# Patient Record
Sex: MALE | Race: BLACK OR AFRICAN AMERICAN | NOT HISPANIC OR LATINO | Employment: OTHER | URBAN - METROPOLITAN AREA
[De-identification: names, ages, dates, MRNs, and addresses within clinical notes are randomized per-mention and may not be internally consistent; named-entity substitution may affect disease eponyms.]

---

## 2021-07-27 RX ORDER — DIAPER,BRIEF,INFANT-TODD,DISP
EACH MISCELLANEOUS
COMMUNITY

## 2021-07-27 RX ORDER — GLUCOSAM/MSM/CHOND/HYALURON AC 750-60-150
TABLET ORAL
COMMUNITY

## 2021-07-28 ENCOUNTER — TELEPHONE (OUTPATIENT)
Dept: INTERNAL MEDICINE CLINIC | Facility: CLINIC | Age: 71
End: 2021-07-28

## 2021-07-28 ENCOUNTER — OFFICE VISIT (OUTPATIENT)
Dept: INTERNAL MEDICINE CLINIC | Facility: CLINIC | Age: 71
End: 2021-07-28
Payer: COMMERCIAL

## 2021-07-28 VITALS
OXYGEN SATURATION: 97 % | HEIGHT: 70 IN | WEIGHT: 214 LBS | BODY MASS INDEX: 30.64 KG/M2 | HEART RATE: 77 BPM | DIASTOLIC BLOOD PRESSURE: 90 MMHG | TEMPERATURE: 97.6 F | SYSTOLIC BLOOD PRESSURE: 168 MMHG

## 2021-07-28 DIAGNOSIS — I10 ESSENTIAL HYPERTENSION: ICD-10-CM

## 2021-07-28 DIAGNOSIS — Z12.5 SCREENING FOR PROSTATE CANCER: ICD-10-CM

## 2021-07-28 DIAGNOSIS — N52.9 ERECTILE DYSFUNCTION, UNSPECIFIED ERECTILE DYSFUNCTION TYPE: ICD-10-CM

## 2021-07-28 DIAGNOSIS — Z13.0 ENCOUNTER FOR SCREENING FOR DISEASES OF THE BLOOD AND BLOOD-FORMING ORGANS AND CERTAIN DISORDERS INVOLVING THE IMMUNE MECHANISM: ICD-10-CM

## 2021-07-28 DIAGNOSIS — E55.9 VITAMIN D DEFICIENCY: ICD-10-CM

## 2021-07-28 DIAGNOSIS — Z12.12 SCREENING FOR COLORECTAL CANCER: ICD-10-CM

## 2021-07-28 DIAGNOSIS — Z13.29 SCREENING FOR THYROID DISORDER: ICD-10-CM

## 2021-07-28 DIAGNOSIS — Z11.59 NEED FOR HEPATITIS C SCREENING TEST: ICD-10-CM

## 2021-07-28 DIAGNOSIS — Z12.11 SCREENING FOR COLORECTAL CANCER: ICD-10-CM

## 2021-07-28 DIAGNOSIS — Z13.220 SCREENING FOR HYPERCHOLESTEROLEMIA: ICD-10-CM

## 2021-07-28 DIAGNOSIS — H54.7 POOR VISION: ICD-10-CM

## 2021-07-28 DIAGNOSIS — Z00.00 WELCOME TO MEDICARE PREVENTIVE VISIT: Primary | ICD-10-CM

## 2021-07-28 DIAGNOSIS — Z23 ENCOUNTER FOR IMMUNIZATION: ICD-10-CM

## 2021-07-28 DIAGNOSIS — Z13.1 SCREENING FOR DIABETES MELLITUS: ICD-10-CM

## 2021-07-28 PROCEDURE — 1160F RVW MEDS BY RX/DR IN RCRD: CPT | Performed by: INTERNAL MEDICINE

## 2021-07-28 PROCEDURE — 93000 ELECTROCARDIOGRAM COMPLETE: CPT | Performed by: INTERNAL MEDICINE

## 2021-07-28 PROCEDURE — 1170F FXNL STATUS ASSESSED: CPT | Performed by: INTERNAL MEDICINE

## 2021-07-28 PROCEDURE — 3725F SCREEN DEPRESSION PERFORMED: CPT | Performed by: INTERNAL MEDICINE

## 2021-07-28 PROCEDURE — 3008F BODY MASS INDEX DOCD: CPT | Performed by: INTERNAL MEDICINE

## 2021-07-28 PROCEDURE — 1101F PT FALLS ASSESS-DOCD LE1/YR: CPT | Performed by: INTERNAL MEDICINE

## 2021-07-28 PROCEDURE — 3288F FALL RISK ASSESSMENT DOCD: CPT | Performed by: INTERNAL MEDICINE

## 2021-07-28 PROCEDURE — 1036F TOBACCO NON-USER: CPT | Performed by: INTERNAL MEDICINE

## 2021-07-28 PROCEDURE — 1125F AMNT PAIN NOTED PAIN PRSNT: CPT | Performed by: INTERNAL MEDICINE

## 2021-07-28 PROCEDURE — G0402 INITIAL PREVENTIVE EXAM: HCPCS | Performed by: INTERNAL MEDICINE

## 2021-07-28 RX ORDER — SILDENAFIL 50 MG/1
50 TABLET, FILM COATED ORAL DAILY PRN
Qty: 10 TABLET | Refills: 5 | Status: SHIPPED | OUTPATIENT
Start: 2021-07-28

## 2021-07-28 NOTE — ASSESSMENT & PLAN NOTE
Next patient's 1st visit here, patient get some outpatient readings to see how he is and will recheck in a few months, will also check

## 2021-07-28 NOTE — ASSESSMENT & PLAN NOTE
Discussed preventative health, cancer screening, immunizations, and safety issues  I recommend screening colonoscopy  I recommend screening labs including hepatitis C screening  Regarding immunizations, I recommend yearly flu shot, I recommend Prevnar 13, then Pneumovax 23 one year later  I recommend Tdap vaccination at pharmacy  I recommend getting the Shingrix shot to help prevent Shingles  You can get it a pharmacy, and they can administer it there  It is a two shot series with the second shot needed between 2-6 months after the first shot  I would not recommend getting the shot before an important or fun event in case you were to have a reaction to the shot like a sore arm or flu-like symptoms  I make the same recommendation about any shot, as people can have a reaction to any shot

## 2021-07-28 NOTE — PATIENT INSTRUCTIONS
Problem List Items Addressed This Visit        Cardiovascular and Mediastinum    Essential hypertension       Next patient's 1st visit here, patient get some outpatient readings to see how he is and will recheck in a few months, will also check            Other    Welcome to Medicare preventive visit - Primary     Discussed preventative health, cancer screening, immunizations, and safety issues  I recommend screening colonoscopy  I recommend screening labs including hepatitis C screening  Regarding immunizations, I recommend yearly flu shot, I recommend Prevnar 13, then Pneumovax 23 one year later  I recommend Tdap vaccination at pharmacy  I recommend getting the Shingrix shot to help prevent Shingles  You can get it a pharmacy, and they can administer it there  It is a two shot series with the second shot needed between 2-6 months after the first shot  I would not recommend getting the shot before an important or fun event in case you were to have a reaction to the shot like a sore arm or flu-like symptoms  I make the same recommendation about any shot, as people can have a reaction to any shot           Relevant Orders    POCT ECG (Completed)    Erectile dysfunction     Try Viagra 50 mg as needed and also check testosterone level         Relevant Medications    sildenafil (VIAGRA) 50 MG tablet    Other Relevant Orders    Testosterone, free, total      Other Visit Diagnoses     Need for hepatitis C screening test        Relevant Orders    Hepatitis C Antibody ([facility])    Screening for colorectal cancer        Relevant Orders    Ambulatory referral to Gastroenterology    Encounter for immunization        Encounter for screening for diseases of the blood and blood-forming organs and certain disorders involving the immune mechanism        Relevant Orders    CBC and differential    Screening for diabetes mellitus        Relevant Orders    Comprehensive metabolic panel    Screening for hypercholesterolemia        Relevant Orders    Lipid Panel with Direct LDL reflex    Screening for thyroid disorder        Relevant Orders    TSH, 3rd generation with Free T4 reflex    Screening for prostate cancer        Relevant Orders    PSA, Total Screen    Poor vision        Relevant Orders    Ambulatory referral to Ophthalmology    Vitamin D deficiency        Relevant Orders    Vitamin D 25 hydroxy          Medicare Preventive Visit Patient Instructions  Thank you for completing your Welcome to Medicare Visit or Medicare Annual Wellness Visit today  Your next wellness visit will be due in one year (7/29/2022)  The screening/preventive services that you may require over the next 5-10 years are detailed below  Some tests may not apply to you based off risk factors and/or age  Screening tests ordered at today's visit but not completed yet may show as past due  Also, please note that scanned in results may not display below  Preventive Screenings:  Service Recommendations Previous Testing/Comments   Colorectal Cancer Screening  · Colonoscopy    · Fecal Occult Blood Test (FOBT)/Fecal Immunochemical Test (FIT)  · Fecal DNA/Cologuard Test  · Flexible Sigmoidoscopy Age: 54-65 years old   Colonoscopy: every 10 years (May be performed more frequently if at higher risk)  OR  FOBT/FIT: every 1 year  OR  Cologuard: every 3 years  OR  Sigmoidoscopy: every 5 years  Screening may be recommended earlier than age 48 if at higher risk for colorectal cancer  Also, an individualized decision between you and your healthcare provider will decide whether screening between the ages of 74-80 would be appropriate   Colonoscopy: Not on file  FOBT/FIT: Not on file  Cologuard: Not on file  Sigmoidoscopy: Not on file          Prostate Cancer Screening Individualized decision between patient and health care provider in men between ages of 53-78   Medicare will cover every 12 months beginning on the day after your 50th birthday PSA: No results in last 5 years           Hepatitis C Screening Once for adults born between 1945 and 1965  More frequently in patients at high risk for Hepatitis C Hep C Antibody: Not on file        Diabetes Screening 1-2 times per year if you're at risk for diabetes or have pre-diabetes Fasting glucose: No results in last 5 years   A1C: No results in last 5 years        Cholesterol Screening Once every 5 years if you don't have a lipid disorder  May order more often based on risk factors  Lipid panel: Not on file           Other Preventive Screenings Covered by Medicare:  1  Abdominal Aortic Aneurysm (AAA) Screening: covered once if your at risk  You're considered to be at risk if you have a family history of AAA or a male between the age of 73-68 who smoking at least 100 cigarettes in your lifetime  2  Lung Cancer Screening: covers low dose CT scan once per year if you meet all of the following conditions: (1) Age 50-69; (2) No signs or symptoms of lung cancer; (3) Current smoker or have quit smoking within the last 15 years; (4) You have a tobacco smoking history of at least 30 pack years (packs per day x number of years you smoked); (5) You get a written order from a healthcare provider  3  Glaucoma Screening: covered annually if you're considered high risk: (1) You have diabetes OR (2) Family history of glaucoma OR (3)  aged 48 and older OR (3)  American aged 72 and older  3  Osteoporosis Screening: covered every 2 years if you meet one of the following conditions: (1) Have a vertebral abnormality; (2) On glucocorticoid therapy for more than 3 months; (3) Have primary hyperparathyroidism; (4) On osteoporosis medications and need to assess response to drug therapy  5  HIV Screening: covered annually if you're between the age of 12-76  Also covered annually if you are younger than 13 and older than 72 with risk factors for HIV infection   For pregnant patients, it is covered up to 3 times per pregnancy  Immunizations:  Immunization Recommendations   Influenza Vaccine Annual influenza vaccination during flu season is recommended for all persons aged >= 6 months who do not have contraindications   Pneumococcal Vaccine (Prevnar and Pneumovax)  * Prevnar = PCV13  * Pneumovax = PPSV23 Adults 25-60 years old: 1-3 doses may be recommended based on certain risk factors  Adults 72 years old: Prevnar (PCV13) vaccine recommended followed by Pneumovax (PPSV23) vaccine  If already received PPSV23 since turning 65, then PCV13 recommended at least one year after PPSV23 dose  Hepatitis B Vaccine 3 dose series if at intermediate or high risk (ex: diabetes, end stage renal disease, liver disease)   Tetanus (Td) Vaccine - COST NOT COVERED BY MEDICARE PART B Following completion of primary series, a booster dose should be given every 10 years to maintain immunity against tetanus  Td may also be given as tetanus wound prophylaxis  Tdap Vaccine - COST NOT COVERED BY MEDICARE PART B Recommended at least once for all adults  For pregnant patients, recommended with each pregnancy  Shingles Vaccine (Shingrix) - COST NOT COVERED BY MEDICARE PART B  2 shot series recommended in those aged 48 and above     Health Maintenance Due:      Topic Date Due    Hepatitis C Screening  Never done    Colorectal Cancer Screening  Never done     Immunizations Due:      Topic Date Due    Pneumococcal Vaccine: 65+ Years (1 of 1 - PPSV23) Never done    Influenza Vaccine (1) 09/01/2021     Advance Directives   What are advance directives? Advance directives are legal documents that state your wishes and plans for medical care  These plans are made ahead of time in case you lose your ability to make decisions for yourself  Advance directives can apply to any medical decision, such as the treatments you want, and if you want to donate organs  What are the types of advance directives?   There are many types of advance directives, and each state has rules about how to use them  You may choose a combination of any of the following:  · Living will: This is a written record of the treatment you want  You can also choose which treatments you do not want, which to limit, and which to stop at a certain time  This includes surgery, medicine, IV fluid, and tube feedings  · Durable power of  for healthcare [facility]): This is a written record that states who you want to make healthcare choices for you when you are unable to make them for yourself  This person, called a proxy, is usually a family member or a friend  You may choose more than 1 proxy  · Do not resuscitate (DNR) order:  A DNR order is used in case your heart stops beating or you stop breathing  It is a request not to have certain forms of treatment, such as CPR  A DNR order may be included in other types of advance directives  · Medical directive: This covers the care that you want if you are in a coma, near death, or unable to make decisions for yourself  You can list the treatments you want for each condition  Treatment may include pain medicine, surgery, blood transfusions, dialysis, IV or tube feedings, and a ventilator (breathing machine)  · Values history: This document has questions about your views, beliefs, and how you feel and think about life  This information can help others choose the care that you would choose  Why are advance directives important? An advance directive helps you control your care  Although spoken wishes may be used, it is better to have your wishes written down  Spoken wishes can be misunderstood, or not followed  Treatments may be given even if you do not want them  An advance directive may make it easier for your family to make difficult choices about your care     Weight Management   Why it is important to manage your weight:  Being overweight increases your risk of health conditions such as heart disease, high blood pressure, type 2 diabetes, and certain types of cancer  It can also increase your risk for osteoarthritis, sleep apnea, and other respiratory problems  Aim for a slow, steady weight loss  Even a small amount of weight loss can lower your risk of health problems  How to lose weight safely:  A safe and healthy way to lose weight is to eat fewer calories and get regular exercise  You can lose up about 1 pound a week by decreasing the number of calories you eat by 500 calories each day  Healthy meal plan for weight management:  A healthy meal plan includes a variety of foods, contains fewer calories, and helps you stay healthy  A healthy meal plan includes the following:  · Eat whole-grain foods more often  A healthy meal plan should contain fiber  Fiber is the part of grains, fruits, and vegetables that is not broken down by your body  Whole-grain foods are healthy and provide extra fiber in your diet  Some examples of whole-grain foods are whole-wheat breads and pastas, oatmeal, brown rice, and bulgur  · Eat a variety of vegetables every day  Include dark, leafy greens such as spinach, kale, theo greens, and mustard greens  Eat yellow and orange vegetables such as carrots, sweet potatoes, and winter squash  · Eat a variety of fruits every day  Choose fresh or canned fruit (canned in its own juice or light syrup) instead of juice  Fruit juice has very little or no fiber  · Eat low-fat dairy foods  Drink fat-free (skim) milk or 1% milk  Eat fat-free yogurt and low-fat cottage cheese  Try low-fat cheeses such as mozzarella and other reduced-fat cheeses  · Choose meat and other protein foods that are low in fat  Choose beans or other legumes such as split peas or lentils  Choose fish, skinless poultry (chicken or turkey), or lean cuts of red meat (beef or pork)  Before you cook meat or poultry, cut off any visible fat  · Use less fat and oil  Try baking foods instead of frying them   Add less fat, such as margarine, sour cream, regular salad dressing and mayonnaise to foods  Eat fewer high-fat foods  Some examples of high-fat foods include french fries, doughnuts, ice cream, and cakes  · Eat fewer sweets  Limit foods and drinks that are high in sugar  This includes candy, cookies, regular soda, and sweetened drinks  Exercise:  Exercise at least 30 minutes per day on most days of the week  Some examples of exercise include walking, biking, dancing, and swimming  You can also fit in more physical activity by taking the stairs instead of the elevator or parking farther away from stores  Ask your healthcare provider about the best exercise plan for you  © Copyright [employer] 2018 Information is for End User's use only and may not be sold, redistributed or otherwise used for commercial purposes   All illustrations and images included in CareNotes® are the copyrighted property of [employer]  or [address] [place] [address]

## 2021-07-28 NOTE — PROGRESS NOTES
Assessment and Plan:     Problem List Items Addressed This Visit        Cardiovascular and Mediastinum    Essential hypertension       Next patient's 1st visit here, patient get some outpatient readings to see how he is and will recheck in a few months, will also check            Other    Welcome to Medicare preventive visit - Primary     Discussed preventative health, cancer screening, immunizations, and safety issues  I recommend screening colonoscopy  I recommend screening labs including hepatitis C screening  Regarding immunizations, I recommend yearly flu shot, I recommend Prevnar 13, then Pneumovax 23 one year later  I recommend Tdap vaccination at pharmacy  I recommend getting the Shingrix shot to help prevent Shingles  You can get it a pharmacy, and they can administer it there  It is a two shot series with the second shot needed between 2-6 months after the first shot  I would not recommend getting the shot before an important or fun event in case you were to have a reaction to the shot like a sore arm or flu-like symptoms  I make the same recommendation about any shot, as people can have a reaction to any shot           Relevant Orders    POCT ECG (Completed)    Erectile dysfunction     Try Viagra 50 mg as needed and also check testosterone level         Relevant Medications    sildenafil (VIAGRA) 50 MG tablet    Other Relevant Orders    Testosterone, free, total      Other Visit Diagnoses     Need for hepatitis C screening test        Relevant Orders    Hepatitis C Antibody ([facility])    Screening for colorectal cancer        Relevant Orders    Ambulatory referral to Gastroenterology    Encounter for immunization        Encounter for screening for diseases of the blood and blood-forming organs and certain disorders involving the immune mechanism        Relevant Orders    CBC and differential    Screening for diabetes mellitus        Relevant Orders    Comprehensive metabolic panel Screening for hypercholesterolemia        Relevant Orders    Lipid Panel with Direct LDL reflex    Screening for thyroid disorder        Relevant Orders    TSH, 3rd generation with Free T4 reflex    Screening for prostate cancer        Relevant Orders    PSA, Total Screen    Poor vision        Relevant Orders    Ambulatory referral to Ophthalmology    Vitamin D deficiency        Relevant Orders    Vitamin D 25 hydroxy        BMI Counseling: Body mass index is 30 71 kg/m²  The BMI is above normal  Nutrition recommendations include encouraging healthy choices of fruits and vegetables and moderation in carbohydrate intake  Exercise recommendations include exercising 3-5 times per week  Preventive health issues were discussed with patient, and age appropriate screening tests were ordered as noted in patient's After Visit Summary  Personalized health advice and appropriate referrals for health education or preventive services given if needed, as noted in patient's After Visit Summary  History of Present Illness:     Patient presents for Medicare Annual Wellness visit    Patient Care Team:  Evan Watson MD as PCP - General (Internal Medicine)     Problem List:     Patient Active Problem List   Diagnosis    Welcome to Medicare preventive visit    Essential hypertension    Erectile dysfunction      Past Medical and Surgical History:     No past medical history on file    Past Surgical History:   Procedure Laterality Date    APPENDECTOMY        Family History:     Family History   Problem Relation Age of Onset    No Known Problems Mother     Cancer Father         stomach      Social History:     Social History     Socioeconomic History    Marital status: /Civil Union     Spouse name: Not on file    Number of children: Not on file    Years of education: Not on file    Highest education level: Not on file   Occupational History    Occupation: retired     Comment: construction   Tobacco Use    Smoking status: Never Smoker    Smokeless tobacco: Never Used   Substance and Sexual Activity    Alcohol use: Not Currently    Drug use: Not on file    Sexual activity: Yes     Partners: Female   Other Topics Concern    Not on file   Social History Narrative     with kids and grandkids     Social Determinants of Health     Financial Resource Strain:     Difficulty of Paying Living Expenses:    Food Insecurity:     Worried About Running Out of Food in the Last Year:     [address] in the Last Year:    Transportation Needs:     Lack of Transportation (Medical):  Lack of Transportation (Non-Medical):    Physical Activity:     Days of Exercise per Week:     Minutes of Exercise per Session:    Stress:     Feeling of Stress :    Social Connections:     Frequency of Communication with Friends and Family:     Frequency of Social Gatherings with Friends and Family:     Attends Jew Services:     Active Member of Clubs or Organizations:     Attends Club or Organization Meetings:     Marital Status:    Intimate Partner Violence:     Fear of Current or Ex-Partner:     Emotionally Abused:     Physically Abused:     Sexually Abused:       Medications and Allergies:     Current Outpatient Medications   Medication Sig Dispense Refill    Glucos-Chondroit-Hyaluron-MSM (Glucosamine Chondroitin Joint) TABS Take by mouth      Multiple Vitamins-Minerals (CENTRUM SILVER PO) Take by mouth      hydrocortisone (Cortizone-10) 1 % ointment Apply topically (Patient not taking: Reported on 7/28/2021)      sildenafil (VIAGRA) 50 MG tablet Take 1 tablet (50 mg total) by mouth daily as needed for erectile dysfunction 10 tablet 5    triamcinolone (KENALOG) 0 1 % ointment Apply topically 2 (two) times a day (Patient not taking: Reported on 7/28/2021)       No current facility-administered medications for this visit       Allergies   Allergen Reactions    Penicillins Other (See Comments) Immunizations:     Immunization History   Administered Date(s) Administered    SARS-CoV-2 / COVID-19 mRNA IM (Pfizer-BioNTech) 03/28/2021, 04/18/2021      Health Maintenance:         Topic Date Due    Hepatitis C Screening  Never done    Colorectal Cancer Screening  Never done         Topic Date Due    Pneumococcal Vaccine: 65+ Years (1 of 1 - PPSV23) Never done    Influenza Vaccine (1) 09/01/2021      Medicare Health Risk Assessment:     /90   Pulse 77   Temp 97 6 °F (36 4 °C) (Temporal)   Ht 5' 10" (1 778 m)   Wt 97 1 kg (214 lb)   SpO2 97%   BMI 30 71 kg/m²      Ansley Jackson is here for his Welcome to Medicare visit  Health Risk Assessment:   Patient rates overall health as fair  Patient feels that their physical health rating is slightly better  Patient is satisfied with their life  Eyesight was rated as same  Hearing was rated as same  Patient feels that their emotional and mental health rating is slightly worse  Patients states they are never, rarely angry  Patient states they are often unusually tired/fatigued  Pain experienced in the last 7 days has been none  Patient states that he has experienced no weight loss or gain in last 6 months  Depression Screening:   PHQ-2 Score: 0      Fall Risk Screening: In the past year, patient has experienced: no history of falling in past year      Home Safety:  Patient does not have trouble with stairs inside or outside of their home  Patient has working smoke alarms and has working carbon monoxide detector  Home safety hazards include: none  Nutrition:   Current diet is Regular  Medications:   Patient is currently taking over-the-counter supplements  OTC medications include: see medication list  Patient is able to manage medications       Activities of Daily Living (ADLs)/Instrumental Activities of Daily Living (IADLs):   Walk and transfer into and out of bed and chair?: Yes  Dress and groom yourself?: Yes    Bathe or shower yourself?: Yes Feed yourself? Yes  Do your laundry/housekeeping?: Yes  Manage your money, pay your bills and track your expenses?: Yes  Make your own meals?: Yes    Do your own shopping?: Yes    Previous Hospitalizations:   Any hospitalizations or ED visits within the last 12 months?: No      Advance Care Planning:   Living will: No    Durable POA for healthcare: Yes    Advanced directive: No    Advanced directive counseling given: Yes      Cognitive Screening:   Provider or family/friend/caregiver concerned regarding cognition?: No    PREVENTIVE SCREENINGS      Cardiovascular Screening:    General: Risks and Benefits Discussed    Due for: Lipid Panel      Diabetes Screening:     General: Risks and Benefits Discussed    Due for: Blood Glucose      Colorectal Cancer Screening:     General: Risks and Benefits Discussed    Due for: Colonoscopy - Low Risk      Prostate Cancer Screening:    General: Risks and Benefits Discussed    Due for: PSA      Osteoporosis Screening:    General: Screening Not Indicated      Abdominal Aortic Aneurysm (AAA) Screening:    Risk factors include: age between 73-67 yo        General: Screening Not Indicated      Lung Cancer Screening:     General: Screening Not Indicated      Hepatitis C Screening:    General: Risks and Benefits Discussed    Hep C Screening Accepted: Yes      Screening, Brief Intervention, and Referral to Treatment (SBIRT)    Screening    Typical number of drinks in a week: 0    Single Item Drug Screening:  How often have you used an illegal drug (including marijuana) or a prescription medication for non-medical reasons in the past year? never    Single Item Drug Screen Score: 0  Interpretation: Negative screen for possible drug use disorder    Brief Intervention  Alcohol & drug use screenings were reviewed  No concerns regarding substance use disorder identified  Other Counseling Topics:   Car/seat belt/driving safety, skin self-exam and sunscreen       Review of Systems Constitutional: Negative for chills, fatigue and fever  HENT: Negative for congestion, nosebleeds, postnasal drip, sore throat and trouble swallowing  Eyes: Negative for pain  Respiratory: Negative for cough, chest tightness, shortness of breath and wheezing  Cardiovascular: Positive for palpitations (occasional)  Negative for chest pain and leg swelling  Gastrointestinal: Negative for abdominal pain, constipation, diarrhea, nausea and vomiting  Endocrine: Negative for polydipsia and polyuria  Genitourinary: Negative for dysuria, flank pain and hematuria  Has some ED   Musculoskeletal: Positive for arthralgias  Skin: Negative for rash  Neurological: Negative for dizziness, tremors, light-headedness and headaches  Hematological: Does not bruise/bleed easily  Psychiatric/Behavioral: Negative for confusion and dysphoric mood  The patient is not nervous/anxious  Physical Exam  Vitals reviewed  Constitutional:       General: He is not in acute distress  Appearance: Normal appearance  He is well-developed  HENT:      Head: Normocephalic and atraumatic  Right Ear: External ear normal       Left Ear: External ear normal       Nose: Nose normal    Eyes:      General: No scleral icterus  Conjunctiva/sclera: Conjunctivae normal       Comments: 20/40 vision in both eyes uncorrected, 20/50 in right eye uncorrected, 20/50 left eye uncorrected   Neck:      Trachea: No tracheal deviation  Cardiovascular:      Rate and Rhythm: Normal rate and regular rhythm  Heart sounds: Normal heart sounds  No murmur heard  Pulmonary:      Effort: Pulmonary effort is normal  No respiratory distress  Breath sounds: Normal breath sounds  No wheezing or rales  Abdominal:      General: Bowel sounds are normal       Palpations: Abdomen is soft  There is no mass  Tenderness: There is no abdominal tenderness  There is no guarding        Hernia: There is no hernia in the left inguinal area or right inguinal area  Genitourinary:     Epididymis:      Right: Normal       Comments: Left testicle absent  Musculoskeletal:      Cervical back: Normal range of motion and neck supple  Right lower leg: No edema  Left lower leg: No edema  Lymphadenopathy:      Cervical: No cervical adenopathy  Skin:     Coloration: Skin is not jaundiced or pale  Neurological:      General: No focal deficit present  Mental Status: He is alert and oriented to person, place, and time  Psychiatric:         Mood and Affect: Mood normal          Behavior: Behavior normal          Thought Content:  Thought content normal          Judgment: Judgment normal        Qi Boudreaux MD

## 2021-07-28 NOTE — TELEPHONE ENCOUNTER
Pt called to ask if there is a less expensive option for Viagra - the pharmacy advised him it is $300 for 10 pills  Is there a generic or alternitive?

## 2021-08-11 ENCOUNTER — LAB (OUTPATIENT)
Dept: LAB | Facility: HOSPITAL | Age: 71
End: 2021-08-11
Payer: COMMERCIAL

## 2021-08-11 DIAGNOSIS — E55.9 VITAMIN D DEFICIENCY: ICD-10-CM

## 2021-08-11 DIAGNOSIS — Z13.1 SCREENING FOR DIABETES MELLITUS: ICD-10-CM

## 2021-08-11 DIAGNOSIS — N52.9 ERECTILE DYSFUNCTION, UNSPECIFIED ERECTILE DYSFUNCTION TYPE: ICD-10-CM

## 2021-08-11 DIAGNOSIS — Z12.5 SCREENING FOR PROSTATE CANCER: ICD-10-CM

## 2021-08-11 DIAGNOSIS — Z11.59 NEED FOR HEPATITIS C SCREENING TEST: ICD-10-CM

## 2021-08-11 DIAGNOSIS — Z13.220 SCREENING FOR HYPERCHOLESTEROLEMIA: ICD-10-CM

## 2021-08-11 DIAGNOSIS — Z13.29 SCREENING FOR THYROID DISORDER: ICD-10-CM

## 2021-08-11 DIAGNOSIS — Z13.0 ENCOUNTER FOR SCREENING FOR DISEASES OF THE BLOOD AND BLOOD-FORMING ORGANS AND CERTAIN DISORDERS INVOLVING THE IMMUNE MECHANISM: ICD-10-CM

## 2021-08-11 LAB
25(OH)D3 SERPL-MCNC: 34 NG/ML (ref 30–100)
ALBUMIN SERPL BCP-MCNC: 3.7 G/DL (ref 3.5–5)
ALP SERPL-CCNC: 61 U/L (ref 46–116)
ALT SERPL W P-5'-P-CCNC: 37 U/L (ref 12–78)
ANION GAP SERPL CALCULATED.3IONS-SCNC: 7 MMOL/L (ref 4–13)
AST SERPL W P-5'-P-CCNC: 27 U/L (ref 5–45)
BASOPHILS # BLD AUTO: 0.04 THOUSANDS/ΜL (ref 0–0.1)
BASOPHILS NFR BLD AUTO: 1 % (ref 0–1)
BILIRUB SERPL-MCNC: 0.48 MG/DL (ref 0.2–1)
BUN SERPL-MCNC: 17 MG/DL (ref 5–25)
CALCIUM SERPL-MCNC: 8.9 MG/DL (ref 8.3–10.1)
CHLORIDE SERPL-SCNC: 111 MMOL/L (ref 100–108)
CHOLEST SERPL-MCNC: 171 MG/DL (ref 50–200)
CO2 SERPL-SCNC: 28 MMOL/L (ref 21–32)
CREAT SERPL-MCNC: 1.35 MG/DL (ref 0.6–1.3)
EOSINOPHIL # BLD AUTO: 0.18 THOUSAND/ΜL (ref 0–0.61)
EOSINOPHIL NFR BLD AUTO: 3 % (ref 0–6)
ERYTHROCYTE [DISTWIDTH] IN BLOOD BY AUTOMATED COUNT: 13.9 % (ref 11.6–15.1)
GFR SERPL CREATININE-BSD FRML MDRD: 60 ML/MIN/1.73SQ M
GLUCOSE P FAST SERPL-MCNC: 97 MG/DL (ref 65–99)
HCT VFR BLD AUTO: 41.4 % (ref 36.5–49.3)
HCV AB SER QL: NORMAL
HDLC SERPL-MCNC: 63 MG/DL
HGB BLD-MCNC: 13.6 G/DL (ref 12–17)
IMM GRANULOCYTES # BLD AUTO: 0.01 THOUSAND/UL (ref 0–0.2)
IMM GRANULOCYTES NFR BLD AUTO: 0 % (ref 0–2)
LDLC SERPL CALC-MCNC: 98 MG/DL (ref 0–100)
LYMPHOCYTES # BLD AUTO: 1.85 THOUSANDS/ΜL (ref 0.6–4.47)
LYMPHOCYTES NFR BLD AUTO: 34 % (ref 14–44)
MCH RBC QN AUTO: 31.7 PG (ref 26.8–34.3)
MCHC RBC AUTO-ENTMCNC: 32.9 G/DL (ref 31.4–37.4)
MCV RBC AUTO: 97 FL (ref 82–98)
MONOCYTES # BLD AUTO: 0.54 THOUSAND/ΜL (ref 0.17–1.22)
MONOCYTES NFR BLD AUTO: 10 % (ref 4–12)
NEUTROPHILS # BLD AUTO: 2.81 THOUSANDS/ΜL (ref 1.85–7.62)
NEUTS SEG NFR BLD AUTO: 52 % (ref 43–75)
NRBC BLD AUTO-RTO: 0 /100 WBCS
PLATELET # BLD AUTO: 144 THOUSANDS/UL (ref 149–390)
PMV BLD AUTO: 11.1 FL (ref 8.9–12.7)
POTASSIUM SERPL-SCNC: 3.7 MMOL/L (ref 3.5–5.3)
PROT SERPL-MCNC: 6.7 G/DL (ref 6.4–8.2)
PSA SERPL-MCNC: 2.3 NG/ML (ref 0–4)
RBC # BLD AUTO: 4.29 MILLION/UL (ref 3.88–5.62)
SODIUM SERPL-SCNC: 146 MMOL/L (ref 136–145)
TRIGL SERPL-MCNC: 51 MG/DL
TSH SERPL DL<=0.05 MIU/L-ACNC: 2.23 UIU/ML (ref 0.36–3.74)
WBC # BLD AUTO: 5.43 THOUSAND/UL (ref 4.31–10.16)

## 2021-08-11 PROCEDURE — 80061 LIPID PANEL: CPT

## 2021-08-11 PROCEDURE — 82306 VITAMIN D 25 HYDROXY: CPT

## 2021-08-11 PROCEDURE — 80053 COMPREHEN METABOLIC PANEL: CPT

## 2021-08-11 PROCEDURE — 84402 ASSAY OF FREE TESTOSTERONE: CPT

## 2021-08-11 PROCEDURE — 36415 COLL VENOUS BLD VENIPUNCTURE: CPT

## 2021-08-11 PROCEDURE — 85025 COMPLETE CBC W/AUTO DIFF WBC: CPT

## 2021-08-11 PROCEDURE — 84403 ASSAY OF TOTAL TESTOSTERONE: CPT

## 2021-08-11 PROCEDURE — 86803 HEPATITIS C AB TEST: CPT

## 2021-08-11 PROCEDURE — G0103 PSA SCREENING: HCPCS

## 2021-08-11 PROCEDURE — 84443 ASSAY THYROID STIM HORMONE: CPT

## 2021-08-12 LAB
TESTOST FREE SERPL-MCNC: 8.4 PG/ML (ref 6.6–18.1)
TESTOST SERPL-MCNC: 451 NG/DL (ref 264–916)

## 2021-08-26 ENCOUNTER — TELEPHONE (OUTPATIENT)
Dept: INTERNAL MEDICINE CLINIC | Facility: CLINIC | Age: 71
End: 2021-08-26

## 2021-08-26 NOTE — TELEPHONE ENCOUNTER
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

## 2021-08-27 ENCOUNTER — APPOINTMENT (OUTPATIENT)
Dept: LAB | Facility: HOSPITAL | Age: 71
End: 2021-08-27
Payer: COMMERCIAL

## 2021-08-27 ENCOUNTER — TELEPHONE (OUTPATIENT)
Dept: INTERNAL MEDICINE CLINIC | Facility: CLINIC | Age: 71
End: 2021-08-27

## 2021-08-27 DIAGNOSIS — Z11.1 SCREENING-PULMONARY TB: ICD-10-CM

## 2021-08-27 DIAGNOSIS — Z11.1 SCREENING-PULMONARY TB: Primary | ICD-10-CM

## 2021-08-27 PROCEDURE — 86480 TB TEST CELL IMMUN MEASURE: CPT

## 2021-08-27 PROCEDURE — 36415 COLL VENOUS BLD VENIPUNCTURE: CPT

## 2021-08-27 NOTE — TELEPHONE ENCOUNTER
Patient called asking if you can write a script to get TB test done at another [address] [place] [address]'s location closer to him  Please advise

## 2021-08-31 ENCOUNTER — TELEPHONE (OUTPATIENT)
Dept: INTERNAL MEDICINE CLINIC | Facility: CLINIC | Age: 71
End: 2021-08-31

## 2021-08-31 LAB
GAMMA INTERFERON BACKGROUND BLD IA-ACNC: 0.02 IU/ML
M TB IFN-G BLD-IMP: NEGATIVE
M TB IFN-G CD4+ BCKGRND COR BLD-ACNC: 0 IU/ML
M TB IFN-G CD4+ BCKGRND COR BLD-ACNC: 0 IU/ML
MITOGEN IGNF BCKGRD COR BLD-ACNC: >10 IU/ML

## 2021-08-31 NOTE — TELEPHONE ENCOUNTER
KYLER -- Pt called to adv he had a PPD test done and he needs forms filled out - he is going to send the forms to us via [facility]

## 2021-10-06 ENCOUNTER — OFFICE VISIT (OUTPATIENT)
Dept: INTERNAL MEDICINE CLINIC | Facility: CLINIC | Age: 71
End: 2021-10-06
Payer: COMMERCIAL

## 2021-10-06 VITALS
BODY MASS INDEX: 30.56 KG/M2 | WEIGHT: 213 LBS | OXYGEN SATURATION: 98 % | DIASTOLIC BLOOD PRESSURE: 84 MMHG | TEMPERATURE: 95.8 F | SYSTOLIC BLOOD PRESSURE: 146 MMHG | HEART RATE: 77 BPM

## 2021-10-06 DIAGNOSIS — I10 ESSENTIAL HYPERTENSION: Primary | ICD-10-CM

## 2021-10-06 PROCEDURE — 3077F SYST BP >= 140 MM HG: CPT | Performed by: INTERNAL MEDICINE

## 2021-10-06 PROCEDURE — 1160F RVW MEDS BY RX/DR IN RCRD: CPT | Performed by: INTERNAL MEDICINE

## 2021-10-06 PROCEDURE — 99213 OFFICE O/P EST LOW 20 MIN: CPT | Performed by: INTERNAL MEDICINE

## 2021-10-06 PROCEDURE — 1036F TOBACCO NON-USER: CPT | Performed by: INTERNAL MEDICINE

## 2021-10-06 PROCEDURE — 3079F DIAST BP 80-89 MM HG: CPT | Performed by: INTERNAL MEDICINE

## 2021-10-06 RX ORDER — AMLODIPINE BESYLATE 2.5 MG/1
2.5 TABLET ORAL DAILY
Qty: 90 TABLET | Refills: 3 | Status: SHIPPED | OUTPATIENT
Start: 2021-10-06 | End: 2021-12-29 | Stop reason: SDUPTHER

## 2021-12-29 ENCOUNTER — OFFICE VISIT (OUTPATIENT)
Dept: INTERNAL MEDICINE CLINIC | Facility: CLINIC | Age: 71
End: 2021-12-29
Payer: COMMERCIAL

## 2021-12-29 VITALS
SYSTOLIC BLOOD PRESSURE: 138 MMHG | WEIGHT: 210 LBS | HEIGHT: 71 IN | OXYGEN SATURATION: 98 % | HEART RATE: 66 BPM | BODY MASS INDEX: 29.4 KG/M2 | DIASTOLIC BLOOD PRESSURE: 88 MMHG

## 2021-12-29 DIAGNOSIS — Z12.11 SCREENING FOR COLORECTAL CANCER: ICD-10-CM

## 2021-12-29 DIAGNOSIS — I10 ESSENTIAL HYPERTENSION: Primary | ICD-10-CM

## 2021-12-29 DIAGNOSIS — Z12.12 SCREENING FOR COLORECTAL CANCER: ICD-10-CM

## 2021-12-29 DIAGNOSIS — Z23 ENCOUNTER FOR IMMUNIZATION: ICD-10-CM

## 2021-12-29 DIAGNOSIS — H93.8X1 SENSATION OF PLUGGED EAR ON RIGHT SIDE: ICD-10-CM

## 2021-12-29 DIAGNOSIS — N52.9 ERECTILE DYSFUNCTION, UNSPECIFIED ERECTILE DYSFUNCTION TYPE: ICD-10-CM

## 2021-12-29 PROCEDURE — 90670 PCV13 VACCINE IM: CPT

## 2021-12-29 PROCEDURE — 99214 OFFICE O/P EST MOD 30 MIN: CPT | Performed by: INTERNAL MEDICINE

## 2021-12-29 PROCEDURE — G0009 ADMIN PNEUMOCOCCAL VACCINE: HCPCS

## 2021-12-29 RX ORDER — AMLODIPINE BESYLATE 5 MG/1
5 TABLET ORAL DAILY
Qty: 90 TABLET | Refills: 3 | Status: SHIPPED | OUTPATIENT
Start: 2021-12-29 | End: 2022-03-30 | Stop reason: SDUPTHER

## 2022-03-30 ENCOUNTER — OFFICE VISIT (OUTPATIENT)
Dept: INTERNAL MEDICINE CLINIC | Facility: CLINIC | Age: 72
End: 2022-03-30
Payer: COMMERCIAL

## 2022-03-30 VITALS
OXYGEN SATURATION: 99 % | HEART RATE: 65 BPM | SYSTOLIC BLOOD PRESSURE: 150 MMHG | WEIGHT: 209.4 LBS | DIASTOLIC BLOOD PRESSURE: 84 MMHG | BODY MASS INDEX: 29.31 KG/M2 | HEIGHT: 71 IN

## 2022-03-30 DIAGNOSIS — R35.0 FREQUENT URINATION: ICD-10-CM

## 2022-03-30 DIAGNOSIS — D69.6 PLATELETS DECREASED (HCC): ICD-10-CM

## 2022-03-30 DIAGNOSIS — N52.9 ERECTILE DYSFUNCTION, UNSPECIFIED ERECTILE DYSFUNCTION TYPE: ICD-10-CM

## 2022-03-30 DIAGNOSIS — I10 ESSENTIAL HYPERTENSION: Primary | ICD-10-CM

## 2022-03-30 PROCEDURE — 3725F SCREEN DEPRESSION PERFORMED: CPT | Performed by: INTERNAL MEDICINE

## 2022-03-30 PROCEDURE — 3288F FALL RISK ASSESSMENT DOCD: CPT | Performed by: INTERNAL MEDICINE

## 2022-03-30 PROCEDURE — 1036F TOBACCO NON-USER: CPT | Performed by: INTERNAL MEDICINE

## 2022-03-30 PROCEDURE — 3077F SYST BP >= 140 MM HG: CPT | Performed by: INTERNAL MEDICINE

## 2022-03-30 PROCEDURE — 99214 OFFICE O/P EST MOD 30 MIN: CPT | Performed by: INTERNAL MEDICINE

## 2022-03-30 PROCEDURE — 1160F RVW MEDS BY RX/DR IN RCRD: CPT | Performed by: INTERNAL MEDICINE

## 2022-03-30 PROCEDURE — 3079F DIAST BP 80-89 MM HG: CPT | Performed by: INTERNAL MEDICINE

## 2022-03-30 PROCEDURE — 3008F BODY MASS INDEX DOCD: CPT | Performed by: INTERNAL MEDICINE

## 2022-03-30 PROCEDURE — 1101F PT FALLS ASSESS-DOCD LE1/YR: CPT | Performed by: INTERNAL MEDICINE

## 2022-03-30 PROCEDURE — 1003F LEVEL OF ACTIVITY ASSESS: CPT | Performed by: INTERNAL MEDICINE

## 2022-03-30 RX ORDER — AMLODIPINE BESYLATE 10 MG/1
10 TABLET ORAL DAILY
Qty: 90 TABLET | Refills: 3 | Status: SHIPPED | OUTPATIENT
Start: 2022-03-30

## 2022-03-30 NOTE — PROGRESS NOTES
Assessment/Plan:    Essential hypertension  I recommend increasing amlodipine 5 mg daily to 10 mg daily  No constipation or ankle swelling  Erectile dysfunction  Continue med as needed    Platelets decreased ([address] )  Mild, will continue to monitor       Diagnoses and all orders for this visit:    Essential hypertension  -     amLODIPine (NORVASC) 10 mg tablet; Take 1 tablet (10 mg total) by mouth daily    Erectile dysfunction, unspecified erectile dysfunction type    Frequent urination  -     UA w Reflex to Microscopic w Reflex to Culture    Platelets decreased ([address] )        BMI Counseling: Body mass index is 29 21 kg/m²  The BMI is above normal  Nutrition recommendations include encouraging healthy choices of fruits and vegetables and moderation in carbohydrate intake  Exercise recommendations include exercising 3-5 times per week  Rationale for BMI follow-up plan is due to patient being overweight or obese  Depression Screening and Follow-up Plan: Patient was screened for depression during today's encounter  They screened negative with a PHQ-2 score of 0  Subjective:      Patient ID: Lesli Victoria is a 66 y o  male  Pt here for follow up chronic conditions      The following portions of the patient's history were reviewed and updated as appropriate: allergies, current medications, past family history, past medical history, past social history, past surgical history and problem list     Review of Systems   Constitutional: Negative for chills, fatigue and fever  HENT: Negative for congestion, nosebleeds, postnasal drip, sore throat and trouble swallowing  Eyes: Negative for pain  Respiratory: Negative for cough, chest tightness, shortness of breath and wheezing  Cardiovascular: Negative for chest pain, palpitations and leg swelling  Gastrointestinal: Negative for abdominal pain, constipation, diarrhea, nausea and vomiting  Endocrine: Negative for polydipsia and polyuria     Genitourinary: Positive for frequency  Negative for dysuria, flank pain and hematuria  Musculoskeletal: Negative for arthralgias  Skin: Negative for rash  Neurological: Negative for dizziness, tremors, light-headedness and headaches  Hematological: Does not bruise/bleed easily  Psychiatric/Behavioral: Negative for confusion and dysphoric mood  The patient is not nervous/anxious  Objective:      /84 (BP Location: Left arm, Patient Position: Sitting, Cuff Size: Large)   Pulse 65   Ht 5' 11" (1 803 m)   Wt 95 kg (209 lb 6 4 oz)   SpO2 99%   BMI 29 21 kg/m²          Physical Exam  Vitals reviewed  Constitutional:       General: He is not in acute distress  Appearance: Normal appearance  He is well-developed  HENT:      Head: Normocephalic and atraumatic  Right Ear: External ear normal       Left Ear: External ear normal       Nose: Nose normal    Eyes:      General: No scleral icterus  Conjunctiva/sclera: Conjunctivae normal    Neck:      Thyroid: No thyromegaly  Trachea: No tracheal deviation  Cardiovascular:      Rate and Rhythm: Normal rate and regular rhythm  Heart sounds: Normal heart sounds  No murmur heard  Pulmonary:      Effort: Pulmonary effort is normal  No respiratory distress  Breath sounds: Normal breath sounds  No wheezing or rales  Abdominal:      General: Bowel sounds are normal       Palpations: Abdomen is soft  Tenderness: There is no abdominal tenderness  There is no guarding or rebound  Musculoskeletal:      Cervical back: Normal range of motion and neck supple  Right lower leg: No edema  Left lower leg: No edema  Lymphadenopathy:      Cervical: No cervical adenopathy  Skin:     Coloration: Skin is not jaundiced or pale  Neurological:      Mental Status: He is alert and oriented to person, place, and time  Psychiatric:         Behavior: Behavior normal          Thought Content:  Thought content normal          Judgment: Judgment normal

## 2022-03-30 NOTE — PATIENT INSTRUCTIONS
Problem List Items Addressed This Visit        Cardiovascular and Mediastinum    Essential hypertension - Primary     I recommend increasing amlodipine 5 mg daily to 10 mg daily  No constipation or ankle swelling             Relevant Medications    amLODIPine (NORVASC) 10 mg tablet       Other    Erectile dysfunction     Continue med as needed         Platelets decreased ([address] )     Mild, will continue to monitor           Other Visit Diagnoses     Frequent urination        Relevant Orders    UA w Reflex to Microscopic w Reflex to Culture

## 2022-04-22 ENCOUNTER — TELEPHONE (OUTPATIENT)
Dept: GASTROENTEROLOGY | Facility: MEDICAL CENTER | Age: 72
End: 2022-04-22

## 2022-04-22 ENCOUNTER — APPOINTMENT (OUTPATIENT)
Dept: LAB | Facility: HOSPITAL | Age: 72
End: 2022-04-22
Payer: COMMERCIAL

## 2022-04-22 LAB
BILIRUB UR QL STRIP: NEGATIVE
CLARITY UR: CLEAR
COLOR UR: YELLOW
GLUCOSE UR STRIP-MCNC: NEGATIVE MG/DL
HGB UR QL STRIP.AUTO: NEGATIVE
KETONES UR STRIP-MCNC: NEGATIVE MG/DL
LEUKOCYTE ESTERASE UR QL STRIP: NEGATIVE
NITRITE UR QL STRIP: NEGATIVE
PH UR STRIP.AUTO: 6 [PH]
PROT UR STRIP-MCNC: NEGATIVE MG/DL
SP GR UR STRIP.AUTO: >=1.03 (ref 1–1.03)
UROBILINOGEN UR QL STRIP.AUTO: 1 E.U./DL

## 2022-04-22 PROCEDURE — 81003 URINALYSIS AUTO W/O SCOPE: CPT | Performed by: INTERNAL MEDICINE

## 2022-04-25 ENCOUNTER — TELEPHONE (OUTPATIENT)
Dept: INTERNAL MEDICINE CLINIC | Facility: CLINIC | Age: 72
End: 2022-04-25

## 2022-05-04 ENCOUNTER — TELEPHONE (OUTPATIENT)
Dept: GASTROENTEROLOGY | Facility: MEDICAL CENTER | Age: 72
End: 2022-05-04

## 2022-10-12 PROBLEM — Z00.00 WELCOME TO MEDICARE PREVENTIVE VISIT: Status: RESOLVED | Noted: 2021-07-28 | Resolved: 2022-10-12

## 2022-12-05 ENCOUNTER — TELEMEDICINE (OUTPATIENT)
Dept: INTERNAL MEDICINE CLINIC | Facility: CLINIC | Age: 72
End: 2022-12-05

## 2022-12-05 ENCOUNTER — NURSE TRIAGE (OUTPATIENT)
Dept: OTHER | Facility: OTHER | Age: 72
End: 2022-12-05

## 2022-12-05 VITALS — TEMPERATURE: 98.6 F

## 2022-12-05 DIAGNOSIS — U07.1 COVID-19 VIRUS INFECTION: Primary | ICD-10-CM

## 2022-12-05 RX ORDER — NIRMATRELVIR AND RITONAVIR 300-100 MG
3 KIT ORAL 2 TIMES DAILY
Qty: 30 TABLET | Refills: 0 | Status: SHIPPED | OUTPATIENT
Start: 2022-12-05 | End: 2022-12-10

## 2022-12-05 NOTE — PATIENT INSTRUCTIONS
Problem List Items Addressed This Visit          Other    COVID-19 virus infection - Primary     COVID positive, I recommend treating with oral antiviral   Patient should take half the dose of amlodipine while on this medication  Patient should isolate for 5 days from onset of symptoms, then wear mask when around others indoors for an additional 5 days    Patient should reach out if developing shortness of breath         Relevant Medications    nirmatrelvir & ritonavir (Paxlovid, 300/100,) tablet therapy pack

## 2022-12-05 NOTE — TELEPHONE ENCOUNTER
Reason for Disposition  • HIGH RISK for severe COVID complications (e g , weak immune system, age > 59 years, obesity with BMI > 22, pregnant, chronic lung disease or other chronic medical condition) (Exception: Already seen by PCP and no new or worsening symptoms )    Answer Assessment - Initial Assessment Questions  1  COVID-19 DIAGNOSIS: "Who made your COVID-19 diagnosis?" "Was it confirmed by a positive lab test or self-test?" If not diagnosed by a doctor (or NP/PA), ask "Are there lots of cases (community spread) where you live?" Note: See public health department website, if unsure  Symptoms and at home test positive  3  ONSET: "When did the COVID-19 symptoms start?"       12/3  5  COUGH: "Do you have a cough?" If Yes, ask: "How bad is the cough?"        Dry cough   6  FEVER: "Do you have a fever?" If Yes, ask: "What is your temperature, how was it measured, and when did it start?"      102  7  RESPIRATORY STATUS: "Describe your breathing?" (e g , shortness of breath, wheezing, unable to speak)    no sob  9  HIGH RISK DISEASE: "Do you have any chronic medical problems?" (e g , asthma, heart or lung disease, weak immune system, obesity, etc )      Age, BMI  10  VACCINE: "Have you had the COVID-19 vaccine?" If Yes, ask: "Which one, how many shots, when did you get it?"        Yes   11  BOOSTER: "Have you received your COVID-19 booster?" If Yes, ask: "Which one and when did you get it?"        Yes just not last booster  13   OTHER SYMPTOMS: "Do you have any other symptoms?"  (e g , chills, fatigue, headache, loss of smell or taste, muscle pain, sore throat)       Cough, fever, chills, sinus congestion , headache    Protocols used: CORONAVIRUS (COVID-19) DIAGNOSED OR SUSPECTED-ADULT-[place]

## 2022-12-05 NOTE — ASSESSMENT & PLAN NOTE
COVID positive, I recommend treating with oral antiviral   Patient should take half the dose of amlodipine while on this medication  Patient should isolate for 5 days from onset of symptoms, then wear mask when around others indoors for an additional 5 days    Patient should reach out if developing shortness of breath

## 2022-12-05 NOTE — PROGRESS NOTES
COVID-19 Outpatient Progress Note    Assessment/Plan:    Problem List Items Addressed This Visit        Other    COVID-19 virus infection - Primary     COVID positive, I recommend treating with oral antiviral   Patient should take half the dose of amlodipine while on this medication  Patient should isolate for 5 days from onset of symptoms, then wear mask when around others indoors for an additional 5 days  Patient should reach out if developing shortness of breath         Relevant Medications    nirmatrelvir & ritonavir (Paxlovid, 300/100,) tablet therapy pack      Disposition:     I have spent 20 minutes directly with the patient  Greater than 50% of this time was spent in counseling/coordination of care regarding: prognosis, instructions for management, patient and family education and impressions  Encounter provider: Loli Willett MD     Provider located at: [address] [place] [id number]     Recent Visits  No visits were found meeting these conditions  Showing recent visits within past 7 days and meeting all other requirements  Today's Visits  Date Type Provider Dept   12/05/22 Telemedicine Loli Willett MD [address] [facility] today's visits and meeting all other requirements  Future Appointments  No visits were found meeting these conditions  Showing future appointments within next 150 days and meeting all other requirements       Patient agrees to participate in a virtual check in via telephone or video visit instead of presenting to the office to address urgent/immediate medical needs  Patient is aware this is a billable service  He acknowledged consent and understanding of privacy and security of the video platform  The patient has agreed to participate and understands they can discontinue the visit at any time  After connecting through [facility], the patient was identified by name and date of birth   Benjamin Jose was informed that this was a telemedicine visit and that the exam was being conducted confidentially over secure lines  My office door was closed  No one else was in the room  Darlene Ross acknowledged consent and understanding of privacy and security of the telemedicine visit  I informed the patient that I have reviewed his record in Epic and presented the opportunity for him to ask any questions regarding the visit today  The patient agreed to participate  Verification of patient location:  Patient is located in the following state in which I hold an active license: [place]    Subjective:   Darlene Ross is a 74 y o  male who is concerned about COVID-19  Patient's symptoms include fever, chills, nasal congestion, rhinorrhea, sore throat, cough, myalgias and headache  Patient denies shortness of breath, chest tightness, abdominal pain, nausea, vomiting and diarrhea  - Date of symptom onset: 12/3/2022      COVID-19 vaccination status: Fully vaccinated with booster    No results found for: [place] , [address] [facility], [place], [address] [facility], [address] [facility]    Review of Systems   Constitutional: Positive for chills and fever  HENT: Positive for congestion, rhinorrhea and sore throat  Respiratory: Positive for cough  Negative for chest tightness and shortness of breath  Gastrointestinal: Negative for abdominal pain, diarrhea, nausea and vomiting  Musculoskeletal: Positive for myalgias  Neurological: Positive for headaches       Current Outpatient Medications on File Prior to Visit   Medication Sig   • amLODIPine (NORVASC) 10 mg tablet Take 1 tablet (10 mg total) by mouth daily (Patient taking differently: Take 5 mg by mouth daily)   • Multiple Vitamins-Minerals (CENTRUM SILVER PO) Take by mouth   • Glucos-Chondroit-Hyaluron-MSM (Glucosamine Chondroitin Joint) TABS Take by mouth (Patient not taking: Reported on 12/5/2022)   • hydrocortisone 1 % ointment Apply topically (Patient not taking: Reported on 7/28/2021)   • sildenafil (VIAGRA) 50 MG tablet Take 1 tablet (50 mg total) by mouth daily as needed for erectile dysfunction (Patient not taking: Reported on 12/5/2022)   • triamcinolone (KENALOG) 0 1 % ointment Apply topically 2 (two) times a day (Patient not taking: Reported on 7/28/2021)       Objective:    Temp 98 6 °F (37 °C)      Physical Exam  Constitutional:       General: He is not in acute distress  Pulmonary:      Effort: Pulmonary effort is normal  No respiratory distress  Neurological:      Mental Status: He is alert         Bhupinder Uriostegui MD

## 2022-12-05 NOTE — TELEPHONE ENCOUNTER
Regarding: Positive for Covid  ----- Message from Mariah Espinoza sent at 12/5/2022 11:58 AM EST -----  "My  tested positive for Covid yesterday and I am looking to get him some medication   He has fevers, chills, sinus issues, and a really bad cough "

## 2023-05-10 ENCOUNTER — RA CDI HCC (OUTPATIENT)
Dept: OTHER | Facility: HOSPITAL | Age: 73
End: 2023-05-10

## 2023-05-10 NOTE — PROGRESS NOTES
[facility]  coding opportunities       Chart reviewed, no opportunity found:   [address]        Patients Insurance     Medicare Insurance: [employer]

## 2023-05-16 ENCOUNTER — OFFICE VISIT (OUTPATIENT)
Dept: INTERNAL MEDICINE CLINIC | Facility: CLINIC | Age: 73
End: 2023-05-16

## 2023-05-16 VITALS
HEIGHT: 71 IN | HEART RATE: 67 BPM | OXYGEN SATURATION: 97 % | WEIGHT: 206.8 LBS | BODY MASS INDEX: 28.95 KG/M2 | DIASTOLIC BLOOD PRESSURE: 78 MMHG | SYSTOLIC BLOOD PRESSURE: 162 MMHG

## 2023-05-16 DIAGNOSIS — E55.9 VITAMIN D DEFICIENCY: ICD-10-CM

## 2023-05-16 DIAGNOSIS — Z12.11 SCREENING FOR COLON CANCER: ICD-10-CM

## 2023-05-16 DIAGNOSIS — I10 ESSENTIAL HYPERTENSION: Primary | ICD-10-CM

## 2023-05-16 DIAGNOSIS — Z23 ENCOUNTER FOR IMMUNIZATION: ICD-10-CM

## 2023-05-16 DIAGNOSIS — Z12.5 SCREENING FOR PROSTATE CANCER: ICD-10-CM

## 2023-05-16 DIAGNOSIS — H91.90 HEARING LOSS, UNSPECIFIED HEARING LOSS TYPE, UNSPECIFIED LATERALITY: ICD-10-CM

## 2023-05-16 DIAGNOSIS — N52.9 ERECTILE DYSFUNCTION, UNSPECIFIED ERECTILE DYSFUNCTION TYPE: ICD-10-CM

## 2023-05-16 DIAGNOSIS — Z00.00 MEDICARE ANNUAL WELLNESS VISIT, SUBSEQUENT: ICD-10-CM

## 2023-05-16 DIAGNOSIS — D69.6 PLATELETS DECREASED (HCC): ICD-10-CM

## 2023-05-16 PROBLEM — U07.1 COVID-19 VIRUS INFECTION: Status: RESOLVED | Noted: 2022-12-05 | Resolved: 2023-05-16

## 2023-05-16 RX ORDER — AMLODIPINE BESYLATE 5 MG/1
5 TABLET ORAL DAILY
Qty: 90 TABLET | Refills: 3 | Status: SHIPPED | OUTPATIENT
Start: 2023-05-16

## 2023-05-16 RX ORDER — SILDENAFIL 50 MG/1
50 TABLET, FILM COATED ORAL DAILY PRN
Qty: 10 TABLET | Refills: 5 | Status: SHIPPED | OUTPATIENT
Start: 2023-05-16

## 2023-05-16 NOTE — ASSESSMENT & PLAN NOTE
Discussed preventative health, cancer screening, immunizations, and safety issues  I again recommend screening colonoscopy  I recommend Prevnar 20 today  I recommend some screening labs  I recommend getting the Shingrix shot to help prevent Shingles  You can get it a pharmacy, and they can administer it there  It is a two shot series with the second shot needed between 2-6 months after the first shot  I would not recommend getting the shot before an important or fun event in case you were to have a reaction to the shot like a sore arm or flu-like symptoms  I make the same recommendation about any shot, as people can have a reaction to any shot

## 2023-05-16 NOTE — ASSESSMENT & PLAN NOTE
Blood pressure is elevated, patient stopped amlodipine on his own about a year ago  Patient was on 10 mg daily, but had a little ankle swelling    I recommend restarting at 5 mg

## 2023-05-16 NOTE — PATIENT INSTRUCTIONS
Problem List Items Addressed This Visit          Cardiovascular and Mediastinum    Essential hypertension - Primary     Blood pressure is elevated, patient stopped amlodipine on his own about a year ago  Patient was on 10 mg daily, but had a little ankle swelling  I recommend restarting at 5 mg           Relevant Medications    amLODIPine (NORVASC) 5 mg tablet    Other Relevant Orders    CBC and differential    Comprehensive metabolic panel    Lipid Panel with Direct LDL reflex    TSH, 3rd generation with Free T4 reflex       Other    Medicare annual wellness visit, subsequent     Discussed preventative health, cancer screening, immunizations, and safety issues  I again recommend screening colonoscopy  I recommend Prevnar 20 today  I recommend some screening labs  I recommend getting the Shingrix shot to help prevent Shingles  You can get it a pharmacy, and they can administer it there  It is a two shot series with the second shot needed between 2-6 months after the first shot  I would not recommend getting the shot before an important or fun event in case you were to have a reaction to the shot like a sore arm or flu-like symptoms  I make the same recommendation about any shot, as people can have a reaction to any shot  Erectile dysfunction     Patient uses Viagra as needed             Relevant Medications    sildenafil (VIAGRA) 50 MG tablet    Platelets decreased ([address] )     Will recheck          Other Visit Diagnoses       Encounter for immunization        Relevant Orders    Pneumococcal Conjugate Vaccine 20-valent (PCV20)    Hearing loss, unspecified hearing loss type, unspecified laterality        Relevant Orders    Ambulatory Referral to Audiology    Vitamin D deficiency        Relevant Orders    Vitamin D 25 hydroxy    Screening for prostate cancer        Relevant Orders    PSA, Total Screen    Screening for colon cancer        Relevant Orders    Ambulatory referral for colonoscopy Medicare Preventive Visit Patient Instructions  Thank you for completing your Welcome to Medicare Visit or Medicare Annual Wellness Visit today  Your next wellness visit will be due in one year (5/16/2024)  The screening/preventive services that you may require over the next 5-10 years are detailed below  Some tests may not apply to you based off risk factors and/or age  Screening tests ordered at today's visit but not completed yet may show as past due  Also, please note that scanned in results may not display below  Preventive Screenings:  Service Recommendations Previous Testing/Comments   Colorectal Cancer Screening  Colonoscopy    Fecal Occult Blood Test (FOBT)/Fecal Immunochemical Test (FIT)  Fecal DNA/Cologuard Test  Flexible Sigmoidoscopy Age: 39-70 years old   Colonoscopy: every 10 years (May be performed more frequently if at higher risk)  OR  FOBT/FIT: every 1 year  OR  Cologuard: every 3 years  OR  Sigmoidoscopy: every 5 years  Screening may be recommended earlier than age 39 if at higher risk for colorectal cancer  Also, an individualized decision between you and your healthcare provider will decide whether screening between the ages of 74-80 would be appropriate   Colonoscopy: Not on file  FOBT/FIT: Not on file  Cologuard: Not on file  Sigmoidoscopy: Not on file          Prostate Cancer Screening Individualized decision between patient and health care provider in men between ages of 53-78   Medicare will cover every 12 months beginning on the day after your 50th birthday PSA: 2 3 ng/mL           Hepatitis C Screening Once for adults born between 1945 and 1965  More frequently in patients at high risk for Hepatitis C Hep C Antibody: 08/11/2021        Diabetes Screening 1-2 times per year if you're at risk for diabetes or have pre-diabetes Fasting glucose: 97 mg/dL (8/11/2021)  A1C: No results in last 5 years (No results in last 5 years)      Cholesterol Screening Once every 5 years if you don't have a lipid disorder  May order more often based on risk factors  Lipid panel: 08/11/2021         Other Preventive Screenings Covered by Medicare:  Abdominal Aortic Aneurysm (AAA) Screening: covered once if your at risk  You're considered to be at risk if you have a family history of AAA or a male between the age of 73-68 who smoking at least 100 cigarettes in your lifetime  Lung Cancer Screening: covers low dose CT scan once per year if you meet all of the following conditions: (1) Age 50-69; (2) No signs or symptoms of lung cancer; (3) Current smoker or have quit smoking within the last 15 years; (4) You have a tobacco smoking history of at least 20 pack years (packs per day x number of years you smoked); (5) You get a written order from a healthcare provider  Glaucoma Screening: covered annually if you're considered high risk: (1) You have diabetes OR (2) Family history of glaucoma OR (3)  aged 48 and older OR (3)  American aged 72 and older  Osteoporosis Screening: covered every 2 years if you meet one of the following conditions: (1) Have a vertebral abnormality; (2) On glucocorticoid therapy for more than 3 months; (3) Have primary hyperparathyroidism; (4) On osteoporosis medications and need to assess response to drug therapy  HIV Screening: covered annually if you're between the age of 12-76  Also covered annually if you are younger than 13 and older than 72 with risk factors for HIV infection  For pregnant patients, it is covered up to 3 times per pregnancy      Immunizations:  Immunization Recommendations   Influenza Vaccine Annual influenza vaccination during flu season is recommended for all persons aged >= 6 months who do not have contraindications   Pneumococcal Vaccine   * Pneumococcal conjugate vaccine = PCV13 (Prevnar 13), PCV15 (Vaxneuvance), PCV20 (Prevnar 20)  * Pneumococcal polysaccharide vaccine = PPSV23 (Pneumovax) Adults 25-60 years old: 1-3 doses may be recommended based on certain risk factors  Adults 72 years old: 1-2 doses may be recommended based off what pneumonia vaccine you previously received   Hepatitis B Vaccine 3 dose series if at intermediate or high risk (ex: diabetes, end stage renal disease, liver disease)   Tetanus (Td) Vaccine - COST NOT COVERED BY MEDICARE PART B Following completion of primary series, a booster dose should be given every 10 years to maintain immunity against tetanus  Td may also be given as tetanus wound prophylaxis  Tdap Vaccine - COST NOT COVERED BY MEDICARE PART B Recommended at least once for all adults  For pregnant patients, recommended with each pregnancy  Shingles Vaccine (Shingrix) - COST NOT COVERED BY MEDICARE PART B  2 shot series recommended in those aged 48 and above     Health Maintenance Due:      Topic Date Due    Colorectal Cancer Screening  Never done    Hepatitis C Screening  Completed     Immunizations Due:      Topic Date Due    COVID-19 Vaccine (4 - Booster for Pfizer series) 01/26/2022    Pneumococcal Vaccine: 65+ Years (2 - PPSV23 if available, else PCV20) 12/29/2022     Advance Directives   What are advance directives? Advance directives are legal documents that state your wishes and plans for medical care  These plans are made ahead of time in case you lose your ability to make decisions for yourself  Advance directives can apply to any medical decision, such as the treatments you want, and if you want to donate organs  What are the types of advance directives? There are many types of advance directives, and each state has rules about how to use them  You may choose a combination of any of the following:  Living will: This is a written record of the treatment you want  You can also choose which treatments you do not want, which to limit, and which to stop at a certain time  This includes surgery, medicine, IV fluid, and tube feedings  Durable power of  for healthcare [facility]):   This is a written record that states who you want to make healthcare choices for you when you are unable to make them for yourself  This person, called a proxy, is usually a family member or a friend  You may choose more than 1 proxy  Do not resuscitate (DNR) order:  A DNR order is used in case your heart stops beating or you stop breathing  It is a request not to have certain forms of treatment, such as CPR  A DNR order may be included in other types of advance directives  Medical directive: This covers the care that you want if you are in a coma, near death, or unable to make decisions for yourself  You can list the treatments you want for each condition  Treatment may include pain medicine, surgery, blood transfusions, dialysis, IV or tube feedings, and a ventilator (breathing machine)  Values history: This document has questions about your views, beliefs, and how you feel and think about life  This information can help others choose the care that you would choose  Why are advance directives important? An advance directive helps you control your care  Although spoken wishes may be used, it is better to have your wishes written down  Spoken wishes can be misunderstood, or not followed  Treatments may be given even if you do not want them  An advance directive may make it easier for your family to make difficult choices about your care  © Copyright [employer] 2018 Information is for End User's use only and may not be sold, redistributed or otherwise used for commercial purposes   All illustrations and images included in CareNotes® are the copyrighted property of [employer]  or [facility] [employer]

## 2023-05-16 NOTE — PROGRESS NOTES
Assessment and Plan:     Problem List Items Addressed This Visit        Cardiovascular and Mediastinum    Essential hypertension - Primary     Blood pressure is elevated, patient stopped amlodipine on his own about a year ago  Patient was on 10 mg daily, but had a little ankle swelling  I recommend restarting at 5 mg         Relevant Medications    amLODIPine (NORVASC) 5 mg tablet    Other Relevant Orders    CBC and differential    Comprehensive metabolic panel    Lipid Panel with Direct LDL reflex    TSH, 3rd generation with Free T4 reflex       Other    Medicare annual wellness visit, subsequent     Discussed preventative health, cancer screening, immunizations, and safety issues  I again recommend screening colonoscopy  I recommend Prevnar 20 today  I recommend some screening labs  I recommend getting the Shingrix shot to help prevent Shingles  You can get it a pharmacy, and they can administer it there  It is a two shot series with the second shot needed between 2-6 months after the first shot  I would not recommend getting the shot before an important or fun event in case you were to have a reaction to the shot like a sore arm or flu-like symptoms  I make the same recommendation about any shot, as people can have a reaction to any shot  Erectile dysfunction     Patient uses Viagra as needed           Relevant Medications    sildenafil (VIAGRA) 50 MG tablet    Platelets decreased ([address] )     Will recheck        Other Visit Diagnoses     Encounter for immunization        Relevant Orders    Pneumococcal Conjugate Vaccine 20-valent (PCV20)    Hearing loss, unspecified hearing loss type, unspecified laterality        Relevant Orders    Ambulatory Referral to Audiology    Vitamin D deficiency        Relevant Orders    Vitamin D 25 hydroxy    Screening for prostate cancer        Relevant Orders    PSA, Total Screen    Screening for colon cancer        Relevant Orders    Ambulatory referral for colonoscopy        BMI Counseling: Body mass index is 28 84 kg/m²  The BMI is above normal  Nutrition recommendations include encouraging healthy choices of fruits and vegetables and moderation in carbohydrate intake  Exercise recommendations include exercising 3-5 times per week  Rationale for BMI follow-up plan is due to patient being overweight or obese  Depression Screening and Follow-up Plan: Patient was screened for depression during today's encounter  They screened negative with a PHQ-2 score of 0  Preventive health issues were discussed with patient, and age appropriate screening tests were ordered as noted in patient's After Visit Summary  Personalized health advice and appropriate referrals for health education or preventive services given if needed, as noted in patient's After Visit Summary  History of Present Illness:     Patient presents for a Medicare Wellness Visit    Pt here for follow up and due for AWV     Patient Care Team:  Rollene Buerger, MD as PCP - General (Internal Medicine)     Review of Systems:     Review of Systems   Constitutional: Negative for chills, fatigue and fever  HENT: Positive for hearing loss  Negative for congestion, nosebleeds, postnasal drip, sore throat and trouble swallowing  Eyes: Negative for pain  Respiratory: Negative for cough, chest tightness, shortness of breath and wheezing  Cardiovascular: Negative for chest pain, palpitations and leg swelling  Gastrointestinal: Negative for abdominal pain, constipation, diarrhea, nausea and vomiting  Endocrine: Negative for polydipsia and polyuria  Genitourinary: Negative for dysuria, flank pain and hematuria  Musculoskeletal: Positive for back pain (occasional)  Negative for arthralgias  Skin: Negative for rash  Neurological: Negative for dizziness, tremors, light-headedness and headaches  Hematological: Does not bruise/bleed easily     Psychiatric/Behavioral: Negative for confusion and dysphoric mood  The patient is not nervous/anxious  Problem List:     Patient Active Problem List   Diagnosis   • Medicare annual wellness visit, subsequent   • Essential hypertension   • Erectile dysfunction   • Sensation of plugged ear on right side   • Platelets decreased ([address] )      Past Medical and Surgical History:     Past Medical History:   Diagnosis Date   • Anxiety    • COVID-19 virus infection 12/5/2022     Past Surgical History:   Procedure Laterality Date   • APPENDECTOMY        Family History:     Family History   Problem Relation Age of Onset   • No Known Problems Mother    • Cancer Father         stomach      Social History:     Social History     Socioeconomic History   • Marital status: /Civil Union     Spouse name: None   • Number of children: None   • Years of education: None   • Highest education level: None   Occupational History   • Occupation: retired     Comment: construction   Tobacco Use   • Smoking status: Never   • Smokeless tobacco: Never   Vaping Use   • Vaping Use: Never used   Substance and Sexual Activity   • Alcohol use: Not Currently   • Drug use: Never   • Sexual activity: Yes     Partners: Female   Other Topics Concern   • None   Social History Narrative     with kids and grandkids     Social Determinants of Health     Financial Resource Strain: Low Risk    • Difficulty of Paying Living Expenses: Not hard at all   Food Insecurity: Not on file   Transportation Needs: No Transportation Needs   • Lack of Transportation (Medical): No   • Lack of Transportation (Non-Medical):  No   Physical Activity: Not on file   Stress: Not on file   Social Connections: Not on file   Intimate Partner Violence: Not on file   Housing Stability: Not on file      Medications and Allergies:     Current Outpatient Medications   Medication Sig Dispense Refill   • amLODIPine (NORVASC) 5 mg tablet Take 1 tablet (5 mg total) by mouth daily 90 tablet 3   • Multiple Vitamins-Minerals (CENTRUM SILVER PO) Take by mouth     • sildenafil (VIAGRA) 50 MG tablet Take 1 tablet (50 mg total) by mouth daily as needed for erectile dysfunction 10 tablet 5   • Glucos-Chondroit-Hyaluron-MSM (Glucosamine Chondroitin Joint) TABS Take by mouth (Patient not taking: Reported on 12/5/2022)     • hydrocortisone 1 % ointment Apply topically (Patient not taking: Reported on 7/28/2021)     • triamcinolone (KENALOG) 0 1 % ointment Apply topically 2 (two) times a day (Patient not taking: Reported on 7/28/2021)       No current facility-administered medications for this visit  Allergies   Allergen Reactions   • Penicillins Other (See Comments)      Immunizations:     Immunization History   Administered Date(s) Administered   • COVID-19 PFIZER VACCINE 0 3 ML IM 03/28/2021, 04/18/2021, 12/01/2021   • INFLUENZA 12/01/2021   • Pneumococcal Conjugate 13-Valent 12/29/2021      Health Maintenance:         Topic Date Due   • Colorectal Cancer Screening  Never done   • Hepatitis C Screening  Completed         Topic Date Due   • COVID-19 Vaccine (4 - Booster for Turner Peter series) 01/26/2022   • Pneumococcal Vaccine: 65+ Years (2 - PPSV23 if available, else PCV20) 12/29/2022      Medicare Screening Tests and Risk Assessments:     Jose A Ocampo is here for his Subsequent Wellness visit  Health Risk Assessment:   Patient rates overall health as good  Patient feels that their physical health rating is same  Patient is satisfied with their life  Eyesight was rated as same  Hearing was rated as same  Patient feels that their emotional and mental health rating is same  Patients states they are never, rarely angry  Patient states they are sometimes unusually tired/fatigued  Pain experienced in the last 7 days has been some  Patient's pain rating has been 1/10  Patient states that he has experienced no weight loss or gain in last 6 months  Depression Screening:   PHQ-2 Score: 0      Fall Risk Screening:    In the past year, patient has experienced: no history of falling in past year      Home Safety:  Patient does not have trouble with stairs inside or outside of their home  Patient has working smoke alarms and has working carbon monoxide detector  Home safety hazards include: none  Nutrition:   Current diet is Regular  Medications:   Patient is not currently taking any over-the-counter supplements  Patient is able to manage medications  Activities of Daily Living (ADLs)/Instrumental Activities of Daily Living (IADLs):   Walk and transfer into and out of bed and chair?: Yes  Dress and groom yourself?: Yes    Bathe or shower yourself?: Yes    Feed yourself? Yes  Do your laundry/housekeeping?: Yes  Manage your money, pay your bills and track your expenses?: Yes  Make your own meals?: Yes    Do your own shopping?: Yes    Previous Hospitalizations:   Any hospitalizations or ED visits within the last 12 months?: No      Advance Care Planning:   Living will: Yes    Durable POA for healthcare:  Yes    Advanced directive: Yes    Advanced directive counseling given: Yes    Five wishes given: No      Cognitive Screening:   Provider or family/friend/caregiver concerned regarding cognition?: No    PREVENTIVE SCREENINGS      Cardiovascular Screening:    General: Risks and Benefits Discussed    Due for: Lipid Panel      Diabetes Screening:     General: Risks and Benefits Discussed    Due for: Blood Glucose      Colorectal Cancer Screening:     General: Risks and Benefits Discussed      Prostate Cancer Screening:    General: Risks and Benefits Discussed    Due for: PSA      Osteoporosis Screening:    General: Screening Not Indicated      Abdominal Aortic Aneurysm (AAA) Screening:    Risk factors include: age between 73-69 yo        General: Screening Not Indicated      Lung Cancer Screening:     General: Screening Not Indicated      Hepatitis C Screening:    General: Screening Current and Risks and Benefits Discussed    Screening, Brief Intervention, "and Referral to Treatment (SBIRT)    Screening  Typical number of drinks in a day: 0  Typical number of drinks in a week: 0  Interpretation: Low risk drinking behavior  Single Item Drug Screening:  How often have you used an illegal drug (including marijuana) or a prescription medication for non-medical reasons in the past year? never    Single Item Drug Screen Score: 0  Interpretation: Negative screen for possible drug use disorder    Brief Intervention  Alcohol & drug use screenings were reviewed  No concerns regarding substance use disorder identified  Other Counseling Topics:   Car/seat belt/driving safety, skin self-exam and sunscreen  No results found  Physical Exam:     /78   Pulse 67   Ht 5' 11\" (1 803 m)   Wt 93 8 kg (206 lb 12 8 oz)   SpO2 97%   BMI 28 84 kg/m²     Physical Exam  Vitals reviewed  Constitutional:       General: He is not in acute distress  Appearance: Normal appearance  He is well-developed  HENT:      Head: Normocephalic and atraumatic  Right Ear: External ear normal       Left Ear: External ear normal       Nose: Nose normal    Eyes:      General: No scleral icterus  Conjunctiva/sclera: Conjunctivae normal    Neck:      Trachea: No tracheal deviation  Cardiovascular:      Rate and Rhythm: Normal rate and regular rhythm  Occasional extrasystoles are present  Heart sounds: Normal heart sounds  No murmur heard  Pulmonary:      Effort: Pulmonary effort is normal  No respiratory distress  Breath sounds: Normal breath sounds  No wheezing or rales  Abdominal:      General: Bowel sounds are normal       Palpations: Abdomen is soft  There is no mass  Tenderness: There is no abdominal tenderness  There is no guarding  Hernia: There is no hernia in the left inguinal area or right inguinal area  Genitourinary:     Testes: Normal    Musculoskeletal:      Cervical back: Normal range of motion and neck supple        Right lower leg: No " edema  Left lower leg: No edema  Lymphadenopathy:      Cervical: No cervical adenopathy  Neurological:      General: No focal deficit present  Mental Status: He is alert and oriented to person, place, and time  Psychiatric:         Mood and Affect: Mood normal          Behavior: Behavior normal          Thought Content:  Thought content normal          Judgment: Judgment normal           Marcy Varela MD

## 2023-06-14 ENCOUNTER — APPOINTMENT (OUTPATIENT)
Dept: LAB | Facility: CLINIC | Age: 73
End: 2023-06-14
Payer: COMMERCIAL

## 2023-06-14 DIAGNOSIS — Z12.5 SCREENING FOR PROSTATE CANCER: ICD-10-CM

## 2023-06-14 DIAGNOSIS — I10 ESSENTIAL HYPERTENSION: ICD-10-CM

## 2023-06-14 DIAGNOSIS — E55.9 VITAMIN D DEFICIENCY: ICD-10-CM

## 2023-06-14 LAB
25(OH)D3 SERPL-MCNC: 39.1 NG/ML (ref 30–100)
ALBUMIN SERPL BCP-MCNC: 4.2 G/DL (ref 3.5–5)
ALP SERPL-CCNC: 60 U/L (ref 46–116)
ALT SERPL W P-5'-P-CCNC: 34 U/L (ref 12–78)
ANION GAP SERPL CALCULATED.3IONS-SCNC: 1 MMOL/L (ref 4–13)
AST SERPL W P-5'-P-CCNC: 32 U/L (ref 5–45)
BASOPHILS # BLD AUTO: 0.04 THOUSANDS/ÂΜL (ref 0–0.1)
BASOPHILS NFR BLD AUTO: 1 % (ref 0–1)
BILIRUB SERPL-MCNC: 0.57 MG/DL (ref 0.2–1)
BUN SERPL-MCNC: 13 MG/DL (ref 5–25)
CALCIUM SERPL-MCNC: 9.8 MG/DL (ref 8.3–10.1)
CHLORIDE SERPL-SCNC: 112 MMOL/L (ref 96–108)
CHOLEST SERPL-MCNC: 198 MG/DL
CO2 SERPL-SCNC: 29 MMOL/L (ref 21–32)
CREAT SERPL-MCNC: 1.42 MG/DL (ref 0.6–1.3)
EOSINOPHIL # BLD AUTO: 0.21 THOUSAND/ÂΜL (ref 0–0.61)
EOSINOPHIL NFR BLD AUTO: 4 % (ref 0–6)
ERYTHROCYTE [DISTWIDTH] IN BLOOD BY AUTOMATED COUNT: 13 % (ref 11.6–15.1)
GFR SERPL CREATININE-BSD FRML MDRD: 48 ML/MIN/1.73SQ M
GLUCOSE P FAST SERPL-MCNC: 89 MG/DL (ref 65–99)
HCT VFR BLD AUTO: 45.7 % (ref 36.5–49.3)
HDLC SERPL-MCNC: 91 MG/DL
HGB BLD-MCNC: 14.8 G/DL (ref 12–17)
IMM GRANULOCYTES # BLD AUTO: 0.01 THOUSAND/UL (ref 0–0.2)
IMM GRANULOCYTES NFR BLD AUTO: 0 % (ref 0–2)
LDLC SERPL CALC-MCNC: 93 MG/DL (ref 0–100)
LYMPHOCYTES # BLD AUTO: 1.57 THOUSANDS/ÂΜL (ref 0.6–4.47)
LYMPHOCYTES NFR BLD AUTO: 33 % (ref 14–44)
MCH RBC QN AUTO: 32 PG (ref 26.8–34.3)
MCHC RBC AUTO-ENTMCNC: 32.4 G/DL (ref 31.4–37.4)
MCV RBC AUTO: 99 FL (ref 82–98)
MONOCYTES # BLD AUTO: 0.36 THOUSAND/ÂΜL (ref 0.17–1.22)
MONOCYTES NFR BLD AUTO: 8 % (ref 4–12)
NEUTROPHILS # BLD AUTO: 2.55 THOUSANDS/ÂΜL (ref 1.85–7.62)
NEUTS SEG NFR BLD AUTO: 54 % (ref 43–75)
NRBC BLD AUTO-RTO: 0 /100 WBCS
PLATELET # BLD AUTO: 180 THOUSANDS/UL (ref 149–390)
PMV BLD AUTO: 10.8 FL (ref 8.9–12.7)
POTASSIUM SERPL-SCNC: 4.6 MMOL/L (ref 3.5–5.3)
PROT SERPL-MCNC: 7.6 G/DL (ref 6.4–8.4)
PSA SERPL-MCNC: 2.98 NG/ML (ref 0–4)
RBC # BLD AUTO: 4.62 MILLION/UL (ref 3.88–5.62)
SODIUM SERPL-SCNC: 142 MMOL/L (ref 135–147)
TRIGL SERPL-MCNC: 70 MG/DL
TSH SERPL DL<=0.05 MIU/L-ACNC: 1.58 UIU/ML (ref 0.45–4.5)
WBC # BLD AUTO: 4.74 THOUSAND/UL (ref 4.31–10.16)

## 2023-06-14 PROCEDURE — 82306 VITAMIN D 25 HYDROXY: CPT

## 2023-06-14 PROCEDURE — G0103 PSA SCREENING: HCPCS

## 2023-06-14 PROCEDURE — 80061 LIPID PANEL: CPT

## 2023-06-14 PROCEDURE — 85025 COMPLETE CBC W/AUTO DIFF WBC: CPT

## 2023-06-14 PROCEDURE — 80053 COMPREHEN METABOLIC PANEL: CPT

## 2023-06-14 PROCEDURE — 84443 ASSAY THYROID STIM HORMONE: CPT

## 2023-06-14 PROCEDURE — 36415 COLL VENOUS BLD VENIPUNCTURE: CPT

## 2023-07-15 PROBLEM — Z00.00 MEDICARE ANNUAL WELLNESS VISIT, SUBSEQUENT: Status: RESOLVED | Noted: 2021-07-28 | Resolved: 2023-07-15

## 2023-08-15 ENCOUNTER — TELEPHONE (OUTPATIENT)
Age: 73
End: 2023-08-15

## 2023-08-15 NOTE — TELEPHONE ENCOUNTER
Scheduled date of colonoscopy (as of today):09/28/2023  Physician performing colonoscopy:Dr Valeri Degroot  Location of colonoscopy:[facility]  Bowel prep reviewed with patient:rajani/dul  Instructions reviewed with patient by:sent via my chart   Clearances: n/a

## 2023-09-20 ENCOUNTER — TELEPHONE (OUTPATIENT)
Dept: GASTROENTEROLOGY | Facility: CLINIC | Age: 73
End: 2023-09-20

## 2023-09-28 ENCOUNTER — ANESTHESIA EVENT (OUTPATIENT)
Dept: GASTROENTEROLOGY | Facility: HOSPITAL | Age: 73
End: 2023-09-28

## 2023-09-28 ENCOUNTER — HOSPITAL ENCOUNTER (OUTPATIENT)
Dept: GASTROENTEROLOGY | Facility: HOSPITAL | Age: 73
Setting detail: OUTPATIENT SURGERY
End: 2023-09-28
Attending: INTERNAL MEDICINE
Payer: COMMERCIAL

## 2023-09-28 ENCOUNTER — ANESTHESIA (OUTPATIENT)
Dept: GASTROENTEROLOGY | Facility: HOSPITAL | Age: 73
End: 2023-09-28

## 2023-09-28 ENCOUNTER — TELEPHONE (OUTPATIENT)
Dept: GASTROENTEROLOGY | Facility: CLINIC | Age: 73
End: 2023-09-28

## 2023-09-28 VITALS
TEMPERATURE: 98.4 F | OXYGEN SATURATION: 100 % | SYSTOLIC BLOOD PRESSURE: 127 MMHG | HEIGHT: 71 IN | HEART RATE: 87 BPM | RESPIRATION RATE: 16 BRPM | WEIGHT: 207.67 LBS | BODY MASS INDEX: 29.07 KG/M2 | DIASTOLIC BLOOD PRESSURE: 59 MMHG

## 2023-09-28 DIAGNOSIS — Z12.11 SCREENING FOR COLON CANCER: ICD-10-CM

## 2023-09-28 PROBLEM — R06.83 SNORING: Status: ACTIVE | Noted: 2023-09-28

## 2023-09-28 PROCEDURE — 88305 TISSUE EXAM BY PATHOLOGIST: CPT | Performed by: STUDENT IN AN ORGANIZED HEALTH CARE EDUCATION/TRAINING PROGRAM

## 2023-09-28 RX ORDER — SODIUM CHLORIDE, SODIUM LACTATE, POTASSIUM CHLORIDE, CALCIUM CHLORIDE 600; 310; 30; 20 MG/100ML; MG/100ML; MG/100ML; MG/100ML
INJECTION, SOLUTION INTRAVENOUS CONTINUOUS PRN
Status: DISCONTINUED | OUTPATIENT
Start: 2023-09-28 | End: 2023-09-28

## 2023-09-28 RX ORDER — EPINEPHRINE IN SOD CHLOR,ISO 1 MG/10 ML
SYRINGE (ML) INTRAVENOUS AS NEEDED
Status: COMPLETED | OUTPATIENT
Start: 2023-09-28 | End: 2023-09-28

## 2023-09-28 RX ORDER — PHENYLEPHRINE HYDROCHLORIDE 10 MG/ML
INJECTION INTRAVENOUS AS NEEDED
Status: DISCONTINUED | OUTPATIENT
Start: 2023-09-28 | End: 2023-09-28

## 2023-09-28 RX ORDER — PROPOFOL 10 MG/ML
INJECTION, EMULSION INTRAVENOUS AS NEEDED
Status: DISCONTINUED | OUTPATIENT
Start: 2023-09-28 | End: 2023-09-28

## 2023-09-28 RX ADMIN — PROPOFOL 100 MG: 10 INJECTION, EMULSION INTRAVENOUS at 10:53

## 2023-09-28 RX ADMIN — PROPOFOL 100 MG: 10 INJECTION, EMULSION INTRAVENOUS at 10:44

## 2023-09-28 RX ADMIN — Medication 1 MG: at 11:13

## 2023-09-28 RX ADMIN — SODIUM CHLORIDE, SODIUM LACTATE, POTASSIUM CHLORIDE, AND CALCIUM CHLORIDE: .6; .31; .03; .02 INJECTION, SOLUTION INTRAVENOUS at 10:29

## 2023-09-28 RX ADMIN — PROPOFOL 200 MG: 10 INJECTION, EMULSION INTRAVENOUS at 10:32

## 2023-09-28 RX ADMIN — PHENYLEPHRINE HYDROCHLORIDE 200 MCG: 10 INJECTION INTRAVENOUS at 11:09

## 2023-09-28 RX ADMIN — PHENYLEPHRINE HYDROCHLORIDE 200 MCG: 10 INJECTION INTRAVENOUS at 11:00

## 2023-09-28 RX ADMIN — PHENYLEPHRINE HYDROCHLORIDE 200 MCG: 10 INJECTION INTRAVENOUS at 10:44

## 2023-09-28 RX ADMIN — PROPOFOL 100 MG: 10 INJECTION, EMULSION INTRAVENOUS at 10:34

## 2023-09-28 RX ADMIN — PROPOFOL 100 MG: 10 INJECTION, EMULSION INTRAVENOUS at 10:38

## 2023-09-28 NOTE — ANESTHESIA POSTPROCEDURE EVALUATION
Post-Op Assessment Note    CV Status:  Stable  Pain Score: 0    Pain management: adequate     Mental Status:  Alert and awake   Hydration Status:  Stable   PONV Controlled:  None   Airway Patency:  Patent and adequate      Post Op Vitals Reviewed: Yes      Staff: Anesthesiologist, CRNA         No notable events documented.

## 2023-09-28 NOTE — ANESTHESIA PREPROCEDURE EVALUATION
Procedure:  COLONOSCOPY    Relevant Problems   ANESTHESIA (within normal limits)   (-) History of anesthesia complications      CARDIO   (+) Essential hypertension      ENDO (within normal limits)      GI/HEPATIC  Confirmed NPO appropriate  s/p bowel prep      /RENAL (within normal limits)      HEMATOLOGY  Refuses blood transfusion      MUSCULOSKELETAL (within normal limits)      NEURO/PSYCH (within normal limits)      PULMONARY   (-) Smoking   (-) URI (upper respiratory infection)      Other   (+) Platelets decreased (HCC)   (+) Snoring        Physical Exam    Airway    Mallampati score: II  TM Distance: >3 FB  Neck ROM: full     Dental       Cardiovascular  Rhythm: regular, Rate: normal,     Pulmonary  Breath sounds clear to auscultation,     Other Findings        Anesthesia Plan  ASA Score- 2     Anesthesia Type- IV sedation with anesthesia with ASA Monitors. Additional Monitors:   Airway Plan:     Comment: I discussed the risks and benefits of IV sedation anesthesia including the possibility of the need to convert to general anesthesia and the potential risk of awareness. The patient was given the opportunity to ask questions, which were answered. .       Plan Factors-Exercise tolerance (METS): >4 METS. Chart reviewed. Patient is not a current smoker. Induction- intravenous. Postoperative Plan-     Informed Consent- Anesthetic plan and risks discussed with patient. I personally reviewed this patient with the CRNA. Discussed and agreed on the Anesthesia Plan with the CRNA. Taina Joel

## 2023-09-28 NOTE — H&P
History and Physical - [facility]  Ricardo Rojas 74 y.o. male MRN: [id number]                  HPI: Ricardo Rojas is a 74y.o. year old male who presents for colonoscopy for colon cancer screening      REVIEW OF SYSTEMS: Per the HPI, and otherwise unremarkable. Historical Information   Past Medical History:   Diagnosis Date   • Anxiety    • COVID-19 virus infection 12/05/2022   • Hypertension      Past Surgical History:   Procedure Laterality Date   • APPENDECTOMY       Social History   Social History     Substance and Sexual Activity   Alcohol Use Not Currently     Social History     Substance and Sexual Activity   Drug Use Never     Social History     Tobacco Use   Smoking Status Never   Smokeless Tobacco Never     Family History   Problem Relation Age of Onset   • No Known Problems Mother    • Cancer Father         stomach       Meds/Allergies     (Not in a hospital admission)      Allergies   Allergen Reactions   • Penicillins Other (See Comments)       Objective     Blood pressure (!) 175/86, pulse 83, temperature 97.8 °F (36.6 °C), temperature source Temporal, resp. rate 18, height 5' 11" (1.803 m), weight 94.2 kg (207 lb 10.8 oz), SpO2 100 %. PHYSICAL EXAM    BP (!) 175/86   Pulse 83   Temp 97.8 °F (36.6 °C) (Temporal)   Resp 18   Ht 5' 11" (1.803 m)   Wt 94.2 kg (207 lb 10.8 oz)   SpO2 100%   BMI 28.96 kg/m²       Gen: NAD  CV: RRR  CHEST: Clear  ABD: soft, NT/ND  EXT: no edema      ASSESSMENT/PLAN:  This is a 74y.o. year old male here for colonoscopy, and he is stable and optimized for his procedure.

## 2023-10-03 PROCEDURE — 88305 TISSUE EXAM BY PATHOLOGIST: CPT | Performed by: STUDENT IN AN ORGANIZED HEALTH CARE EDUCATION/TRAINING PROGRAM

## 2023-10-05 ENCOUNTER — RA CDI HCC (OUTPATIENT)
Dept: OTHER | Facility: HOSPITAL | Age: 73
End: 2023-10-05

## 2023-10-05 NOTE — PROGRESS NOTES
[facility] [address] [facility] coding opportunities       Chart reviewed, no opportunity found: [address]        Patients Insurance     Medicare Insurance: [employer] Advantage

## 2024-03-15 ENCOUNTER — TELEPHONE (OUTPATIENT)
Age: 74
End: 2024-03-15

## 2024-03-15 NOTE — TELEPHONE ENCOUNTER
03/15/24  Screened by: Angela Gutierrez    Referring Provider     Pre- Screening:     There is no height or weight on file to calculate BMI.  Has patient been referred for a routine screening Colonoscopy? yes  Is the patient between 45-75 years old? yes      Previous Colonoscopy yes   If yes:    Date: 2023     Facility:     Reason:     Does the patient want to see a Gastroenterologist prior to their procedure OR are they having any GI symptoms? no    Has the patient been hospitalized or had abdominal surgery in the past 6 months? no    Does the patient use supplemental oxygen? no    Does the patient take Coumadin, Lovenox, Plavix, Elliquis, Xarelto, or other blood thinning medication? no    Has the patient had a stroke, cardiac event, or stent placed in the past year? no      If patient is between 45yrs - 49yrs, please advise patient that we will have to confirm benefits & coverage with their insurance company for a routine screening colonoscopy.

## 2024-03-15 NOTE — TELEPHONE ENCOUNTER
Scheduled date of colonoscopy (as of today):04/03/2024  Physician performing colonoscopy:DR Sanders  Location of colonoscopy:[facility]  Bowel prep reviewed with patient: 2 day Golytely prep   Instructions reviewed with patient by: sent via my chart   Clearances: n/a    Pt stated the last prep did not work and will need to do 2 day prep please send prep to pharmacy thank you

## 2024-03-22 ENCOUNTER — TELEPHONE (OUTPATIENT)
Age: 74
End: 2024-03-22

## 2024-03-22 ENCOUNTER — TELEPHONE (OUTPATIENT)
Dept: GASTROENTEROLOGY | Facility: CLINIC | Age: 74
End: 2024-03-22

## 2024-03-22 DIAGNOSIS — Z12.11 SCREENING FOR COLON CANCER: Primary | ICD-10-CM

## 2024-03-22 DIAGNOSIS — H91.90 HEARING LOSS, UNSPECIFIED HEARING LOSS TYPE, UNSPECIFIED LATERALITY: Primary | ICD-10-CM

## 2024-03-22 NOTE — TELEPHONE ENCOUNTER
The patient's wife called to request a new referral for Ambulatory Referral to Audiology. The patient's wife reports that she cannot find him on mychart and wants a new referral.

## 2024-03-26 ENCOUNTER — OFFICE VISIT (OUTPATIENT)
Dept: AUDIOLOGY | Age: 74
End: 2024-03-26
Payer: COMMERCIAL

## 2024-03-26 DIAGNOSIS — H91.90 HEARING LOSS, UNSPECIFIED HEARING LOSS TYPE, UNSPECIFIED LATERALITY: ICD-10-CM

## 2024-03-26 DIAGNOSIS — H90.3 SENSORY HEARING LOSS, BILATERAL: Primary | ICD-10-CM

## 2024-03-26 PROCEDURE — 92567 TYMPANOMETRY: CPT

## 2024-03-26 NOTE — PROGRESS NOTES
Adult Hearing Evaluation    Name:  Aron Oswald  :  1950  Age:  73 y.o.   MRN:  [id number]  Date of Evaluation: 24     HISTORY:     Reason for visit:  Family concerns    Aron Oswald is being seen today at the request of Dr. Harmon for an initial  evaluation of hearing. Patient reports that his wife believes that he has difficulty hearing. He notes some difficulty, but is not concerned. He has a significant history of noise exposure through work. He reports tinnitus in both ears, but more noticeable in the left.  Patient denies otalgia, otorrhea, dizziness, and fullness.     EVALUATION:    Otoscopic Evaluation:   Right Ear: Non-occluding cerumen, TM view obscured   Left Ear: Occluding cerumen    Tympanometry:   Right Ear: Type A; normal middle ear pressure and static compliance    Left Ear: Unable to seal    Pure Tone Audiometry:  Waiting on clean ears    *see attached audiogram      RECOMMENDATIONS:  1 month hearing eval, Return to [facility]. for F/U, Hearing Aid Evaluation, and Copy to Patient/Caregiver    PATIENT EDUCATION:   The results of today's results and recommendations were reviewed with the patient. It was explained that we can attempt to perform testing today, but if wax is causing additional hearing loss, we will have to have wax removed and performed again. He opted to have wax removed before performing his hearing test.    Lisa Bautista., [facility]  Clinical Audiologist  [facility]  [address]  [place] [address]

## 2024-04-10 ENCOUNTER — ANESTHESIA EVENT (OUTPATIENT)
Dept: GASTROENTEROLOGY | Facility: HOSPITAL | Age: 74
End: 2024-04-10

## 2024-04-10 ENCOUNTER — ANESTHESIA (OUTPATIENT)
Dept: GASTROENTEROLOGY | Facility: HOSPITAL | Age: 74
End: 2024-04-10

## 2024-04-10 ENCOUNTER — HOSPITAL ENCOUNTER (OUTPATIENT)
Dept: GASTROENTEROLOGY | Facility: HOSPITAL | Age: 74
Setting detail: OUTPATIENT SURGERY
Discharge: HOME | End: 2024-04-10
Attending: INTERNAL MEDICINE
Payer: COMMERCIAL

## 2024-04-10 VITALS
TEMPERATURE: 97.4 F | HEIGHT: 71 IN | RESPIRATION RATE: 19 BRPM | OXYGEN SATURATION: 98 % | BODY MASS INDEX: 28.95 KG/M2 | WEIGHT: 206.79 LBS | HEART RATE: 68 BPM | SYSTOLIC BLOOD PRESSURE: 122 MMHG | DIASTOLIC BLOOD PRESSURE: 64 MMHG

## 2024-04-10 DIAGNOSIS — Z86.010 HISTORY OF COLON POLYPS: ICD-10-CM

## 2024-04-10 PROCEDURE — G0105 COLORECTAL SCRN; HI RISK IND: HCPCS | Performed by: INTERNAL MEDICINE

## 2024-04-10 RX ORDER — PROPOFOL 10 MG/ML
INJECTION, EMULSION INTRAVENOUS AS NEEDED
Status: DISCONTINUED | OUTPATIENT
Start: 2024-04-10 | End: 2024-04-10

## 2024-04-10 RX ORDER — SODIUM CHLORIDE, SODIUM LACTATE, POTASSIUM CHLORIDE, CALCIUM CHLORIDE 600; 310; 30; 20 MG/100ML; MG/100ML; MG/100ML; MG/100ML
INJECTION, SOLUTION INTRAVENOUS CONTINUOUS PRN
Status: DISCONTINUED | OUTPATIENT
Start: 2024-04-10 | End: 2024-04-10

## 2024-04-10 RX ORDER — LIDOCAINE HYDROCHLORIDE 10 MG/ML
INJECTION, SOLUTION EPIDURAL; INFILTRATION; INTRACAUDAL; PERINEURAL AS NEEDED
Status: DISCONTINUED | OUTPATIENT
Start: 2024-04-10 | End: 2024-04-10

## 2024-04-10 RX ADMIN — LIDOCAINE HYDROCHLORIDE 50 MG: 10 INJECTION, SOLUTION EPIDURAL; INFILTRATION; INTRACAUDAL; PERINEURAL at 12:00

## 2024-04-10 RX ADMIN — PROPOFOL 40 MG: 10 INJECTION, EMULSION INTRAVENOUS at 12:08

## 2024-04-10 RX ADMIN — SODIUM CHLORIDE, SODIUM LACTATE, POTASSIUM CHLORIDE, AND CALCIUM CHLORIDE: .6; .31; .03; .02 INJECTION, SOLUTION INTRAVENOUS at 11:52

## 2024-04-10 RX ADMIN — PROPOFOL 40 MG: 10 INJECTION, EMULSION INTRAVENOUS at 12:03

## 2024-04-10 RX ADMIN — PROPOFOL 80 MG: 10 INJECTION, EMULSION INTRAVENOUS at 12:00

## 2024-04-10 RX ADMIN — PROPOFOL 40 MG: 10 INJECTION, EMULSION INTRAVENOUS at 12:05

## 2024-04-10 NOTE — ANESTHESIA POSTPROCEDURE EVALUATION
Post-Op Assessment Note    CV Status:  Stable  Pain Score: 0    Pain management: adequate       Mental Status:  Alert and awake   Hydration Status:  Euvolemic   PONV Controlled:  Controlled   Airway Patency:  Patent     Post Op Vitals Reviewed: Yes    No anethesia notable event occurred.    Staff: Anesthesiologist, CRNA               /93 (04/10/24 1216)    Temp      Pulse 81 (04/10/24 1216)   Resp 21 (04/10/24 1216)    SpO2 100 % (04/10/24 1216)

## 2024-04-10 NOTE — ANESTHESIA PREPROCEDURE EVALUATION
Procedure:  COLONOSCOPY    Relevant Problems   ANESTHESIA (within normal limits)   (-) History of anesthesia complications      CARDIO   (+) Essential hypertension      ENDO (within normal limits)      GI/HEPATIC  Confirmed NPO appropriate  s/p bowel prep      /RENAL (within normal limits)      HEMATOLOGY  Refuses blood transfusion      MUSCULOSKELETAL (within normal limits)      NEURO/PSYCH (within normal limits)      PULMONARY   (-) Smoking   (-) URI (upper respiratory infection)        Physical Exam    Airway    Mallampati score: II  TM Distance: >3 FB  Neck ROM: full     Dental        Cardiovascular  Rhythm: regular, Rate: normal    Pulmonary   Breath sounds clear to auscultation    Other Findings        Anesthesia Plan  ASA Score- 2     Anesthesia Type- IV sedation with anesthesia with ASA Monitors.         Additional Monitors:     Airway Plan:     Comment: I discussed the risks and benefits of IV sedation anesthesia including the possibility of the need to convert to general anesthesia and the potential risk of awareness.  The patient was given the opportunity to ask questions, which were answered..       Plan Factors-Exercise tolerance (METS): >4 METS.    Chart reviewed.        Patient is not a current smoker.              Induction- intravenous.    Postoperative Plan-     Informed Consent- Anesthetic plan and risks discussed with patient.  I personally reviewed this patient with the CRNA. Discussed and agreed on the Anesthesia Plan with the CRNA..

## 2024-04-10 NOTE — H&P
"History and Physical -  [facility]  Aron Oswald 73 y.o. male MRN: [id number]                  HPI: Aron Oswald is a 73 y.o. year old male who presents for colonoscopy for history of colon polyps      REVIEW OF SYSTEMS: Per the HPI, and otherwise unremarkable.    Historical Information   Past Medical History:   Diagnosis Date    Anxiety     COVID-19 virus infection 12/05/2022    Hypertension     Refusal of blood transfusions as patient is [employer]      Past Surgical History:   Procedure Laterality Date    APPENDECTOMY       Social History   Social History     Substance and Sexual Activity   Alcohol Use Not Currently     Social History     Substance and Sexual Activity   Drug Use Never     Social History     Tobacco Use   Smoking Status Never   Smokeless Tobacco Never     Family History   Problem Relation Age of Onset    No Known Problems Mother     Cancer Father         stomach       Meds/Allergies     (Not in a hospital admission)      Allergies   Allergen Reactions    Penicillins Other (See Comments)       Objective     Blood pressure (!) 173/80, pulse 78, temperature 98 °F (36.7 °C), temperature source Tympanic, resp. rate 16, height 5' 11\" (1.803 m), weight 93.8 kg (206 lb 12.7 oz), SpO2 100%.      PHYSICAL EXAM    BP (!) 173/80   Pulse 78   Temp 98 °F (36.7 °C) (Tympanic)   Resp 16   Ht 5' 11\" (1.803 m)   Wt 93.8 kg (206 lb 12.7 oz)   SpO2 100%   BMI 28.84 kg/m²       Gen: NAD  CV: RRR  CHEST: Clear  ABD: soft, NT/ND  EXT: no edema      ASSESSMENT/PLAN:  This is a 73 y.o. year old male here for colonoscopy, and he is stable and optimized for his procedure.        "

## 2024-06-23 ENCOUNTER — OFFICE VISIT (OUTPATIENT)
Dept: URGENT CARE | Facility: CLINIC | Age: 74
End: 2024-06-23
Payer: COMMERCIAL

## 2024-06-23 VITALS
DIASTOLIC BLOOD PRESSURE: 81 MMHG | OXYGEN SATURATION: 100 % | SYSTOLIC BLOOD PRESSURE: 141 MMHG | TEMPERATURE: 97.6 F | RESPIRATION RATE: 20 BRPM | HEART RATE: 78 BPM

## 2024-06-23 DIAGNOSIS — J20.9 ACUTE BRONCHITIS, UNSPECIFIED ORGANISM: Primary | ICD-10-CM

## 2024-06-23 PROCEDURE — S9083 URGENT CARE CENTER GLOBAL: HCPCS

## 2024-06-23 PROCEDURE — 99213 OFFICE O/P EST LOW 20 MIN: CPT

## 2024-06-23 RX ORDER — AZITHROMYCIN 250 MG/1
TABLET, FILM COATED ORAL
Qty: 6 TABLET | Refills: 0 | Status: SHIPPED | OUTPATIENT
Start: 2024-06-23 | End: 2024-06-27

## 2024-06-23 NOTE — PROGRESS NOTES
[facility]        NAME: Aron Oswald is a 73 y.o. male  : 1950    MRN: [id number]  DATE: 2024  TIME: 2:43 PM    Assessment and Plan   Acute bronchitis, unspecified organism [J20.9]  1. Acute bronchitis, unspecified organism  azithromycin (ZITHROMAX) 250 mg tablet            Patient Instructions     Start azithromycin.  Start albuterol.  Mucinex over-the-counter for cough and congestion.  Over the counter delsym at bedtime as needed as a cough suppressant.   Tylenol and Motrin over-the-counter for pain and fever.    Follow up with PCP.  Go to the nearest emergency department if you have difficulty breathing, worsening symptoms.     Chief Complaint     Chief Complaint   Patient presents with    Cough     Pt c/o cough x 1 week non productive. No other c/o          History of Present Illness       The patient presents today with his wife for complaints of a wet cough, post nasal drip x 1.5 weeks. Denies fever/chills, nasal congestion, sinus pain/pressure. He took OTC allergy medication with minimal relief. Denies recent travel. Wife is here to be seen with similar symptoms.         Review of Systems   Review of Systems   Constitutional:  Negative for chills and fever.   HENT:  Positive for nosebleeds (resolved) and postnasal drip. Negative for congestion, ear pain, rhinorrhea, sinus pressure, sinus pain and sore throat.    Respiratory:  Positive for cough. Negative for shortness of breath and wheezing.    Gastrointestinal:  Negative for abdominal pain, diarrhea, nausea and vomiting.   Musculoskeletal:  Negative for myalgias.   Skin:  Negative for rash.         Current Medications       Current Outpatient Medications:     azithromycin (ZITHROMAX) 250 mg tablet, Take 2 tablets today then 1 tablet daily x 4 days, Disp: 6 tablet, Rfl: 0    amLODIPine (NORVASC) 5 mg tablet, Take 1 tablet (5 mg total) by mouth daily (Patient not taking: Reported on 2024), Disp: 90 tablet, Rfl: 3    Multiple  Vitamins-Minerals (CENTRUM SILVER PO), Take by mouth (Patient not taking: Reported on 6/23/2024), Disp: , Rfl:     sildenafil (VIAGRA) 50 MG tablet, Take 1 tablet (50 mg total) by mouth daily as needed for erectile dysfunction (Patient not taking: Reported on 6/23/2024), Disp: 10 tablet, Rfl: 5    Current Allergies     Allergies as of 06/23/2024 - Reviewed 06/23/2024   Allergen Reaction Noted    Penicillins Other (See Comments) 11/11/2016            The following portions of the patient's history were reviewed and updated as appropriate: allergies, current medications, past family history, past medical history, past social history, past surgical history and problem list.     Past Medical History:   Diagnosis Date    Anxiety     COVID-19 virus infection 12/05/2022    Hypertension     Refusal of blood transfusions as patient is [employer]        Past Surgical History:   Procedure Laterality Date    APPENDECTOMY         Family History   Problem Relation Age of Onset    No Known Problems Mother     Cancer Father         stomach         Medications have been verified.        Objective   /81   Pulse 78   Temp 97.6 °F (36.4 °C)   Resp 20   SpO2 100%        Physical Exam     Physical Exam  Vitals and nursing note reviewed.   Constitutional:       General: He is not in acute distress.     Appearance: Normal appearance. He is not ill-appearing.   HENT:      Head: Normocephalic and atraumatic.      Right Ear: Tympanic membrane, ear canal and external ear normal.      Left Ear: Tympanic membrane, ear canal and external ear normal.      Nose: Nose normal. No congestion or rhinorrhea.      Mouth/Throat:      Lips: Pink.      Mouth: Mucous membranes are moist.      Pharynx: No oropharyngeal exudate or posterior oropharyngeal erythema.      Tonsils: No tonsillar exudate.   Eyes:      General: Vision grossly intact.      Extraocular Movements: Extraocular movements intact.      Pupils: Pupils are equal, round, and  reactive to light.   Cardiovascular:      Rate and Rhythm: Normal rate and regular rhythm.      Heart sounds: Normal heart sounds. No murmur heard.  Pulmonary:      Effort: Pulmonary effort is normal. No respiratory distress.      Breath sounds: Normal breath sounds. No decreased air movement. No decreased breath sounds, wheezing, rhonchi or rales.   Musculoskeletal:         General: Normal range of motion.      Cervical back: Normal range of motion.   Lymphadenopathy:      Cervical: No cervical adenopathy.   Skin:     General: Skin is warm.      Findings: No rash.   Neurological:      Mental Status: He is alert and oriented to person, place, and time.      Motor: Motor function is intact.      Gait: Gait is intact.   Psychiatric:         Attention and Perception: Attention normal.         Mood and Affect: Mood normal.

## 2024-06-23 NOTE — PATIENT INSTRUCTIONS
Start azithromycin.  Start albuterol.  Mucinex over-the-counter for cough and congestion.  Over the counter delsym at bedtime as needed as a cough suppressant.   Tylenol and Motrin over-the-counter for pain and fever.    Follow up with PCP.  Go to the nearest emergency department if you have difficulty breathing, worsening symptoms.         Acute Bronchitis   WHAT YOU NEED TO KNOW:   Acute bronchitis is swelling and irritation in your lungs. It is usually caused by a virus and most often happens in the winter. Bronchitis may also be caused by bacteria or by a chemical irritant, such as smoke.  DISCHARGE INSTRUCTIONS:   Return to the emergency department if:   You cough up blood.     Your lips or fingernails turn blue.     You feel like you are not getting enough air when you breathe.     Call your doctor if:   Your symptoms do not go away or get worse, even after treatment.     Your cough does not get better within 4 weeks.     You have questions or concerns about your condition or care.     Medicines:  You may  need any of the following:  Cough suppressants  decrease your urge to cough.      Decongestants  help loosen mucus in your lungs and make it easier to cough up. This can help you breathe easier.     Inhalers  may be given. Your healthcare provider may give you one or more inhalers to help you breathe easier and cough less. An inhaler gives your medicine to open your airways. Ask your healthcare provider to show you how to use your inhaler correctly.          Antibiotics  may be given for up to 5 days if your bronchitis is caused by bacteria.     Acetaminophen  decreases pain and fever. It is available without a doctor's order. Ask how much to take and how often to take it. Follow directions. Read the labels of all other medicines you are using to see if they also contain acetaminophen, or ask your doctor or pharmacist. Acetaminophen can cause liver damage if not taken correctly. Do not use more than 4 grams  (4,000 milligrams) total of acetaminophen in one day.      NSAIDs  help decrease swelling and pain or fever. This medicine is available with or without a doctor's order. NSAIDs can cause stomach bleeding or kidney problems in certain people. If you take blood thinner medicine, always ask your healthcare provider if NSAIDs are safe for you. Always read the medicine label and follow directions.     Take your medicine as directed.  Contact your healthcare provider if you think your medicine is not helping or if you have side effects. Tell him of her if you are allergic to any medicine. Keep a list of the medicines, vitamins, and herbs you take. Include the amounts, and when and why you take them. Bring the list or the pill bottles to follow-up visits. Carry your medicine list with you in case of an emergency.     Self-care:   Drink liquids as directed.  You may need to drink more liquids than usual to stay hydrated. Ask how much liquid to drink each day and which liquids are best for you.     Use a cool mist humidifier  to increase air moisture in your home. This may make it easier for you to breathe and help decrease your cough.      Get more rest.  Rest helps your body to heal. Slowly start to do more each day. Rest when you feel it is needed.     Avoid irritants in the air.  Avoid chemicals, fumes, and dust. Wear a face mask if you must work around dust or fumes. Stay inside on days when air pollution levels are high. If you have allergies, stay inside when pollen counts are high. Do not use aerosol products, such as spray-on deodorant, bug spray, and hair spray.     Do not smoke or be around others who are smoking.  Nicotine and other chemicals in cigarettes and cigars can cause lung damage. Ask your healthcare provider for information if you currently smoke and need help to quit. E-cigarettes or smokeless tobacco still contain nicotine. Talk to your healthcare provider before you use these products.     Prevent  acute bronchitis:       Ask about vaccines you may need.  Get a flu vaccine each year as soon as recommended, usually in September or October. Ask your healthcare provider if you should also get a pneumonia or COVID-19 vaccine. Your healthcare provider can tell you if you should also get other vaccines, and when to get them.     Prevent the spread of germs.  You can decrease your risk for acute bronchitis and other illnesses by doing the following:      Wash your hands often with soap and water. Carry germ-killing hand lotion or gel with you. You can use the lotion or gel to clean your hands when soap and water are not available.          Do not touch your eyes, nose, or mouth unless you have washed your hands first.     Always cover your mouth when you cough to prevent the spread of germs. It is best to cough into a tissue or your shirt sleeve instead of into your hand. Ask those around you to cover their mouths when they cough.     Try to avoid people who have a cold or the flu. If you are sick, stay away from others as much as possible.     Follow up with your doctor as directed:  Write down questions you have so you will remember to ask them during your follow-up visits.  © Copyright [employer] 2021 Information is for End User's use only and may not be sold, redistributed or otherwise used for commercial purposes. All illustrations and images included in CareNotes® are the copyrighted property of [employer] or [employer]  The above information is an  only. It is not intended as medical advice for individual conditions or treatments. Talk to your doctor, nurse or pharmacist before following any medical regimen to see if it is safe and effective for you

## 2024-06-24 DIAGNOSIS — H91.90 HEARING LOSS, UNSPECIFIED HEARING LOSS TYPE, UNSPECIFIED LATERALITY: ICD-10-CM

## 2024-06-24 DIAGNOSIS — H61.23 BILATERAL IMPACTED CERUMEN: Primary | ICD-10-CM

## 2024-08-04 ENCOUNTER — OFFICE VISIT (OUTPATIENT)
Dept: URGENT CARE | Facility: CLINIC | Age: 74
End: 2024-08-04
Payer: COMMERCIAL

## 2024-08-04 VITALS
RESPIRATION RATE: 18 BRPM | WEIGHT: 200 LBS | DIASTOLIC BLOOD PRESSURE: 70 MMHG | SYSTOLIC BLOOD PRESSURE: 143 MMHG | OXYGEN SATURATION: 98 % | HEART RATE: 80 BPM | BODY MASS INDEX: 28 KG/M2 | HEIGHT: 71 IN | TEMPERATURE: 99.3 F

## 2024-08-04 DIAGNOSIS — U07.1 COVID: Primary | ICD-10-CM

## 2024-08-04 DIAGNOSIS — R05.1 ACUTE COUGH: ICD-10-CM

## 2024-08-04 LAB
SARS-COV-2 AG UPPER RESP QL IA: POSITIVE
VALID CONTROL: ABNORMAL

## 2024-08-04 PROCEDURE — 99214 OFFICE O/P EST MOD 30 MIN: CPT | Performed by: PHYSICAL MEDICINE & REHABILITATION

## 2024-08-04 PROCEDURE — 87811 SARS-COV-2 COVID19 W/OPTIC: CPT | Performed by: PHYSICAL MEDICINE & REHABILITATION

## 2024-08-04 PROCEDURE — S9083 URGENT CARE CENTER GLOBAL: HCPCS | Performed by: PHYSICAL MEDICINE & REHABILITATION

## 2024-08-04 RX ORDER — NIRMATRELVIR AND RITONAVIR 150-100 MG
2 KIT ORAL 2 TIMES DAILY
Qty: 20 TABLET | Refills: 0 | Status: SHIPPED | OUTPATIENT
Start: 2024-08-04 | End: 2024-08-09

## 2024-08-04 NOTE — PROGRESS NOTES
"  [facility]        NAME: Aron Oswald is a 73 y.o. male  : 1950    MRN: [id number]  DATE: 2024  TIME: 11:26 AM    Assessment and Plan   COVID [U07.1]  1. COVID  nirmatrelvir & ritonavir (Paxlovid, 150/100,) tablet therapy pack      2. Acute cough  Poct Covid 19 Rapid Antigen Test            Patient Instructions       Follow up with PCP in 3-5 days.  Proceed to  ER if symptoms worsen.    If tests are performed, our office will contact you with results only if changes need to made to the care plan discussed with you at the visit. [facility]    Chief Complaint     Chief Complaint   Patient presents with    Cough     Cough started a few days ago.          History of Present Illness       Patient presenting with a cough that started a few days ago. Also with chills and a \"Tickle in throat\".     Cough  Associated symptoms include chills, headaches and postnasal drip. Pertinent negatives include no fever, sore throat, shortness of breath or wheezing.       Review of Systems   Review of Systems   Constitutional:  Positive for chills. Negative for fever.   HENT:  Positive for congestion and postnasal drip. Negative for sore throat.    Respiratory:  Positive for cough. Negative for shortness of breath and wheezing.    Cardiovascular: Negative.    Gastrointestinal: Negative.  Negative for diarrhea, nausea and vomiting.   Musculoskeletal: Negative.    Neurological:  Positive for headaches.         Current Medications       Current Outpatient Medications:     nirmatrelvir & ritonavir (Paxlovid, 150/100,) tablet therapy pack, Take 2 tablets by mouth 2 (two) times a day for 5 days Take 1 nirmatrelvir tablet + 1 ritonavir tablet together per dose, Disp: 20 tablet, Rfl: 0    amLODIPine (NORVASC) 5 mg tablet, Take 1 tablet (5 mg total) by mouth daily (Patient not taking: Reported on 2024), Disp: 90 tablet, Rfl: 3    Multiple Vitamins-Minerals (CENTRUM " "SILVER PO), Take by mouth (Patient not taking: Reported on 6/23/2024), Disp: , Rfl:     sildenafil (VIAGRA) 50 MG tablet, Take 1 tablet (50 mg total) by mouth daily as needed for erectile dysfunction (Patient not taking: Reported on 6/23/2024), Disp: 10 tablet, Rfl: 5    Current Allergies     Allergies as of 08/04/2024 - Reviewed 08/04/2024   Allergen Reaction Noted    Penicillins Other (See Comments) 11/11/2016            The following portions of the patient's history were reviewed and updated as appropriate: allergies, current medications, past family history, past medical history, past social history, past surgical history and problem list.     Past Medical History:   Diagnosis Date    Anxiety     COVID-19 virus infection 12/05/2022    Hypertension     Refusal of blood transfusions as patient is Pent[employer]ism        Past Surgical History:   Procedure Laterality Date    APPENDECTOMY         Family History   Problem Relation Age of Onset    No Known Problems Mother     Cancer Father         stomach         Medications have been verified.        Objective   /70   Pulse 80   Temp 99.3 °F (37.4 °C)   Resp 18   Ht 5' 11\" (1.803 m)   Wt 90.7 kg (200 lb)   SpO2 98%   BMI 27.89 kg/m²        Physical Exam     Physical Exam  Constitutional:       General: He is not in acute distress.     Appearance: He is ill-appearing.   HENT:      Right Ear: Tympanic membrane normal.      Left Ear: Tympanic membrane normal.      Nose: Congestion present. No rhinorrhea.      Mouth/Throat:      Mouth: Mucous membranes are moist.      Pharynx: Oropharynx is clear. No oropharyngeal exudate or posterior oropharyngeal erythema.   Eyes:      Conjunctiva/sclera: Conjunctivae normal.   Cardiovascular:      Rate and Rhythm: Normal rate and regular rhythm.      Heart sounds: Normal heart sounds.   Pulmonary:      Effort: Pulmonary effort is normal. No respiratory distress.      Breath sounds: Normal breath sounds. No wheezing, " rhonchi or rales.   Musculoskeletal:      Cervical back: Normal range of motion and neck supple.   Lymphadenopathy:      Cervical: Cervical adenopathy present.   Skin:     General: Skin is warm.   Neurological:      Mental Status: He is alert.   Psychiatric:         Mood and Affect: Mood normal.         Behavior: Behavior normal.

## 2024-08-23 ENCOUNTER — HOSPITAL ENCOUNTER (OUTPATIENT)
Dept: CT IMAGING | Facility: HOSPITAL | Age: 74
End: 2024-08-23
Payer: COMMERCIAL

## 2024-08-23 DIAGNOSIS — H90.A32 MIXED CONDUCTIVE AND SENSORINEURAL HEARING LOSS OF LEFT EAR WITH RESTRICTED HEARING OF RIGHT EAR: ICD-10-CM

## 2024-08-23 PROCEDURE — 70480 CT ORBIT/EAR/FOSSA W/O DYE: CPT

## 2024-08-27 ENCOUNTER — APPOINTMENT (EMERGENCY)
Dept: RADIOLOGY | Facility: HOSPITAL | Age: 74
End: 2024-08-27
Payer: COMMERCIAL

## 2024-08-27 ENCOUNTER — APPOINTMENT (EMERGENCY)
Dept: CT IMAGING | Facility: HOSPITAL | Age: 74
End: 2024-08-27
Payer: COMMERCIAL

## 2024-08-27 ENCOUNTER — HOSPITAL ENCOUNTER (INPATIENT)
Facility: HOSPITAL | Age: 74
LOS: 3 days | Discharge: HOME | DRG: 552 | End: 2024-08-30
Attending: SURGERY | Admitting: SURGERY
Payer: COMMERCIAL

## 2024-08-27 ENCOUNTER — HOSPITAL ENCOUNTER (EMERGENCY)
Facility: HOSPITAL | Age: 74
End: 2024-08-27
Attending: EMERGENCY MEDICINE | Admitting: EMERGENCY MEDICINE
Payer: COMMERCIAL

## 2024-08-27 VITALS
SYSTOLIC BLOOD PRESSURE: 133 MMHG | HEART RATE: 74 BPM | WEIGHT: 201.94 LBS | OXYGEN SATURATION: 99 % | DIASTOLIC BLOOD PRESSURE: 86 MMHG | TEMPERATURE: 98.3 F | BODY MASS INDEX: 28.17 KG/M2 | RESPIRATION RATE: 18 BRPM
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
## 2024-08-27 DIAGNOSIS — R93.89 ABNORMAL COMPUTED TOMOGRAPHY ANGIOGRAPHY (CTA) OF NECK: ICD-10-CM

## 2024-08-27 DIAGNOSIS — S12.401A CLOSED NONDISPLACED FRACTURE OF FIFTH CERVICAL VERTEBRA, UNSPECIFIED FRACTURE MORPHOLOGY, INITIAL ENCOUNTER (HCC): Primary | ICD-10-CM

## 2024-08-27 DIAGNOSIS — S12.400A C5 CERVICAL FRACTURE (HCC): Primary | ICD-10-CM

## 2024-08-27 PROBLEM — S12.9XXA CERVICAL SPINE FRACTURE (HCC): Status: ACTIVE | Noted: 2024-08-27

## 2024-08-27 LAB
ANION GAP SERPL CALCULATED.3IONS-SCNC: 7 MMOL/L (ref 4–13)
ATRIAL RATE: 75 BPM
ATRIAL RATE: 77 BPM
ATRIAL RATE: 86 BPM
BASOPHILS # BLD AUTO: 0.04 THOUSANDS/ÂΜL (ref 0–0.1)
BASOPHILS NFR BLD AUTO: 1 % (ref 0–1)
BUN SERPL-MCNC: 19 MG/DL (ref 5–25)
CALCIUM SERPL-MCNC: 9 MG/DL (ref 8.4–10.2)
CHLORIDE SERPL-SCNC: 111 MMOL/L (ref 96–108)
CO2 SERPL-SCNC: 22 MMOL/L (ref 21–32)
CREAT SERPL-MCNC: 1.32 MG/DL (ref 0.6–1.3)
EOSINOPHIL # BLD AUTO: 0.09 THOUSAND/ÂΜL (ref 0–0.61)
EOSINOPHIL NFR BLD AUTO: 1 % (ref 0–6)
ERYTHROCYTE [DISTWIDTH] IN BLOOD BY AUTOMATED COUNT: 13.8 % (ref 11.6–15.1)
GFR SERPL CREATININE-BSD FRML MDRD: 52 ML/MIN/1.73SQ M
GLUCOSE SERPL-MCNC: 125 MG/DL (ref 65–140)
HCT VFR BLD AUTO: 40.5 % (ref 36.5–49.3)
HGB BLD-MCNC: 13.6 G/DL (ref 12–17)
IMM GRANULOCYTES # BLD AUTO: 0.03 THOUSAND/UL (ref 0–0.2)
IMM GRANULOCYTES NFR BLD AUTO: 0 % (ref 0–2)
LYMPHOCYTES # BLD AUTO: 1.31 THOUSANDS/ÂΜL (ref 0.6–4.47)
LYMPHOCYTES NFR BLD AUTO: 17 % (ref 14–44)
MAGNESIUM SERPL-MCNC: 2.3 MG/DL (ref 1.9–2.7)
MCH RBC QN AUTO: 32.5 PG (ref 26.8–34.3)
MCHC RBC AUTO-ENTMCNC: 33.6 G/DL (ref 31.4–37.4)
MCV RBC AUTO: 97 FL (ref 82–98)
MONOCYTES # BLD AUTO: 0.67 THOUSAND/ÂΜL (ref 0.17–1.22)
MONOCYTES NFR BLD AUTO: 9 % (ref 4–12)
NEUTROPHILS # BLD AUTO: 5.76 THOUSANDS/ÂΜL (ref 1.85–7.62)
NEUTS SEG NFR BLD AUTO: 72 % (ref 43–75)
NRBC BLD AUTO-RTO: 0 /100 WBCS
P AXIS: 35 DEGREES
P AXIS: 39 DEGREES
P AXIS: 58 DEGREES
PHOSPHATE SERPL-MCNC: 2.8 MG/DL (ref 2.3–4.1)
PLATELET # BLD AUTO: 123 THOUSANDS/UL (ref 149–390)
PLATELET # BLD AUTO: 135 THOUSANDS/UL (ref 149–390)
PMV BLD AUTO: 11 FL (ref 8.9–12.7)
PMV BLD AUTO: 11.4 FL (ref 8.9–12.7)
POTASSIUM SERPL-SCNC: 3.7 MMOL/L (ref 3.5–5.3)
PR INTERVAL: 146 MS
PR INTERVAL: 146 MS
PR INTERVAL: 148 MS
QRS AXIS: 22 DEGREES
QRS AXIS: 29 DEGREES
QRS AXIS: 51 DEGREES
QRSD INTERVAL: 88 MS
QRSD INTERVAL: 90 MS
QRSD INTERVAL: 90 MS
QT INTERVAL: 396 MS
QT INTERVAL: 410 MS
QT INTERVAL: 410 MS
QTC INTERVAL: 457 MS
QTC INTERVAL: 463 MS
QTC INTERVAL: 473 MS
RBC # BLD AUTO: 4.19 MILLION/UL (ref 3.88–5.62)
SODIUM SERPL-SCNC: 140 MMOL/L (ref 135–147)
T WAVE AXIS: 62 DEGREES
T WAVE AXIS: 64 DEGREES
T WAVE AXIS: 82 DEGREES
VENTRICULAR RATE: 75 BPM
VENTRICULAR RATE: 77 BPM
VENTRICULAR RATE: 86 BPM
WBC # BLD AUTO: 7.9 THOUSAND/UL (ref 4.31–10.16)

## 2024-08-27 PROCEDURE — 36415 COLL VENOUS BLD VENIPUNCTURE: CPT | Performed by: EMERGENCY MEDICINE

## 2024-08-27 PROCEDURE — 93005 ELECTROCARDIOGRAM TRACING: CPT

## 2024-08-27 PROCEDURE — 71045 X-RAY EXAM CHEST 1 VIEW: CPT

## 2024-08-27 PROCEDURE — 99285 EMERGENCY DEPT VISIT HI MDM: CPT | Performed by: EMERGENCY MEDICINE

## 2024-08-27 PROCEDURE — 85025 COMPLETE CBC W/AUTO DIFF WBC: CPT | Performed by: EMERGENCY MEDICINE

## 2024-08-27 PROCEDURE — 99222 1ST HOSP IP/OBS MODERATE 55: CPT | Performed by: SURGERY

## 2024-08-27 PROCEDURE — 72125 CT NECK SPINE W/O DYE: CPT

## 2024-08-27 PROCEDURE — 72128 CT CHEST SPINE W/O DYE: CPT

## 2024-08-27 PROCEDURE — 84100 ASSAY OF PHOSPHORUS: CPT

## 2024-08-27 PROCEDURE — 93010 ELECTROCARDIOGRAM REPORT: CPT | Performed by: INTERNAL MEDICINE

## 2024-08-27 PROCEDURE — 99284 EMERGENCY DEPT VISIT MOD MDM: CPT

## 2024-08-27 PROCEDURE — 70498 CT ANGIOGRAPHY NECK: CPT

## 2024-08-27 PROCEDURE — 70450 CT HEAD/BRAIN W/O DYE: CPT

## 2024-08-27 PROCEDURE — 99223 1ST HOSP IP/OBS HIGH 75: CPT | Performed by: PHYSICIAN ASSISTANT

## 2024-08-27 PROCEDURE — 80048 BASIC METABOLIC PNL TOTAL CA: CPT | Performed by: EMERGENCY MEDICINE

## 2024-08-27 PROCEDURE — 83735 ASSAY OF MAGNESIUM: CPT

## 2024-08-27 PROCEDURE — 85049 AUTOMATED PLATELET COUNT: CPT

## 2024-08-27 RX ORDER — LABETALOL HYDROCHLORIDE 5 MG/ML
10 INJECTION, SOLUTION INTRAVENOUS EVERY 6 HOURS PRN
Status: DISCONTINUED | OUTPATIENT
Start: 2024-08-27 | End: 2024-08-30 | Stop reason: HOSPADM

## 2024-08-27 RX ORDER — ENOXAPARIN SODIUM 100 MG/ML
30 INJECTION SUBCUTANEOUS EVERY 12 HOURS
Status: DISCONTINUED | OUTPATIENT
Start: 2024-08-27 | End: 2024-08-30 | Stop reason: HOSPADM

## 2024-08-27 RX ADMIN — IOHEXOL 85 ML: 350 INJECTION, SOLUTION INTRAVENOUS at 13:10

## 2024-08-27 RX ADMIN — ENOXAPARIN SODIUM 30 MG: 30 INJECTION SUBCUTANEOUS at 18:26

## 2024-08-27 NOTE — ED PROVIDER NOTES
"Emergency Department Trauma Note  Aron Oswald 73 y.o. male MRN: [id number]  Unit/Bed#: ED 29/ED 29 Encounter: [id number]      Trauma Alert: Trauma Acuity: Trauma Evaluation  Model of Arrival:   via    Trauma Team: Current Providers  Attending Provider: Aleks Delacruz MD  Registered Nurse: John Renteria, RN  Graduate Nurse: Allie Hough  Registered Nurse: John Renteria RN  Registered Nurse: Allie Hough  Consultants:     None      History of Present Illness     Chief Complaint:   Chief Complaint   Patient presents with    Fall     Pt reports falling out bed this am; struck head on end table. C/o HA, left eye pain, left shoulder pain, and neck tenderness. Also c/o pain with swallowing. No LOC. No thinners.      HPI:  Aron Oswald is a 73 y.o. male who presents with history of falling out of bed.  Apparently striking his forehead on a carpeted floor.  Now complains of some neck pain.  Complains of pain when he tries to move his neck.  Wife reports they went to while asked for coffee and he was having a great amount difficulty getting out of the car trying to bend his upper body.  He complains of bruising in his left forehead and pain around his left eye.  Denies any loss consciousness.  Denies any difficulty moving his legs.  Denies any difficulty breathing.  There is no shortness of breath.  There was no loss of consciousness.  Medical history, anxiety hypertension  Family is noncontributory  social history, non-smoker no history drug abuse  .  Mechanism:Details of Incident: vivid dream caused patient to \"dive\" out of bed, striking left forehead on thin capeted floor. Injury Date: 08/27/24        HPI  Review of Systems   Constitutional:  Negative for fever.   HENT:  Negative for congestion.    Eyes:  Negative for pain and redness.   Respiratory:  Negative for cough and shortness of breath.    Cardiovascular:  Negative for chest pain.   Gastrointestinal:  Negative for abdominal pain and vomiting. "   Musculoskeletal:  Positive for neck pain.   Reports head injury    Historical Information     Immunizations:   Immunization History   Administered Date(s) Administered    COVID-19 PFIZER VACCINE 0.3 ML IM 03/28/2021, 04/18/2021, 12/01/2021    INFLUENZA 12/01/2021    Pneumococcal Conjugate 13-Valent 12/29/2021    Pneumococcal Conjugate Vaccine 20-valent (Pcv20), Polysace 05/16/2023       Past Medical History:   Diagnosis Date    Anxiety     COVID-19 virus infection 12/05/2022    Hypertension     Refusal of blood transfusions as patient is Spiritism        Family History   Problem Relation Age of Onset    No Known Problems Mother     Cancer Father         stomach     Past Surgical History:   Procedure Laterality Date    APPENDECTOMY       Social History     Tobacco Use    Smoking status: Never    Smokeless tobacco: Never   Vaping Use    Vaping status: Never Used   Substance Use Topics    Alcohol use: Not Currently    Drug use: Never     E-Cigarette/Vaping    E-Cigarette Use Never User      E-Cigarette/Vaping Substances    Nicotine No     THC No     CBD No     Flavoring No     Other No     Unknown No        Family History: non-contributory    Meds/Allergies   Prior to Admission Medications   Prescriptions Last Dose Informant Patient Reported? Taking?   Multiple Vitamins-Minerals (CENTRUM SILVER PO)   Yes No   Sig: Take by mouth   Patient not taking: Reported on 6/23/2024   amLODIPine (NORVASC) 5 mg tablet   No No   Sig: Take 1 tablet (5 mg total) by mouth daily   Patient not taking: Reported on 6/23/2024   sildenafil (VIAGRA) 50 MG tablet   No No   Sig: Take 1 tablet (50 mg total) by mouth daily as needed for erectile dysfunction   Patient not taking: Reported on 6/23/2024      Facility-Administered Medications: None       Allergies   Allergen Reactions    Penicillins Other (See Comments)       PHYSICAL EXAM    PE limited by: Nothing    Objective   Vitals:   First set: Temperature: 98.3 °F (36.8 °C) (08/27/24  1020)  Pulse: 80 (08/27/24 1020)  Respirations: 17 (08/27/24 1020)  Blood Pressure: 144/78 (08/27/24 1020)  SpO2: 100 % (08/27/24 1020)    Primary Survey:   (A) Airway: Patent airway, normal phonation  (B) Breathing: Good bilateral breath sounds  (C) Circulation: Pulses:   radial  4/4  (D) Disabliity:  Verbal Response:  Oriented = 5 Glascow 15  (E) Expose:  Completed    Secondary Survey: (Click on Physical Exam tab above)  Physical Exam  Vitals and nursing note reviewed.   Constitutional:       Appearance: He is well-developed.   HENT:      Head: Normocephalic.      Right Ear: External ear normal.      Left Ear: External ear normal.      Nose: Nose normal.      Mouth/Throat:      Mouth: Mucous membranes are moist.      Pharynx: Oropharynx is clear.   Eyes:      General: Lids are normal.      Extraocular Movements: Extraocular movements intact.      Pupils: Pupils are equal, round, and reactive to light.   Neck:      Comments: There is a midline cervical spine tenderness unable to clear by Nexus criteria, no focal weakness  Cardiovascular:      Rate and Rhythm: Normal rate and regular rhythm.      Pulses: Normal pulses.      Heart sounds: Normal heart sounds.   Pulmonary:      Effort: Pulmonary effort is normal. No respiratory distress.      Breath sounds: Normal breath sounds.   Abdominal:      General: Abdomen is flat. Bowel sounds are normal.      Tenderness: There is no abdominal tenderness.   Musculoskeletal:         General: No deformity. Normal range of motion.      Cervical back: Normal range of motion and neck supple. Tenderness present.   Skin:     General: Skin is warm and dry.   Neurological:      Mental Status: He is alert and oriented to person, place, and time.   Psychiatric:         Mood and Affect: Mood normal.         Cervical spine cleared by clinical criteria?  No    Invasive Devices       Peripheral Intravenous Line  Duration             Peripheral IV 08/27/24 Left Antecubital <1 day     Peripheral IV 08/27/24 Left;Ventral (anterior) Hand <1 day                    Lab Results:   Results Reviewed       Procedure Component Value Units Date/Time    Basic metabolic panel [[id number]]  (Abnormal) Collected: 08/27/24 1117    Lab Status: Final result Specimen: Blood from Arm, Left Updated: 08/27/24 1138     Sodium 140 mmol/L      Potassium 3.7 mmol/L      Chloride 111 mmol/L      CO2 22 mmol/L      ANION GAP 7 mmol/L      BUN 19 mg/dL      Creatinine 1.32 mg/dL      Glucose 125 mg/dL      Calcium 9.0 mg/dL      eGFR 52 ml/min/1.73sq m     Narrative:      National Kidney Disease Foundation guidelines for Chronic Kidney Disease (CKD):     Stage 1 with normal or high GFR (GFR > 90 mL/min/1.73 square meters)    Stage 2 Mild CKD (GFR = 60-89 mL/min/1.73 square meters)    Stage 3A Moderate CKD (GFR = 45-59 mL/min/1.73 square meters)    Stage 3B Moderate CKD (GFR = 30-44 mL/min/1.73 square meters)    Stage 4 Severe CKD (GFR = 15-29 mL/min/1.73 square meters)    Stage 5 End Stage CKD (GFR <15 mL/min/1.73 square meters)  Note: GFR calculation is accurate only with a steady state creatinine    CBC and differential [[id number]]  (Abnormal) Collected: 08/27/24 1117    Lab Status: Final result Specimen: Blood from Arm, Left Updated: 08/27/24 1126     WBC 7.90 Thousand/uL      RBC 4.19 Million/uL      Hemoglobin 13.6 g/dL      Hematocrit 40.5 %      MCV 97 fL      MCH 32.5 pg      MCHC 33.6 g/dL      RDW 13.8 %      MPV 11.0 fL      Platelets 135 Thousands/uL      nRBC 0 /100 WBCs      Segmented % 72 %      Immature Grans % 0 %      Lymphocytes % 17 %      Monocytes % 9 %      Eosinophils Relative 1 %      Basophils Relative 1 %      Absolute Neutrophils 5.76 Thousands/µL      Absolute Immature Grans 0.03 Thousand/uL      Absolute Lymphocytes 1.31 Thousands/µL      Absolute Monocytes 0.67 Thousand/µL      Eosinophils Absolute 0.09 Thousand/µL      Basophils Absolute 0.04 Thousands/µL                    Imaging Studies:  "  Direct to CT: No  CTA neck with and without contrast   Final Result by Randolph Ortega DO (08/27 1341)      Subtle beaded appearance of the distal cervical internal carotid artery bilaterally most suggestive of fibromuscular dysplasia. In the setting of trauma, grade 1 vessel wall injury is not completely excluded. No stenosis, pseudoaneurysm or dissection.      Incidental thyroid nodule(s) for which nonemergent thyroid ultrasound is recommended.      This examination was marked \"immediate notification\" in Epic in order to begin the standard process by which the radiology reading room liaison alerts the referring practitioner.      Workstation performed: [id number]         CT head without contrast   Final Result by Federico Mcclure MD (08/27 1138)      No intracranial hemorrhage or calvarial fracture.                  Workstation performed: [id number]         CT spine cervical without contrast   Final Result by Federico Mcclure MD (08/27 1150)      Acute fracture of the anterior inferior endplate of C5 versus anterior inferior C5 osteophyte fracture.      Nondisplaced fracture of the left L5 lamina extending into the spinous process.            I personally discussed this study with ALEKS DELACRUZ on 8/27/2024 11:50 AM.            Workstation performed: [id number]         CT spine thoracic without contrast   Final Result by Federico Mcclure MD (08/27 1148)      No acute fracture.            Workstation performed: [id number]         XR chest 1 view portable    (Results Pending)         Procedures  ECG 12 Lead Documentation Only    Date/Time: 8/27/2024 11:28 AM    Performed by: Aleks Delacruz MD  Authorized by: Aleks Delacruz MD    Indications / Diagnosis:  Fall  ECG reviewed by me, the ED Provider: yes    Patient location:  ED  Previous ECG:     Previous ECG:  Unavailable  Interpretation:     Interpretation: non-specific    Rate:     ECG rate:  There is a 6    ECG rate " assessment: normal    Rhythm:     Rhythm: sinus rhythm    Ectopy:     Ectopy: PVCs    Comments:      Normal sinus rhythm with PVCs, no acute ST-T wave changes           ED Course     Chest x-ray: Chest x-ray showed a normal cardiac silhouette, no pneumothorax no infiltrates, No sign of pathology, interpreted by me, I was the primary .       Medical Decision Making  Medical decision making 73-year-old male status post fall out of bed this morning complaining of hitting the left side of his head and headache with neck pain.  No loss of consciousness awake and alert here CT showed no intracranial bleeding.  CT scan of cervical spine did show a C5 fracture.  Discussed with the patient advised transfer.  Discussed with the trauma service.    Amount and/or Complexity of Data Reviewed  Labs: ordered.  Radiology: ordered.    Risk  Prescription drug management.      Acute fracture of the anterior inferior endplate of C5 versus anterior inferior C5 osteophyte fracture.     Nondisplaced fracture of the left L5 lamina extending into the spinous process.  Electrolytes are within normal limits  White count was normal at 7.9 no sign of inflammation hemoglobin is normal 13 no sign of anemia.  CT scan of the brain showed no acute pathology, CT scan cervical spine showed fractures of the cervical spine.        Disposition  [facility] Transfer: No  Final diagnoses:   C5 cervical fracture (HCC)     Time reflects when diagnosis was documented in both MDM as applicable and the Disposition within this note       Time User Action Codes Description Comment    8/27/2024 12:12 PM Aleks Delacruz Add [S12.400A] C5 cervical fracture (HCC)           ED Disposition       ED Disposition   Transfer to Another Facility-In Network    Condition   --    Date/Time   Tue Aug 27, 2024 12:12 PM    Comment   Aron Oswald should be transferred out to [facility] trauma service.               MD Documentation      Flowsheet Row Most Recent  Value   Patient Condition The patient has been stabilized such that within reasonable medical probability, no material deterioration of the patient condition or the condition of the unborn child(cary) is likely to result from the transfer   Reason for Transfer Level of Care needed not available at this facility   Benefits of Transfer Specialized equipment and/or services available at the receiving facility (Include comment)________________________   Risks of Transfer Potential for delay in receiving treatment   Accepting Physician Dr. Poe   Accepting Facility Name, [facility]    (Name & Tel number) Patient access center   Trans[facility] by (Company and Unit #) [facility] [employer] [facility]   Sending MD Dr. Delacruz   Provider Certification General risk, such as traffic hazards, adverse weather conditions, rough terrain or turbulence, possible failure of equipment (including vehicle or aircraft), or consequences of actions of persons outside the control of the transport personnel          RN Documentation      Flowsheet Row Most Recent Value   Accepting Facility Name, [facility]    (Name & Tel number) Patient access center   Trans[facility] by (Company and Unit [facility]          Follow-up Information    None       Discharge Medication List as of 8/27/2024  1:30 PM        CONTINUE these medications which have NOT CHANGED    Details   amLODIPine (NORVASC) 5 mg tablet Take 1 tablet (5 mg total) by mouth daily, Starting Tue 5/16/2023, Normal      Multiple Vitamins-Minerals (CENTRUM SILVER PO) Take by mouth, Historical Med      sildenafil (VIAGRA) 50 MG tablet Take 1 tablet (50 mg total) by mouth daily as needed for erectile dysfunction, Starting Tue 5/16/2023, Print           No discharge procedures on file.    PDMP Review       None            ED Provider  Electronically Signed by           Aleks Delacruz,  MD  08/27/24 1453

## 2024-08-27 NOTE — ASSESSMENT & PLAN NOTE
CTA with concern for possible FMD vs grade 1 injury of cervical ICA    Imaging:  CTA neck w wo contrast 8/27/2024: Subtle beaded appearance of the distal cervical internal carotid artery bilaterally most suggestive of fibromuscular dysplasia. In the setting of trauma, grade 1 vessel wall injury is not completely excluded. No stenosis, pseudoaneurysm or dissection.     Plan:  Will review with endovascular attending to see if asa therapy is warranted

## 2024-08-27 NOTE — CONSULTS
[facility]  Consult  Name: Aron Oswald 73 y.o. male I MRN: [id number]  Unit/Bed#: QCF I Date of Admission: 8/27/2024   Date of Service: 8/27/2024 I Hospital Day: 0    Inpatient consult to Neurosurgery  Consult performed by: Basilia Acosta PA-C  Consult ordered by: Jose Medrano MD          Assessment & Plan   * Cervical spine fracture (HCC)  Assessment & Plan  C5 inferior endplate fracture with left C5 laminar fracture  Patient presented s/p mechanical fall from bed while sleeping this morning  Mid neck and back pain after the fall associated with some transient R shoulder weakness now resolved   No reported AC/AP medication    Imaging:   CT cervical spine 8/27/2024: Acute fracture of the anterior inferior endplate vs osteophyte fracture of C5. Non displaced fracture of the L C5 lamina extending to the spinous process    Plan:   Imaging reviewed, plan for conservative management for now  Ongoing frequent neurological checks  Recommend cervical collar to be worn at all times, maia collar for showering  Order placed for upright cervical XR   PT/OT evaluation  Ongoing medical management and pain control per primary team  DVT ppx: SCD's, lovenox (if patient agreeable)  CM following for dispo planning    Neurosurgery will continue to follow with completion of upright cervical XR imaging. Call with questions or concerns.     Fall from bed  Assessment & Plan  Fall out of bed with +head strike  CT head and thoracic spine without acute findings  See plan above    Abnormal computed tomography angiography (CTA) of neck  Assessment & Plan  CTA with concern for possible FMD vs grade 1 injury of cervical ICA    Imaging:  CTA neck w wo contrast 8/27/2024: Subtle beaded appearance of the distal cervical internal carotid artery bilaterally most suggestive of fibromuscular dysplasia. In the setting of trauma, grade 1 vessel wall injury is not completely excluded. No stenosis,  pseudoaneurysm or dissection.     Plan:  Will review with endovascular attending to see if asa therapy is warranted         History of Present Illness   HPI: Aron Oswald is a 73 y.o. year old male with PMH including Hindu with refusal of blood transfusions, hypertension who presents status post fall out of bed with C5 fracture and possible vascular abnormality.    Patient states this morning he fell out of bed while sleeping.  He must of hit his head on the nightstand.  He sat on the ground for a while before getting up.  He laid in bed for a bit before taking his wife to her therapy session.  After she had completed she noted that his left eye was red and that he had an abrasion on his forehead and told him to go to the hospital for evaluation.  He is currently complaining of some neck pain, some right shoulder pain which she has at baseline but seem to be exacerbated after the fall even though he did not fall on this arm.  He says this seems to be getting better now.  No numbness/tingling/weakness, bowel or bladder issues, saddle anesthesia.  He does not take blood thinners at baseline.  He is wary about taking Lovenox.      Review of Systems   Constitutional:  Negative for chills and fever.   HENT:  Negative for hearing loss and trouble swallowing.    Eyes:  Negative for visual disturbance.        Conjunctival hemorrhage left eye   Respiratory:  Negative for chest tightness and shortness of breath.    Cardiovascular:  Negative for chest pain.   Gastrointestinal:  Negative for abdominal pain, constipation, diarrhea, nausea and vomiting.   Genitourinary:  Negative for difficulty urinating.   Musculoskeletal:  Positive for arthralgias (right shoulder pain) and neck pain. Negative for back pain.   Skin:  Positive for wound.   Allergic/Immunologic: Negative for environmental allergies and food allergies.   Neurological:  Negative for dizziness, facial asymmetry, speech difficulty, weakness, numbness and  headaches.   Hematological:  Does not bruise/bleed easily.   Psychiatric/Behavioral:  Negative for confusion.        Historical Information   Past Medical History:   Diagnosis Date    Anxiety     COVID-19 virus infection 12/05/2022    Hypertension     Refusal of blood transfusions as patient is Restorationism      Past Surgical History:   Procedure Laterality Date    APPENDECTOMY       Social History     Substance and Sexual Activity   Alcohol Use Not Currently     Social History     Substance and Sexual Activity   Drug Use Never     Social History     Tobacco Use   Smoking Status Never   Smokeless Tobacco Never     Family History   Problem Relation Age of Onset    No Known Problems Mother     Cancer Father         stomach       Meds/Allergies   all current active meds have been reviewed, current meds:   Current Facility-Administered Medications   Medication Dose Route Frequency    enoxaparin (LOVENOX) subcutaneous injection 30 mg  30 mg Subcutaneous Q12H   , and PTA meds:   Prior to Admission Medications   Prescriptions Last Dose Informant Patient Reported? Taking?   Multiple Vitamins-Minerals (CENTRUM SILVER PO)   Yes No   Sig: Take by mouth   Patient not taking: Reported on 6/23/2024   amLODIPine (NORVASC) 5 mg tablet   No No   Sig: Take 1 tablet (5 mg total) by mouth daily   Patient not taking: Reported on 6/23/2024   sildenafil (VIAGRA) 50 MG tablet   No No   Sig: Take 1 tablet (50 mg total) by mouth daily as needed for erectile dysfunction   Patient not taking: Reported on 6/23/2024      Facility-Administered Medications: None     Allergies   Allergen Reactions    Penicillins Other (See Comments)       Objective   I/O       None            Physical Exam  Constitutional:       General: He is awake.      Appearance: Normal appearance.      Interventions: Cervical collar in place.   HENT:      Head: Normocephalic and atraumatic.     Eyes:      Extraocular Movements: EOM normal.      Conjunctiva/sclera:       Left eye: Hemorrhage (conjunctival hemorrhage) present.   Cardiovascular:      Rate and Rhythm: Normal rate.   Pulmonary:      Effort: Pulmonary effort is normal. No respiratory distress.   Musculoskeletal:      Cervical back: Spinous process tenderness and muscular tenderness present.   Skin:     General: Skin is warm and dry.   Neurological:      Mental Status: He is alert and oriented to person, place, and time.      Coordination: Finger-Nose-Finger Test normal.   Psychiatric:         Attention and Perception: Attention and perception normal.         Mood and Affect: Mood and affect normal.         Speech: Speech normal.         Behavior: Behavior normal. Behavior is cooperative.         Thought Content: Thought content normal.         Cognition and Memory: Cognition and memory normal.         Judgment: Judgment normal.       Neurologic Exam     Mental Status   Oriented to person, place, and time.   Follows 1 step commands.   Attention: normal. Concentration: normal.   Speech: speech is normal   Level of consciousness: alert  Knowledge: good.   Normal comprehension.     Cranial Nerves     CN III, IV, VI   Extraocular motions are normal.   CN III: no CN III palsy  CN VI: no CN VI palsy  Nystagmus: none   Diplopia: none  Ophthalmoparesis: none  Upgaze: normal  Downgaze: normal  Conjugate gaze: present    CN V   Right facial sensation deficit: none  Left facial sensation deficit: none    CN VII   Right facial weakness: none  Left facial weakness: none    CN VIII   Hearing: intact    CN IX, X   CN IX normal.   CN X normal.     CN XI   Right trapezius strength: normal  Left trapezius strength: normal    CN XII   CN XII normal.     Motor Exam   Muscle bulk: normal  Overall muscle tone: normal  Right arm pronator drift: absent  Left arm pronator drift: absent    Strength   Strength 5/5 except as noted.   Mild weakness proximal right shoulder     Gait, Coordination, and Reflexes     Coordination   Finger to nose  "coordination: normal    Tremor   Resting tremor: absent  Intention tremor: absent  Action tremor: absent    Reflexes   Right : 2+  Left : 2+  Right Torres: absent  Left Torres: absent      Vitals:Blood pressure (!) 172/74, pulse 82, temperature 98.2 °F (36.8 °C), temperature source Oral, resp. rate 19, weight 91.6 kg (201 lb 15.1 oz), SpO2 99%.,Body mass index is 28.17 kg/m².     Lab Results:   Results from last 7 days   Lab Units 08/27/24  1117   WBC Thousand/uL 7.90   HEMOGLOBIN g/dL 13.6   HEMATOCRIT % 40.5   PLATELETS Thousands/uL 135*   SEGS PCT % 72   MONO PCT % 9   EOS PCT % 1     Results from last 7 days   Lab Units 08/27/24  1117   POTASSIUM mmol/L 3.7   CHLORIDE mmol/L 111*   CO2 mmol/L 22   BUN mg/dL 19   CREATININE mg/dL 1.32*   CALCIUM mg/dL 9.0         Imaging Studies: I have personally reviewed pertinent reports.   and I have personally reviewed pertinent films in PACS    XR chest 1 view portable    Result Date: 8/27/2024  Impression: No acute consolidation or congestion Workstation performed: [id number]     CTA neck with and without contrast    Result Date: 8/27/2024  Impression: Subtle beaded appearance of the distal cervical internal carotid artery bilaterally most suggestive of fibromuscular dysplasia. In the setting of trauma, grade 1 vessel wall injury is not completely excluded. No stenosis, pseudoaneurysm or dissection. Incidental thyroid nodule(s) for which nonemergent thyroid ultrasound is recommended. This examination was marked \"immediate notification\" in Epic in order to begin the standard process by which the radiology reading room liaison alerts the referring practitioner. Workstation performed: [id number]     CT spine cervical without contrast    Result Date: 8/27/2024  Impression: Acute fracture of the anterior inferior endplate of C5 versus anterior inferior C5 osteophyte fracture. Nondisplaced fracture of the left L5 lamina extending into the spinous process. I " personally discussed this study with ROSANNE GTZ on 8/27/2024 11:50 AM. Workstation performed: [id number]     CT spine thoracic without contrast    Result Date: 8/27/2024  Impression: No acute fracture. Workstation performed: [id number]     CT head without contrast    Result Date: 8/27/2024  Impression: No intracranial hemorrhage or calvarial fracture. Workstation performed: [id number]        EKG, Pathology, and Other Studies: I have personally reviewed pertinent reports.      VTE Prophylaxis: Sequential compression device (Venodyne)  and Enoxaparin (Lovenox)    Code Status: Level 1 - Full Code  Advance Directive and Living Will:      Power of :    POLST:      Counseling / Coordination of Care  I spent 20 minutes with the patient.

## 2024-08-27 NOTE — H&P
H&P - Trauma   Aron Oswald 73 y.o. male MRN: [id number]  Unit/Bed#: QCF Encounter: [id number]    Model of Arrival: Transfer  Trauma Team: Dr. Poe  Consultants:     Neurosurgery: routine consult; Epic consult order placed;     Assessment & Plan   Active Problems / Assessment:     C5 spine fx  Secondary to mechanical fall on his left side from bed while sleeping. Had initial right arm weakness, since resolved. No neuro deficits. Has some neck and back pain. Patient is Jehovahs Witness who will accept blood expanders, but blood transfusions.    - maintain c-collar  - appreciate neurosurgery consult  - appreciate swallow consult, if able to have a diet per neurosurgery  - DVT ppx  - admit to med surg     History of Present Illness     Chief Complaint: C5 fx  Mechanism:Fall     HPI:    Aron Oswald is a 73 healthy male, Jehovahs Witness, here with a C5 fx after a mechanical fall on his left side from bed while sleeping (during vivid dream). After his fall this morning around 2 am, he continued about his day. He had mild neck and back pain, an injected eye, some transient right shoulder weakness, now resolved. He presented to an OSH, where a CT confirmed a C5 fx. He was placed in a c-collar and transferred here to the [facility]. Here, he has had a return to full range of motion in his shoulder. He takes no blood thinners. His last meal was this morning around 10 am. He is having regular bowel and urinary function.    Review of Systems   Constitutional: Negative.    Eyes: Negative.    Respiratory: Negative.     Cardiovascular: Negative.    Gastrointestinal: Negative.    Genitourinary: Negative.    Neurological: Negative.         Mild neck pain. Transient right shoulder weakness   Hematological: Negative.      12-point, complete review of systems was reviewed and negative except as stated above.     Historical Information     Past Medical History:   Diagnosis Date    Anxiety     COVID-19 virus infection  12/05/2022    Hypertension     Refusal of blood transfusions as patient is Restorationism      Past Surgical History:   Procedure Laterality Date    APPENDECTOMY          Social History     Tobacco Use    Smoking status: Never    Smokeless tobacco: Never   Vaping Use    Vaping status: Never Used   Substance Use Topics    Alcohol use: Not Currently    Drug use: Never     Immunization History   Administered Date(s) Administered    COVID-19 PFIZER VACCINE 0.3 ML IM 03/28/2021, 04/18/2021, 12/01/2021    INFLUENZA 12/01/2021    Pneumococcal Conjugate 13-Valent 12/29/2021    Pneumococcal Conjugate Vaccine 20-valent (Pcv20), Polysace 05/16/2023     Family History: Non-contributory       Allergies   Allergen Reactions    Penicillins Other (See Comments)       Objective   Initial Vitals:   Temperature: 98.2 °F (36.8 °C) (08/27/24 1448)  Pulse: 76 (08/27/24 1448)  Respirations: 20 (08/27/24 1448)  Blood Pressure: (!) 171/98 (08/27/24 1448)    Primary Survey:   Airway:        Status: patent;        Airway interventions: c collar.        Hospital Interventions: none  Breathing:        Pre-hospital Interventions: none              Right breath sounds:        Left breath sounds:   Circulation:        Rhythm: regular       Rate: regular   Right Pulses Left Pulses    R radial: 2+    R pedal: 2+     L radial: 2+    L pedal: 2+       Disability:        GCS: Eye: 4; Verbal: 5 Motor: 6 Total: 15       Right Pupil:       Left Pupil:     R Motor Strength L Motor Strength    R : 5/5  R dorsiflex: 5/5  R plantarflex: 5/5         Sensory:  No sensory deficit  Exposure:       Completed: No      Secondary Survey:  Physical Exam  Constitutional:       Appearance: Normal appearance.   Eyes:      Extraocular Movements: Extraocular movements intact.      Pupils: Pupils are equal, round, and reactive to light.      Comments: Injected left conjunctiva. TTP over left forehead   Neck:      Comments: In c-collar  Cardiovascular:      Rate and  Rhythm: Normal rate and regular rhythm.   Pulmonary:      Effort: Pulmonary effort is normal.      Breath sounds: Normal breath sounds.   Abdominal:      General: Abdomen is flat.      Palpations: Abdomen is soft.   Neurological:      General: No focal deficit present.      Mental Status: He is alert and oriented to person, place, and time.         Invasive Devices       Peripheral Intravenous Line  Duration             Peripheral IV 08/27/24 Left Antecubital <1 day    Peripheral IV 08/27/24 Left;Ventral (anterior) Hand <1 day                  Lab Results: I have personally reviewed all pertinent laboratory/test results 08/27/24 and in the preceding 24 hours.  Recent Labs     08/27/24  1117   WBC 7.90   HGB 13.6   HCT 40.5   *   SODIUM 140   K 3.7   *   CO2 22   BUN 19   CREATININE 1.32*   GLUC 125       Code Status: Full code. Would discuss based on situation

## 2024-08-27 NOTE — PLAN OF CARE
Problem: PAIN - ADULT  Goal: Verbalizes/displays adequate comfort level or baseline comfort level  Description: Interventions:  - Encourage patient to monitor pain and request assistance  - Assess pain using appropriate pain scale  - Administer analgesics based on type and severity of pain and evaluate response  - Implement non-pharmacological measures as appropriate and evaluate response  - Consider cultural and social influences on pain and pain management  - Notify physician/advanced practitioner if interventions unsuccessful or patient reports new pain  Outcome: Progressing     Problem: INFECTION - ADULT  Goal: Absence or prevention of progression during hospitalization  Description: INTERVENTIONS:  - Assess and monitor for signs and symptoms of infection  - Monitor lab/diagnostic results  - Monitor all insertion sites, i.e. indwelling lines, tubes, and drains  - Monitor endotracheal if appropriate and nasal secretions for changes in amount and color  - [place] appropriate cooling/warming therapies per order  - Administer medications as ordered  - Instruct and encourage patient and family to use good hand hygiene technique  - Identify and instruct in appropriate isolation precautions for identified infection/condition  Outcome: Progressing  Goal: Absence of fever/infection during neutropenic period  Description: INTERVENTIONS:  - Monitor WBC    Outcome: Progressing     Problem: SAFETY ADULT  Goal: Patient will remain free of falls  Description: INTERVENTIONS:  - Educate patient/family on patient safety including physical limitations  - Instruct patient to call for assistance with activity   - Consult OT/PT to assist with strengthening/mobility   - Keep Call bell within reach  - Keep bed low and locked with side rails adjusted as appropriate  - Keep care items and personal belongings within reach  - Initiate and maintain comfort rounds  - Make Fall Risk Sign visible to staff  - Offer Toileting every 2 Hours,  in advance of need  - Initiate/Maintain bed/chair alarm  - Obtain necessary fall risk management equipment:   - Apply yellow socks and bracelet for high fall risk patients  - Consider moving patient to room near nurses station  Outcome: Progressing  Goal: Maintain or return to baseline ADL function  Description: INTERVENTIONS:  -  Assess patient's ability to carry out ADLs; assess patient's baseline for ADL function and identify physical deficits which impact ability to perform ADLs (bathing, care of mouth/teeth, toileting, grooming, dressing, etc.)  - Assess/evaluate cause of self-care deficits   - Assess range of motion  - Assess patient's mobility; develop plan if impaired  - Assess patient's need for assistive devices and provide as appropriate  - Encourage maximum independence but intervene and supervise when necessary  - Involve family in performance of ADLs  - Assess for home care needs following discharge   - Consider OT consult to assist with ADL evaluation and planning for discharge  - Provide patient education as appropriate  Outcome: Progressing  Goal: Maintains/Returns to pre admission functional level  Description: INTERVENTIONS:  - Perform AM-PAC 6 Click Basic Mobility/ Daily Activity assessment daily.  - Set and communicate daily mobility goal to care team and patient/family/caregiver.   - Collaborate with rehabilitation services on mobility goals if consulted  - Perform Range of Motion 3 times a day.  - Reposition patient every 2 hours.  - Dangle patient 3 times a day  - Stand patient 3 times a day  - Ambulate patient 3 times a day  - Out of bed to chair 3 times a day   - Out of bed for meals 3 times a day  - Out of bed for toileting  - Record patient progress and toleration of activity level   Outcome: Progressing     Problem: DISCHARGE PLANNING  Goal: Discharge to home or other facility with appropriate resources  Description: INTERVENTIONS:  - Identify barriers to discharge w/patient and  caregiver  - Arrange for needed discharge resources and transportation as appropriate  - Identify discharge learning needs (meds, wound care, etc.)  - Arrange for interpretive services to assist at discharge as needed  - Refer to Case Management Department for coordinating discharge planning if the patient needs post-hospital services based on physician/advanced practitioner order or complex needs related to functional status, cognitive ability, or social support system  Outcome: Progressing     Problem: Knowledge Deficit  Goal: Patient/family/caregiver demonstrates understanding of disease process, treatment plan, medications, and discharge instructions  Description: Complete learning assessment and assess knowledge base.  Interventions:  - Provide teaching at level of understanding  - Provide teaching via preferred learning methods  Outcome: Progressing     Problem: NEUROSENSORY - ADULT  Goal: Achieves stable or improved neurological status  Description: INTERVENTIONS  - Monitor and report changes in neurological status  - Monitor vital signs such as temperature, blood pressure, glucose, and any other labs ordered   - Initiate measures to prevent increased intracranial pressure  - Monitor for seizure activity and implement precautions if appropriate      Outcome: Progressing  Goal: Remains free of injury related to seizures activity  Description: INTERVENTIONS  - Maintain airway, patient safety  and administer oxygen as ordered  - Monitor patient for seizure activity, document and report duration and description of seizure to physician/advanced practitioner  - If seizure occurs,  ensure patient safety during seizure  - Reorient patient post seizure  - Seizure pads on all 4 side rails  - Instruct patient/family to notify RN of any seizure activity including if an aura is experienced  - Instruct patient/family to call for assistance with activity based on nursing assessment  - Administer anti-seizure medications if  ordered    Outcome: Progressing  Goal: Achieves maximal functionality and self care  Description: INTERVENTIONS  - Monitor swallowing and airway patency with patient fatigue and changes in neurological status  - Encourage and assist patient to increase activity and self care.   - Encourage visually impaired, hearing impaired and aphasic patients to use assistive/communication devices  Outcome: Progressing     Problem: CARDIOVASCULAR - ADULT  Goal: Maintains optimal cardiac output and hemodynamic stability  Description: INTERVENTIONS:  - Monitor I/O, vital signs and rhythm  - Monitor for S/S and trends of decreased cardiac output  - Administer and titrate ordered vasoactive medications to optimize hemodynamic stability  - Assess quality of pulses, skin color and temperature  - Assess for signs of decreased coronary artery perfusion  - Instruct patient to report change in severity of symptoms  Outcome: Progressing  Goal: Absence of cardiac dysrhythmias or at baseline rhythm  Description: INTERVENTIONS:  - Continuous cardiac monitoring, vital signs, obtain 12 lead EKG if ordered  - Administer antiarrhythmic and heart rate control medications as ordered  - Monitor electrolytes and administer replacement therapy as ordered  Outcome: Progressing     Problem: RESPIRATORY - ADULT  Goal: Achieves optimal ventilation and oxygenation  Description: INTERVENTIONS:  - Assess for changes in respiratory status  - Assess for changes in mentation and behavior  - Position to facilitate oxygenation and minimize respiratory effort  - Oxygen administered by appropriate delivery if ordered  - Initiate smoking cessation education as indicated  - Encourage broncho-pulmonary hygiene including cough, deep breathe, Incentive Spirometry  - Assess the need for suctioning and aspirate as needed  - Assess and instruct to report SOB or any respiratory difficulty  - Respiratory Therapy support as indicated  Outcome: Progressing     Problem:  GASTROINTESTINAL - ADULT  Goal: Minimal or absence of nausea and/or vomiting  Description: INTERVENTIONS:  - Administer IV fluids if ordered to ensure adequate hydration  - Maintain NPO status until nausea and vomiting are resolved  - Nasogastric tube if ordered  - Administer ordered antiemetic medications as needed  - Provide nonpharmacologic comfort measures as appropriate  - Advance diet as tolerated, if ordered  - Consider nutrition services referral to assist patient with adequate nutrition and appropriate food choices  Outcome: Progressing  Goal: Maintains or returns to baseline bowel function  Description: INTERVENTIONS:  - Assess bowel function  - Encourage oral fluids to ensure adequate hydration  - Administer IV fluids if ordered to ensure adequate hydration  - Administer ordered medications as needed  - Encourage mobilization and activity  - Consider nutritional services referral to assist patient with adequate nutrition and appropriate food choices  Outcome: Progressing  Goal: Maintains adequate nutritional intake  Description: INTERVENTIONS:  - Monitor percentage of each meal consumed  - Identify factors contributing to decreased intake, treat as appropriate  - Assist with meals as needed  - Monitor I&O, weight, and lab values if indicated  - Obtain nutrition services referral as needed  Outcome: Progressing  Goal: Establish and maintain optimal ostomy function  Description: INTERVENTIONS:  - Assess bowel function  - Encourage oral fluids to ensure adequate hydration  - Administer IV fluids if ordered to ensure adequate hydration   - Administer ordered medications as needed  - Encourage mobilization and activity  - Nutrition services referral to assist patient with appropriate food choices  - Assess stoma site  - Consider wound care consult   Outcome: Progressing  Goal: Oral mucous membranes remain intact  Description: INTERVENTIONS  - Assess oral mucosa and hygiene practices  - Implement preventative  oral hygiene regimen  - Implement oral medicated treatments as ordered  - Initiate Nutrition services referral as needed  Outcome: Progressing     Problem: GENITOURINARY - ADULT  Goal: Maintains or returns to baseline urinary function  Description: INTERVENTIONS:  - Assess urinary function  - Encourage oral fluids to ensure adequate hydration if ordered  - Administer IV fluids as ordered to ensure adequate hydration  - Administer ordered medications as needed  - Offer frequent toileting  - Follow urinary retention protocol if ordered  Outcome: Progressing  Goal: Absence of urinary retention  Description: INTERVENTIONS:  - Assess patient’s ability to void and empty bladder  - Monitor I/O  - Bladder scan as needed  - Discuss with physician/AP medications to alleviate retention as needed  - Discuss catheterization for long term situations as appropriate  Outcome: Progressing  Goal: Urinary catheter remains patent  Description: INTERVENTIONS:  - Assess patency of urinary catheter  - If patient has a chronic hargrove, consider changing catheter if non-functioning  - Follow guidelines for intermittent irrigation of non-functioning urinary catheter  Outcome: Progressing     Problem: METABOLIC, FLUID AND ELECTROLYTES - ADULT  Goal: Electrolytes maintained within normal limits  Description: INTERVENTIONS:  - Monitor labs and assess patient for signs and symptoms of electrolyte imbalances  - Administer electrolyte replacement as ordered  - Monitor response to electrolyte replacements, including repeat lab results as appropriate  - Instruct patient on fluid and nutrition as appropriate  Outcome: Progressing  Goal: Fluid balance maintained  Description: INTERVENTIONS:  - Monitor labs   - Monitor I/O and WT  - Instruct patient on fluid and nutrition as appropriate  - Assess for signs & symptoms of volume excess or deficit  Outcome: Progressing  Goal: Glucose maintained within target range  Description: INTERVENTIONS:  - Monitor  Blood Glucose as ordered  - Assess for signs and symptoms of hyperglycemia and hypoglycemia  - Administer ordered medications to maintain glucose within target range  - Assess nutritional intake and initiate nutrition service referral as needed  Outcome: Progressing     Problem: SKIN/TISSUE INTEGRITY - ADULT  Goal: Skin Integrity remains intact(Skin Breakdown Prevention)  Description: Assess:  -Perform Tomy assessment every shift  -Clean and moisturize skin every shift  -Inspect skin when repositioning, toileting, and assisting with ADLS  -Assess under medical devices such as morenita every shift  -Assess extremities for adequate circulation and sensation     Bed Management:  -Have minimal linens on bed & keep smooth, unwrinkled  -Change linens as needed when moist or perspiring  -Avoid sitting or lying in one position for more than 2 hours while in bed  -Keep HOB at 30 degrees     Toileting:  -Offer bedside commode  -Assess for incontinence every shift  -Use incontinent care products after each incontinent episode such as     Activity:  -Mobilize patient 3 times a day  -Encourage activity and walks on unit  -Encourage or provide ROM exercises   -Turn and reposition patient every 2 Hours  -Use appropriate equipment to lift or move patient in bed  -Instruct/ Assist with weight shifting every 20 minutes when out of bed in chair  -Consider limitation of chair time 2 hour intervals    Skin Care:  -Avoid use of baby powder, tape, friction and shearing, hot water or constrictive clothing  -Relieve pressure over bony prominences using alevin foam  -Do not massage red bony areas    Next Steps:  -Teach patient strategies to minimize risks such as    -Consider consults to  interdisciplinary teams such as pt/ot  Outcome: Progressing  Goal: Incision(s), wounds(s) or drain site(s) healing without S/S of infection  Description: INTERVENTIONS  - Assess and document dressing, incision, wound bed, drain sites and surrounding  tissue  - Provide patient and family education  - Perform skin care/dressing changes every hour  Outcome: Progressing  Goal: Pressure injury heals and does not worsen  Description: Interventions:  - Implement low air loss mattress or specialty surface (Criteria met)  - Apply silicone foam dressing  - Instruct/assist with weight shifting every 20 minutes when in chair   - Limit chair time to 2 hour intervals  - Use special pressure reducing interventions such as when in chair   - Apply fecal or urinary incontinence containment device   - Perform passive or active ROM every hour  - Turn and reposition patient & offload bony prominences every 2 hours   - Utilize friction reducing device or surface for transfers   - Consider consults to  interdisciplinary teams such as pt/ot  - Use incontinent care products after each incontinent episode such as   - Consider nutrition services referral as needed  Outcome: Progressing     Problem: HEMATOLOGIC - ADULT  Goal: Maintains hematologic stability  Description: INTERVENTIONS  - Assess for signs and symptoms of bleeding or hemorrhage  - Monitor labs  - Administer supportive blood products/factors as ordered and appropriate  Outcome: Progressing     Problem: MUSCULOSKELETAL - ADULT  Goal: Maintain or return mobility to safest level of function  Description: INTERVENTIONS:  - Assess patient's ability to carry out ADLs; assess patient's baseline for ADL function and identify physical deficits which impact ability to perform ADLs (bathing, care of mouth/teeth, toileting, grooming, dressing, etc.)  - Assess/evaluate cause of self-care deficits   - Assess range of motion  - Assess patient's mobility  - Assess patient's need for assistive devices and provide as appropriate  - Encourage maximum independence but intervene and supervise when necessary  - Involve family in performance of ADLs  - Assess for home care needs following discharge   - Consider OT consult to assist with ADL  evaluation and planning for discharge  - Provide patient education as appropriate  Outcome: Progressing  Goal: Maintain proper alignment of affected body part  Description: INTERVENTIONS:  - Support, maintain and protect limb and body alignment  - Provide patient/ family with appropriate education  Outcome: Progressing

## 2024-08-27 NOTE — EMTALA/ACUTE CARE TRANSFER
[facility] EMERGENCY DEPARTMENT  [address]  [place] [facility] [address]  Dept: [phone]      EMTALA TRANSFER CONSENT    NAME Aron ROSALES 1950                              MRN [id number]    I have been informed of my rights regarding examination, treatment, and transfer   by Dr. Aleks Delacruz MD    Benefits: Specialized equipment and/or services available at the receiving facility (Include comment)________________________    Risks: Potential for delay in receiving treatment      Transfer Request   I acknowledge that my medical condition has been evaluated and explained to me by the emergency department physician or other qualified medical person and/or my attending physician who has recommended and offered to me further medical examination and treatment. I understand the Hospital's obligation with respect to the treatment and stabilization of my emergency medical condition. I nevertheless request to be transferred. I release the Hospital, the doctor, and any other persons caring for me from all responsibility or liability for any injury or ill effects that may result from my transfer and agree to accept all responsibility for the consequences of my choice to transfer, rather than receive stabilizing treatment at the Hospital. I understand that because the transfer is my request, my insurance may not provide reimbursement for the services.  The Hospital will assist and direct me and my family in how to make arrangements for transfer, but the hospital is not liable for any fees charged by the transport service.  In spite of this understanding, I refuse to consent to further medical examination and treatment which has been offered to me, and request transfer to Accepting Facility Name, City & State : [facility]. I authorize the performance of emergency medical procedures and treatments upon me in both transit and upon arrival at the  receiving facility.  Additionally, I authorize the release of any and all medical records to the receiving facility and request they be transported with me, if possible.    I authorize the performance of emergency medical procedures and treatments upon me in both transit and upon arrival at the receiving facility.  Additionally, I authorize the release of any and all medical records to the receiving facility and request they be transported with me, if possible.  I understand that the safest mode of transportation during a medical emergency is an ambulance and that the Hospital advocates the use of this mode of transport. Risks of traveling to the receiving facility by car, including absence of medical control, life sustaining equipment, such as oxygen, and medical personnel has been explained to me and I fully understand them.    (MANDEEP CORRECT BOX BELOW)  [  ]  I consent to the stated transfer and to be transported by ambulance/helicopter.  [  ]  I consent to the stated transfer, but refuse transportation by ambulance and accept full responsibility for my transportation by car.  I understand the risks of non-ambulance transfers and I exonerate the Hospital and its staff from any deterioration in my condition that results from this refusal.    X___________________________________________    DATE  24  TIME________  Signature of patient or legally responsible individual signing on patient behalf           RELATIONSHIP TO PATIENT_________________________          Provider Certification    NAME Aron Oswald                                        [facility] 1950                              MRN [id number]    A medical screening exam was performed on the above named patient.  Based on the examination:    Condition Necessitating Transfer The encounter diagnosis was C5 cervical fracture (HCC).    Patient Condition: The patient has been stabilized such that within reasonable medical probability, no material deterioration  of the patient condition or the condition of the unborn child(cary) is likely to result from the transfer    Reason for Transfer: Level of Care needed not available at this facility    Transfer Requirements: Facility [facility]   Space available and qualified personnel available for treatment as acknowledged by Patient access center  Agreed to accept transfer and to provide appropriate medical treatment as acknowledged by       Dr. Poe  Appropriate medical records of the examination and treatment of the patient are provided at the time of transfer   STAFF INITIAL WHEN COMPLETED _______  Transfer will be performed by qualified personnel from [facility] emergency transport  and appropriate transfer equipment as required, including the use of necessary and appropriate life support measures.    Provider Certification: I have examined the patient and explained the following risks and benefits of being transferred/refusing transfer to the patient/family:  General risk, such as traffic hazards, adverse weather conditions, rough terrain or turbulence, possible failure of equipment (including vehicle or aircraft), or consequences of actions of persons outside the control of the transport personnel      Based on these reasonable risks and benefits to the patient and/or the unborn child(cary), and based upon the information available at the time of the patient’s examination, I certify that the medical benefits reasonably to be expected from the provision of appropriate medical treatments at another medical facility outweigh the increasing risks, if any, to the individual’s medical condition, and in the case of labor to the unborn child, from effecting the transfer.    X____________________________________________ DATE 08/27/24        TIME_______      ORIGINAL - SEND TO MEDICAL RECORDS   COPY - SEND WITH PATIENT DURING TRANSFER

## 2024-08-27 NOTE — ASSESSMENT & PLAN NOTE
Fall out of bed with +head strike  CT head and thoracic spine without acute findings  See plan above

## 2024-08-27 NOTE — ASSESSMENT & PLAN NOTE
C5 inferior endplate fracture with left C5 laminar fracture  Patient presented s/p mechanical fall from bed while sleeping this morning  Mid neck and back pain after the fall associated with some transient R shoulder weakness now resolved   No reported AC/AP medication    Imaging:   CT cervical spine 8/27/2024: Acute fracture of the anterior inferior endplate vs osteophyte fracture of C5. Non displaced fracture of the L C5 lamina extending to the spinous process    Plan:   Imaging reviewed, plan for conservative management for now  Ongoing frequent neurological checks  Recommend cervical collar to be worn at all times, maia collar for showering  Order placed for upright cervical XR   PT/OT evaluation  Ongoing medical management and pain control per primary team  DVT ppx: SCD's, lovenox (if patient agreeable)  CM following for dispo planning    Neurosurgery will continue to follow with completion of upright cervical XR imaging. Call with questions or concerns.

## 2024-08-28 ENCOUNTER — APPOINTMENT (INPATIENT)
Dept: RADIOLOGY | Facility: HOSPITAL | Age: 74
DRG: 552 | End: 2024-08-28
Payer: COMMERCIAL

## 2024-08-28 ENCOUNTER — APPOINTMENT (OUTPATIENT)
Dept: RADIOLOGY | Facility: HOSPITAL | Age: 74
DRG: 552 | End: 2024-08-28
Payer: COMMERCIAL

## 2024-08-28 PROCEDURE — 97162 PT EVAL MOD COMPLEX 30 MIN: CPT

## 2024-08-28 PROCEDURE — 73030 X-RAY EXAM OF SHOULDER: CPT

## 2024-08-28 PROCEDURE — 99232 SBSQ HOSP IP/OBS MODERATE 35: CPT | Performed by: SURGERY

## 2024-08-28 PROCEDURE — 72141 MRI NECK SPINE W/O DYE: CPT

## 2024-08-28 PROCEDURE — 72040 X-RAY EXAM NECK SPINE 2-3 VW: CPT

## 2024-08-28 PROCEDURE — 99223 1ST HOSP IP/OBS HIGH 75: CPT | Performed by: INTERNAL MEDICINE

## 2024-08-28 PROCEDURE — 97166 OT EVAL MOD COMPLEX 45 MIN: CPT

## 2024-08-28 PROCEDURE — 99233 SBSQ HOSP IP/OBS HIGH 50: CPT | Performed by: PHYSICIAN ASSISTANT

## 2024-08-28 NOTE — QUICK NOTE
MRI CSpine reviewed - no compressive pathology, no evidence of cord injury.   Upright Xrays also reviewed and show good, maintained alignment upright in collar.  Can continue with conservative management.

## 2024-08-28 NOTE — CASE MANAGEMENT
Case Management Assessment & Discharge Planning Note    Patient name Aron Oswald  Location [facility] 620/[facility] 620-01 MRN [id number]  : 1950 Date 2024       Current Admission Date: 2024  Current Admission Diagnosis:Cervical spine fracture (HCC)   Patient Active Problem List    Diagnosis Date Noted Date Diagnosed    Cervical spine fracture (HCC) 2024     Abnormal computed tomography angiography (CTA) of neck 2024     Fall from bed 2024     Snoring 2023     Platelets decreased (HCC) 2022     Sensation of plugged ear on right side 2021     Essential hypertension 2021     Erectile dysfunction 2021       LOS (days): 1  Geometric Mean LOS (GMLOS) (days): 2.9  Days to GMLOS:2     OBJECTIVE:    Risk of Unplanned Readmission Score: 9.08         Current admission status: Inpatient       Preferred Pharmacy:   [id number] - [place], [place] - [address]  [address] [place] [address]  Phone: [phone] Fax: [phone]    Primary Care Provider: Jose Harmon MD    Primary Insurance: [employer]  Secondary Insurance:     ASSESSMENT:  Active Health Care Proxies    There are no active Health Care Proxies on file.       Advance Directives  Primary Contact: Kimberly Oswald (Spouse) [phone]    Readmission Root Cause  30 Day Readmission: No    Patient Information  Admitted from:: Home  Mental Status: Alert  During Assessment patient was accompanied by: Not accompanied during assessment  Assessment information provided by:: Patient  Primary Caregiver: Self  Support Systems: Self, Spouse/significant other, Family members  County of Residence: [place]  What city do you live in?: [place]  Home entry access options. Select all that apply.: Stairs  Number of steps to enter home.: 5  Do the steps have railings?: No  Type of Current Residence: Bi-level  Upon entering residence, is there a bedroom on the main floor (no further steps)?: Yes  Upon  entering residence, is there a bathroom on the main floor (no further steps)?: Yes  Living Arrangements: Lives w/ Spouse/significant other  Is patient a ?: No    Activities of Daily Living Prior to Admission  Functional Status: Independent  Completes ADLs independently?: Yes  Ambulates independently?: Yes  Does patient use assisted devices?: No  Does patient currently own DME?: No  Does patient have a history of Outpatient Therapy (PT/OT)?: No  Does the patient have a history of Short-Term Rehab?: No  Does patient have a history of HHC?: No  Does patient currently have HHC?: No    Patient Information Continued  Income Source: Pension/USP  Does patient have prescription coverage?: Yes  Does patient receive dialysis treatments?: No  Does patient have a history of substance abuse?: No  Does patient have a history of Mental Health Diagnosis?: No    Means of Transportation  Means of Transport to Appts:: Drives Self    Social Determinants of Health (SDOH)      Flowsheet Row Most Recent Value   Housing Stability    In the last 12 months, was there a time when you were not able to pay the mortgage or rent on time? N   In the past 12 months, how many times have you moved where you were living? 0   At any time in the past 12 months, were you homeless or living in a shelter (including now)? N   Transportation Needs    In the past 12 months, has lack of transportation kept you from medical appointments or from getting medications? no   In the past 12 months, has lack of transportation kept you from meetings, work, or from getting things needed for daily living? No   Food Insecurity    Within the past 12 months, you worried that your food would run out before you got the money to buy more. Never true   Within the past 12 months, the food you bought just didn't last and you didn't have money to get more. Never true   Utilities    In the past 12 months has the electric, gas, oil, or water company threatened to shut  off services in your home? No          DISCHARGE DETAILS:    Discharge planning discussed with:: patient  Freedom of Choice: Yes     CM contacted family/caregiver?: Yes  Were Treatment Team discharge recommendations reviewed with patient/caregiver?: Yes     Were patient/caregiver advised of the risks associated with not following Treatment Team discharge recommendations?: Yes    Contacts  Patient Contacts: Kimberly Oswald (Spouse) [phone]  Relationship to Patient:: Family  Contact Method: Phone  Phone Number: [phone]  Reason/Outcome: Continuity of Care, Emergency Contact, Discharge Planning    Requested Home Health Care         Is the patient interested in HHC at discharge?: No    DME Referral Provided  Referral made for DME?: No    Treatment Team Recommendation: Home  Discharge Destination Plan:: Home       Pt lives with his spouse in a bi-level home which has 5STE. Pt has a tub/shower with standard toilet. Pt is retired. Pt drives. Pt is independent for all ADL/iADLs. Pt owns no DME. Pt enjoys spending time with his children.     Pt was evaluated by OT/PT and recommended for home d/c with no needs. CM will continue to follow      CM reviewed d/c planning process including the following: identifying help at home, patient preference for d/c planning needs, Discharge Lounge, [facility] Meds to Bed program, availability of treatment team to discuss questions or concerns patient and/or family may have regarding understanding medications and recognizing signs and symptoms once discharged.  CM also encouraged patient to follow up with all recommended appointments after discharge. Patient advised of importance for patient and family to participate in managing patient’s medical well being.

## 2024-08-28 NOTE — PROGRESS NOTES
[facility]  Progress Note  Name: Aron Oswald I  MRN: [id number]  Unit/Bed#: [facility] 620-01 I Date of Admission: 8/27/2024   Date of Service: 8/28/2024 I Hospital Day: 1    Assessment & Plan   * Cervical spine fracture (HCC)  Assessment & Plan  Secondary to mechanical fall on his left side from bed while sleeping. Had initial right arm weakness. No neuro deficits. Has some neck and back pain. Patient is Jehovahs Witness who will accept blood expanders, but blood transfusions.  8/28: Acute change with, no ability to flex right shoulder or arm, previously had transient right shoulder weakness    - Neurosurgery onboard  - stat cervical MRI for right shoulder/arm weakness  - currently non-op. Maintain C-collar. Serial neuro checks Q4. Checking with endovascular regarding possible FMD  - follow up upright cervical XR  - DVT ppx. Pt wary about lovenox         VTE Prophylaxis:Lovenox ordered, pt wary per Jehovahs witness    Disposition: inpatient    Subjective     Subjective: Mild neck pain with movement. Feels some right shoulder weakness. Otherwise, tolerating diet, feeling well.     Objective   Vitals:   Temp:  [97.4 °F (36.3 °C)-98.9 °F (37.2 °C)] 97.4 °F (36.3 °C)  HR:  [37-82] 39  Resp:  [17-20] 18  BP: (133-172)/(65-98) 155/91    I/O         08/26 0701  08/27 0700 08/27 0701  08/28 0700 08/28 0701  08/29 0700    P.O.  518     Total Intake(mL/kg)  518 (5.6)     Urine (mL/kg/hr)  750     Total Output  750     Net  -232                     Physical Exam:   Physical Exam:  General: NAD  Neuro: acute change, unable to flex right shoulder (previous transient weakness)  Neck: In c-collar  CV: nl rate  Lungs: nl wob. No resp distress.  Chest: no lesions  ABD: Soft, ND, NT  Extrem: No CCE      Invasive Devices       Peripheral Intravenous Line  Duration             Peripheral IV 08/27/24 Left;Ventral (anterior) Hand 1 day    Peripheral IV 08/27/24 Left Antecubital <1 day

## 2024-08-28 NOTE — UTILIZATION REVIEW
Initial Clinical Review    Admission: Date/Time/Statement:   Admission Orders (From admission, onward)       Ordered        08/27/24 1608  Inpatient Admission  Once                          Orders Placed This Encounter   Procedures    Inpatient Admission     Standing Status:   Standing     Number of Occurrences:   1     Order Specific Question:   Level of Care     Answer:   Med Surg [16]     Order Specific Question:   Estimated length of stay     Answer:   Not Applicable     ED Arrival Information       Expected   8/27/2024 12:13    Arrival   8/27/2024 14:46    Acuity   Emergent              Means of arrival   Ambulance    Escorted by   [facility] ([facility])    Service   Trauma    Admission type   Emergency              Arrival complaint   fall, c5 fx             Chief Complaint   Patient presents with    Trauma     Transfer from [facility]. C5 fracture.       Initial Presentation: 73 y.o. male presents as a transfer from ED [facility] to [facility] for trauma eval after having a fall onto carpeted floor with forehead strike with bruising, pain around L eye, no LOC, now  w/ c/o neck pain, difficulty bending upper body, transient R shoulder weakness now resolved.   Was found to have C5 fracture, placed in a collar.  Not on blood thinners. PMH: Mormon, HTN. On exam Injected left conjunctiva. TTP over left forehead.  No other neuro deficits or complaints.  Admitted to INPATIENT status with C5 spine fracture - maintain C collar, neurosurgery consult, swallow eval.      8/27 Neurosurgery Consult - C5 inferior endplate fracture with left C5 laminar fracture, abnormal CTA neck  - conservative tx for now, freq neuro checks, C collar at all times, [place] collar for showering, Upright C xray, therapy evals, review CTA with endovascular to see if ASA therapy warranted.  On exam L eye (conjunctival hemorrhage), Spinous process tenderness and muscular tenderness, Mild weakness proximal right shoulder.     Date: 8/28   Day 2:   C5  endplate fx and C5 laminar fx - BP improved today.  VS stable.  On exam today neurosurgery notes ongoing R shoulder weakness, worse than yesterday, will get MRI C spine to r/o nerve impingement, cord injury.  Also notes neck pain between shoulder blades.  No neuro deficits on exam.  Geriatric consult, continue neuro checks. Worked with therapy today, mobility score 19/16.  Probable return home post d/c.     Date: 8/29  Day 3: Has surpassed a 2nd midnight with active treatments and services.  C5 endplate fx and C5 laminar fx - MRI shows C5 fx w/o hematoma.  On exam pain controlled, inability to flex shoulder due to weakness. Tolerating diet, without N/V. No SOB, chest, fevers, dizziness, light headedness, Right shoulder unable to flex past 60 due to weakness. Continue with PT/OT.  Neurosurgery signing off.  Will see pt OP in 2 wks.  Continue ASA. Labs stable.       ED Triage Vitals   Temperature Pulse Respirations Blood Pressure SpO2 Pain Score   08/27/24 1448 08/27/24 1448 08/27/24 1448 08/27/24 1448 08/27/24 1447 08/27/24 1735   98.2 °F (36.8 °C) 76 20 (!) 171/98 99 % No Pain     Weight (last 2 days)       Date/Time Weight    08/27/24 17:35:49 92.1 (203)    08/27/24 1500 91.6 (201.94)            Vital Signs (last 3 days)       Date/Time Temp Pulse Resp BP MAP (mmHg) SpO2 O2 Device Patient Position - Orthostatic VS Erwin Coma Scale Score Pain    08/29/24 15:01:07 -- 39 -- 106/50 69 97 % -- -- -- --    08/29/24 1259 98.1 °F (36.7 °C) -- 18 -- -- -- -- -- -- --    08/29/24 1230 -- -- -- -- -- -- -- -- 15 --    08/29/24 11:32:29 -- 41 -- 162/76 105 100 % -- -- -- --    08/29/24 1036 -- -- -- -- -- -- None (Room air) -- 15 No Pain    08/29/24 10:30:48 -- 40 18 129/57 81 99 % -- Sitting -- --    08/29/24 1030 -- -- -- -- -- -- -- -- -- No Pain    08/29/24 0900 97.9 °F (36.6 °C) -- -- -- -- -- -- -- -- --    08/29/24 0800 -- -- -- -- -- -- -- -- 15 --    08/29/24 06:52:04 -- 45 18 144/67 93 97 % -- -- -- --    08/29/24  0647 -- 42 -- -- -- -- -- -- -- --    08/29/24 0545 -- -- -- -- -- -- -- -- 15 --    08/29/24 02:09:55 98.2 °F (36.8 °C) 41 17 131/66 88 97 % -- -- -- --    08/29/24 0059 -- 58 -- -- -- -- -- -- 15 --    08/28/24 2321 -- -- -- -- -- 96 % None (Room air) -- -- 2 08/28/24 22:39:48 98.5 °F (36.9 °C) 51 -- 140/73 95 98 % -- -- -- --    08/28/24 2145 -- -- -- -- -- -- -- -- 15 --    08/28/24 16:17:28 -- 38 19 158/72 101 98 % -- -- -- --    08/28/24 1535 -- -- -- -- -- -- -- -- 15 --    08/28/24 1135 -- -- -- -- -- -- -- -- 15 --    08/28/24 11:04:07 97.4 °F (36.3 °C) 39 -- 155/91 112 99 % -- -- -- --    08/28/24 1041 -- -- -- -- -- -- -- -- -- 2 08/28/24 1040 -- -- -- -- -- -- -- -- -- 2 08/28/24 0735 -- -- -- -- -- -- None (Room air) -- 15 --    08/28/24 07:29:39 -- 76 -- 161/97 118 100 % -- -- -- --    08/28/24 0343 -- -- -- -- -- -- -- -- 15 --    08/28/24 0222 -- -- -- -- -- -- -- -- -- 2 08/28/24 02:19:13 98.7 °F (37.1 °C) 78 18 137/65 89 100 % None (Room air) -- -- --    08/28/24 0000 -- -- -- -- -- -- -- -- 15 --    08/27/24 22:01:38 98.9 °F (37.2 °C) 70 20 138/69 92 100 % -- -- -- --    08/27/24 2000 -- -- -- -- -- -- -- -- 15 --    08/27/24 17:35:49 98.7 °F (37.1 °C) 37 18 166/77 107 99 % -- -- -- --    08/27/24 1735 -- -- -- -- -- 99 % None (Room air) -- 15 No Pain    08/27/24 1600 -- 82 19 172/74 -- 99 % None (Room air) Lying 15 --    08/27/24 1500 98.2 °F (36.8 °C) 76 18 171/98 -- 99 % None (Room air) -- 15 --    08/27/24 1448 98.2 °F (36.8 °C) 76 20 171/98 -- 99 % -- Lying -- --    08/27/24 1447 -- -- -- -- -- 99 % -- -- -- --              Pertinent Labs/Diagnostic Test Results:   Radiology:  XR shoulder 2+ vw right   Final Interpretation by Nuno Carson MD (08/28 1211)      Arthritic changes. No fracture or dislocation seen      XR spine cervical 2 or 3 vw injury       Barely perceptible C5 fractures. No significant spinal malalignment. Prevertebral soft tissue swelling is noted.        MRI inpatient order     Redemonstration of C5 fractures as described above with ligamentous injury.  No epidural hematoma, cord compression or cord contusion.  Multilevel degenerative changes as described     Cardiology:  ECG 12 lead   Final Result by Atif White MD (08/29 0903)   Sinus rhythm with frequent and consecutive Premature ventricular complexes   Abnormal ECG   When compared with ECG of 27-AUG-2024 17:09,   No significant change was found   Confirmed by Atif White ([id number]) on 8/29/2024 9:03:40 AM      ECG 12 lead   Final Result by Atif White MD (08/27 2016)   Sinus rhythm with frequent Premature ventricular complexes in a pattern of    bigeminy   Otherwise normal ECG   When compared with ECG of 27-AUG-2024 17:09, (unconfirmed)   No significant change was found   Confirmed by Atif White ([id number]) on 8/27/2024 8:16:15 PM      ECG 12 lead   Final Result by Atif White MD (08/27 2016)   Sinus rhythm with frequent Premature ventricular complexes in a pattern of    bigeminy   Otherwise normal ECG   When compared with ECG of 27-AUG-2024 10:38,   No significant change was found   Confirmed by Atif White ([id number]) on 8/27/2024 8:16:19 PM        GI:  No orders to display           Results from last 7 days   Lab Units 08/29/24  0557 08/27/24  1631 08/27/24  1117   WBC Thousand/uL 5.69  --  7.90   HEMOGLOBIN g/dL 12.6  --  13.6   HEMATOCRIT % 37.7  --  40.5   PLATELETS Thousands/uL 122* 123* 135*   TOTAL NEUT ABS Thousands/µL 3.06  --  5.76         Results from last 7 days   Lab Units 08/29/24  0557 08/27/24  1827 08/27/24  1117   SODIUM mmol/L 139  --  140   POTASSIUM mmol/L 3.8  --  3.7   CHLORIDE mmol/L 106  --  111*   CO2 mmol/L 28  --  22   ANION GAP mmol/L 5  --  7   BUN mg/dL 15  --  19   CREATININE mg/dL 1.29  --  1.32*   EGFR ml/min/1.73sq m 53  --  52   CALCIUM mg/dL 8.7  --  9.0   MAGNESIUM mg/dL 2.2 2.3  --    PHOSPHORUS mg/dL 2.9 2.8  --               Results from last 7 days   Lab Units 08/29/24  0557 08/27/24  1117   GLUCOSE RANDOM mg/dL 91 125     Past Medical History:   Diagnosis Date    Anxiety     COVID-19 virus infection 12/05/2022    Hypertension     Refusal of blood transfusions as patient is Sikh      Present on Admission:   Cervical spine fracture (HCC)      Admitting Diagnosis: Closed nondisplaced fracture of fifth cervical vertebra, unspecified fracture morphology, initial encounter ([facility]) [S12.401A]  Age/Sex: 73 y.o. male  Admission Orders:  Scheduled Medications:  acetaminophen, 975 mg, Oral, Q8H BEA  aspirin, 81 mg, Oral, Daily  enoxaparin, 30 mg, Subcutaneous, Q12H      Continuous IV Infusions:     PRN Meds:  labetalol, 10 mg, Intravenous, Q6H PRN      Activity as andrew   VS and neuro checks q 4 hr   Cervical brace - aspen and maia  Incentive spirometry  Xray c spine  IP CONSULT TO NEUROSURGERY  IP CONSULT TO GERONTOLOGY  IP CONSULT TO NEUROLOGY    Network Utilization Review Department  ATTENTION: Please call with any questions or concerns to [phone] and carefully listen to the prompts so that you are directed to the right person. All voicemails are confidential.   For Discharge needs, contact Care Management DC Support Team at [phone] opt. 2  Send all requests for admission clinical reviews, approved or denied determinations and any other requests to dedicated fax number below belonging to the campus where the patient is receiving treatment. List of dedicated fax numbers for the Facilities:  FACILITY NAME UR FAX NUMBER   ADMISSION DENIALS (Administrative/Medical Necessity) [phone]   DISCHARGE SUPPORT TEAM (NETWORK) [phone]   PARENT CHILD HEALTH (Maternity/NICU/Pediatrics) [phone]   [facility] [phone]   [facility] [phone]   Count includes the [facility] [phone]   [facility] [phone]   [facility]  [facility] [phone]   [facility] [phone]   [facility] [phone]   [facility] [phone]   [facility] [phone]   [facility] [phone]   [facility] [phone]   [facility] [phone]

## 2024-08-28 NOTE — PLAN OF CARE
Problem: PAIN - ADULT  Goal: Verbalizes/displays adequate comfort level or baseline comfort level  Description: Interventions:  - Encourage patient to monitor pain and request assistance  - Assess pain using appropriate pain scale  - Administer analgesics based on type and severity of pain and evaluate response  - Implement non-pharmacological measures as appropriate and evaluate response  - Consider cultural and social influences on pain and pain management  - Notify physician/advanced practitioner if interventions unsuccessful or patient reports new pain  Outcome: Progressing     Problem: INFECTION - ADULT  Goal: Absence or prevention of progression during hospitalization  Description: INTERVENTIONS:  - Assess and monitor for signs and symptoms of infection  - Monitor lab/diagnostic results  - Monitor all insertion sites, i.e. indwelling lines, tubes, and drains  - Monitor endotracheal if appropriate and nasal secretions for changes in amount and color  - [place] appropriate cooling/warming therapies per order  - Administer medications as ordered  - Instruct and encourage patient and family to use good hand hygiene technique  - Identify and instruct in appropriate isolation precautions for identified infection/condition  Outcome: Progressing     Problem: SAFETY ADULT  Goal: Patient will remain free of falls  Description: INTERVENTIONS:  - Educate patient/family on patient safety including physical limitations  - Instruct patient to call for assistance with activity   - Consult OT/PT to assist with strengthening/mobility   - Keep Call bell within reach  - Keep bed low and locked with side rails adjusted as appropriate  - Keep care items and personal belongings within reach  - Initiate and maintain comfort rounds  - Make Fall Risk Sign visible to staff  Problem: NEUROSENSORY - ADULT  Goal: Achieves stable or improved neurological status  Description: INTERVENTIONS  - Monitor and report changes in neurological  status  - Monitor vital signs such as temperature, blood pressure, glucose, and any other labs ordered   - Initiate measures to prevent increased intracranial pressure  - Monitor for seizure activity and implement precautions if appropriate      Outcome: Progressing     Problem: MUSCULOSKELETAL - ADULT  Goal: Maintain or return mobility to safest level of function  Description: INTERVENTIONS:  - Assess patient's ability to carry out ADLs; assess patient's baseline for ADL function and identify physical deficits which impact ability to perform ADLs (bathing, care of mouth/teeth, toileting, grooming, dressing, etc.)  - Assess/evaluate cause of self-care deficits   - Assess range of motion  - Assess patient's mobility  - Assess patient's need for assistive devices and provide as appropriate  - Encourage maximum independence but intervene and supervise when necessary  - Involve family in performance of ADLs  - Assess for home care needs following discharge   - Consider OT consult to assist with ADL evaluation and planning for discharge  - Provide patient education as appropriate  Outcome: Progressing     - Apply yellow socks and bracelet for high fall risk patients  - Consider moving patient to room near nurses station  Outcome: Progressing

## 2024-08-28 NOTE — PHYSICAL THERAPY NOTE
PHYSICAL THERAPY EVALUATION  NAME:  Aron Oswald  DATE: 08/28/24    AGE:   73 y.o.  Mrn:   [id number]  ADMIT DX:  Closed nondisplaced fracture of fifth cervical vertebra, unspecified fracture morphology, initial encounter ([facility]) [S12.401A]    Past Medical History:   Diagnosis Date    Anxiety     COVID-19 virus infection 12/05/2022    Hypertension     Refusal of blood transfusions as patient is [employer]        Past Surgical History:   Procedure Laterality Date    APPENDECTOMY         Length Of Stay: 1    PHYSICAL THERAPY EVALUATION:       08/28/24 1040   Note Type   Note type Evaluation   Pain Assessment   Pain Assessment Tool 0-10   Pain Score 2   Pain Location/Orientation Location: Neck   Pain Onset/Description Onset: Ongoing;Frequency: Constant/Continuous;Descriptor: Aching   Effect of Pain on Daily Activities increased pain with activity   Patient's Stated Pain Goal No pain   Hospital Pain Intervention(s) Ambulation/increased activity;Repositioned   Restrictions/Precautions   Weight Bearing Precautions Per Order No   Braces or Orthoses (S)  C/S Collar   Other Precautions Fall Risk;Pain;Spinal precautions   Home Living   Type of Home House   Home Layout Multi-level;Stairs to enter with rails  (bi level home)   Home Equipment   (none as per pt)   Additional Comments Pt reports living with spouse who is able to assist as needed   Prior Function   Level of [place] Independent with functional mobility   Lives With Spouse   Receives Help From Family   Falls in the last 6 months 1 to 4   Comments Pt denies the use of an AD for ambulation PTA   General   Family/Caregiver Present No   Cognition   Overall Cognitive Status WFL   Arousal/Participation Alert   Attention Within functional limits   Orientation Level Oriented X4   Memory Within functional limits   Following Commands Follows all commands and directions without difficulty   RUE Assessment   RUE Assessment WFL   LUE Assessment   LUE Assessment WFL    RLE Assessment   RLE Assessment WFL   LLE Assessment   LLE Assessment WFL   Bed Mobility   Supine to Sit 5  Supervision   Additional items Increased time required   Transfers   Sit to Stand 5  Supervision   Stand to Sit 5  Supervision   Ambulation/Elevation   Gait pattern Short stride   Gait Assistance 5  Supervision   Assistive Device None   Distance 150ft   Stair Management Assistance 5  Supervision   Stair Management Technique Two rails   Number of Stairs 3   Balance   Static Sitting Good   Static Standing Fair +   Ambulatory Fair   Activity Tolerance   Activity Tolerance Patient tolerated treatment well   Medical Staff Made Aware Chise OT; OT present for co evaluation due to pts current medical presentation   Nurse Made Aware Pt appropriate to be seen and mobilize per nsg   Assessment   Prognosis Good   Problem List Decreased mobility;Pain;Orthopedic restrictions;Decreased range of motion   Assessment Pt is 73 y.o. male seen for PT evaluation s/p admit to [facility] on 8/27/2024. Two pt identifiers were used to confirm. Pt presented s/p fall OOB.  Pt was admitted with a primary dx of: C5 fx.  PT now consulted for assessment of mobility and d/c needs.   Pts current co morbidities affecting treatment include:  has a past medical history of Anxiety, COVID-19 virus infection, Hypertension, and Refusal of blood transfusions as patient is [employer]. . Pts current clinical presentation is Evolving (medium complexity) due to Ongoing medical management for primary dx, Decreased activity tolerance compared to baseline, Spinal precautions at current time, Continuous pulse oximetry monitoring   . Upon evaluation, pt currently is requiring Supervision for bed mobility; Supervision for transfers and Supervision for ambulation w/ no AD. Pt presents at PT eval functioning below baseline and currently w/ overall mobility deficits 2* to: decreased ROM, decreased activity tolerance compared to baseline, spinal  "precautions.  At conclusion of PT session pt returned back in chair with phone and call bell within reach. Pt denies any further questions at this time. PT is currently recommending  no post acute rehab needs .  D/C acute care PT at this time due to pt being supervision with all mobility and having supportive spouse who is able to assist as needed. Pt denies any mobility or safety concerns about returning home at d/c. Recommend pt continues to mobilize with nsg and restorative techs during hospital stay.   Barriers to Discharge None   Goals   Patient Goals \" to go home\"   Plan   PT Frequency   (DC IPPT)   Discharge Recommendation   Rehab Resource Intensity Level, PT No post-acute rehabilitation needs   Equipment Recommended   (none at this time)   AM-PAC Basic Mobility Inpatient   Turning in Flat Bed Without Bedrails 4   Lying on Back to Sitting on Edge of Flat Bed Without Bedrails 4   Moving Bed to Chair 4   Standing Up From Chair Using Arms 4   Walk in Room 3   Climb 3-5 Stairs With Railing 3   Basic Mobility Inpatient Raw Score 22   Basic Mobility Standardized Score 47.4   [facility] Highest Level Of Mobility   -HLM Goal 7: Walk 25 feet or more   -HLM Achieved 7: Walk 25 feet or more   Modified [place] Scale   Modified [place] Scale 2   Portions of the documentation may have been created using voice recognition software.Occasional wrong word or sound alike substitutions may have occurred due to the inherent limitations of the voice recognition software. Read the chart carefully and recognize, using context, where substitutions have occurred.    Benito Jackson, PT, DPT      "

## 2024-08-28 NOTE — ASSESSMENT & PLAN NOTE
Fall out of bed with +head strike  CT head and thoracic spine without acute findings  Patient's wife reports repeated falls from bed due to active dreaming (5-10x in 5 years), has never been hurt like he did with this most recent fall.    Consider outpatient sleep study for further evaluation  Geriatrics consult placed  See plan above

## 2024-08-28 NOTE — PROGRESS NOTES
[facility]  Progress Note  Name: Aron Oswald I  MRN: [id number]  Unit/Bed#: [facility]PHP 620-01 I Date of Admission: 8/27/2024   Date of Service: 8/28/2024 I Hospital Day: 1    Assessment & Plan   * Cervical spine fracture (HCC)  Assessment & Plan  C5 inferior endplate fracture with left C5 laminar fracture  Patient presented s/p mechanical fall from bed while sleeping 8/27  Mid neck and back pain after the fall associated with right arm weakness, initially could not move arm, has regained some mobility but now appears to still be an issue   No reported AC/AP medication    Imaging:   CT cervical spine 8/27/2024: Acute fracture of the anterior inferior endplate vs osteophyte fracture of C5. Non displaced fracture of the L C5 lamina extending to the spinous process    Plan:   Given ongoing right shoulder weakness (appears worse than yesterday), will order MRI cervical spine to rule out nerve impingement or cord injury given mechanism of fall as well as XR shoulder to rule out shoulder injury  Ongoing frequent neurological checks  Recommend cervical collar to be worn at all times, maia collar for showering  Order placed for upright cervical XR, to be completed today to assess alignment  PT/OT evaluation  Ongoing medical management and pain control per primary team  DVT ppx: SCD's, lovenox (if patient agreeable)  CM following for dispo planning    Neurosurgery will continue to follow. Call with questions or concerns.     Fall from bed  Assessment & Plan  Fall out of bed with +head strike  CT head and thoracic spine without acute findings  Patient's wife reports repeated falls from bed due to active dreaming (5-10x in 5 years), has never been hurt like he did with this most recent fall.    Consider outpatient sleep study for further evaluation  Geriatrics consult placed  See plan above    Abnormal computed tomography angiography (CTA) of neck  Assessment & Plan  CTA with concern for  possible FMD vs grade 1 injury of cervical ICA    Imaging:  CTA neck w wo contrast 8/27/2024: Subtle beaded appearance of the distal cervical internal carotid artery bilaterally most suggestive of fibromuscular dysplasia. In the setting of trauma, grade 1 vessel wall injury is not completely excluded. No stenosis, pseudoaneurysm or dissection.     Plan:  As discussed with Dr. Fritz, plan for asa 81mg daily with follow up in 6 weeks with repeat CTA  Given need for additional neck imaging, will not initiate asa therapy until imaging done to ensure no need for surgical intervention         Subjective/Objective   Chief Complaint: follow up cspine fracture    Subjective:  patient reports some neck pain, mostly between the shoulder blades. Reports ongoing soreness in the right shoulder with ongoing weakness.  States he cannot fully lift the arm up.  At baseline he does have some rotator cuff issues in this shoulder but prior to the fall he was able to lift the arm and put up curtain rods without issue.  No numbness or tingling.  No BBI or saddle anesthesia.    Talked to wife on the phone who states patient has history of multiple falls out of bed over the last 5 years (5-10x).  States he has very active dreams and will end up on the ground but has never hurt himself like he did with this last fall.  She is worried something is going on to cause these falls and wonders if he needs EEG or a sleep study.  States when he fell yesterday he called out to her for help and was unable to get up on his own (normally he is able to get up off the floor on his own).  States he was laying on his left shoulder and initially she did not see him moving any of his extremities but then eventually used his left arm to move his right arm as he was unable to lift it on its own.  Since that time, patient is moving everything without issue other than the right proximal arm.      Objective: siting up in bed, collar in place, NAD    I/O          "08/26 0701 08/27 0700 08/27 0701 08/28 0700 08/28 0701 08/29 0700    P.O.  518     Total Intake(mL/kg)  518 (5.6)     Urine (mL/kg/hr)  750     Total Output  750     Net  -232                    Invasive Devices       Peripheral Intravenous Line  Duration             Peripheral IV 08/27/24 Left;Ventral (anterior) Hand 1 day    Peripheral IV 08/27/24 Left Antecubital <1 day                    Physical Exam:  Vitals: Blood pressure 161/97, pulse 76, temperature 98.7 °F (37.1 °C), resp. rate 18, height 5' 11\" (1.803 m), weight 92.1 kg (203 lb), SpO2 100%.,Body mass index is 28.31 kg/m².      General appearance: alert, appears stated age, cooperative and no distress  Head: Normocephalic, without obvious abnormality, atraumatic  Eyes: conjugate gaze  Neck: supple, symmetrical, trachea midline, mild lower cervical TTP noted  Lungs: non labored breathing  Heart: regular heart rate  MSK:  no right shoulder deformity noted, no evidence of pain to palpation of the shoulder  Neurologic:   Mental status: Alert, oriented x3, follows commnads, thought content appropriate  Cranial nerves: grossly intact (Cranial nerves II-XII)  Sensory: normal to LT BUE  Motor: moving all extremities with weakness of the right arm, 5/5 throughout except as follows:   RUE:  2-3/5 deltoid, 4/5 bicep/tricep  Reflexes: no herrera or clonus noted      Lab Results:  Results from last 7 days   Lab Units 08/27/24  1631 08/27/24  1117   WBC Thousand/uL  --  7.90   HEMOGLOBIN g/dL  --  13.6   HEMATOCRIT %  --  40.5   PLATELETS Thousands/uL 123* 135*   SEGS PCT %  --  72   MONO PCT %  --  9   EOS PCT %  --  1     Results from last 7 days   Lab Units 08/27/24  1117   SODIUM mmol/L 140   POTASSIUM mmol/L 3.7   CHLORIDE mmol/L 111*   CO2 mmol/L 22   BUN mg/dL 19   CREATININE mg/dL 1.32*   CALCIUM mg/dL 9.0     Results from last 7 days   Lab Units 08/27/24  1827   MAGNESIUM mg/dL 2.3     Results from last 7 days   Lab Units 08/27/24  1827   PHOSPHORUS mg/dL " "2.8         Imaging Studies: I have personally reviewed pertinent reports.   and I have personally reviewed pertinent films in PACS    XR chest 1 view portable    Result Date: 8/27/2024  Impression: No acute consolidation or congestion Workstation performed: [id number]     CTA neck with and without contrast    Result Date: 8/27/2024  Impression: Subtle beaded appearance of the distal cervical internal carotid artery bilaterally most suggestive of fibromuscular dysplasia. In the setting of trauma, grade 1 vessel wall injury is not completely excluded. No stenosis, pseudoaneurysm or dissection. Incidental thyroid nodule(s) for which nonemergent thyroid ultrasound is recommended. This examination was marked \"immediate notification\" in Epic in order to begin the standard process by which the radiology reading room liaison alerts the referring practitioner. Workstation performed: [id number]     CT spine cervical without contrast    Result Date: 8/27/2024  Impression: Acute fracture of the anterior inferior endplate of C5 versus anterior inferior C5 osteophyte fracture. Nondisplaced fracture of the left L5 lamina extending into the spinous process. I personally discussed this study with ROSANNE GTZ on 8/27/2024 11:50 AM. Workstation performed: [id number]     CT spine thoracic without contrast    Result Date: 8/27/2024  Impression: No acute fracture. Workstation performed: [id number]     CT head without contrast    Result Date: 8/27/2024  Impression: No intracranial hemorrhage or calvarial fracture. Workstation performed: [id number]       EKG, Pathology, and Other Studies: I have personally reviewed pertinent reports.      VTE Pharmacologic Prophylaxis: Enoxaparin (Lovenox)    VTE Mechanical Prophylaxis: sequential compression device    "

## 2024-08-28 NOTE — CONSULTS
Consultation - Geriatric Medicine   Aron Oswald 73 y.o. male MRN: [id number]  Unit/Bed#: [facility] 620-01 Encounter: [id number]      Assessment & Plan     Fall from bed  -reportedly mechanical fall from bed on 8/27/24  -(+) head strike (-) loss of consciousness  -injuries as outlined below  -Does not require use of assist device for ambulation at baseline  -denies history of falls from standing   -high risk future falls due to age, deconditioning/debility and unfamiliar environment   -encourage good body mechanics and assist with all transfers  -keep personal items and call bell close to prevent reaching  -maintain environment free of fall hazards  -encourage appropriate footwear and adequate lighting at all times when out of bed  -recommend home fall risk assessment and personal fall alert system if returning home  -PT and OT pending     Cervical spine fracture  -s/p fall from bed as outlined above  -CT C-spine on admission reports fracture of anterior inferior endplate of C5 vs anterior inferior C5 osteophyte fracture   -cont acute multimodal pain control   -c-spine precautions, collar at all times  -upright films pending, MRI neck pending   -neurovascular checks per protocol  -Nsx on consult     Abnormal CTA of neck   -CTA neck w/wo contrast reports subtle beaded appearance of distal cervical internal carotid artery bilaterally suggestive of fibromuscular dysplasia however in setting of trauma grade 1 vessel injury not completely excluded  -NSx on consult    Acute pain due to trauma  -recommend pain control per Geriatric pain protocol:  Tylenol 975mg Q8H scheduled  Roxicodone 2.5mg Q4H PRN moderate pain  Roxicodone 5mg Q4H PRN severe pain  Dilaudid 0.2mg Q4H PRN  -consider adjuncts such as lidocaine patch topically to appropriate areas  -encourage addition of non-pharmacologic pain treatment including ice and frequent repositioning  -recommend bowel regimen to prevent constipation due to increased risk with acute  pain and opiate pain medications    Cognitive screening  -alert and oriented, denies memory or cognitive concerns   -reports independence with ADLs and iADLs at baseline   -no prior cognitive testing on record for review, no other symptoms to suggest LBD at current time however in setting of report of talking in sleep and vivid dreams (not on melatonin or other sleep altering medications) would benefit from comprehensive geriatric assessment with cognitive testing as o/p to establish baseline   -CTH obtained on admission imaging personally viewed, reveals mild chronic microangiopathic changes  -TSH WNL 1.58, no recent B12 on record for review, will check with routine labs  -at risk age related cognitive decline, continue secondary risk factor modifications   -encourage patient remain physically, socially and cognitively active and engaged to maintain cognitive acuity   -encourage continued use of sensory assist devices such as corrective lenses all appropriate times to reduce risk uncorrected sensory impairment from contributing to isolation, confusion, encephalopathy and more precipitous cognitive decline     Impaired Vision  -recommend use of corrective lenses at all appropriate times  -encourage adequate lighting and encourage use of assistance with ambulation  -keep personal belongings close to person to avoid reaching  -encourage appropriate footwear at all times  -consider large font for printed materials provided to patient     HTN  -previously on amlodipine which patient self d/c as o/p, counseled regarding importance of discussion with PCP prior to d/c any prescription medications   -continue optimization of hemodynamics      Deconditioning/debility/frailty   -clinical frailty scale stage IV, vulnerable   -multifactorial including age and HTN now with fall and acute traumatic injuries  -continue optimization chronic medical conditions and address acute derangements as arise  -encourage well balanced  nutritional intake  -monitor for and treat any underlying anxiety/mood/depression symptoms if present as may impact patient response to therapies as well as overall sense of wellbeing and quality of life   -ensure treatment and interventions align with patient wishes and goals of care   -continue psychosocial supports of patient and caregivers     Delirium precautions  -Patient is high risk of delirium due to age, fall, traumatic injuries, acute pain, hospitalization   -Initiate delirium precautions  -maintain normal sleep/wake cycle  -minimize overnight interruptions, group overnight vitals/labs/nursing checks as possible  -dim lights, close blinds and turn off tv to minimize stimulation and encourage sleep environment in evenings  -ensure that pain is well controlled    -monitor for fecal and urinary retention which may precipitate delirium  -encourage early mobilization and ambulation with assist as cleared to safely do so  -provide frequent reorientation and redirection as indicated and appropriate   -encourage family and friends at the bedside to help help calm patient if anxious  -avoid medications which may precipitate or worsen delirium such as tramadol, benzodiazepine, anticholinergics, and benadryl as possible  -encourage hydration and nutrition   -redirect unwanted behaviors as first line    Home medication review     Patient reports self d/c'd home amlodipine as o/p and takes no other daily medications as o/p    Care coordination: rounded with Basilia (Neurosurgery AP) and Dr. Medrano (Trauma Resident)    History of Present Illness   Physician Requesting Consult: Chad Poe,*  Reason for Consult / Principal Problem: Fall from bed   Hx and PE limited by: N/A  Additional history obtained from: Chart review and patient evaluation    HPI: Aron Oswald is a 73 y.o. year old male with HTN, anxiety and impaired hearing who is admitted to the Trauma service with mechanical fall from bed found to have  C5 vertebrae fracture on admission imaging, he is being seen in consultation by Geriatrics for high risk developing delirium during hospitalization. Aron is seen and examined at bedside where he is lying resting, he explains that he sustained a mechanical fall from bed on 8/27 which he states occurred when he rolled out of bed striking his head. He notes being told by his wife that he seemed to be having a vivid dream leading to the fall. He notes pain in his neck and left shoulder blade as well as trouble lifting his right shoulder since that time. He reports that prior to admission he was residing home with his wife and was independent with ADLs and iADLs and denies memory or cognitive concerns. He denies history of vivid dreams but does note being told by his wife in recent years that he intermittently talks in his sleep and does endorse similar falls from bed 1-2x/year the past couple of years none of which previously resulted in traumatic injuries. He reports no use of assist device for ambulation at baseline or history of falls from standing, he requires use of glasses for reading, does not use hearing aid or dentures.     Inpatient consult to Gerontology  Consult performed by: Luann Fofana DO  Consult ordered by: Kit Carrillo PA-C      Review of Systems   Constitutional: Negative.  Negative for appetite change, chills and fever.   HENT:  Positive for hearing loss (L ear).    Eyes:  Positive for visual disturbance (glasses for reading).   Respiratory: Negative.  Negative for cough and shortness of breath.    Cardiovascular: Negative.  Negative for chest pain and palpitations.   Gastrointestinal: Negative.  Negative for abdominal pain.   Genitourinary: Negative.    Musculoskeletal:  Positive for neck pain and neck stiffness. Negative for gait problem.        Pain in left scapula and weakness right shoulder    Skin: Negative.    Neurological:  Positive for dizziness. Negative for light-headedness,  numbness and headaches.   Hematological: Negative.    Psychiatric/Behavioral: Negative.  Negative for confusion and sleep disturbance.    All other systems reviewed and are negative.    Historical Information   Past Medical History:   Diagnosis Date    Anxiety     COVID-19 virus infection 12/05/2022    Hypertension     Refusal of blood transfusions as patient is Adventist      Past Surgical History:   Procedure Laterality Date    APPENDECTOMY       Social History   Social History     Substance and Sexual Activity   Alcohol Use Not Currently     Social History     Substance and Sexual Activity   Drug Use Never     Social History     Tobacco Use   Smoking Status Never   Smokeless Tobacco Never     Family History:   Family History   Problem Relation Age of Onset    No Known Problems Mother     Cancer Father         stomach     Meds/Allergies   all current active meds have been reviewed    Allergies   Allergen Reactions    Penicillins Other (See Comments)     Objective     Intake/Output Summary (Last 24 hours) at 8/28/2024 1045  Last data filed at 8/28/2024 0518  Gross per 24 hour   Intake 518 ml   Output 750 ml   Net -232 ml     Invasive Devices       Peripheral Intravenous Line  Duration             Peripheral IV 08/27/24 Left;Ventral (anterior) Hand 1 day    Peripheral IV 08/27/24 Left Antecubital <1 day                  Physical Exam  Vitals and nursing note reviewed.   Constitutional:       General: He is not in acute distress.     Appearance: Normal appearance. He is not toxic-appearing.   HENT:      Head: Normocephalic.      Nose: Nose normal.      Mouth/Throat:      Mouth: Mucous membranes are moist.      Comments: Missing some teeth  Eyes:      General: No scleral icterus.        Right eye: No discharge.         Left eye: No discharge.      Conjunctiva/sclera: Conjunctivae normal.   Neck:      Comments: C-collar in place     Trachea midline through ant viewing window of c-collar   Cardiovascular:       Rate and Rhythm: Normal rate and regular rhythm.   Pulmonary:      Effort: Pulmonary effort is normal. No respiratory distress.      Breath sounds: No wheezing.   Abdominal:      General: Bowel sounds are normal. There is no distension.      Palpations: Abdomen is soft.      Tenderness: There is no abdominal tenderness.   Musculoskeletal:      Right lower leg: No edema.      Left lower leg: No edema.      Comments: Norm overall muscle bulk and tone    Skin:     General: Skin is warm and dry.   Neurological:      Mental Status: He is alert.      Comments: Awake alert and oriented ans ques appropriately    Psychiatric:         Mood and Affect: Mood normal.         Behavior: Behavior normal.       Lab Results:     I have personally reviewed pertinent lab results including the followin/27/24- CBC, BMP    I have personally reviewed the following imaging study reports in PACS:    24- portable CXR, CTH, CT C-spine, CT thoracic spine, CTA neck w/wo contrast     Therapies:   PT: pending   OT: pending     VTE Prophylaxis: Enoxaparin (Lovenox)    Code Status: Level 1 - Full Code  Advance Directive and Living Will:      Power of :    POLST:      Family and Social Support:   Living Arrangements: Lives w/ Spouse/significant other  Support Systems: Self; Spouse/significant other  Assistance Needed: Wheel chair  Type of Current Residence: Private residence  Current Home Care Services: No    Goals of Care: acute pain control

## 2024-08-28 NOTE — ASSESSMENT & PLAN NOTE
C5 inferior endplate fracture with left C5 laminar fracture  Patient presented s/p mechanical fall from bed while sleeping 8/27  Mid neck and back pain after the fall associated with right arm weakness, initially could not move arm, has regained some mobility but now appears to still be an issue   No reported AC/AP medication    Imaging:   CT cervical spine 8/27/2024: Acute fracture of the anterior inferior endplate vs osteophyte fracture of C5. Non displaced fracture of the L C5 lamina extending to the spinous process    Plan:   Given ongoing right shoulder weakness (appears worse than yesterday), will order MRI cervical spine to rule out nerve impingement or cord injury given mechanism of fall as well as XR shoulder to rule out shoulder injury  Ongoing frequent neurological checks  Recommend cervical collar to be worn at all times, maia collar for showering  Order placed for upright cervical XR, to be completed today to assess alignment  PT/OT evaluation  Ongoing medical management and pain control per primary team  DVT ppx: SCD's, lovenox (if patient agreeable)  CM following for dispo planning    Neurosurgery will continue to follow. Call with questions or concerns.

## 2024-08-28 NOTE — RESTORATIVE TECHNICIAN NOTE
Restorative Technician Note      Patient Name: Aron Oswald     Restorative Tech Visit Date: 08/28/24  Note Type: Bracing, Initial consult  Patient Position Upon Consult: Supine    Order received for [place] Collar. Pt already wearing one from previous campus. Ensured proper fit and comfort. Chin plate level 1.     Katiana Shower Collar, replacement pads, and educational handout reviewed and left at bedside.    Please contact [facility] PT Restorative tech on Epic Secure Chat  in regards to bracing instruction and/or adjustment.    Ele Roman, Restorative Tech

## 2024-08-28 NOTE — OCCUPATIONAL THERAPY NOTE
Occupational Therapy Evaluation     Patient Name: Aron Oswald  Today's Date: 8/28/2024  Problem List  Principal Problem:    Cervical spine fracture (HCC)  Active Problems:    Abnormal computed tomography angiography (CTA) of neck    Fall from bed    Past Medical History  Past Medical History:   Diagnosis Date    Anxiety     COVID-19 virus infection 12/05/2022    Hypertension     Refusal of blood transfusions as patient is Adventist      Past Surgical History  Past Surgical History:   Procedure Laterality Date    APPENDECTOMY           08/28/24 1041   OT Last Visit   OT Visit Date 08/28/24   Note Type   Note type Evaluation   Pain Assessment   Pain Assessment Tool 0-10   Pain Score 2   Pain Location/Orientation Location: Neck   Patient's Stated Pain Goal No pain   Hospital Pain Intervention(s) Repositioned;Ambulation/increased activity;Emotional support   Restrictions/Precautions   Weight Bearing Precautions Per Order No   Braces or Orthoses (S)  C/S Collar   Other Precautions Fall Risk;Pain;Spinal precautions   Home Living   Type of Home House   Home Layout Multi-level;Stairs to enter with rails  (BILEVEL)   Bathroom Shower/Tub Tub/shower unit   Bathroom Toilet Standard   Additional Comments NO USE OF DME AT BASELINE   Prior Function   Level of [place] Independent with ADLs;Independent with functional mobility;Independent with IADLS   Lives With Spouse   Receives Help From Family   IADLs Independent with driving;Independent with meal prep;Independent with medication management   Falls in the last 6 months 1 to 4   Vocational Retired   Lifestyle   [employer] PT REPORTS BEING I WITH ADLS/IADLS/DRIVING PTA.   Reciprocal Relationships LIVES WITH SUPPORTIVE SPOUSE.   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 PT IS A [place]anda[place]   ADL   Eating Assistance 7  Independent   Grooming Assistance 5  Supervision/Setup   UB Bathing  Assistance 5  Supervision/Setup   LB Bathing Assistance 5  Supervision/Setup   UB Dressing Assistance 5  Supervision/Setup   LB Dressing Assistance 5  Supervision/Setup   Toileting Assistance  5  Supervision/Setup   Functional Assistance 5  Supervision/Setup   Bed Mobility   Supine to Sit 5  Supervision   Additional items Increased time required   Sit to Supine Unable to assess   Additional Comments PT LEFT OOB WITH ALL NEEDS IN REACH   Transfers   Sit to Stand 5  Supervision   Stand to Sit 5  Supervision   Functional Mobility   Functional Mobility 5  Supervision   Balance   Static Sitting Good   Static Standing Fair +   Ambulatory Fair   Activity Tolerance   Activity Tolerance Patient tolerated treatment well   Medical Staff Made Aware PT SEEN FOR CO-EVAL WITH SKILLED PHYSICAL THERAPIST 2' TRAUAMTIC INJURIES, NEW PRECAUTIONS/LIMITATIONS, AND LIMITED ACTIVITY TOLERANCE WHICH IMPACT PERFORMANCE AND ARE A REGRESSION FROM PT'S BASELINE.   Nurse Made Aware APPROPRIATE TO SEE PER RN.   RUE Assessment   RUE Assessment WFL   LUE Assessment   LUE Assessment WFL   Hand Function   Gross Motor Coordination Functional   Fine Motor Coordination Functional   Psychosocial   Psychosocial (WDL) WDL   Cognition   Overall Cognitive Status WFL   Arousal/Participation Alert;Cooperative   Attention Within functional limits   Orientation Level Oriented X4   Memory Within functional limits   Following Commands Follows all commands and directions without difficulty   Comments PT IS PLEASANT AND COOPERATIVE.   Assessment   Assessment 74 YO Male SEEN FOR INITIAL OCCUPATIONAL THERAPY EVALUATION FOLLOWING ADMISSION TO [facility] S/P FALL FROM BED RESULTING IN C5 INFERIOR ENDPLATE FX WITH L C5 LAMINAR FX. PT CURRENTLY HAS THE FOLLOWING RESTRICTIONS;SPINAL PRECAUTIONS and C-COLLAR . PROBLEMS LIST/PMH INCLUDES Anxiety, COVID-19 virus infection, Hypertension, and Refusal of blood transfusions as patient is [employer]. PT IS FROM  HOME WITH FAMILY WHERE HE REPORTS BEING INDEPENDENT WITH ADLS/IADLS/DRIVING PTA. PT CURRENTLY REQUIRES OVERALL S WITH ADLS, TRANSFERS AND FUNCTIONAL MOBILITY WITH USE OF AD. PT IS EXPERIENCING EXPECTED LIMITATIONS 2' PAIN, FATIGUE, FALL RISK , SPINAL PRECAUTIONS, and OVERALL LIMITED ACTIVITY TOLERANCE. PT EDUCATED ON C-COLLAR MANAGEMENT, SPINAL PRECAUTIONS, DEEP BREATHING TECHNIQUES T/O ACTIVITY, SLOWING OF PACE, ENERGY CONSERVATION TECHNIQUES FOR CARRY OVER UPON D/C, INCREASED FAMILY SUPPORT, and CONTINUE PARTICIPATION IN SELF-CARE/MOBILITY WITH STAFF WHILE IN THE HOSPITAL . The patient's raw score on the AM-PAC Daily Activity Inpatient Short Form is 19. A raw score of greater than or equal to 19 suggests the patient may benefit from discharge to home. Please refer to the recommendation of the Occupational Therapist for safe discharge planning. FROM AN OCCUPATIONAL THERAPY PERSPECTIVE, PT CAN RETURN HOME WITH INCREASED FAMILY SUPPORT WHEN MEDICALLY CLEARED. ALL QUESTIONS/CONCERNS ADDRESSED. NO ADDITIONAL ACUTE CARE OT NEEDS. D/C OT.   Goals   Patient Goals TO RETURN HOME   Discharge Recommendation   Rehab Resource Intensity Level, OT No post-acute rehabilitation needs   Equipment Recommended   (MAY BENEFIT FROM SC PRN)   AM-[facility] Daily Activity Inpatient   Lower Body Dressing 3   Bathing 3   Toileting 3   Upper Body Dressing 3   Grooming 3   Eating 4   Daily Activity Raw Score 19   Daily Activity Standardized Score (Calc for Raw Score >=11) 40.22   AM-PAC Applied Cognition Inpatient   Following a Speech/Presentation 4   Understanding Ordinary Conversation 4   Taking Medications 4   Remembering Where Things Are Placed or Put Away 4   Remembering List of 4-5 Errands 4   Taking Care of Complicated Tasks 4   Applied Cognition Raw Score 24   Applied Cognition Standardized Score 62.21       Documentation completed by EVE Tatum, OTR/L  MOCA Certified ID# [id number]

## 2024-08-28 NOTE — ASSESSMENT & PLAN NOTE
Secondary to mechanical fall on his left side from bed while sleeping. Had initial right arm weakness. No neuro deficits. Has some neck and back pain. Patient is Jehovahs Witness who will accept blood expanders, but blood transfusions.  8/28: Acute change with, no ability to flex right shoulder or arm, previously had transient right shoulder weakness. Upright cervical xray read as normal    - Neurosurgery onboard - fu recs iso or recent normal neck imaging with right shoulder neuro deficit. Outpt follow up vs brachila plexus MRI  - 8/28: non concerning stat cervical MRI for right shoulder/arm weakness  - 8/28: non conerning upright cervical XR  - currently non-op. Maintain C-collar. Serial neuro checks Q4. Checking with endovascular regarding possible FMD  - DVT ppx. Pt wary about lovenox

## 2024-08-28 NOTE — ASSESSMENT & PLAN NOTE
CTA with concern for possible FMD vs grade 1 injury of cervical ICA    Imaging:  CTA neck w wo contrast 8/27/2024: Subtle beaded appearance of the distal cervical internal carotid artery bilaterally most suggestive of fibromuscular dysplasia. In the setting of trauma, grade 1 vessel wall injury is not completely excluded. No stenosis, pseudoaneurysm or dissection.     Plan:  As discussed with Dr. Fritz, plan for asa 81mg daily with follow up in 6 weeks with repeat CTA  Given need for additional neck imaging, will not initiate asa therapy until imaging done to ensure no need for surgical intervention

## 2024-08-29 ENCOUNTER — TELEPHONE (OUTPATIENT)
Dept: NEUROSURGERY | Facility: CLINIC | Age: 74
End: 2024-08-29

## 2024-08-29 PROBLEM — G47.52 REM SLEEP BEHAVIOR DISORDER: Status: ACTIVE | Noted: 2024-08-29

## 2024-08-29 LAB
ANION GAP SERPL CALCULATED.3IONS-SCNC: 5 MMOL/L (ref 4–13)
ATRIAL RATE: 86 BPM
BASOPHILS # BLD AUTO: 0.02 THOUSANDS/ÂΜL (ref 0–0.1)
BASOPHILS NFR BLD AUTO: 0 % (ref 0–1)
BUN SERPL-MCNC: 15 MG/DL (ref 5–25)
CALCIUM SERPL-MCNC: 8.7 MG/DL (ref 8.4–10.2)
CHLORIDE SERPL-SCNC: 106 MMOL/L (ref 96–108)
CO2 SERPL-SCNC: 28 MMOL/L (ref 21–32)
CREAT SERPL-MCNC: 1.29 MG/DL (ref 0.6–1.3)
EOSINOPHIL # BLD AUTO: 0.15 THOUSAND/ÂΜL (ref 0–0.61)
EOSINOPHIL NFR BLD AUTO: 3 % (ref 0–6)
ERYTHROCYTE [DISTWIDTH] IN BLOOD BY AUTOMATED COUNT: 13.8 % (ref 11.6–15.1)
GFR SERPL CREATININE-BSD FRML MDRD: 53 ML/MIN/1.73SQ M
GLUCOSE SERPL-MCNC: 91 MG/DL (ref 65–140)
HCT VFR BLD AUTO: 37.7 % (ref 36.5–49.3)
HGB BLD-MCNC: 12.6 G/DL (ref 12–17)
IMM GRANULOCYTES # BLD AUTO: 0.01 THOUSAND/UL (ref 0–0.2)
IMM GRANULOCYTES NFR BLD AUTO: 0 % (ref 0–2)
LYMPHOCYTES # BLD AUTO: 1.75 THOUSANDS/ÂΜL (ref 0.6–4.47)
LYMPHOCYTES NFR BLD AUTO: 31 % (ref 14–44)
MAGNESIUM SERPL-MCNC: 2.2 MG/DL (ref 1.9–2.7)
MCH RBC QN AUTO: 32.5 PG (ref 26.8–34.3)
MCHC RBC AUTO-ENTMCNC: 33.4 G/DL (ref 31.4–37.4)
MCV RBC AUTO: 97 FL (ref 82–98)
MONOCYTES # BLD AUTO: 0.7 THOUSAND/ÂΜL (ref 0.17–1.22)
MONOCYTES NFR BLD AUTO: 12 % (ref 4–12)
NEUTROPHILS # BLD AUTO: 3.06 THOUSANDS/ÂΜL (ref 1.85–7.62)
NEUTS SEG NFR BLD AUTO: 54 % (ref 43–75)
NRBC BLD AUTO-RTO: 0 /100 WBCS
P AXIS: 30 DEGREES
PHOSPHATE SERPL-MCNC: 2.9 MG/DL (ref 2.3–4.1)
PLATELET # BLD AUTO: 122 THOUSANDS/UL (ref 149–390)
PMV BLD AUTO: 11.6 FL (ref 8.9–12.7)
POTASSIUM SERPL-SCNC: 3.8 MMOL/L (ref 3.5–5.3)
PR INTERVAL: 138 MS
QRS AXIS: 5 DEGREES
QRSD INTERVAL: 86 MS
QT INTERVAL: 386 MS
QTC INTERVAL: 461 MS
RBC # BLD AUTO: 3.88 MILLION/UL (ref 3.88–5.62)
SODIUM SERPL-SCNC: 139 MMOL/L (ref 135–147)
T WAVE AXIS: 52 DEGREES
VENTRICULAR RATE: 86 BPM
VIT B12 SERPL-MCNC: 285 PG/ML (ref 180–914)
WBC # BLD AUTO: 5.69 THOUSAND/UL (ref 4.31–10.16)

## 2024-08-29 PROCEDURE — 99223 1ST HOSP IP/OBS HIGH 75: CPT | Performed by: STUDENT IN AN ORGANIZED HEALTH CARE EDUCATION/TRAINING PROGRAM

## 2024-08-29 PROCEDURE — 93005 ELECTROCARDIOGRAM TRACING: CPT

## 2024-08-29 PROCEDURE — 93010 ELECTROCARDIOGRAM REPORT: CPT | Performed by: INTERNAL MEDICINE

## 2024-08-29 PROCEDURE — 85025 COMPLETE CBC W/AUTO DIFF WBC: CPT

## 2024-08-29 PROCEDURE — 99231 SBSQ HOSP IP/OBS SF/LOW 25: CPT | Performed by: EMERGENCY MEDICINE

## 2024-08-29 PROCEDURE — 80048 BASIC METABOLIC PNL TOTAL CA: CPT

## 2024-08-29 PROCEDURE — 84100 ASSAY OF PHOSPHORUS: CPT

## 2024-08-29 PROCEDURE — 82607 VITAMIN B-12: CPT | Performed by: INTERNAL MEDICINE

## 2024-08-29 PROCEDURE — 83735 ASSAY OF MAGNESIUM: CPT

## 2024-08-29 PROCEDURE — 99232 SBSQ HOSP IP/OBS MODERATE 35: CPT | Performed by: PHYSICIAN ASSISTANT

## 2024-08-29 RX ORDER — POTASSIUM CHLORIDE 1500 MG/1
20 TABLET, EXTENDED RELEASE ORAL ONCE
Status: COMPLETED | OUTPATIENT
Start: 2024-08-29 | End: 2024-08-29

## 2024-08-29 RX ORDER — ASPIRIN 81 MG/1
81 TABLET, CHEWABLE ORAL DAILY
Status: DISCONTINUED | OUTPATIENT
Start: 2024-08-29 | End: 2024-08-30 | Stop reason: HOSPADM

## 2024-08-29 RX ORDER — ACETAMINOPHEN 325 MG/1
975 TABLET ORAL EVERY 8 HOURS SCHEDULED
Status: DISCONTINUED | OUTPATIENT
Start: 2024-08-29 | End: 2024-08-30 | Stop reason: HOSPADM

## 2024-08-29 RX ADMIN — POTASSIUM CHLORIDE 20 MEQ: 1500 TABLET, EXTENDED RELEASE ORAL at 10:26

## 2024-08-29 RX ADMIN — ASPIRIN 81 MG CHEWABLE TABLET 81 MG: 81 TABLET CHEWABLE at 12:49

## 2024-08-29 RX ADMIN — ENOXAPARIN SODIUM 30 MG: 30 INJECTION SUBCUTANEOUS at 17:25

## 2024-08-29 NOTE — ASSESSMENT & PLAN NOTE
Patient presented s/p mechanical fall from bed while sleeping. CT C-spine shows C5 inferior endplate fracture with left C5 laminar fracture. MRI C-spine shows no epidural hematoma, cord compression or cord contusion. Right arm weakness on exam.    Trauma and neurosurgery following  Patient using cervical collar with PT/OT eval

## 2024-08-29 NOTE — CASE MANAGEMENT
Case Management Discharge Planning Note    Patient name Aron Jade [facility] 620/[facility] 620-01 MRN [id number]  : 1950 Date 2024       Current Admission Date: 2024  Current Admission Diagnosis:Cervical spine fracture (HCC)   Patient Active Problem List    Diagnosis Date Noted Date Diagnosed    Cervical spine fracture (HCC) 2024     Abnormal computed tomography angiography (CTA) of neck 2024     Fall from bed 2024     Snoring 2023     Platelets decreased (HCC) 2022     Sensation of plugged ear on right side 2021     Essential hypertension 2021     Erectile dysfunction 2021       LOS (days): 2  Geometric Mean LOS (GMLOS) (days): 2.9  Days to GMLOS:1.1     OBJECTIVE:  Risk of Unplanned Readmission Score: 8.57         Current admission status: Inpatient   Preferred Pharmacy:   [facility][id number] - [place], [place] - [address]  [address] [place] [id number]  Phone: [phone] Fax: [phone]    Primary Care Provider: Jose Harmon MD    Primary Insurance: [employer] REP  Secondary Insurance:     DISCHARGE DETAILS:    CM spoke to pt's wife regarding concerns she has about the pt's care and plan.   Pt's wife would like to meet with someone from primary team, as she will be in the room soon, and review these concerns

## 2024-08-29 NOTE — CASE MANAGEMENT
Case Management Discharge Planning Note    Patient name Aron Jade [facility] 620/[facility] 620-01 MRN [id number]  : 1950 Date 2024       Current Admission Date: 2024  Current Admission Diagnosis:Cervical spine fracture (HCC)   Patient Active Problem List    Diagnosis Date Noted Date Diagnosed    Cervical spine fracture (HCC) 2024     Abnormal computed tomography angiography (CTA) of neck 2024     Fall from bed 2024     Snoring 2023     Platelets decreased (HCC) 2022     Sensation of plugged ear on right side 2021     Essential hypertension 2021     Erectile dysfunction 2021       LOS (days): 2  Geometric Mean LOS (GMLOS) (days): 2.9  Days to GMLOS:1.1     OBJECTIVE:  Risk of Unplanned Readmission Score: 8.57         Current admission status: Inpatient   Preferred Pharmacy:   [id number] - [place], [place] - [address]  [facility] [place] [address]  Phone: [phone] Fax: [phone]    Primary Care Provider: Jose Harmon MD    Primary Insurance: [employer] HAMLIN  Secondary Insurance:     DISCHARGE DETAILS:    Voicemail left for pt's wife, to discuss d/c plan

## 2024-08-29 NOTE — ASSESSMENT & PLAN NOTE
Fall out of bed with +head strike  CT head and thoracic spine without acute findings  Patient's wife reports repeated falls from bed due to active dreaming (5-10x in 5 years), has never been hurt like he did with this most recent fall.    Consider outpatient sleep study for further evaluation  Geriatrics following  See plan above

## 2024-08-29 NOTE — PROGRESS NOTES
[facility]  Progress Note  Name: Aron Oswald I  MRN: [id number]  Unit/Bed#: [facility] 620-01 I Date of Admission: 8/27/2024   Date of Service: 8/29/2024 I Hospital Day: 2    Assessment & Plan   * Cervical spine fracture (HCC)  Assessment & Plan  Secondary to mechanical fall on his left side from bed while sleeping. Had initial right arm weakness. No neuro deficits. Has some neck and back pain. Patient is Jehovahs Witness who will accept blood expanders, but blood transfusions.  8/28: Acute change with, no ability to flex right shoulder or arm, previously had transient right shoulder weakness. Upright cervical xray read as normal    - Neurosurgery onboard - fu recs iso or recent normal neck imaging with right shoulder neuro deficit. Outpt follow up vs brachila plexus MRI  - 8/28: non concerning stat cervical MRI for right shoulder/arm weakness  - 8/28: non conerning upright cervical XR  - currently non-op. Maintain C-collar. Serial neuro checks Q4. Checking with endovascular regarding possible FMD  - DVT ppx. Pt wary about lovenox       Subjective: Feeling stable pain. Continues to have inability to flex shoulder due to weakness. Tolerating diet, without N/V. No SOB, chest, fevers, dizziness, light headedness.    Vitals:    08/29/24 1259   BP:    Pulse:    Resp: 18   Temp: 98.1 °F (36.7 °C)   SpO2:      Physical Exam:  General: NAD  Neuro: AO3. Right shoulder unable to flex past 60 due to weakness. No other focal deficit  CV: nl rate  Lungs: nl wob. No resp distress.  Chest: no lesions  Ext: right shoulder unable to flex past 60 degrees  ABD: Soft, ND, NT  Extrem: No CCE

## 2024-08-29 NOTE — PROGRESS NOTES
[facility]  Progress Note  Name: Aron Oswald I  MRN: [id number]  [facility] Date of Admission: 8/27/2024   Date of Service: 8/29/2024 I Hospital Day: 2    Assessment & Plan   * Cervical spine fracture (HCC)  Assessment & Plan  C5 inferior endplate fracture with left C5 laminar fracture  Patient presented s/p mechanical fall from bed while sleeping 8/27  Mid neck and back pain after the fall associated with right arm weakness, initially could not move arm at all, now with ongoing focal shoulder weakness  No reported AC/AP medication    Imaging:   CT cervical spine 8/27/2024: Acute fracture of the anterior inferior endplate vs osteophyte fracture of C5. Non displaced fracture of the L C5 lamina extending to the spinous process  MRI cervical spine without contrast 8/28/2024: Redemonstration of C5 fractures as described above with ligamentous injury.  No epidural hematoma, cord compression or cord contusion.  Multilevel degenerative changes as described.  XR cervical spine 8/28/2024: Barely perceptible C5 fractures. No significant spinal malalignment. Prevertebral soft tissue swelling is noted.     Plan:   Imaging reviewed with attending this morning, multilevel degenerative changes noted, nothing overly compressive to account for focal deltoid weakness (without significant pain, numbness / tingling) which would be in line with a C5 issue.  No jelani spinal cord injury or compression.  Unclear etiology at this time as rest of exam is essentially nonfocal.  No surgical intervention warranted at this time, will continue to monitor in the outpatient setting and consider outpatient EMG if no improvement.  Ongoing frequent neurological checks  Continue cervical collar to be worn at all times, maia collar for showering  PT/OT evaluation  Ongoing medical management and pain control per primary team  DVT ppx: SCD's, lovenox (if patient agreeable)  CM following for dispo  planning    Neurosurgery will sign off, outpatient follow up in 2 weeks with upright XR cervical spine. Call with questions or concerns.     Fall from bed  Assessment & Plan  Fall out of bed with +head strike  CT head and thoracic spine without acute findings  Patient's wife reports repeated falls from bed due to active dreaming (5-10x in 5 years), has never been hurt like he did with this most recent fall.    Consider outpatient sleep study for further evaluation  Geriatrics following  See plan above    Abnormal computed tomography angiography (CTA) of neck  Assessment & Plan  CTA with concern for possible FMD vs grade 1 injury of cervical ICA    Imaging:  CTA neck w wo contrast 8/27/2024: Subtle beaded appearance of the distal cervical internal carotid artery bilaterally most suggestive of fibromuscular dysplasia. In the setting of trauma, grade 1 vessel wall injury is not completely excluded. No stenosis, pseudoaneurysm or dissection.     Plan:  As discussed with Dr. Fritz, plan for asa 81mg daily with follow up in 6 weeks with repeat CTA    Neurosurgery will sign off, please reach out with questions or concerns.         Subjective/Objective   Chief Complaint: follow up    Subjective: Patient is doing okay this morning.  He has some mild discomfort in the lower cervical / upper thoracic spine between the shoulder blades.  Some soreness in his right shoulder, stating it feels like he did a workout with muscle strain and significant right deltoid weakness.  He does not have any numbness or tingling in this arm.  No other neurological issues.  He is very concerned about going home and potentially falling out of bed again now that he has suffered this injury and is wondering what plan will be put in place to make sure this does not happen again.    Objective: sitting in chair, collar in place, NAD    I/O         08/27 0701 08/28 0700 08/28 0701 08/29 0700 08/29 0701 08/30 0700    P.O. 518 436     Total  "Intake(mL/kg) 518 (5.6) 436 (4.7)     Urine (mL/kg/hr) 750 490 (0.2)     Total Output 750 490     Net -232 -54            Unmeasured Urine Occurrence  3 x             Invasive Devices       Peripheral Intravenous Line  Duration             Peripheral IV 08/27/24 Left;Ventral (anterior) Hand 2 days    Peripheral IV 08/27/24 Left Antecubital 1 day                    Physical Exam:  Vitals: Blood pressure 129/57, pulse (!) 40, temperature 97.9 °F (36.6 °C), temperature source Oral, resp. rate 18, height 5' 11\" (1.803 m), weight 92.1 kg (203 lb), SpO2 99%.,Body mass index is 28.31 kg/m².      General appearance: alert, appears stated age, cooperative and no distress  Head: Normocephalic, without obvious abnormality, atraumatic  Eyes: Conjugate gaze  Neck: supple, symmetrical, trachea midline, collar in place, tenderness to palpation lower cervical/upper thoracic spine moreso off to the left  Lungs: non labored breathing  Heart: Bradycardia  Neurologic:   Mental status: Alert, oriented x 3, thought content appropriate  Cranial nerves: grossly intact (Cranial nerves II-XII)  Sensory: normal to light touch  Motor: moving all extremities with focal 2/5 weakness in right deltoid, all other muscle groups are 5/5       Lab Results:  Results from last 7 days   Lab Units 08/29/24 0557 08/27/24  1631 08/27/24  1117   WBC Thousand/uL 5.69  --  7.90   HEMOGLOBIN g/dL 12.6  --  13.6   HEMATOCRIT % 37.7  --  40.5   PLATELETS Thousands/uL 122* 123* 135*   SEGS PCT % 54  --  72   MONO PCT % 12  --  9   EOS PCT % 3  --  1     Results from last 7 days   Lab Units 08/29/24  0557 08/27/24  1117   SODIUM mmol/L 139 140   POTASSIUM mmol/L 3.8 3.7   CHLORIDE mmol/L 106 111*   CO2 mmol/L 28 22   BUN mg/dL 15 19   CREATININE mg/dL 1.29 1.32*   CALCIUM mg/dL 8.7 9.0     Results from last 7 days   Lab Units 08/29/24  0557 08/27/24  1827   MAGNESIUM mg/dL 2.2 2.3     Results from last 7 days   Lab Units 08/29/24  0557 08/27/24  1827   PHOSPHORUS " "mg/dL 2.9 2.8       Imaging Studies: I have personally reviewed pertinent reports.   and I have personally reviewed pertinent films in PACS    MRI cervical spine wo contrast    Result Date: 8/28/2024  Impression: Redemonstration of C5 fractures as described above with ligamentous injury. No epidural hematoma, cord compression or cord contusion. Multilevel degenerative changes as described Workstation performed: [id number]     XR spine cervical 2 or 3 vw injury    Result Date: 8/28/2024  Impression: Barely perceptible C5 fractures. No significant spinal malalignment. Prevertebral soft tissue swelling is noted. Workstation performed: [id number]     XR shoulder 2+ vw right    Result Date: 8/28/2024  Impression: Arthritic changes. No fracture or dislocation seen Computerized Assisted Algorithm (CAA) may have been used to analyze all applicable images. Workstation performed: [id number]     XR chest 1 view portable    Result Date: 8/27/2024  Impression: No acute consolidation or congestion Workstation performed: [id number]     CTA neck with and without contrast    Result Date: 8/27/2024  Impression: Subtle beaded appearance of the distal cervical internal carotid artery bilaterally most suggestive of fibromuscular dysplasia. In the setting of trauma, grade 1 vessel wall injury is not completely excluded. No stenosis, pseudoaneurysm or dissection. Incidental thyroid nodule(s) for which nonemergent thyroid ultrasound is recommended. This examination was marked \"immediate notification\" in Epic in order to begin the standard process by which the radiology reading room liaison alerts the referring practitioner. Workstation performed: [id number]     CT spine cervical without contrast    Result Date: 8/27/2024  Impression: Acute fracture of the anterior inferior endplate of C5 versus anterior inferior C5 osteophyte fracture. Nondisplaced fracture of the left L5 lamina extending into the spinous process. I personally discussed " this study with ROSANNE GTZ on 8/27/2024 11:50 AM. Workstation performed: [id number]     CT spine thoracic without contrast    Result Date: 8/27/2024  Impression: No acute fracture. Workstation performed: [id number]     CT head without contrast    Result Date: 8/27/2024  Impression: No intracranial hemorrhage or calvarial fracture. Workstation performed: [id number]       EKG, Pathology, and Other Studies: I have personally reviewed pertinent reports.      VTE Pharmacologic Prophylaxis: Enoxaparin (Lovenox)    VTE Mechanical Prophylaxis: sequential compression device

## 2024-08-29 NOTE — ASSESSMENT & PLAN NOTE
CTA with concern for possible FMD vs grade 1 injury of cervical ICA    Imaging:  CTA neck w wo contrast 8/27/2024: Subtle beaded appearance of the distal cervical internal carotid artery bilaterally most suggestive of fibromuscular dysplasia. In the setting of trauma, grade 1 vessel wall injury is not completely excluded. No stenosis, pseudoaneurysm or dissection.     Plan:  As discussed with Dr. Fritz, plan for asa 81mg daily with follow up in 6 weeks with repeat CTA    Neurosurgery will sign off, please reach out with questions or concerns.

## 2024-08-29 NOTE — DISCHARGE INSTR - AVS FIRST PAGE
Discharge Instructions - Neurosurgery    VISTA collar at all times except for showering change to maia collar.  No heavy lifting. No strenuous activities. NO DRIVING.   Take aspirin daily  You may start formal physical therapy once cleared by neurosurgery. Okay for active and passive range of motion of the right shoulder.     **Please notify MD immediately if you have increased neck or arm pain. New numbness and/or weakness in your arm. Difficulty swallowing or breathing especially while lying down. Numbness or weakness in arms or legs.**    Please follow up in 2 weeks with the Neurosurgical group with repeat X-ray of cervical spine 2-3 days prior to your appointment.  You will return in 6 weeks with repeat CTA neck, also to be obtained 2-3 days prior to your appointment.  You may go to any [facility]'s facility to get your imaging done.  Please call [phone] to schedule your imaging appointment.

## 2024-08-29 NOTE — TELEPHONE ENCOUNTER
8/29/24 - PT IN [facility]  9/13/24 2 WK HFU W/CSPINE  10/9/24 SNPX W/EM 6 WK HFU W/CTA NECK    [facility] [place] [facility]  2-week hospital follow-up with AP and upright cervical spine x-rays for C5 fracture    6-week hospital follow-up with AP and Dr. Fritz and CTA neck for vascular abnormality (injury vs FMD)

## 2024-08-29 NOTE — CONSULTS
NEUROLOGY RESIDENCY CONSULT NOTE     Name: Aron Oswald   Age & Sex: 73 y.o. male   MRN: [id number]  Unit/Bed#: [facility] 620-01   Encounter: [id number]  Length of Stay: 2    Aron Oswald will need follow-up in  4-6 weeks  with Sleep Medicine team for Other (REM Sleep Behavior disorder) in 60 minute appointment. Office is notified. They will not require outpatient neurological testing.     Pending for discharge: None from Neuro standpoint    ASSESSMENT & PLAN     REM sleep behavior disorder  Assessment & Plan  73 year old patient with 5-10 year history of acting out dreams in his sleep with falls out of bed, most recently on 8/27 resulting in C5 fracture. Physical exam is significant for right upper extremity weakness but otherwise normal tone and no tremor. CTH is unremarkable for mass or infarct. MRI C-Spine shows a C5 fracture.     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 No signs of Parkinsonism on exam. No mass or acute pathology seen on CTH. Patient will benefit from outpatient sleep study and evaluation for restless leg syndrome with Vitamin D and Ferritin levels.      Plan:  Obtain Vitamin D and Ferritin levels   Recommend Melatonin: start with 3 mg before bedtime for 2-3 days, then increase to 6 mg before if no side effects  Outpatient follow-up with Sleep Medicine for a sleep study   If signs or symptoms of Parkinson's present in the future, we recommend appointment with neurology movement clinic      * Cervical spine fracture (HCC)  Assessment & Plan  Patient presented s/p mechanical fall from bed while sleeping. CT C-spine shows C5 inferior endplate fracture with left C5 laminar fracture. MRI C-spine shows no epidural hematoma, cord compression or cord contusion. Right arm weakness on exam.    Trauma and neurosurgery following  Patient using cervical collar with PT/OT eval              SUBJECTIVE     Reason for Consult / Principal Problem: acting out dreams in sleep      HPI: Aron  Darek is a 73 y.o. male consulted for a 5-10 year history of acting out dreams in his sleep. On 8/27, patient was moving around in his sleep and fell out of bed, sustaining a C5 fracture. Patient's wife has noticed that patient acts out dreams in his sleep and has been having falls from bed 1-2 times per year. Symptoms have not gotten progressively worse or more frequent. He has been reporting intermittent constipation since February. He has no visual or auditory hallucinations when awake. He has no tremors, gait instability, falls, or syncopal episodes. No changes in sense of smell. No confusion, forgetfulness, or inability to do daily activities/take care of finances. No family history of dementia or Parkinson's.     Inpatient consult to Neurology  Consult performed by: Branden Mcintosh  Consult ordered by: Jose Medrano MD          Historical Information   Past Medical History:   Diagnosis Date    Anxiety     COVID-19 virus infection 12/05/2022    Hypertension     Refusal of blood transfusions as patient is Spiritism      Past Surgical History:   Procedure Laterality Date    APPENDECTOMY       Social History   Social History     Substance and Sexual Activity   Alcohol Use Not Currently     Social History     Substance and Sexual Activity   Drug Use Never     E-Cigarette/Vaping    E-Cigarette Use Never User      E-Cigarette/Vaping Substances    Nicotine No     THC No     CBD No     Flavoring No     Other No     Unknown No      Social History     Tobacco Use   Smoking Status Never   Smokeless Tobacco Never     Family History:   Family History   Problem Relation Age of Onset    No Known Problems Mother     Cancer Father         stomach     Meds/Allergies   all current active meds have been reviewed  Allergies   Allergen Reactions    Penicillins Other (See Comments)       Review of previous medical records was completed.       Review of Systems   Neurological:  Positive for weakness. Negative for dizziness, tremors,  syncope and light-headedness.   Psychiatric/Behavioral:  Positive for sleep disturbance. Negative for confusion and hallucinations.        OBJECTIVE     Patient ID: Aron Oswald is a 73 y.o. male.    Vitals:   Vitals:    24 1030 24 1132 24 1259 24 1501   BP: 129/57 162/76  106/50   BP Location: Right arm      Pulse: (!) 40 (!) 41  (!) 39   Resp: 18  18    Temp:   98.1 °F (36.7 °C) 98.6 °F (37 °C)   TempSrc:   Oral Oral   SpO2: 99% 100%  97%   Weight:       Height:          Body mass index is 28.31 kg/m².     Intake/Output Summary (Last 24 hours) at 2024 1536  Last data filed at 2024 1101  Gross per 24 hour   Intake 318 ml   Output 1200 ml   Net -882 ml       Temperature:   Temp (24hrs), Av.3 °F (36.8 °C), Min:97.9 °F (36.6 °C), Max:98.6 °F (37 °C)    Temperature: 98.6 °F (37 °C)    Invasive Devices:   Invasive Devices       Peripheral Intravenous Line  Duration             Peripheral IV 24 Left;Ventral (anterior) Hand 2 days                    Physical Exam  Eyes:      Extraocular Movements: EOM normal.      Pupils: Pupils are equal, round, and reactive to light.   Neurological:      Mental Status: He is oriented to person, place, and time.      Coordination: Finger-Nose-Finger Test normal.      Deep Tendon Reflexes:      Reflex Scores:       Tricep reflexes are 2+ on the right side and 2+ on the left side.       Bicep reflexes are 2+ on the right side and 2+ on the left side.       Brachioradialis reflexes are 2+ on the right side and 2+ on the left side.       Patellar reflexes are 2+ on the right side and 2+ on the left side.       Achilles reflexes are 2+ on the right side and 2+ on the left side.  Psychiatric:         Speech: Speech normal.          Neurologic Exam     Mental Status   Oriented to person, place, and time.   Attention: normal.   Speech: speech is normal   Able to perform simple calculations.   Able to name object.     Cranial Nerves     CN II   Visual  acuity: normal  Right visual field deficit: none  Left visual field deficit: none     CN III, IV, VI   Pupils are equal, round, and reactive to light.  Extraocular motions are normal.     CN V   Facial sensation intact.     CN VII   Facial expression full, symmetric.     CN VIII   CN VIII normal.     CN XI   CN XI normal.     CN XII   Tongue deviation: none    Motor Exam   Muscle bulk: normal  Overall muscle tone: normal  Right arm tone: normal  Left arm tone: normal  Right leg tone: normal  Left leg tone: normal    Strength   Strength 5/5 except as noted. Right shoulder abduction 4+/5     Sensory Exam   Light touch normal.   Pinprick normal.     Gait, Coordination, and Reflexes     Coordination   Finger to nose coordination: normal    Tremor   Resting tremor: absent  Intention tremor: absent  Action tremor: absent    Reflexes   Right brachioradialis: 2+  Left brachioradialis: 2+  Right biceps: 2+  Left biceps: 2+  Right triceps: 2+  Left triceps: 2+  Right patellar: 2+  Left patellar: 2+  Right achilles: 2+  Left achilles: 2+  Right : 2+  Left : 2+             LABORATORY DATA     Labs: I have personally reviewed pertinent reports.    Results from last 7 days   Lab Units 08/29/24  0557 08/27/24  1631 08/27/24  1117   WBC Thousand/uL 5.69  --  7.90   HEMOGLOBIN g/dL 12.6  --  13.6   HEMATOCRIT % 37.7  --  40.5   PLATELETS Thousands/uL 122* 123* 135*   SEGS PCT % 54  --  72   MONO PCT % 12  --  9   EOS PCT % 3  --  1      Results from last 7 days   Lab Units 08/29/24  0557 08/27/24  1117   POTASSIUM mmol/L 3.8 3.7   CHLORIDE mmol/L 106 111*   CO2 mmol/L 28 22   BUN mg/dL 15 19   CREATININE mg/dL 1.29 1.32*   CALCIUM mg/dL 8.7 9.0     Results from last 7 days   Lab Units 08/29/24  0557 08/27/24  1827   MAGNESIUM mg/dL 2.2 2.3     Results from last 7 days   Lab Units 08/29/24  0557 08/27/24  1827   PHOSPHORUS mg/dL 2.9 2.8                    IMAGING & DIAGNOSTIC TESTING     Radiology Results: I have personally  reviewed pertinent reports.    MRI cervical spine wo contrast   Final Result by E. Alec Schoenberger, MD (08/28 1623)   Redemonstration of C5 fractures as described above with ligamentous injury.   No epidural hematoma, cord compression or cord contusion.   Multilevel degenerative changes as described               Workstation performed: [id number]         XR spine cervical 2 or 3 vw injury   Final Result by Nuno Carson MD (08/28 1215)      Barely perceptible C5 fractures. No significant spinal malalignment. Prevertebral soft tissue swelling is noted.         Workstation performed: [id number]         XR shoulder 2+ vw right   Final Result by Nuno Carson MD (08/28 1211)      Arthritic changes. No fracture or dislocation seen         Computerized Assisted Algorithm (CAA) may have been used to analyze all applicable images.         Workstation performed: [id number]         XR spine cervical 2 or 3 vw injury    (Results Pending)   CTA neck w wo contrast    (Results Pending)       Other Diagnostic Testing: I have personally reviewed pertinent reports.      ACTIVE MEDICATIONS     Current Facility-Administered Medications   Medication Dose Route Frequency    acetaminophen (TYLENOL) tablet 975 mg  975 mg Oral Q8H [facility]    aspirin chewable tablet 81 mg  81 mg Oral Daily    enoxaparin (LOVENOX) subcutaneous injection 30 mg  30 mg Subcutaneous Q12H    labetalol (NORMODYNE) injection 10 mg  10 mg Intravenous Q6H PRN       Prior to Admission medications    Medication Sig Start Date End Date Taking? Authorizing Provider   amLODIPine (NORVASC) 5 mg tablet Take 1 tablet (5 mg total) by mouth daily  Patient not taking: Reported on 6/23/2024 5/16/23   Jose Harmon MD   Multiple Vitamins-Minerals (CENTRUM SILVER PO) Take by mouth  Patient not taking: Reported on 6/23/2024    Historical Provider, MD   sildenafil (VIAGRA) 50 MG tablet Take 1 tablet (50 mg total) by mouth daily as needed for erectile  dysfunction  Patient not taking: Reported on 6/23/2024 5/16/23   Jose Harmon MD       CODE STATUS & ADVANCED DIRECTIVES     Code Status: Level 1 - Full Code  Advance Directive and Living Will:      Power of :    POLST:        VTE Pharmacologic Prophylaxis: Enoxaparin (Lovenox)  VTE Mechanical Prophylaxis: sequential compression device    ======    I have discussed the patient's history, physical exam findings, assessment, and plan in detail with attending, Dr. Fry    Thank you for allowing me to participate in the care of your patient, Aron Oswald.  Contributions by Keiko Perez MS4    Branden Mcintosh, DO  Neurology Residency PGY-2

## 2024-08-29 NOTE — ASSESSMENT & PLAN NOTE
73 year old patient with 5-10 year history of acting out dreams in his sleep with falls out of bed, most recently on 8/27 resulting in C5 fracture. Physical exam is significant for right upper extremity weakness but otherwise normal tone and no tremor. CTH is unremarkable for mass or infarct. MRI C-Spine shows a C5 fracture.     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 No signs of Parkinsonism on exam. No mass or acute pathology seen on CTH. Patient will benefit from outpatient sleep study and evaluation for restless leg syndrome with Vitamin D and Ferritin levels.      Plan:  Obtain Vitamin D and Ferritin levels   Recommend Melatonin: start with 3 mg before bedtime for 2-3 days, then increase to 6 mg before if no side effects  Outpatient follow-up with Sleep Medicine for a sleep study   If signs or symptoms of Parkinson's present in the future, we recommend appointment with neurology movement clinic

## 2024-08-29 NOTE — PLAN OF CARE
Problem: PAIN - ADULT  Goal: Verbalizes/displays adequate comfort level or baseline comfort level  Description: Interventions:  - Encourage patient to monitor pain and request assistance  - Assess pain using appropriate pain scale  - Administer analgesics based on type and severity of pain and evaluate response  - Implement non-pharmacological measures as appropriate and evaluate response  - Consider cultural and social influences on pain and pain management  - Notify physician/advanced practitioner if interventions unsuccessful or patient reports new pain  Outcome: Progressing     Problem: INFECTION - ADULT  Goal: Absence or prevention of progression during hospitalization  Description: INTERVENTIONS:  - Assess and monitor for signs and symptoms of infection  - Monitor lab/diagnostic results  - Monitor all insertion sites, i.e. indwelling lines, tubes, and drains  - Monitor endotracheal if appropriate and nasal secretions for changes in amount and color  - [place] appropriate cooling/warming therapies per order  - Administer medications as ordered  - Instruct and encourage patient and family to use good hand hygiene technique  - Identify and instruct in appropriate isolation precautions for identified infection/condition  Outcome: Progressing  Goal: Absence of fever/infection during neutropenic period  Description: INTERVENTIONS:  - Monitor WBC    Outcome: Progressing     Problem: SAFETY ADULT  Goal: Patient will remain free of falls  Description: INTERVENTIONS:  - Educate patient/family on patient safety including physical limitations  - Instruct patient to call for assistance with activity   - Consult OT/PT to assist with strengthening/mobility   - Keep Call bell within reach  - Keep bed low and locked with side rails adjusted as appropriate  - Keep care items and personal belongings within reach  - Initiate and maintain comfort rounds  - Make Fall Risk Sign visible to staff  - Offer Toileting every 2 Hours,  in advance of need  - Initiate/Maintain bed/chair alarm  - Obtain necessary fall risk management equipment:   - Apply yellow socks and bracelet for high fall risk patients  - Consider moving patient to room near nurses station  Outcome: Progressing  Goal: Maintain or return to baseline ADL function  Description: INTERVENTIONS:  -  Assess patient's ability to carry out ADLs; assess patient's baseline for ADL function and identify physical deficits which impact ability to perform ADLs (bathing, care of mouth/teeth, toileting, grooming, dressing, etc.)  - Assess/evaluate cause of self-care deficits   - Assess range of motion  - Assess patient's mobility; develop plan if impaired  - Assess patient's need for assistive devices and provide as appropriate  - Encourage maximum independence but intervene and supervise when necessary  - Involve family in performance of ADLs  - Assess for home care needs following discharge   - Consider OT consult to assist with ADL evaluation and planning for discharge  - Provide patient education as appropriate  Outcome: Progressing  Goal: Maintains/Returns to pre admission functional level  Description: INTERVENTIONS:  - Perform AM-PAC 6 Click Basic Mobility/ Daily Activity assessment daily.  - Set and communicate daily mobility goal to care team and patient/family/caregiver.   - Collaborate with rehabilitation services on mobility goals if consulted  - Perform Range of Motion 3 times a day.  - Reposition patient every 2 hours.  - Dangle patient 3 times a day  - Stand patient 3 times a day  - Ambulate patient 3 times a day  - Out of bed to chair 3 times a day   - Out of bed for meals 3 times a day  - Out of bed for toileting  - Record patient progress and toleration of activity level   Outcome: Progressing     Problem: DISCHARGE PLANNING  Goal: Discharge to home or other facility with appropriate resources  Description: INTERVENTIONS:  - Identify barriers to discharge w/patient and  caregiver  - Arrange for needed discharge resources and transportation as appropriate  - Identify discharge learning needs (meds, wound care, etc.)  - Arrange for interpretive services to assist at discharge as needed  - Refer to Case Management Department for coordinating discharge planning if the patient needs post-hospital services based on physician/advanced practitioner order or complex needs related to functional status, cognitive ability, or social support system  Outcome: Progressing     Problem: NEUROSENSORY - ADULT  Goal: Achieves stable or improved neurological status  Description: INTERVENTIONS  - Monitor and report changes in neurological status  - Monitor vital signs such as temperature, blood pressure, glucose, and any other labs ordered   - Initiate measures to prevent increased intracranial pressure  - Monitor for seizure activity and implement precautions if appropriate      Outcome: Progressing  Goal: Remains free of injury related to seizures activity  Description: INTERVENTIONS  - Maintain airway, patient safety  and administer oxygen as ordered  - Monitor patient for seizure activity, document and report duration and description of seizure to physician/advanced practitioner  - If seizure occurs,  ensure patient safety during seizure  - Reorient patient post seizure  - Seizure pads on all 4 side rails  - Instruct patient/family to notify RN of any seizure activity including if an aura is experienced  - Instruct patient/family to call for assistance with activity based on nursing assessment  - Administer anti-seizure medications if ordered    Outcome: Progressing  Goal: Achieves maximal functionality and self care  Description: INTERVENTIONS  - Monitor swallowing and airway patency with patient fatigue and changes in neurological status  - Encourage and assist patient to increase activity and self care.   - Encourage visually impaired, hearing impaired and aphasic patients to use  assistive/communication devices  Outcome: Progressing     Problem: CARDIOVASCULAR - ADULT  Goal: Maintains optimal cardiac output and hemodynamic stability  Description: INTERVENTIONS:  - Monitor I/O, vital signs and rhythm  - Monitor for S/S and trends of decreased cardiac output  - Administer and titrate ordered vasoactive medications to optimize hemodynamic stability  - Assess quality of pulses, skin color and temperature  - Assess for signs of decreased coronary artery perfusion  - Instruct patient to report change in severity of symptoms  Outcome: Progressing  Goal: Absence of cardiac dysrhythmias or at baseline rhythm  Description: INTERVENTIONS:  - Continuous cardiac monitoring, vital signs, obtain 12 lead EKG if ordered  - Administer antiarrhythmic and heart rate control medications as ordered  - Monitor electrolytes and administer replacement therapy as ordered  Outcome: Progressing     Problem: RESPIRATORY - ADULT  Goal: Achieves optimal ventilation and oxygenation  Description: INTERVENTIONS:  - Assess for changes in respiratory status  - Assess for changes in mentation and behavior  - Position to facilitate oxygenation and minimize respiratory effort  - Oxygen administered by appropriate delivery if ordered  - Initiate smoking cessation education as indicated  - Encourage broncho-pulmonary hygiene including cough, deep breathe, Incentive Spirometry  - Assess the need for suctioning and aspirate as needed  - Assess and instruct to report SOB or any respiratory difficulty  - Respiratory Therapy support as indicated  Outcome: Progressing     Problem: GASTROINTESTINAL - ADULT  Goal: Minimal or absence of nausea and/or vomiting  Description: INTERVENTIONS:  - Administer IV fluids if ordered to ensure adequate hydration  - Maintain NPO status until nausea and vomiting are resolved  - Nasogastric tube if ordered  - Administer ordered antiemetic medications as needed  - Provide nonpharmacologic comfort measures  as appropriate  - Advance diet as tolerated, if ordered  - Consider nutrition services referral to assist patient with adequate nutrition and appropriate food choices  Outcome: Progressing  Goal: Maintains or returns to baseline bowel function  Description: INTERVENTIONS:  - Assess bowel function  - Encourage oral fluids to ensure adequate hydration  - Administer IV fluids if ordered to ensure adequate hydration  - Administer ordered medications as needed  - Encourage mobilization and activity  - Consider nutritional services referral to assist patient with adequate nutrition and appropriate food choices  Outcome: Progressing  Goal: Maintains adequate nutritional intake  Description: INTERVENTIONS:  - Monitor percentage of each meal consumed  - Identify factors contributing to decreased intake, treat as appropriate  - Assist with meals as needed  - Monitor I&O, weight, and lab values if indicated  - Obtain nutrition services referral as needed  Outcome: Progressing  Goal: Establish and maintain optimal ostomy function  Description: INTERVENTIONS:  - Assess bowel function  - Encourage oral fluids to ensure adequate hydration  - Administer IV fluids if ordered to ensure adequate hydration   - Administer ordered medications as needed  - Encourage mobilization and activity  - Nutrition services referral to assist patient with appropriate food choices  - Assess stoma site  - Consider wound care consult   Outcome: Progressing  Goal: Oral mucous membranes remain intact  Description: INTERVENTIONS  - Assess oral mucosa and hygiene practices  - Implement preventative oral hygiene regimen  - Implement oral medicated treatments as ordered  - Initiate Nutrition services referral as needed  Outcome: Progressing     Problem: GENITOURINARY - ADULT  Goal: Maintains or returns to baseline urinary function  Description: INTERVENTIONS:  - Assess urinary function  - Encourage oral fluids to ensure adequate hydration if ordered  -  Administer IV fluids as ordered to ensure adequate hydration  - Administer ordered medications as needed  - Offer frequent toileting  - Follow urinary retention protocol if ordered  Outcome: Progressing  Goal: Absence of urinary retention  Description: INTERVENTIONS:  - Assess patient’s ability to void and empty bladder  - Monitor I/O  - Bladder scan as needed  - Discuss with physician/AP medications to alleviate retention as needed  - Discuss catheterization for long term situations as appropriate  Outcome: Progressing  Goal: Urinary catheter remains patent  Description: INTERVENTIONS:  - Assess patency of urinary catheter  - If patient has a chronic [place], consider changing catheter if non-functioning  - Follow guidelines for intermittent irrigation of non-functioning urinary catheter  Outcome: Progressing     Problem: METABOLIC, FLUID AND ELECTROLYTES - ADULT  Goal: Electrolytes maintained within normal limits  Description: INTERVENTIONS:  - Monitor labs and assess patient for signs and symptoms of electrolyte imbalances  - Administer electrolyte replacement as ordered  - Monitor response to electrolyte replacements, including repeat lab results as appropriate  - Instruct patient on fluid and nutrition as appropriate  Outcome: Progressing  Goal: Fluid balance maintained  Description: INTERVENTIONS:  - Monitor labs   - Monitor I/O and WT  - Instruct patient on fluid and nutrition as appropriate  - Assess for signs & symptoms of volume excess or deficit  Outcome: Progressing  Goal: Glucose maintained within target range  Description: INTERVENTIONS:  - Monitor Blood Glucose as ordered  - Assess for signs and symptoms of hyperglycemia and hypoglycemia  - Administer ordered medications to maintain glucose within target range  - Assess nutritional intake and initiate nutrition service referral as needed  Outcome: Progressing     Problem: SKIN/TISSUE INTEGRITY - ADULT  Goal: Skin Integrity remains intact(Skin Breakdown  Prevention)  Description: Assess:  -Perform Tomy assessment every shift   -Clean and moisturize skin every 2 hours  -Inspect skin when repositioning, toileting, and assisting with ADLS  -Assess under medical devices -Assess extremities for adequate circulation and sensation     Bed Management:  -Have minimal linens on bed & keep smooth, unwrinkled  -Change linens as needed when moist or perspiring  -Avoid sitting or lying in one position for more than 2 hours while in bed  -Keep HOB at 30 degrees     Toileting:  -Offer bedside commode  -Assess for incontinence every 2 hrs   -Use incontinent care products after each incontinent episode     Activity:  -Mobilize patient 3 times a day  -Encourage activity and walks on unit  -Encourage or provide ROM exercises   -Turn and reposition patient every 2 Hours  -Use appropriate equipment to lift or move patient in bed  -Instruct/ Assist with weight shifting every 20 minutes when out of bed in chair  -Consider limitation of chair time 2 hour intervals    Skin Care:  -Avoid use of baby powder, tape, friction and shearing, hot water or constrictive clothing  -Relieve pressure over bony prominences   -Do not massage red bony areas    Next Steps:  -Teach patient strategies to minimize risks    -Consider consults to  interdisciplinary teams   Outcome: Progressing  Goal: Incision(s), wounds(s) or drain site(s) healing without S/S of infection  Description: INTERVENTIONS  - Assess and document dressing, incision, wound bed, drain sites and surrounding tissue  - Provide patient and family education  - Perform skin care/dressing changes every shift   Outcome: Progressing  Goal: Pressure injury heals and does not worsen  Description: Interventions:  - Implement low air loss mattress or specialty surface (Criteria met)  - Apply silicone foam dressing  - Instruct/assist with weight shifting every 20 minutes when in chair   - Limit chair time to 2 hour intervals  - Use special pressure  reducing interventions  when in chair   - Apply fecal or urinary incontinence containment device   - Perform passive or active ROM   - Turn and reposition patient & offload bony prominences every 2 hours   - Utilize friction reducing device or surface for transfers   - Consider consults to  interdisciplinary teams   - Use incontinent care products after each incontinent episode   - Consider nutrition services referral as needed  Outcome: Progressing     Problem: HEMATOLOGIC - ADULT  Goal: Maintains hematologic stability  Description: INTERVENTIONS  - Assess for signs and symptoms of bleeding or hemorrhage  - Monitor labs  - Administer supportive blood products/factors as ordered and appropriate  Outcome: Progressing     Problem: MUSCULOSKELETAL - ADULT  Goal: Maintain or return mobility to safest level of function  Description: INTERVENTIONS:  - Assess patient's ability to carry out ADLs; assess patient's baseline for ADL function and identify physical deficits which impact ability to perform ADLs (bathing, care of mouth/teeth, toileting, grooming, dressing, etc.)  - Assess/evaluate cause of self-care deficits   - Assess range of motion  - Assess patient's mobility  - Assess patient's need for assistive devices and provide as appropriate  - Encourage maximum independence but intervene and supervise when necessary  - Involve family in performance of ADLs  - Assess for home care needs following discharge   - Consider OT consult to assist with ADL evaluation and planning for discharge  - Provide patient education as appropriate  Outcome: Progressing  Goal: Maintain proper alignment of affected body part  Description: INTERVENTIONS:  - Support, maintain and protect limb and body alignment  - Provide patient/ family with appropriate education  Outcome: Progressing

## 2024-08-29 NOTE — ASSESSMENT & PLAN NOTE
C5 inferior endplate fracture with left C5 laminar fracture  Patient presented s/p mechanical fall from bed while sleeping 8/27  Mid neck and back pain after the fall associated with right arm weakness, initially could not move arm at all, now with ongoing focal shoulder weakness  No reported AC/AP medication    Imaging:   CT cervical spine 8/27/2024: Acute fracture of the anterior inferior endplate vs osteophyte fracture of C5. Non displaced fracture of the L C5 lamina extending to the spinous process  MRI cervical spine without contrast 8/28/2024: Redemonstration of C5 fractures as described above with ligamentous injury.  No epidural hematoma, cord compression or cord contusion.  Multilevel degenerative changes as described.  XR cervical spine 8/28/2024: Barely perceptible C5 fractures. No significant spinal malalignment. Prevertebral soft tissue swelling is noted.     Plan:   Imaging reviewed with attending this morning, multilevel degenerative changes noted, nothing overly compressive to account for focal deltoid weakness (without significant pain, numbness / tingling) which would be in line with a C5 issue.  No jelani spinal cord injury or compression.  Unclear etiology at this time as rest of exam is essentially nonfocal.  No surgical intervention warranted at this time, will continue to monitor in the outpatient setting and consider outpatient EMG if no improvement.  Ongoing frequent neurological checks  Continue cervical collar to be worn at all times, maia collar for showering  PT/OT evaluation  Ongoing medical management and pain control per primary team  DVT ppx: SCD's, lovenox (if patient agreeable)  CM following for dispo planning    Neurosurgery will sign off, outpatient follow up in 2 weeks with upright XR cervical spine. Call with questions or concerns.

## 2024-08-30 VITALS
OXYGEN SATURATION: 98 % | DIASTOLIC BLOOD PRESSURE: 77 MMHG | WEIGHT: 203 LBS | RESPIRATION RATE: 17 BRPM | HEIGHT: 71 IN | TEMPERATURE: 97.7 F | SYSTOLIC BLOOD PRESSURE: 132 MMHG | BODY MASS INDEX: 28.42 KG/M2 | HEART RATE: 35 BPM

## 2024-08-30 LAB
25(OH)D3 SERPL-MCNC: 24.4 NG/ML (ref 30–100)
ANION GAP SERPL CALCULATED.3IONS-SCNC: 7 MMOL/L (ref 4–13)
BUN SERPL-MCNC: 18 MG/DL (ref 5–25)
CALCIUM SERPL-MCNC: 8.6 MG/DL (ref 8.4–10.2)
CHLORIDE SERPL-SCNC: 108 MMOL/L (ref 96–108)
CO2 SERPL-SCNC: 26 MMOL/L (ref 21–32)
CREAT SERPL-MCNC: 1.15 MG/DL (ref 0.6–1.3)
DME PARACHUTE DELIVERY DATE REQUESTED: NORMAL
DME PARACHUTE ITEM DESCRIPTION: NORMAL
DME PARACHUTE ORDER STATUS: NORMAL
DME PARACHUTE SUPPLIER NAME: NORMAL
DME PARACHUTE SUPPLIER PHONE: NORMAL
FERRITIN SERPL-MCNC: 80 NG/ML (ref 24–336)
GFR SERPL CREATININE-BSD FRML MDRD: 61 ML/MIN/1.73SQ M
GLUCOSE SERPL-MCNC: 86 MG/DL (ref 65–140)
IRON SATN MFR SERPL: 24 % (ref 15–50)
IRON SERPL-MCNC: 57 UG/DL (ref 50–212)
POTASSIUM SERPL-SCNC: 3.8 MMOL/L (ref 3.5–5.3)
SODIUM SERPL-SCNC: 141 MMOL/L (ref 135–147)
TIBC SERPL-MCNC: 233 UG/DL (ref 250–450)
UIBC SERPL-MCNC: 176 UG/DL (ref 155–355)

## 2024-08-30 PROCEDURE — 99238 HOSP IP/OBS DSCHRG MGMT 30/<: CPT

## 2024-08-30 PROCEDURE — 83550 IRON BINDING TEST: CPT

## 2024-08-30 PROCEDURE — 80048 BASIC METABOLIC PNL TOTAL CA: CPT

## 2024-08-30 PROCEDURE — 83540 ASSAY OF IRON: CPT

## 2024-08-30 PROCEDURE — 82306 VITAMIN D 25 HYDROXY: CPT

## 2024-08-30 PROCEDURE — 99233 SBSQ HOSP IP/OBS HIGH 50: CPT | Performed by: INTERNAL MEDICINE

## 2024-08-30 PROCEDURE — 82728 ASSAY OF FERRITIN: CPT

## 2024-08-30 RX ORDER — ASPIRIN 81 MG/1
81 TABLET, CHEWABLE ORAL DAILY
Start: 2024-08-31

## 2024-08-30 RX ORDER — ACETAMINOPHEN 325 MG/1
975 TABLET ORAL EVERY 8 HOURS SCHEDULED
Start: 2024-08-30

## 2024-08-30 RX ADMIN — ACETAMINOPHEN 975 MG: 325 TABLET ORAL at 03:37

## 2024-08-30 RX ADMIN — ASPIRIN 81 MG CHEWABLE TABLET 81 MG: 81 TABLET CHEWABLE at 08:22

## 2024-08-30 NOTE — PROGRESS NOTES
Progress Note - Geriatric Medicine   Aron Oswald 73 y.o. male MRN: [id number]  Unit/Bed#: [facility] 620-01 Encounter: [id number]      Assessment/Plan:    Fall from bed   -reportedly fell from bed on 8/27/24 when acting out vivid dream  -admitted with below noted injuries   -denies use of assist device for ambulation at baseline  -denies history of falls from standing   -high risk future falls due to age, deconditioning/debility and unfamiliar environment   -encourage good body mechanics and assist with all transfers  -keep personal items and call bell close to prevent reaching  -maintain environment free of fall hazards  -consider home fall risk assessment and personal fall alert system on returning home  -PT and OT following       Cervical spine fracture  -s/p fall from bed as outlined above  -CT C-spine on admission reports fracture of anterior inferior endplate of C5 vs anterior inferior C5 osteophyte fracture   -MRI C-spine reports C5 fracture with presumed injury of anterior longitudinal ligament at C5   -cont acute multimodal pain control   -c-spine precautions, collar at all times as directed   -neurovascular checks per protocol  -Nsx on consult      Abnormal CTA of neck   -CTA neck w/wo contrast reports subtle beaded appearance of distal cervical internal carotid artery bilaterally suggestive of fibromuscular dysplasia however in setting of trauma grade 1 vessel injury not completely excluded  -NSx on consult     Acute pain due to trauma  -recommend pain control per Geriatric pain protocol:  Tylenol 975mg Q8H scheduled  Roxicodone 2.5mg Q4H PRN moderate pain  Roxicodone 5mg Q4H PRN severe pain  Dilaudid 0.2mg Q4H PRN  -consider adjuncts such as lidocaine patch topically to appropriate areas  -encourage addition of non-pharmacologic pain treatment including ice and frequent repositioning  -recommend bowel regimen to prevent constipation due to increased risk with acute pain and opiate pain medications    REM  sleep behavior disorder   -recommend o/p sleep study following recovery from acute injuries   -consider trial of melatonin 3mg HS PRN for sleep  -family interested in bed rails/safety rails for home use to prevent falls from bed, CM looking into if can be obtained as DME and will f/u with family, appreciate assist   -Neurology on consult and following, appreciate input and recs      Cognitive screening  -alert and oriented, denies memory or cognitive concerns   -reports independence with ADLs and iADLs at baseline   -no prior cognitive testing on record for review, no other symptoms to suggest LBD at current time however in setting of report of talking in sleep and vivid dreams (not on melatonin or other sleep altering medications) would benefit from comprehensive geriatric assessment with cognitive testing as o/p to establish baseline   -CTH obtained on admission imaging personally viewed, reveals mild chronic microangiopathic changes  -TSH WNL 1.58, no recent B12 on record for review, will check with routine labs  -at risk age related cognitive decline, continue secondary risk factor modifications   -encourage patient remain physically, socially and cognitively active and engaged to maintain cognitive acuity   -encourage continued use of sensory assist devices such as corrective lenses all appropriate times to reduce risk uncorrected sensory impairment from contributing to isolation, confusion, encephalopathy and more precipitous cognitive decline      Impaired Vision  -recommend use of corrective lenses at all appropriate times  -encourage adequate lighting and encourage use of assistance with ambulation  -keep personal belongings close to person to avoid reaching  -encourage appropriate footwear at all times  -consider large font for printed materials provided to patient      HTN  -previously on amlodipine which patient self d/c as o/p, counseled regarding importance of discussion with PCP prior to d/c any  prescription medications   -continue optimization of hemodynamics       Frailty syndrome in geriatric patient   -clinical frailty scale stage IV, vulnerable   -multifactorial including age and HTN now with fall and acute traumatic injuries  -continue optimization chronic medical conditions and address acute derangements as arise  -encourage well balanced nutritional intake  -monitor for and treat any underlying anxiety/mood/depression symptoms if present as may impact patient response to therapies as well as overall sense of wellbeing and quality of life   -ensure treatment and interventions align with patient wishes and goals of care   -continue psychosocial supports of patient and caregivers      High risk developing delirium   -Patient is high risk of delirium due to age, fall, traumatic injuries, acute pain, hospitalization   -continue delirium precautions  -encourage normal sleep/wake cycle  -minimize overnight interruptions, group overnight vitals/labs/nursing checks as possible  -ensure that pain is well controlled    -monitor for fecal and urinary retention which may precipitate delirium  -encourage early mobilization and ambulation with assist as cleared to safely do so  -provide frequent reorientation and redirection as indicated and appropriate   -appreciate family at bedside for familiarity and reassurance   -encourage hydration and nutrition     Notified primary team of questions patient/family would like explicitly addressed in d/c instructions.    Care coordination: rounded with Dian (Trauma AP)     Subjective:     Aron is seen and examined at bedside where he is sitting resting comfortably with his wife at his side, he endorses continued soreness in his neck and shoulders otherwise doing well and notes that he is anticipating discharge home later today.     Review of Systems   Constitutional:  Negative for appetite change.   Musculoskeletal:  Positive for neck pain and neck stiffness.         "Discomfort left shoulder blade, weakness with lifting right shoulder    Neurological:  Positive for weakness (right shoulder).   Psychiatric/Behavioral:  Positive for sleep disturbance (due to neck discomfort and difficulty finding comfortable position).      Objective:     Vitals: Blood pressure 132/77, pulse (!) 35, temperature 97.7 °F (36.5 °C), temperature source Oral, resp. rate 17, height 5' 11\" (1.803 m), weight 92.1 kg (203 lb), SpO2 98%.,Body mass index is 28.31 kg/m².      Intake/Output Summary (Last 24 hours) at 8/30/2024 1134  Last data filed at 8/29/2024 1730  Gross per 24 hour   Intake 221 ml   Output --   Net 221 ml     Current Medications: Reviewed    Physical Exam:   Physical Exam  Constitutional:       General: He is not in acute distress.     Appearance: Normal appearance. He is not toxic-appearing.   HENT:      Head: Normocephalic.      Mouth/Throat:      Mouth: Mucous membranes are moist.      Comments: Dentition grossly in tact  Neck:      Comments: C-collar in place, trachea appears midline through ant viewing window of collar   Pulmonary:      Effort: No respiratory distress.   Abdominal:      General: There is no distension.   Musculoskeletal:      Cervical back: Neck supple.      Right lower leg: No edema.      Left lower leg: No edema.   Neurological:      Mental Status: He is alert.      Comments: Awake alert and oriented answering ques appropriately    Psychiatric:      Comments: Polite pleasant cooperative         Invasive Devices       Peripheral Intravenous Line  Duration             Peripheral IV 08/27/24 Left;Ventral (anterior) Hand 3 days                  Lab Results:     I have personally reviewed pertinent lab results including the following:    Results from last 7 days   Lab Units 08/29/24  0557 08/27/24  1631 08/27/24  1117   WBC Thousand/uL 5.69  --  7.90   HEMOGLOBIN g/dL 12.6  --  13.6   HEMATOCRIT % 37.7  --  40.5   PLATELETS Thousands/uL 122* 123* 135*   SEGS PCT % 54  --  " 72   MONO PCT % 12  --  9   EOS PCT % 3  --  1     Results from last 7 days   Lab Units 08/30/24  0450 08/29/24  0557 08/27/24  1117   POTASSIUM mmol/L 3.8 3.8 3.7   CHLORIDE mmol/L 108 106 111*   CO2 mmol/L 26 28 22   BUN mg/dL 18 15 19   CREATININE mg/dL 1.15 1.29 1.32*   CALCIUM mg/dL 8.6 8.7 9.0     I have personally reviewed the following imaging study reports in PACS:    No new imaging overnight

## 2024-08-30 NOTE — INCIDENTAL FINDINGS
The following findings require follow up:  Radiographic finding   Findin cm thyroid nodule within the thyroid isthmus extending into the right aspect of the gland for which nonemergent thyroid ultrasound is recommended.      Follow up should be done within 1 month(s)    Please notify the following clinician to assist with the follow up:   Dr. Harmon    Incidental finding results were discussed with the Patient by Alicia Boland PA-C on 24.   They expressed understanding and all questions answered.

## 2024-08-30 NOTE — CASE MANAGEMENT
Case Management Discharge Planning Note    Patient name Aron Jade [facility] 620/[facility] 620-01 MRN [id number]  : 1950 Date 2024       Current Admission Date: 2024  Current Admission Diagnosis:Cervical spine fracture (HCC)   Patient Active Problem List    Diagnosis Date Noted Date Diagnosed    REM sleep behavior disorder 2024     Cervical spine fracture (HCC) 2024     Abnormal computed tomography angiography (CTA) of neck 2024     Fall from bed 2024     Snoring 2023     Platelets decreased (HCC) 2022     Sensation of plugged ear on right side 2021     Essential hypertension 2021     Erectile dysfunction 2021       LOS (days): 3  Geometric Mean LOS (GMLOS) (days): 2.9  Days to GMLOS:0.1     OBJECTIVE:  Risk of Unplanned Readmission Score: 8.91         Current admission status: Inpatient   Preferred Pharmacy:   [id number] - [place], [place] - [facility]  [address] [place] [id number]  Phone: [phone] Fax: [phone]    Primary Care Provider: Jose Harmon MD    Primary Insurance: [employer] REP  Secondary Insurance:     DISCHARGE DETAILS:                Pt and his wife are interested in bed rails for their bedroom at home. CM reached out to [employer] provider to see if this is something they can accommodate.

## 2024-08-30 NOTE — DISCHARGE SUMMARY
[facility]  Discharge- Aron Oswald 1950, 73 y.o. male MRN: [id number]  [facility] Encounter: [id number]  Primary Care Provider: Jose Harmon MD   Date and time admitted to hospital: 8/27/2024  2:46 PM    Fall from bed  Assessment & Plan  - Status post fall with the below noted injuries.  - Fall precautions.  - Geriatric Medicine consultation for evaluation, medication review and recommendations.  - PT and OT evaluation and treatment as indicated.  - Case Management consultation for disposition planning.      Abnormal computed tomography angiography (CTA) of neck  Assessment & Plan  - CTA neck w wo contrast 8/27/2024: Subtle beaded appearance of the distal cervical internal carotid artery bilaterally most suggestive of fibromuscular dysplasia. In the setting of trauma, grade 1 vessel wall injury is not completely excluded. No stenosis, pseudoaneurysm or dissection.   - Neurosurgery consult, appreciate recommendations  - Continue ASA 81 mg daily  - Outpatient follow up with the neurosurgical clinic    * Cervical spine fracture (HCC)  Assessment & Plan  Secondary to mechanical fall on his left side from bed while sleeping. Had initial right arm weakness. No neuro deficits. Has some neck and back pain. Patient is Jehov[employer]s Witness who will accept blood expanders, but not blood transfusions.  8/28: Acute change with no ability to flex right shoulder or arm, previously had transient right shoulder weakness. Upright cervical xray read as normal    - Neurosurgery onboard - fu recs iso or recent normal neck imaging with right shoulder neuro deficit. Outpt follow up vs brachila plexus MRI  - 8/28: non concerning stat cervical MRI for right shoulder/arm weakness  - 8/28: non conerning upright cervical XR  - currently non-op. Maintain C-collar. Serial neuro checks Q4. Checking with endovascular regarding possible FMD  - DVT ppx. Pt wary about lovenox secondary to  Taoism beliefs  - PT/OT evaluation and treatment as indicated  - Outpatient follow up with neurosurgery clinic        Bowel Regimen: n/a  VTE Prophylaxis:Enoxaparin (Lovenox)     Disposition: Medically stable for discharge home.     Subjective   Chief Complaint: Neck pain    Subjective: Patient reports he is doing well this morning, does endorse ongoing mild back pain but tolerable at this time.  He reports some mild improvement in range of motion of his right shoulder but still does not have full range of motion.  He reports he is tolerating a diet denies abdominal pain, nausea, vomiting.  He is motivated for discharge home.     Objective   Vitals:   Temp:  [97.7 °F (36.5 °C)-98.6 °F (37 °C)] 97.7 °F (36.5 °C)  HR:  [35-60] 35  Resp:  [17] 17  BP: (106-143)/(50-80) 132/77    I/O         08/28 0701  08/29 0700 08/29 0701  08/30 0700 08/30 0701  08/31 0700    P.O. 436 221     Total Intake(mL/kg) 436 (4.7) 221 (2.4)     Urine (mL/kg/hr) 490 (0.2) 950 (0.4)     Total Output 490 950     Net -54 -729            Unmeasured Urine Occurrence 3 x               Physical Exam:   GENERAL APPEARANCE: Patient in no acute distress.  HEENT: NCAT; PERRL, EOMs intact; Mucous membranes moist  NECK / BACK: Cervical collar in place  CV: Regular rate and rhythm; no murmur/gallops/rubs appreciated.  CHEST / LUNGS: Clear to auscultation; no wheezes/rales/rhonci.  ABD: NABS; soft; non-distended; non-tender.  : Voiding  EXT: +2 pulses bilaterally upper & lower extremities; no edema.  NEURO: GCS 15; no focal neurologic deficits; neurovascularly intact.  SKIN: Warm, dry and well perfused; no rash; no jaundice.      Invasive Devices       None                         Lab Results: Results: I have personally reviewed all pertinent laboratory/tests results and BMP/CMP:   Lab Results   Component Value Date    SODIUM 141 08/30/2024    K 3.8 08/30/2024     08/30/2024    CO2 26 08/30/2024    BUN 18 08/30/2024    CREATININE 1.15 08/30/2024  "   CALCIUM 8.6 08/30/2024    EGFR 61 08/30/2024     Imaging: I have personally reviewed pertinent reports.     Other Studies: None         Medical Problems       Resolved Problems  Date Reviewed: 8/30/2024   None         Admission Date:   Admission Orders (From admission, onward)       Ordered        08/27/24 1608  Inpatient Admission  Once                            Admitting Diagnosis: Closed nondisplaced fracture of fifth cervical vertebra, unspecified fracture morphology, initial encounter ([facility]) [S12.401A]    HPI: Documented by Jose Medrano MD on 8/27/2024, \"Aron Oswald is a 73 healthy male, Jehov[employer]s Witness, here with a C5 fx after a mechanical fall on his left side from bed while sleeping (during vivid dream). After his fall this morning around 2 am, he continued about his day. He had mild neck and back pain, an injected eye, some transient right shoulder weakness, now resolved. He presented to an OSH, where a CT confirmed a C5 fx. He was placed in a c-collar and transferred here to the [facility]. Here, he has had a return to full range of motion in his shoulder. He takes no blood thinners. His last meal was this morning around 10 am. He is having regular bowel and urinary function.\"    Procedures Performed: No orders of the defined types were placed in this encounter.      Summary of Hospital Course: Patient was seen and evaluated by the trauma team admitted with findings of a C5 fracture after mechanical fall.  Neurosurgery was consulted to manage patient nonoperatively.  Patient was placed in a cervical collar and upright x-rays were obtained which were stable.  A CTA of the head and neck was performed which demonstrated beaded appearance of the distal cervical internal carotid arteries.  Patient was placed on aspirin as a result.  Neurology was consulted to evaluate for patient's noted sleep disorder.  He was recommended to follow-up outpatient with neurology for sleep study.  PT/OT evaluated " patient and recommended discharge home without rehab needs.  Patient was stable for discharge on 8/30/2024.  He will also follow-up outpatient with the neurosurgery clinic in approximately 2 weeks for reevaluation.  For further details on hospitalization, please reference hospital medical chart    On day of discharge, a long discussion was had with the patient and his wife regarding diagnosis, prognosis, and course of treatment.  We discussed the importance of following up with the neurosurgery clinic.  We also discussed incidental findings noted during this admission.  All questions were answered.  Patient and his wife are in agreement with the plan going forward as an outpatient.    Significant Findings, Care, Treatment and Services Provided: C5 fracture    Complications: None    Condition at Discharge: good         Discharge instructions/Information to patient and family:   See after visit summary for information provided to patient and family.      Provisions for Follow-Up Care:  See after visit summary for information related to follow-up care and any pertinent home health orders.      PCP: Jose Harmon MD    Disposition: Home    Planned Readmission: No    Discharge Statement   I spent 30 minutes discharging the patient. This time was spent on the day of discharge. I had direct contact with the patient on the day of discharge. Additional documentation is required if more than 30 minutes were spent on discharge.     Discharge Medications:  See after visit summary for reconciled discharge medications provided to patient and family.

## 2024-08-30 NOTE — ASSESSMENT & PLAN NOTE
- CTA neck w wo contrast 8/27/2024: Subtle beaded appearance of the distal cervical internal carotid artery bilaterally most suggestive of fibromuscular dysplasia. In the setting of trauma, grade 1 vessel wall injury is not completely excluded. No stenosis, pseudoaneurysm or dissection.   - Neurosurgery consult, appreciate recommendations  - Continue ASA 81 mg daily  - Outpatient follow up with the neurosurgical clinic

## 2024-08-30 NOTE — ASSESSMENT & PLAN NOTE
Secondary to mechanical fall on his left side from bed while sleeping. Had initial right arm weakness. No neuro deficits. Has some neck and back pain. Patient is Jehovahs Witness who will accept blood expanders, but not blood transfusions.  8/28: Acute change with no ability to flex right shoulder or arm, previously had transient right shoulder weakness. Upright cervical xray read as normal    - Neurosurgery onboard - fu recs iso or recent normal neck imaging with right shoulder neuro deficit. Outpt follow up vs brachila plexus MRI  - 8/28: non concerning stat cervical MRI for right shoulder/arm weakness  - 8/28: non conerning upright cervical XR  - currently non-op. Maintain C-collar. Serial neuro checks Q4. Checking with endovascular regarding possible FMD  - DVT ppx. Pt wary about lovenox secondary to [employer] beliefs  - PT/OT evaluation and treatment as indicated  - Outpatient follow up with neurosurgery clinic

## 2024-08-30 NOTE — CASE MANAGEMENT
Case Management Discharge Planning Note    Patient name Aron Jade [facility] 620/[facility] 620-01 MRN [id number]  : 1950 Date 2024       Current Admission Date: 2024  Current Admission Diagnosis:Cervical spine fracture (HCC)   Patient Active Problem List    Diagnosis Date Noted Date Diagnosed    REM sleep behavior disorder 2024     Cervical spine fracture (HCC) 2024     Abnormal computed tomography angiography (CTA) of neck 2024     Fall from bed 2024     Snoring 2023     Platelets decreased (HCC) 2022     Sensation of plugged ear on right side 2021     Essential hypertension 2021     Erectile dysfunction 2021       LOS (days): 3  Geometric Mean LOS (GMLOS) (days): 2.9  Days to GMLOS:0     OBJECTIVE:  Risk of Unplanned Readmission Score: 8.83         Current admission status: Inpatient   Preferred Pharmacy:   [id number] - [place], [place] - [facility] [place] [address]  Phone: [phone] Fax: [phone]    Primary Care Provider: Jose Harmon MD    [facility]  Secondary Insurance:     DISCHARGE DETAILS:    [employer] requesting the pt's wife contact them to discuss the bedrails. CM provided her with the appropriate number

## 2024-08-30 NOTE — PLAN OF CARE
Problem: PAIN - ADULT  Goal: Verbalizes/displays adequate comfort level or baseline comfort level  Description: Interventions:  - Encourage patient to monitor pain and request assistance  - Assess pain using appropriate pain scale  - Administer analgesics based on type and severity of pain and evaluate response  - Implement non-pharmacological measures as appropriate and evaluate response  - Consider cultural and social influences on pain and pain management  - Notify physician/advanced practitioner if interventions unsuccessful or patient reports new pain  Outcome: Completed     Problem: INFECTION - ADULT  Goal: Absence or prevention of progression during hospitalization  Description: INTERVENTIONS:  - Assess and monitor for signs and symptoms of infection  - Monitor lab/diagnostic results  - Monitor all insertion sites, i.e. indwelling lines, tubes, and drains  - Monitor endotracheal if appropriate and nasal secretions for changes in amount and color  - [place] appropriate cooling/warming therapies per order  - Administer medications as ordered  - Instruct and encourage patient and family to use good hand hygiene technique  - Identify and instruct in appropriate isolation precautions for identified infection/condition  Outcome: Completed  Goal: Absence of fever/infection during neutropenic period  Description: INTERVENTIONS:  - Monitor WBC    Outcome: Completed     Problem: SAFETY ADULT  Goal: Patient will remain free of falls  Description: INTERVENTIONS:  - Educate patient/family on patient safety including physical limitations  - Instruct patient to call for assistance with activity   - Consult OT/PT to assist with strengthening/mobility   - Keep Call bell within reach  - Keep bed low and locked with side rails adjusted as appropriate  - Keep care items and personal belongings within reach  - Initiate and maintain comfort rounds  - Make Fall Risk Sign visible to staff  - Offer Toileting every 2 Hours, in  advance of need  - Initiate/Maintain alarm  - Obtain necessary fall risk management equipment:   - Apply yellow socks and bracelet for high fall risk patients  - Consider moving patient to room near nurses station  Outcome: Completed  Goal: Maintain or return to baseline ADL function  Description: INTERVENTIONS:  -  Assess patient's ability to carry out ADLs; assess patient's baseline for ADL function and identify physical deficits which impact ability to perform ADLs (bathing, care of mouth/teeth, toileting, grooming, dressing, etc.)  - Assess/evaluate cause of self-care deficits   - Assess range of motion  - Assess patient's mobility; develop plan if impaired  - Assess patient's need for assistive devices and provide as appropriate  - Encourage maximum independence but intervene and supervise when necessary  - Involve family in performance of ADLs  - Assess for home care needs following discharge   - Consider OT consult to assist with ADL evaluation and planning for discharge  - Provide patient education as appropriate  Outcome: Completed  Goal: Maintains/Returns to pre admission functional level  Description: INTERVENTIONS:  - Perform AM-PAC 6 Click Basic Mobility/ Daily Activity assessment daily.  - Set and communicate daily mobility goal to care team and patient/family/caregiver.   - Collaborate with rehabilitation services on mobility goals if consulted  - Perform Range of Motion 3 times a day.  - Reposition patient every 2 hours.  - Dangle patient 3 times a day  - Stand patient 3 times a day  - Ambulate patient 3 times a day  - Out of bed to chair 3 times a day   - Out of bed for meals 3 times a day  - Out of bed for toileting  - Record patient progress and toleration of activity level   Outcome: Completed     Problem: DISCHARGE PLANNING  Goal: Discharge to home or other facility with appropriate resources  Description: INTERVENTIONS:  - Identify barriers to discharge w/patient and caregiver  - Arrange for  needed discharge resources and transportation as appropriate  - Identify discharge learning needs (meds, wound care, etc.)  - Arrange for interpretive services to assist at discharge as needed  - Refer to Case Management Department for coordinating discharge planning if the patient needs post-hospital services based on physician/advanced practitioner order or complex needs related to functional status, cognitive ability, or social support system  Outcome: Completed     Problem: Knowledge Deficit  Goal: Patient/family/caregiver demonstrates understanding of disease process, treatment plan, medications, and discharge instructions  Description: Complete learning assessment and assess knowledge base.  Interventions:  - Provide teaching at level of understanding  - Provide teaching via preferred learning methods  Outcome: Completed     Problem: NEUROSENSORY - ADULT  Goal: Achieves stable or improved neurological status  Description: INTERVENTIONS  - Monitor and report changes in neurological status  - Monitor vital signs such as temperature, blood pressure, glucose, and any other labs ordered   - Initiate measures to prevent increased intracranial pressure  - Monitor for seizure activity and implement precautions if appropriate      Outcome: Completed  Goal: Remains free of injury related to seizures activity  Description: INTERVENTIONS  - Maintain airway, patient safety  and administer oxygen as ordered  - Monitor patient for seizure activity, document and report duration and description of seizure to physician/advanced practitioner  - If seizure occurs,  ensure patient safety during seizure  - Reorient patient post seizure  - Seizure pads on all 4 side rails  - Instruct patient/family to notify RN of any seizure activity including if an aura is experienced  - Instruct patient/family to call for assistance with activity based on nursing assessment  - Administer anti-seizure medications if ordered    Outcome:  Completed  Goal: Achieves maximal functionality and self care  Description: INTERVENTIONS  - Monitor swallowing and airway patency with patient fatigue and changes in neurological status  - Encourage and assist patient to increase activity and self care.   - Encourage visually impaired, hearing impaired and aphasic patients to use assistive/communication devices  Outcome: Completed     Problem: CARDIOVASCULAR - ADULT  Goal: Maintains optimal cardiac output and hemodynamic stability  Description: INTERVENTIONS:  - Monitor I/O, vital signs and rhythm  - Monitor for S/S and trends of decreased cardiac output  - Administer and titrate ordered vasoactive medications to optimize hemodynamic stability  - Assess quality of pulses, skin color and temperature  - Assess for signs of decreased coronary artery perfusion  - Instruct patient to report change in severity of symptoms  Outcome: Completed  Goal: Absence of cardiac dysrhythmias or at baseline rhythm  Description: INTERVENTIONS:  - Continuous cardiac monitoring, vital signs, obtain 12 lead EKG if ordered  - Administer antiarrhythmic and heart rate control medications as ordered  - Monitor electrolytes and administer replacement therapy as ordered  Outcome: Completed     Problem: RESPIRATORY - ADULT  Goal: Achieves optimal ventilation and oxygenation  Description: INTERVENTIONS:  - Assess for changes in respiratory status  - Assess for changes in mentation and behavior  - Position to facilitate oxygenation and minimize respiratory effort  - Oxygen administered by appropriate delivery if ordered  - Initiate smoking cessation education as indicated  - Encourage broncho-pulmonary hygiene including cough, deep breathe, Incentive Spirometry  - Assess the need for suctioning and aspirate as needed  - Assess and instruct to report SOB or any respiratory difficulty  - Respiratory Therapy support as indicated  Outcome: Completed     Problem: GASTROINTESTINAL - ADULT  Goal:  Minimal or absence of nausea and/or vomiting  Description: INTERVENTIONS:  - Administer IV fluids if ordered to ensure adequate hydration  - Maintain NPO status until nausea and vomiting are resolved  - Nasogastric tube if ordered  - Administer ordered antiemetic medications as needed  - Provide nonpharmacologic comfort measures as appropriate  - Advance diet as tolerated, if ordered  - Consider nutrition services referral to assist patient with adequate nutrition and appropriate food choices  Outcome: Completed  Goal: Maintains or returns to baseline bowel function  Description: INTERVENTIONS:  - Assess bowel function  - Encourage oral fluids to ensure adequate hydration  - Administer IV fluids if ordered to ensure adequate hydration  - Administer ordered medications as needed  - Encourage mobilization and activity  - Consider nutritional services referral to assist patient with adequate nutrition and appropriate food choices  Outcome: Completed  Goal: Maintains adequate nutritional intake  Description: INTERVENTIONS:  - Monitor percentage of each meal consumed  - Identify factors contributing to decreased intake, treat as appropriate  - Assist with meals as needed  - Monitor I&O, weight, and lab values if indicated  - Obtain nutrition services referral as needed  Outcome: Completed  Goal: Establish and maintain optimal ostomy function  Description: INTERVENTIONS:  - Assess bowel function  - Encourage oral fluids to ensure adequate hydration  - Administer IV fluids if ordered to ensure adequate hydration   - Administer ordered medications as needed  - Encourage mobilization and activity  - Nutrition services referral to assist patient with appropriate food choices  - Assess stoma site  - Consider wound care consult   Outcome: Completed  Goal: Oral mucous membranes remain intact  Description: INTERVENTIONS  - Assess oral mucosa and hygiene practices  - Implement preventative oral hygiene regimen  - Implement oral  medicated treatments as ordered  - Initiate Nutrition services referral as needed  Outcome: Completed     Problem: GENITOURINARY - ADULT  Goal: Maintains or returns to baseline urinary function  Description: INTERVENTIONS:  - Assess urinary function  - Encourage oral fluids to ensure adequate hydration if ordered  - Administer IV fluids as ordered to ensure adequate hydration  - Administer ordered medications as needed  - Offer frequent toileting  - Follow urinary retention protocol if ordered  Outcome: Completed  Goal: Absence of urinary retention  Description: INTERVENTIONS:  - Assess patient’s ability to void and empty bladder  - Monitor I/O  - Bladder scan as needed  - Discuss with physician/AP medications to alleviate retention as needed  - Discuss catheterization for long term situations as appropriate  Outcome: Completed  Goal: Urinary catheter remains patent  Description: INTERVENTIONS:  - Assess patency of urinary catheter  - If patient has a chronic [place], consider changing catheter if non-functioning  - Follow guidelines for intermittent irrigation of non-functioning urinary catheter  Outcome: Completed     Problem: METABOLIC, FLUID AND ELECTROLYTES - ADULT  Goal: Electrolytes maintained within normal limits  Description: INTERVENTIONS:  - Monitor labs and assess patient for signs and symptoms of electrolyte imbalances  - Administer electrolyte replacement as ordered  - Monitor response to electrolyte replacements, including repeat lab results as appropriate  - Instruct patient on fluid and nutrition as appropriate  Outcome: Completed  Goal: Fluid balance maintained  Description: INTERVENTIONS:  - Monitor labs   - Monitor I/O and WT  - Instruct patient on fluid and nutrition as appropriate  - Assess for signs & symptoms of volume excess or deficit  Outcome: Completed  Goal: Glucose maintained within target range  Description: INTERVENTIONS:  - Monitor Blood Glucose as ordered  - Assess for signs and  symptoms of hyperglycemia and hypoglycemia  - Administer ordered medications to maintain glucose within target range  - Assess nutritional intake and initiate nutrition service referral as needed  Outcome: Completed     Problem: SKIN/TISSUE INTEGRITY - ADULT  Goal: Skin Integrity remains intact(Skin Breakdown Prevention)  Description: Assess:  Outcome: Completed     Problem: MUSCULOSKELETAL - ADULT  Goal: Maintain or return mobility to safest level of function  Description: INTERVENTIONS:  - Assess patient's ability to carry out ADLs; assess patient's baseline for ADL function and identify physical deficits which impact ability to perform ADLs (bathing, care of mouth/teeth, toileting, grooming, dressing, etc.)  - Assess/evaluate cause of self-care deficits   - Assess range of motion  - Assess patient's mobility  - Assess patient's need for assistive devices and provide as appropriate  - Encourage maximum independence but intervene and supervise when necessary  - Involve family in performance of ADLs  - Assess for home care needs following discharge   - Consider OT consult to assist with ADL evaluation and planning for discharge  - Provide patient education as appropriate  Outcome: Completed  Goal: Maintain proper alignment of affected body part  Description: INTERVENTIONS:  - Support, maintain and protect limb and body alignment  - Provide patient/ family with appropriate education  Outcome: Completed

## 2024-09-03 ENCOUNTER — TRANSITIONAL CARE MANAGEMENT (OUTPATIENT)
Dept: INTERNAL MEDICINE CLINIC | Facility: CLINIC | Age: 74
End: 2024-09-03

## 2024-09-03 ENCOUNTER — TELEPHONE (OUTPATIENT)
Age: 74
End: 2024-09-03

## 2024-09-03 NOTE — UTILIZATION REVIEW
NOTIFICATION OF ADMISSION DISCHARGE   This is a Notification of Discharge from [facility]. Please be advised that this patient has been discharge from our facility. Below you will find the admission and discharge date and time including the patient’s disposition.   UTILIZATION REVIEW CONTACT:  Silvano Powell  Utilization   Network Utilization Review Department  Phone: [phone] x carefully listen to the prompts. All voicemails are confidential.  Email: [email or web address]     ADMISSION INFORMATION  PRESENTATION DATE: 8/27/2024  2:46 PM  OBERVATION ADMISSION DATE: N/A  INPATIENT ADMISSION DATE: 8/27/24  4:08 PM   DISCHARGE DATE: 8/30/2024  3:24 PM   DISPOSITION:Home/Self Care    Network Utilization Review Department  ATTENTION: Please call with any questions or concerns to [phone] and carefully listen to the prompts so that you are directed to the right person. All voicemails are confidential.   For Discharge needs, contact Care Management DC Support Team at [phone] opt. 2  Send all requests for admission clinical reviews, approved or denied determinations and any other requests to dedicated fax number below belonging to the campus where the patient is receiving treatment. List of dedicated fax numbers for the Facilities:  FACILITY NAME UR FAX NUMBER   ADMISSION DENIALS (Administrative/Medical Necessity) [phone]   DISCHARGE SUPPORT TEAM ([facility]) [phone]   PARENT CHILD HEALTH (Maternity/NICU/Pediatrics) [phone]   [facility] [phone]   [facility] [phone]   [facility] [phone]   [facility] [phone]   Critical access hospital [phone]   [facility] [phone]   [facility] [phone]   [facility] [phone]   [facility]  [facility] [phone]   [facility] [phone]   [facility] [phone]   [facility] [phone]

## 2024-09-03 NOTE — TELEPHONE ENCOUNTER
Prescription written as I cannot put that order in electronically, we can mail the prescription to patient and he can try submitting it to his insurance, or he can  the prescription if he does not want to wait for the mail

## 2024-09-03 NOTE — TELEPHONE ENCOUNTER
Pt called stating that since falling he know needs a bed railing. Per [employer] insurance he know needs a durable medical equipment order and prior auth . Please advise.

## 2024-09-03 NOTE — TELEPHONE ENCOUNTER
"Patient returned call and RN advised of PCP response for bed rail order. RN looked up \"bed rails for elderly adult safety\" on [employer] and identified independent side rail to use with regular bed; slides in below mattress. Patient is interested in this type bed rail and is not sure if medical supply offices offer this bed rail for insurance assistance in payment. Please follow up with patient for PCP response.   "

## 2024-09-03 NOTE — TELEPHONE ENCOUNTER
"I tried putting in the order to the system, there is no \"bed rail\" option anywhere in the system.  I could handwrite a prescription for this and send it somewhere, find out where they want it sent  "

## 2024-09-04 ENCOUNTER — RA CDI HCC (OUTPATIENT)
Dept: OTHER | Facility: HOSPITAL | Age: 74
End: 2024-09-04

## 2024-09-04 NOTE — TELEPHONE ENCOUNTER
9/4/24 - CALLED AND SPOKE TO PT CONFIRMING APT IN [place] W/XR NEEDED PRIOR. PT IS AWARE THAT XR ARE A WALK IN SERVICE    09/03/2024- PT WAS DISCHARGED HOME

## 2024-09-05 ENCOUNTER — OFFICE VISIT (OUTPATIENT)
Dept: SURGERY | Facility: CLINIC | Age: 74
End: 2024-09-05

## 2024-09-05 ENCOUNTER — TELEPHONE (OUTPATIENT)
Dept: SURGERY | Facility: CLINIC | Age: 74
End: 2024-09-05

## 2024-09-05 ENCOUNTER — TELEPHONE (OUTPATIENT)
Age: 74
End: 2024-09-05
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
## 2024-09-05 NOTE — ASSESSMENT & PLAN NOTE
Patient sustained fall from bed on 8/27 admitted to [facility] due to C5 fracture and right arm weakness.  Patient here today for follow up.   Patient is concerned due to Right back pain and still with RUE weakness, has about 50 % of normal  strength.  Discussed with patient and wife that he should follow up with NSGY  He has an appt next Friday.  Also discussed that if he is worried about his back pain, which he states is new to call NSGY to ask if this is to be expected or not.  - Physical exam wise his right hand  is weaker than left, he is unable to fully raise his RUE

## 2024-09-05 NOTE — PROGRESS NOTES
Ambulatory Visit  Name: Aron Oswald      : 1950      MRN: [id number]  Encounter Provider: Trauma Surgery provider  Encounter Date: 2024   Encounter department: [facility]    Assessment & Plan   1. Fall from bed, subsequent encounter  Assessment & Plan:  Patient sustained fall from bed on  admitted to [facility] due to C5 fracture and right arm weakness.  Patient here today for follow up.   Patient is concerned due to Right back pain and still with RUE weakness, has about 50 % of normal  strength.  Discussed with patient and wife that he should follow up with NSGY  He has an appt next Friday.  Also discussed that if he is worried about his back pain, which he states is new to call NSGY to ask if this is to be expected or not.  - Physical exam wise his right hand  is weaker than left, he is unable to fully raise his RUE        Does the patient have a positive PTSD Screen? N/A  Was a psychiatric referral provided? NA    History of Present Illness     rAon Oswald is a pleasant  73 y.o. male who presents following a fall and sustaining a C5 fracture who is here today for follow up. Patient was only supposed to follow up with neurosurgery however he was confused as to which service to be seen by. He is concerned due to some newer back pain near his C5 fracture and he is still not able to lift his RUE which has been consistent since the fall. We discussed calling NSGY to ask, he does have an appt next week for imaging and seeing NSGY. He will walk upstairs to see if he is able to talk with someone or have them call him back regarding his concerns.    Review of Systems   Constitutional: Negative.    Respiratory: Negative.     Cardiovascular: Negative.    Psychiatric/Behavioral: Negative.       Current Outpatient Medications on File Prior to Visit   Medication Sig Dispense Refill    acetaminophen (TYLENOL) 325 mg tablet Take 3 tablets (975 mg total) by mouth every 8 (eight) hours       aspirin 81 mg chewable tablet Chew 1 tablet (81 mg total) daily       No current facility-administered medications on file prior to visit.        Objective     There were no vitals taken for this visit.  Physical Exam  Constitutional:       General: He is not in acute distress.     Appearance: Normal appearance. He is not toxic-appearing.   HENT:      Head: Atraumatic.   Musculoskeletal:      Comments: Right Hand  2-3/5  RUE unable to raise arms more than 30 degrees.  + point tenderness to C5 level of paraspinal area.   Skin:     General: Skin is warm.   Neurological:      Mental Status: He is oriented to person, place, and time.   Psychiatric:         Behavior: Behavior normal.

## 2024-09-05 NOTE — TELEPHONE ENCOUNTER
Call to patient to let him know that he does not have to come to the appointment mtled for at 2:15 today. He has no injury that trauma is going to take care of, his injury is to be taken care of by nuerosurgery. Left # for neurosurgery and our #.

## 2024-09-05 NOTE — TELEPHONE ENCOUNTER
Hello Good afternoon,     Patient will need an HFU , can you please help schedule patient?    Thank you in advance,     Valerie     HFU/ SMITHA FOSTER/ REM sleep behavior disorder     DC- 8/30/2024- HOME    Aron Oswald will need follow-up in  4-6 weeks  with Sleep Medicine team for Other (REM Sleep Behavior disorder) in 60 minute appointment. Office is notified. They will not require outpatient neurological testing.

## 2024-09-10 ENCOUNTER — HOSPITAL ENCOUNTER (OUTPATIENT)
Dept: RADIOLOGY | Facility: HOSPITAL | Age: 74
Discharge: HOME | End: 2024-09-10
Payer: COMMERCIAL

## 2024-09-10 DIAGNOSIS — S12.401A CLOSED NONDISPLACED FRACTURE OF FIFTH CERVICAL VERTEBRA, UNSPECIFIED FRACTURE MORPHOLOGY, INITIAL ENCOUNTER (HCC): ICD-10-CM

## 2024-09-10 PROCEDURE — 72040 X-RAY EXAM NECK SPINE 2-3 VW: CPT

## 2024-09-11 ENCOUNTER — TELEPHONE (OUTPATIENT)
Dept: INTERNAL MEDICINE CLINIC | Facility: CLINIC | Age: 74
End: 2024-09-11

## 2024-09-11 ENCOUNTER — OFFICE VISIT (OUTPATIENT)
Dept: INTERNAL MEDICINE CLINIC | Facility: CLINIC | Age: 74
End: 2024-09-11
Payer: COMMERCIAL

## 2024-09-11 VITALS
BODY MASS INDEX: 27.69 KG/M2 | DIASTOLIC BLOOD PRESSURE: 76 MMHG | SYSTOLIC BLOOD PRESSURE: 132 MMHG | HEART RATE: 44 BPM | WEIGHT: 197.8 LBS | OXYGEN SATURATION: 99 % | HEIGHT: 71 IN

## 2024-09-11 DIAGNOSIS — R39.15 URINARY URGENCY: ICD-10-CM

## 2024-09-11 DIAGNOSIS — G47.52 REM SLEEP BEHAVIOR DISORDER: Primary | ICD-10-CM

## 2024-09-11 DIAGNOSIS — I10 ESSENTIAL HYPERTENSION: ICD-10-CM

## 2024-09-11 DIAGNOSIS — Z12.5 SCREENING FOR PROSTATE CANCER: ICD-10-CM

## 2024-09-11 DIAGNOSIS — M25.511 ACUTE PAIN OF RIGHT SHOULDER: ICD-10-CM
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
## 2024-09-11 DIAGNOSIS — S12.401S CLOSED NONDISPLACED FRACTURE OF FIFTH CERVICAL VERTEBRA, UNSPECIFIED FRACTURE MORPHOLOGY, SEQUELA: ICD-10-CM

## 2024-09-11 DIAGNOSIS — E04.1 THYROID NODULE: ICD-10-CM

## 2024-09-11 DIAGNOSIS — D69.6 PLATELETS DECREASED (HCC): ICD-10-CM

## 2024-09-11 PROCEDURE — 99495 TRANSJ CARE MGMT MOD F2F 14D: CPT | Performed by: INTERNAL MEDICINE

## 2024-09-11 NOTE — ASSESSMENT & PLAN NOTE
"Patient wife states that his insurance company needs \"preauthorization for bed rails\" if there is some number to call, please call to authorize it from me, he had recent fall out of bed, has REM sleep behavior disorder which puts him at increased risk for falls out of bed, and has had a recent C5 vertebral fracture as a result of this         "

## 2024-09-11 NOTE — ASSESSMENT & PLAN NOTE
Will recheck PSA and refer to urology    Orders:    UA w Reflex to Microscopic w Reflex to Culture; Future    Ambulatory referral to Urology; Future

## 2024-09-11 NOTE — PATIENT INSTRUCTIONS
"Problem List Items Addressed This Visit          Cardiovascular and Mediastinum    Essential hypertension     Blood pressure is good, no need for meds                   Endocrine    Thyroid nodule     Will check ultrasound of the thyroid nonurgently    Orders:    US thyroid; Future           Relevant Orders    US thyroid       Musculoskeletal and Integument    Cervical spine fracture (HCC)     Follow-up neurosurgery                   Blood    Platelets decreased (HCC)     Will continue to monitor                   Surgery/Wound/Pain    Fall from bed     Patient wife states that his insurance company needs \"preauthorization for bed rails\" if there is some number to call, please call to authorize it from me, he had recent fall out of bed, has REM sleep behavior disorder which puts him at increased risk for falls out of bed, and has had a recent C5 vertebral fracture as a result of this         Acute pain of right shoulder     Probable rotator cuff injury, will address understanding he is dealing with the C5 fracture.    Orders:    Ambulatory Referral to Orthopedic Surgery; Future           Relevant Orders    Ambulatory Referral to Orthopedic Surgery       Neurology/Sleep    REM sleep behavior disorder - Primary     Follow-up with neurology and sleep medicine                   Other    Urinary urgency     Will recheck PSA and refer to urology    Orders:    UA w Reflex to Microscopic w Reflex to Culture; Future    Ambulatory referral to Urology; Future           Relevant Orders    UA w Reflex to Microscopic w Reflex to Culture    Ambulatory referral to Urology     Other Visit Diagnoses       Screening for prostate cancer        Relevant Orders    PSA, Total Screen              "

## 2024-09-11 NOTE — ASSESSMENT & PLAN NOTE
Will check ultrasound of the thyroid nonurgently    Orders:    US thyroid; Future     22-Mar-2024 07:35

## 2024-09-11 NOTE — ASSESSMENT & PLAN NOTE
Probable rotator cuff injury, will address understanding he is dealing with the C5 fracture.    Orders:    Ambulatory Referral to Orthopedic Surgery; Future

## 2024-09-11 NOTE — TELEPHONE ENCOUNTER
"----- Message from Jose Harmon MD sent at 9/11/2024  3:00 PM EDT -----  Patient wife states that his insurance company needs \"preauthorization for bed rails\" if there is some number to call, please call to authorize it from me, he had recent fall out of bed, has REM sleep behavior disorder which puts him at increased risk for falls out of bed, and has had a recent C5 vertebral fracture as a result of this  "

## 2024-09-11 NOTE — PROGRESS NOTES
"Transition of Care Visit  Name: Aron Oswald      : 1950      MRN: [id number]  Encounter Provider: Jose Harmon MD  Encounter Date: 2024   Enc[facility]    Assessment & Plan  REM sleep behavior disorder  Follow-up with neurology and sleep medicine         Acute pain of right shoulder  Probable rotator cuff injury, will address understanding he is dealing with the C5 fracture.    Orders:    Ambulatory Referral to Orthopedic Surgery; Future    Urinary urgency  Will recheck PSA and refer to urology    Orders:    UA w Reflex to Microscopic w Reflex to Culture; Future    Ambulatory referral to Urology; Future    Screening for prostate cancer    Orders:    PSA, Total Screen; Future    Platelets decreased (HCC)  Will continue to monitor         Closed nondisplaced fracture of fifth cervical vertebra, unspecified fracture morphology, sequela  Follow-up neurosurgery         Thyroid nodule  Will check ultrasound of the thyroid nonurgently    Orders:    US thyroid; Future    Essential hypertension  Blood pressure is good, no need for meds         Fall from bed, sequela  Patient wife states that his insurance company needs \"preauthorization for bed rails\" if there is some number to call, please call to authorize it from me, he had recent fall out of bed, has REM sleep behavior disorder which puts him at increased risk for falls out of bed, and has had a recent C5 vertebral fracture as a result of this           Depression Screening and Follow-up Plan: Patient was screened for depression during today's encounter. They screened negative with a PHQ-2 score of 0.    Falls Plan of Care: balance, strength, and gait training instructions were provided.         History of Present Illness     Transitional Care Management Review:   Aron Oswald is a 73 y.o. male here for TCM follow up.     During the TCM phone call patient stated:  TCM Call       Date and time call was made  " 9/3/2024  8:36 AM    Hospital care reviewed  Records not available    Patient was hospitialized at  [facility]    Date of Admission  08/27/24    Date of discharge  08/30/24    Diagnosis  Cervical spine fracture (HCC)    Disposition  Home    Were the patients medications reviewed and updated  No          TCM Call       Should patient be enrolled in anticoag monitoring?  No    Scheduled for follow up?  Yes    I have advised the patient to call PCP with any new or worsening symptoms  Tom Hussein - /MA          I viewed patient's hospitalization for C5 vertebral fracture.  He was seen by neurology and diagnosed with REM sleep behavior disorder and has follow-up with neurology and sleep medicine.  Patient also has follow-up with neurosurgery.  He is currently in a neck brace.  Patient also has difficulty abducting his right arm since the fall.  Since home, no fevers, no chills, no chest pain, no shortness of breath, urine and bowels moving okay.  He does have some right shoulder pain.    Patient has been having some urgency of urine, nocturia a couple of times at night, sometimes frequency at night, no pain with urination.  No blood in urine.  He has concerns because his father had urethral cancer and prostate cancer      Review of Systems   Constitutional:  Negative for chills, fatigue and fever.   HENT:  Negative for congestion, nosebleeds, postnasal drip, sore throat and trouble swallowing.    Eyes:  Negative for pain.   Respiratory:  Negative for cough, chest tightness, shortness of breath and wheezing.    Cardiovascular:  Negative for chest pain, palpitations and leg swelling.   Gastrointestinal:  Negative for abdominal pain, constipation, diarrhea, nausea and vomiting.   Endocrine: Negative for polydipsia and polyuria.   Genitourinary:  Positive for urgency. Negative for dysuria, flank pain, frequency and hematuria.        Difficulty initiating stream of urine, no weak stream, has nocturia one time  "generally   Musculoskeletal:  Negative for arthralgias.   Skin:  Negative for rash.   Neurological:  Negative for dizziness, tremors and headaches.   Hematological:  Does not bruise/bleed easily.   Psychiatric/Behavioral:  Negative for confusion and dysphoric mood. The patient is not nervous/anxious.      Objective     /76 (BP Location: Right arm, Patient Position: Sitting, Cuff Size: Standard)   Pulse (!) 44   Ht 5' 11\" (1.803 m)   Wt 89.7 kg (197 lb 12.8 oz)   SpO2 99%   BMI 27.59 kg/m²     Physical Exam  Vitals reviewed.   Constitutional:       General: He is not in acute distress.     Appearance: Normal appearance. He is well-developed.   HENT:      Head: Normocephalic.      Right Ear: External ear normal.      Left Ear: External ear normal.      Nose: Nose normal.   Eyes:      General: No scleral icterus.     Conjunctiva/sclera: Conjunctivae normal.   Neck:      Trachea: No tracheal deviation.   Cardiovascular:      Rate and Rhythm: Normal rate and regular rhythm. Frequent Extrasystoles are present.     Heart sounds: Normal heart sounds. No murmur heard.  Pulmonary:      Effort: Pulmonary effort is normal. No respiratory distress.      Breath sounds: Normal breath sounds. No wheezing or rales.   Musculoskeletal:      Cervical back: Normal range of motion and neck supple.      Right lower leg: No edema.      Left lower leg: No edema.      Comments: Difficulty abducting right shoulder   Lymphadenopathy:      Cervical: No cervical adenopathy.   Neurological:      Mental Status: He is alert and oriented to person, place, and time.   Psychiatric:         Mood and Affect: Mood normal.         Behavior: Behavior normal.         Thought Content: Thought content normal.         Judgment: Judgment normal.       Medications have been reviewed by provider in current encounter    Administrative Statements         "

## 2024-09-12 NOTE — TELEPHONE ENCOUNTER
When patient called the insurance company they stated he needs a pre-authorization for the bed rail. Phone number to insurance comp is [phone]  Patient was also stated that a bed asst grab bar handle with be fine as well Please advise

## 2024-09-12 NOTE — TELEPHONE ENCOUNTER
LM on VM x 2 for Kimberly (wife) to CB in regards to the denial for the bed rail. More information is needed since we did not receive notification that pre-authorization was needed. Did the patient receive a letter, phone call? Is there a phone number provided to call the insurance?    If patient's wife calls back, please ask this information.

## 2024-09-13 ENCOUNTER — OFFICE VISIT (OUTPATIENT)
Dept: NEUROSURGERY | Facility: CLINIC | Age: 74
End: 2024-09-13
Payer: COMMERCIAL

## 2024-09-13 VITALS
DIASTOLIC BLOOD PRESSURE: 80 MMHG | TEMPERATURE: 98 F | WEIGHT: 197 LBS | HEIGHT: 71 IN | SYSTOLIC BLOOD PRESSURE: 146 MMHG | BODY MASS INDEX: 27.58 KG/M2 | HEART RATE: 40 BPM | OXYGEN SATURATION: 99 %

## 2024-09-13 DIAGNOSIS — R29.898 RIGHT ARM WEAKNESS: Primary | ICD-10-CM

## 2024-09-13 DIAGNOSIS — S12.400A C5 CERVICAL FRACTURE (HCC): ICD-10-CM

## 2024-09-13 DIAGNOSIS — M12.819 ROTATOR CUFF ARTHROPATHY: ICD-10-CM

## 2024-09-13 PROCEDURE — 99213 OFFICE O/P EST LOW 20 MIN: CPT | Performed by: PHYSICIAN ASSISTANT

## 2024-09-13 RX ORDER — METHYLPREDNISOLONE 4 MG
TABLET, DOSE PACK ORAL
Qty: 21 TABLET | Refills: 0 | Status: SHIPPED | OUTPATIENT
Start: 2024-09-13

## 2024-09-13 NOTE — PROGRESS NOTES
Neurosurgery Office Note  Aron Oswald 73 y.o. male MRN: [id number]      Assessment & Plan     Patient is a 73 years old pleasant gentleman with past medical history of hypertension, cytopenia, REM sleep behavior disorder, here today with his wife for 2 weeks follow-up of traumatic C7 vertebral bone fracture including the right lamina. patient had also suspicious bilateral internal carotid artery grade 1 injury versus fibromuscular disease.  Currently on baby aspirin. patient reportedly failed of bed while asleep.  Wearing [place] cervical collar. complains continues weakness in the right upper extremity with difficulty abducting his shoulder, general soreness in his deltoid region otherwise no radicular pain from the neck down to his hand or fingers.  Numbness, or paresthesia.  He is wearing brace.  Patient has slight right  weakness, fine motor dysfunction but denies dropping objects.  No gait issues or bowel/bladder dysfunction.    Exam-alert and oriented x 3.  Moves all extremities.  Strength is right elbow flexion extension 4/5 otherwise , also weakness in his right shoulder abduction. and interosseous 5/5, left side both upper and lower extremities including right lower extremity 5/5 bilaterally and sensation to light intact throughout.  DTR 2+ no clonus bilaterally.  [place] cervical collar, gait stable.    Image: Cervical spine x-rays demonstrate multilevel degenerative disease otherwise difficult to view the C5 fracture.  Right shoulder x-rays demonstrate likely chronic degenerative arthritis at the glenohumeral and acromioclavicular joints. The humeral head also appears to impinge on the undersurface of the acromion suggesting severe rotator cuff abnormality.     Hx, Exam and images reviewed with the patient.  Management plan discussed.  Patient had right shoulder x-rays in the past, patient is right upper extremity complaints could be related to his shoulder issues, will refer to  "orthopedics for evaluation if in case he needs MRI, for his C5 fracture I will see him back in 4 weeks with upright cervical spine x-rays in brace.  I gave him Medrol Dosepak.  Advised to follow-up in 4 weeks with x-ray results.  Questions and concerns were answered to patient satisfaction.  Patient verbalized understanding's and agreed with the plan.    Plan:  F/U upright Cervical spine x-rays in brace/4 weeks  Ambulatory referral to Orthopedics  Script for medrol dose pack sent to his pharmacy  F/U in 4 weeks with Cx spine xrays, consider EMG test of RUE if no holly with his RUE weakness  Call with questions or concerns.      Chief Complaint   Patient presents with    Follow-up     2 WK HFU W/CSPINE XR PER MAGGY           History of Present Illness     Chief complaint : \"73 y.o. year old male here today for 2 weeks follow-up of C5 fracture\"    HPI    See detailed discussion above    REVIEW OF SYSTEMS  Review of system personally reviewed and updated as follows  Review of Systems   Constitutional: Negative.    HENT: Negative.     Eyes: Negative.    Respiratory: Negative.     Cardiovascular: Negative.    Gastrointestinal: Negative.         Bowel Urgency & Constipation   Endocrine: Negative.    Genitourinary: Negative.    Musculoskeletal:  Positive for arthralgias (right arm pain).        Right arm radiates down from neck area and shoulder blade   Skin: Negative.    Allergic/Immunologic: Negative.    Neurological: Negative.    Hematological:  Bruises/bleeds easily (asa81).   Psychiatric/Behavioral: Negative.         ROS obtained by MA. Reviewed. See HPI.     Meds/Allergies     Current Outpatient Medications   Medication Sig Dispense Refill    acetaminophen (TYLENOL) 325 mg tablet Take 3 tablets (975 mg total) by mouth every 8 (eight) hours      aspirin 81 mg chewable tablet Chew 1 tablet (81 mg total) daily       No current facility-administered medications for this visit.       Allergies   Allergen Reactions    " "Penicillins Other (See Comments)       PAST HISTORY    Past Medical History:   Diagnosis Date    Anxiety     COVID-19 virus infection 12/05/2022    Hypertension     Refusal of blood transfusions as patient is Hinduism        Past Surgical History:   Procedure Laterality Date    APPENDECTOMY         Social History     Tobacco Use    Smoking status: Never    Smokeless tobacco: Never   Vaping Use    Vaping status: Never Used   Substance Use Topics    Alcohol use: Not Currently    Drug use: Never       Family History   Problem Relation Age of Onset    No Known Problems Mother     Cancer Father         stomach         Above history personally reviewed.       EXAM    Vitals:Blood pressure 146/80, pulse (!) 40, temperature 98 °F (36.7 °C), temperature source Temporal, height 5' 11\" (1.803 m), weight 89.4 kg (197 lb), SpO2 99%.,Body mass index is 27.48 kg/m².     Physical Exam  Constitutional:       Appearance: Normal appearance.   HENT:      Head: Normocephalic and atraumatic.   Pulmonary:      Effort: Pulmonary effort is normal.   Musculoskeletal:         General: Tenderness present.      Cervical back: Normal range of motion.   Neurological:      Mental Status: He is alert and oriented to person, place, and time.      Motor: Weakness present.      Deep Tendon Reflexes:      Reflex Scores:       Tricep reflexes are 2+ on the right side and 2+ on the left side.       Bicep reflexes are 2+ on the right side and 2+ on the left side.       Brachioradialis reflexes are 2+ on the right side and 2+ on the left side.       Patellar reflexes are 2+ on the right side and 2+ on the left side.       Achilles reflexes are 2+ on the right side and 2+ on the left side.  Psychiatric:         Speech: Speech normal.         Neurologic Exam     Mental Status   Oriented to person, place, and time.   Speech: speech is normal   Level of consciousness: alert    Cranial Nerves     CN III, IV, VI   Right pupil: Size: 3 mm. Shape: " regular. Reactivity: brisk.   Left pupil: Size: 3 mm. Shape: regular. Reactivity: brisk.   Nystagmus: none     CN XI   CN XI normal.     Motor Exam   Muscle bulk: normal  Overall muscle tone: normal  Right arm tone: normal  Left arm tone: normal  Right arm pronator drift: absent  Left arm pronator drift: absent  Right leg tone: normal  Left leg tone: normal    Sensory Exam   Light touch normal.     Gait, Coordination, and Reflexes     Reflexes   Right brachioradialis: 2+  Left brachioradialis: 2+  Right biceps: 2+  Left biceps: 2+  Right triceps: 2+  Left triceps: 2+  Right patellar: 2+  Left patellar: 2+  Right achilles: 2+  Left achilles: 2+  Right : 2+  Left : 2+  Right Torres: absent  Left Torres: absent  Right ankle clonus: absent  Left ankle clonus: absent        MEDICAL DECISION MAKING    Imaging Studies:     XR spine cervical 2 or 3 vw injury    Result Date: 9/10/2024  Narrative: CERVICAL SPINE INDICATION:   Unspecified nondisplaced fracture of fifth cervical vertebra, initial encounter for closed fracture. COMPARISON: Radiographs and MRI from 8/28/2024. VIEWS:  XR SPINE CERVICAL 2 OR 3 VW INJURY Images: 2 FINDINGS: Previously seen fracture through C5 not well visualized radiographically. No new fracture seen. There is no spondylolisthesis.. There is moderate multilevel disc space narrowing osteophytosis throughout the cervical spine worse at C6-7. Normal prevertebral soft tissues.  Clear lung apices.     Impression: Previously seen C5 fracture not well visualized radiographically. Moderate multilevel spondylosis Electronically signed: 09/10/2024 03:03 PM Car Byers MD    MRI cervical spine wo contrast    Result Date: 8/28/2024  Narrative: MRI CERVICAL SPINE WITHOUT CONTRAST INDICATION: fall c5 fx. COMPARISON: CT from yesterday. TECHNIQUE:  Multiplanar, multisequence imaging of the cervical spine was performed. . IMAGE QUALITY:  Diagnostic FINDINGS: VERTEBRAL BODIES: Redemonstration of  fracture through the anterior osteophyte and anterior inferior endplate of C5 with only minimal marrow edema.. Fracture of the left lamina at C5 with marrow edema extending into the spinous process is again seen but better visualized on CT. No subluxation. Vertebral height maintained. CERVICAL AND VISUALIZED THORACIC CORD: Cord is normal in size and signal intensity. No significant epidural hematoma or cord compression PREVERTEBRAL AND PARASPINAL SOFT TISSUES: Prevertebral edema with presumed injury of the anterior longitudinal ligament at C5.. There is interspinous edema most pronounced at C5-C6. There is also edema in the posterior paraspinal musculature. VISUALIZED POSTERIOR FOSSA:  The visualized posterior fossa demonstrates no abnormal signal. CERVICAL DISC SPACES: C2-C3: Disc osteophyte complex with facet, uncovertebral and ligamentum flavum hypertrophy. Mild canal, mild to moderate right and mild left foraminal stenosis C3-C4: Disc osteophyte complex with facet and uncovertebral hypertrophy. Mild canal, mild to moderate right and moderate to severe left foraminal stenosis C4-C5: Small disc osteophyte complex with facet and uncovertebral hypertrophy. Mild canal and mild left foraminal stenosis C5-C6: Disc osteophyte complex with facet and uncovertebral hypertrophy. Mild canal and moderate foraminal stenosis, right worse than left. C6-C7: Small disc osteophyte complex that mildly indents the thecal sac . Mild left foraminal stenosis. C7-T1: Disc osteophyte complex with facet and uncovertebral hypertrophy. Mild canal stenosis, moderate foraminal stenosis UPPER THORACIC DISC SPACES: Small disc osteophyte complexes with facet and uncovertebral hypertrophy. Mild indentation of the thecal sac and multilevel foraminal stenosis most pronounced at T1-T2 OTHER FINDINGS: Stable 1.3 cm right thyroid nodule     Impression: Redemonstration of C5 fractures as described above with ligamentous injury. No epidural hematoma,  cord compression or cord contusion. Multilevel degenerative changes as described Workstation performed: [id number]     XR spine cervical 2 or 3 vw injury    Result Date: 8/28/2024  Narrative: XR SPINE CERVICAL 2 OR 3 VW INJURY INDICATION: follow up cspine fracture. COMPARISON: CT scan from previous day FINDINGS: The C5 fracture which was shown on the prior CT is not readily apparent on plain film. Perhaps a portion of the fracture plane is visible on the lateral view as a lucency in the bone, however, the overall appearance is more typical of bulky degenerative bridging osteophytic spurring along the anterior margin of the spinal column from C4-C6. Similarly, the fracture which was described as involving the C5 lamina and spinous process is barely perceptible although perhaps a faint linear lucency is visible along the lower margin of the spinous process. Anatomic alignment. Extensive chronic disc degeneration throughout cervical spine unchanged from recent prior imaging. There appears to be some prevertebral soft tissue swelling. Similar to CT No obvious apical lung pathology. There is some artifact from a cervical collar. Other artifacts are present on the images as well.     Impression: Barely perceptible C5 fractures. No significant spinal malalignment. Prevertebral soft tissue swelling is noted. Workstation performed: [id number]     XR shoulder 2+ vw right    Result Date: 8/28/2024  Narrative: XR SHOULDER 2+ VW RIGHT INDICATION: trauma. COMPARISON: None FINDINGS: No acute fracture or dislocation. There is degenerative arthritis at the glenohumeral and acromioclavicular joints. The humeral head also appears to impinge on the undersurface of the acromion suggesting severe likely chronic rotator cuff abnormality.. No lytic or blastic osseous lesion. Unremarkable soft tissues.     Impression: Arthritic changes. No fracture or dislocation seen Computerized Assisted Algorithm (CAA) may have been used to analyze all  applicable images. Workstation performed: [id number]     XR chest 1 view portable    Result Date: 8/27/2024  Narrative: XR CHEST PORTABLE INDICATION: Fall, neck pain. COMPARISON: None FINDINGS: Clear lungs. No pneumothorax or pleural effusion. Normal cardiomediastinal silhouette. Bones are unremarkable for age. Normal upper abdomen. Right rotator cuff tear    Impression: No acute consolidation or congestion Workstation performed: [id number]     CTA neck with and without contrast    Result Date: 8/27/2024  Narrative: CTA NECK INDICATION: Requested by trauma COMPARISON:  None. TECHNIQUE:  0.625 mm images from the aortic arch through the [place] after administration of IV contrast.  This examination, like all [facility], was performed utilizing techniques to minimize radiation  dose exposure, including the use of iterative reconstruction and automated exposure control.   3-D reconstructions and multiplanar MIP images were obtained.  3D rendering was performed on an independent workstation.  Rad dose 443 mGy-cm IV Contrast:  85 mL of iohexol (OMNIPAQUE) IMAGE QUALITY:   Diagnostic. FINDINGS CERVICAL VASCULATURE ARCH AND GREAT VESSELS: Visualized arch and great vessels are normal. VERTEBRAL ARTERIES: Patent extracranial segments. Right side is dominant. RIGHT CAROTID: Subtle luminal irregularity involving the distal aspect of the cervical internal carotid artery. Although more suggestive of fibromuscular dysplasia, in the setting of trauma the possibility of grade 1 vessel wall injury is not excluded. No stenosis.    No dissection. LEFT CAROTID: Similar to the right side there is subtle luminal irregularity involving the distal aspect of the cervical internal carotid artery. Although more suggestive of fibromuscular dysplasia, in the setting of trauma the possibility of grade 1 vessel wall injury is not excluded. No stenosis.    No dissection. NASCET criteria was used  "to determine the degree of internal carotid artery diameter stenosis. INTRACRANIAL VASCULATURE ANTERIOR CIRCULATION: The visualized intracranial internal carotid arteries are unremarkable.  Normal visualized anterior and middle cerebral artery branches. POSTERIOR CIRCULATION: The intracranial portions of the vertebral arteries are unremarkable with normal posterior inferior cerebellar artery origins.  Normal basilar artery and posterior cerebral arteries.  Posterior communicating arteries are unremarkable. VENOUS STRUCTURES:  The visualized dural venous sinuses and venous structures of the neck are unremarkable. Please note this is not a complete CT angiogram of the brain. NON VASCULAR ANATOMY BONY STRUCTURES: As seen on recent CT scan of the cervical spine there is an acute fracture involving the anterior inferior aspect of the C5 vertebral body without extension to the posterior cortex or to the posterior elements at this vertebral segment level. Incidentally noted opacification of the left mastoid temporal bone. SOFT TISSUES OF THE NECK:   Heterogeneous appearance of the thyroid gland including a nodule within the thyroid isthmus extending into the right aspect of the gland measuring 2 cm in maximum diameter. THORACIC INLET:  Unremarkable. VISUALIZED BRAIN PARENCHYMA:  No acute intracranial pathology.     Impression: Subtle beaded appearance of the distal cervical internal carotid artery bilaterally most suggestive of fibromuscular dysplasia. In the setting of trauma, grade 1 vessel wall injury is not completely excluded. No stenosis, pseudoaneurysm or dissection. Incidental thyroid nodule(s) for which nonemergent thyroid ultrasound is recommended. This examination was marked \"immediate notification\" in Epic in order to begin the standard process by which the radiology reading room liaison alerts the referring practitioner. Workstation performed: [id number]     CT spine cervical without contrast    Result " Date: 8/27/2024  Narrative: CT CERVICAL SPINE - WITHOUT CONTRAST INDICATION:   Trauma, neck pain. COMPARISON:  None. TECHNIQUE:  CT examination of the cervical spine was performed without intravenous contrast.  Contiguous axial images were obtained. Multiplanar 2D reformatted images were created from the source data. Radiation dose length product (DLP) for this visit:  504 mGy-cm .  This examination, like all CT scans performed in the [facility], was performed utilizing techniques to minimize radiation dose exposure, including the use of iterative reconstruction and automated exposure control. IMAGE QUALITY:  Diagnostic. FINDINGS: ALIGNMENT:  Normal alignment of the cervical spine. No subluxation. VERTEBRAE: Mildly displaced acute fracture of the anterior inferior C5 endplate anterior versus inferior endplate osteophyte fracture #602/94. Nondisplaced fracture of the left L5 lamina extending into the spinous process. DEGENERATIVE CHANGES: Severe multilevel cervical degenerative changes are noted.  No critical central canal stenosis. PREVERTEBRAL AND PARASPINAL SOFT TISSUES: Unremarkable THORACIC INLET: 13 mm right thyroid hypodensity does not meet size criterion and necessitating follow-up.     Impression: Acute fracture of the anterior inferior endplate of C5 versus anterior inferior C5 osteophyte fracture. Nondisplaced fracture of the left L5 lamina extending into the spinous process. I personally discussed this study with ROSANNE GTZ on 8/27/2024 11:50 AM. Workstation performed: [id number]     CT spine thoracic without contrast    Result Date: 8/27/2024  Narrative: CT THORACIC SPINE INDICATION:   Upper back pain rule out fracture. COMPARISON:  None. TECHNIQUE:  Contiguous axial images were obtained. Sagittal and coronal reconstructions were performed. Radiation dose length product (DLP) for this visit:  596 mGy-cm .  This examination, like all CT scans performed in the [facility], was performed utilizing techniques to minimize radiation dose exposure, including the use of iterative reconstruction and automated exposure control. IMAGE QUALITY:  Diagnostic. FINDINGS: ALIGNMENT: No posttraumatic malalignment. Mild scoliosis. VERTEBRAE:  No fracture. DEGENERATIVE CHANGES: Mild multilevel degenerative disc disease. PARASPINAL SOFT TISSUES: Normal. OTHER: Unremarkable     Impression: No acute fracture. Workstation performed: [id number]     CT head without contrast    Result Date: 8/27/2024  Narrative: CT BRAIN - WITHOUT CONTRAST INDICATION:   Left-sided head injury, rule out intracranial bleeding. COMPARISON:  None. TECHNIQUE:  CT examination of the brain was performed.  Multiplanar 2D reformatted images were created from the source data. Radiation dose length product (DLP) for this visit:  885 mGy-cm .  This examination, like all CT scans performed in the [facility], was performed utilizing techniques to minimize radiation dose exposure, including the use of iterative reconstruction and automated exposure control. IMAGE QUALITY:  Diagnostic. FINDINGS: PARENCHYMA: Decreased attenuation is noted in periventricular and subcortical white matter demonstrating an appearance that is statistically most likely to represent mild microangiopathic change. No CT signs of acute infarction.  No intracranial mass, mass effect or midline shift.  No acute parenchymal hemorrhage. VENTRICLES AND EXTRA-AXIAL SPACES:  Normal for the patient's age. VISUALIZED ORBITS:Normal visualized orbits. PARANASAL SINUSES:Left maxillary sinus mucosal retention cyst. Left mastoid effusion. CALVARIUM AND EXTRACRANIAL SOFT TISSUES: Left frontal scalp swelling. No underlying calvarial fracture     Impression: No intracranial hemorrhage or calvarial fracture. 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 COMPARISON:  None. TECHNIQUE: Using a multi-detector scanner, 0.625 mm axial scans of the temporal bone were acquired using a high-resolution bone technique.  Targeted reconstructions were obtained of each side, including axial, coronal, and oblique views.  All reconstructed images were reviewed. Soft tissue reconstructions were performed as well. Radiation dose length product (DLP) for this visit:  587 mGy-cm .  This examination, like all CT scans performed in the [facility], was performed utilizing techniques to minimize radiation dose exposure, including the use of iterative reconstruction and automated exposure control. IV Contrast: None IMAGE QUALITY:  Diagnostic. FINDINGS: RIGHT TEMPORAL BONE: PERIAURICULAR SOFT TISSUES:  Normal. EXTERNAL AUDITORY CANAL: Normal. MIDDLE EAR: Normal. OSSICLES:Normal. MASTOID AIR CELLS: Normal. COCHLEA: Normal. OTIC CAPSULE: Normal mineralization. VESTIBULE: Normal. VESTIBULAR AND COCHLEAR AQUEDUCT: Normal SEMICIRCULAR CANALS: Normal. INTERNAL AUDITORY CANAL: Normal. FACIAL NERVE CANAL: Normal. CAROTID CANAL: Normal. JUGULAR FORAMEN: Normal. LEFT TEMPORAL BONE: PERIAURICULAR SOFT TISSUES:  Normal. EXTERNAL AUDITORY CANAL: Normal. MIDDLE EAR: Abnormal soft tissue and/or fluid opacifies the left middle ear cavity and surrounds the ossicles without erosion. Thinning and possible focal dehiscence of of the left tegmen tympani (example series 307 image 81). OSSICLES:Normal. MASTOID AIR CELLS: Opacified without coalescence. COCHLEA: Normal. OTIC CAPSULE: Normal mineralization. VESTIBULE: Normal. VESTIBULAR AND COCHLEAR AQUEDUCT: Normal SEMICIRCULAR CANALS: Normal. INTERNAL AUDITORY CANAL: Normal. FACIAL NERVE CANAL: Normal. CAROTID CANAL: Normal. JUGULAR FORAMEN: Normal. OTHER FINDINGS:  Polypoid mucosal thickening  left maxillary sinus.     Impression: Normally mineralized otic capsules without specific evidence for otosclerosis. Significant left mastoid opacification without coalescence. Soft tissue and/or fluid fills the left middle ear cavity without ossicular erosion. Questionable thinning/focal dehiscence of the left tegmen tympani. Workstation performed: [id number]       Personally reviewed the following image studies in PACS and associated radiology reports: xray(s). My interpretation of the radiology images/reports is: See image findings in the assessment section.

## 2024-09-13 NOTE — TELEPHONE ENCOUNTER
Spoke to [employer] in regards to the pre-certification for the bed rails and grab bar handle. Additional information is needed in order to see if the equipment needs pre-certification.     What DME company will patient be using for the order?  NPI # and location of that facility.    Once this information is obtained, we will need to call back the insurance to see if the DME company is in network with [employer] and see if the equipment needs pre-cert.     LM on VM for Kimberly (spouse) to call back with this information.    [facility] # [phone]  Patient ID # [id number]  [place] NPI# [id number]  Tax ID # [id number]

## 2024-09-16 ENCOUNTER — TELEPHONE (OUTPATIENT)
Dept: NEUROSURGERY | Facility: CLINIC | Age: 74
End: 2024-09-16

## 2024-09-16 DIAGNOSIS — D69.6 PLATELETS DECREASED (HCC): Primary | ICD-10-CM

## 2024-09-16 NOTE — TELEPHONE ENCOUNTER
The patient's daughter called back to advise that she would like to hold off on these orders for right now. The patient does not need a hospital bed currently and rails are not supplied by [employer] any longer unless used with a hospital bed. The patient's daughter will be ordering the rails from [employer] since he has a personal bed and not a hospital bed.     She will let us know in the future if the hospital bed/rails are needed.

## 2024-09-16 NOTE — TELEPHONE ENCOUNTER
Received a call from patient's spouse questioning when patient needs to have the xray completed. Advised the xray should be done 4 weeks from his appt. She questioned if it was okay for patient to have the xray same day as his CT. Advised the timeframe is sufficient.     She stated an understanding and was appreciative.

## 2024-09-17 ENCOUNTER — LAB (OUTPATIENT)
Dept: LAB | Facility: CLINIC | Age: 74
End: 2024-09-17
Payer: COMMERCIAL

## 2024-09-17 DIAGNOSIS — R39.15 URINARY URGENCY: ICD-10-CM

## 2024-09-17 DIAGNOSIS — D69.6 PLATELETS DECREASED (HCC): ICD-10-CM

## 2024-09-17 DIAGNOSIS — Z12.5 SCREENING FOR PROSTATE CANCER: ICD-10-CM

## 2024-09-17 LAB
BASOPHILS # BLD AUTO: 0.04 THOUSANDS/ΜL (ref 0–0.1)
BASOPHILS NFR BLD AUTO: 1 % (ref 0–1)
BILIRUB UR QL STRIP: NEGATIVE
CLARITY UR: CLEAR
COLOR UR: COLORLESS
EOSINOPHIL # BLD AUTO: 0.08 THOUSAND/ΜL (ref 0–0.61)
EOSINOPHIL NFR BLD AUTO: 1 % (ref 0–6)
ERYTHROCYTE [DISTWIDTH] IN BLOOD BY AUTOMATED COUNT: 13.7 % (ref 11.6–15.1)
GLUCOSE UR STRIP-MCNC: NEGATIVE MG/DL
HCT VFR BLD AUTO: 43.4 % (ref 36.5–49.3)
HGB BLD-MCNC: 14.2 G/DL (ref 12–17)
HGB UR QL STRIP.AUTO: NEGATIVE
IMM GRANULOCYTES # BLD AUTO: 0.02 THOUSAND/UL (ref 0–0.2)
IMM GRANULOCYTES NFR BLD AUTO: 0 % (ref 0–2)
KETONES UR STRIP-MCNC: NEGATIVE MG/DL
LEUKOCYTE ESTERASE UR QL STRIP: NEGATIVE
LYMPHOCYTES # BLD AUTO: 1.34 THOUSANDS/ΜL (ref 0.6–4.47)
LYMPHOCYTES NFR BLD AUTO: 16 % (ref 14–44)
MCH RBC QN AUTO: 32.6 PG (ref 26.8–34.3)
MCHC RBC AUTO-ENTMCNC: 32.7 G/DL (ref 31.4–37.4)
MCV RBC AUTO: 100 FL (ref 82–98)
MONOCYTES # BLD AUTO: 0.69 THOUSAND/ΜL (ref 0.17–1.22)
MONOCYTES NFR BLD AUTO: 8 % (ref 4–12)
NEUTROPHILS # BLD AUTO: 6.08 THOUSANDS/ΜL (ref 1.85–7.62)
NEUTS SEG NFR BLD AUTO: 74 % (ref 43–75)
NITRITE UR QL STRIP: NEGATIVE
NRBC BLD AUTO-RTO: 0 /100 WBCS
PH UR STRIP.AUTO: 6.5 [PH]
PLATELET # BLD AUTO: 172 THOUSANDS/UL (ref 149–390)
PMV BLD AUTO: 11.8 FL (ref 8.9–12.7)
PROT UR STRIP-MCNC: NEGATIVE MG/DL
PSA SERPL-MCNC: 3.68 NG/ML (ref 0–4)
RBC # BLD AUTO: 4.36 MILLION/UL (ref 3.88–5.62)
SP GR UR STRIP.AUTO: 1.01 (ref 1–1.03)
UROBILINOGEN UR STRIP-ACNC: <2 MG/DL
WBC # BLD AUTO: 8.25 THOUSAND/UL (ref 4.31–10.16)

## 2024-09-17 PROCEDURE — G0103 PSA SCREENING: HCPCS

## 2024-09-17 PROCEDURE — 85025 COMPLETE CBC W/AUTO DIFF WBC: CPT

## 2024-09-17 PROCEDURE — 36415 COLL VENOUS BLD VENIPUNCTURE: CPT

## 2024-09-17 PROCEDURE — 81003 URINALYSIS AUTO W/O SCOPE: CPT

## 2024-09-19 ENCOUNTER — OFFICE VISIT (OUTPATIENT)
Dept: OBGYN CLINIC | Facility: CLINIC | Age: 74
End: 2024-09-19
Payer: COMMERCIAL

## 2024-09-19 ENCOUNTER — TELEPHONE (OUTPATIENT)
Dept: NEUROSURGERY | Facility: CLINIC | Age: 74
End: 2024-09-19

## 2024-09-19 VITALS
WEIGHT: 197 LBS | HEART RATE: 58 BPM | DIASTOLIC BLOOD PRESSURE: 80 MMHG | SYSTOLIC BLOOD PRESSURE: 170 MMHG | HEIGHT: 71 IN | BODY MASS INDEX: 27.58 KG/M2

## 2024-09-19 DIAGNOSIS — M75.41 SUBACROMIAL IMPINGEMENT OF RIGHT SHOULDER: ICD-10-CM

## 2024-09-19 DIAGNOSIS — M75.121 NONTRAUMATIC COMPLETE TEAR OF RIGHT ROTATOR CUFF: Primary | ICD-10-CM

## 2024-09-19 DIAGNOSIS — S46.211A BICEPS STRAIN, RIGHT, INITIAL ENCOUNTER: ICD-10-CM

## 2024-09-19 PROCEDURE — 99203 OFFICE O/P NEW LOW 30 MIN: CPT | Performed by: FAMILY MEDICINE

## 2024-09-19 NOTE — PROGRESS NOTES
Assessment/Plan:  Assessment & Plan   Diagnoses and all orders for this visit:    Nontraumatic complete tear of right rotator cuff  -     Ambulatory Referral to Orthopedic Surgery  -     Ambulatory referral to Orthopedic Surgery  -     Ambulatory Referral to Physical Therapy; Future    Subacromial impingement of right shoulder  -     Ambulatory Referral to Physical Therapy; Future    Biceps strain, right, initial encounter  -     Ambulatory Referral to Physical Therapy; Future      73-year-old right-hand-dominant male with onset of right shoulder weakness following injury 8/27/2024.  Discussed with patient physical exam, imaging studies, impression, and plan.  X-rays of right shoulder noted for superior riding of the humerus with complete loss of glenohumeral space.  Imaging studies, clinical exam, and history suggest that he has chronic rotator cuff tear.  Chronic reported cuff tear and acute cervical spine injury are contributing to weakness in the shoulder and upper extremity.  I advised that surgery not warranted at this time.  Will do trial of conservative management.  I will refer him to formal therapy which he is to start as soon as possible and do home exercises as directed.  He will follow-up in 5 weeks at which point he will be reevaluated.        Subjective:   Patient ID: Aron Oswald is a 73 y.o. male.  Chief Complaint   Patient presents with    Right Shoulder - Pain, Extremity Weakness        73-year-old right-hand-dominant male who presents for evaluation of right shoulder weakness following injury on 8/27/2024.  Patient states that he rolled out of bed and fell.  He reports that he had pain of the neck and right shoulder.  Pain described as sudden in onset, generalized to the shoulder, achy and sore, worse with attempted moving the arm, associated with weakness and limited range of motion, and improved with resting.  He was evaluated at the emergency room where he was assessed to have cervical spine  "fracture.  He has since been seeing neurosurgery and is currently in a cervical collar.  He states he currently does not have any pain in the shoulder, however has weakness with being unable to actively abduct or elevate the arm, but is able to do so passively.  He had follow-up with neurosurgery and was referred to orthopedic care.      Shoulder Injury  This is a new problem. The current episode started 1 to 4 weeks ago. The problem has been unchanged. Associated symptoms include weakness. Pertinent negatives include no arthralgias, joint swelling or numbness. He has tried rest and position changes for the symptoms. The treatment provided mild relief.           The following portions of the patient's history were reviewed and updated as appropriate: He  has a past medical history of Anxiety, COVID-19 virus infection (12/05/2022), Fractures (8/27/2024), Hypertension, and Refusal of blood transfusions as patient is [employer].  He is allergic to penicillins..    Review of Systems   Musculoskeletal:  Negative for arthralgias and joint swelling.   Neurological:  Positive for weakness. Negative for numbness.       Objective:  Vitals:    09/19/24 0938   BP: 170/80   Pulse: 58   Weight: 89.4 kg (197 lb)   Height: 5' 11\" (1.803 m)      Right Hand Exam     Muscle Strength   Wrist extension: 5/5   Wrist flexion: 5/5   : 5/5     Other   Sensation: normal  Pulse: present      Left Hand Exam     Muscle Strength   Wrist extension: 5/5   Wrist flexion: 5/5   :  5/5     Other   Sensation: normal  Pulse: present      Right Elbow Exam     Muscle Strength   Pronation:  5/5   Supination:  4/5     Comments:  4/5 flexion  5/5 extension      Right Shoulder Exam     Tenderness   The patient is experiencing no tenderness.    Range of Motion   Active abduction:  10   Passive abduction:  140   Forward flexion:  20 (Passive 90)   Internal rotation 0 degrees:  Sacrum     Muscle Strength   Abduction: 4/5   Internal rotation: 4/5 "   External rotation: 4/5   Supraspinatus: 4/5           Strength/Myotome Testing     Left Wrist/Hand   Wrist extension: 5  Wrist flexion: 5    Right Wrist/Hand   Wrist extension: 5  Wrist flexion: 5      Physical Exam  Vitals and nursing note reviewed.   Constitutional:       Appearance: Normal appearance. He is well-developed. He is not ill-appearing or diaphoretic.   HENT:      Head: Normocephalic and atraumatic.      Right Ear: External ear normal.      Left Ear: External ear normal.   Eyes:      Conjunctiva/sclera: Conjunctivae normal.   Neck:      Trachea: No tracheal deviation.   Cardiovascular:      Rate and Rhythm: Normal rate.   Pulmonary:      Effort: Pulmonary effort is normal. No respiratory distress.   Abdominal:      General: There is no distension.   Musculoskeletal:         General: No tenderness.   Skin:     General: Skin is warm and dry.      Coloration: Skin is not jaundiced or pale.   Neurological:      Mental Status: He is alert and oriented to person, place, and time.   Psychiatric:         Mood and Affect: Mood normal.         Behavior: Behavior normal.         Thought Content: Thought content normal.         Judgment: Judgment normal.         I have personally reviewed pertinent films in PACS and my interpretation is  .  X-rays of right shoulder noted for superior riding of the humerus with complete loss of glenohumeral space.

## 2024-09-19 NOTE — TELEPHONE ENCOUNTER
Patients spouse called Rx refill line requesting replacement pads for [place] cervical collar neck brace to be faxed to [facility] in [place]. She could not supply me with a fax number. The phone # is [phone] or [phone]. Please contact Kimberly (patient spouse) at [phone] with any questions.

## 2024-09-19 NOTE — LETTER
September 19, 2024     Jose Harmon MD  [facility] [address]    Patient: Aron Oswald   YOB: 1950   Date of Visit: 9/19/2024       Dear Dr. Harmon:    Thank you for referring Aron Oswald to me for evaluation. Below are my notes for this consultation.    If you have questions, please do not hesitate to call me. I look forward to following your patient along with you.         Sincerely,        Ronak Burgos DO        CC: No Recipients    Ronak Burgos DO  9/19/2024 11:11 AM  Sign when Signing Visit  Assessment/Plan:  Assessment & Plan  Diagnoses and all orders for this visit:    Nontraumatic complete tear of right rotator cuff  -     Ambulatory Referral to Orthopedic Surgery  -     Ambulatory referral to Orthopedic Surgery  -     Ambulatory Referral to Physical Therapy; Future    Subacromial impingement of right shoulder  -     Ambulatory Referral to Physical Therapy; Future    Biceps strain, right, initial encounter  -     Ambulatory Referral to Physical Therapy; Future      73-year-old right-hand-dominant male with onset of right shoulder weakness following injury 8/27/2024.  Discussed with patient physical exam, imaging studies, impression, and plan.  X-rays of right shoulder noted for superior riding of the humerus with complete loss of glenohumeral space.  Imaging studies, clinical exam, and history suggest that he has chronic rotator cuff tear.  Chronic reported cuff tear and acute cervical spine injury are contributing to weakness in the shoulder and upper extremity.  I advised that surgery not warranted at this time.  Will do trial of conservative management.  I will refer him to formal therapy which he is to start as soon as possible and do home exercises as directed.  He will follow-up in 5 weeks at which point he will be reevaluated.        Subjective:   Patient ID: Aron Oswald is a 73 y.o. male.  Chief Complaint   Patient presents with  "  • Right Shoulder - Pain, Extremity Weakness        73-year-old right-hand-dominant male who presents for evaluation of right shoulder weakness following injury on 8/27/2024.  Patient states that he rolled out of bed and fell.  He reports that he had pain of the neck and right shoulder.  Pain described as sudden in onset, generalized to the shoulder, achy and sore, worse with attempted moving the arm, associated with weakness and limited range of motion, and improved with resting.  He was evaluated at the emergency room where he was assessed to have cervical spine fracture.  He has since been seeing neurosurgery and is currently in a cervical collar.  He states he currently does not have any pain in the shoulder, however has weakness with being unable to actively abduct or elevate the arm, but is able to do so passively.  He had follow-up with neurosurgery and was referred to orthopedic care.      Shoulder Injury  This is a new problem. The current episode started 1 to 4 weeks ago. The problem has been unchanged. Associated symptoms include weakness. Pertinent negatives include no arthralgias, joint swelling or numbness. He has tried rest and position changes for the symptoms. The treatment provided mild relief.           The following portions of the patient's history were reviewed and updated as appropriate: He  has a past medical history of Anxiety, COVID-19 virus infection (12/05/2022), Fractures (8/27/2024), Hypertension, and Refusal of blood transfusions as patient is [employer].  He is allergic to penicillins..    Review of Systems   Musculoskeletal:  Negative for arthralgias and joint swelling.   Neurological:  Positive for weakness. Negative for numbness.       Objective:  Vitals:    09/19/24 0938   BP: 170/80   Pulse: 58   Weight: 89.4 kg (197 lb)   Height: 5' 11\" (1.803 m)      Right Hand Exam     Muscle Strength   Wrist extension: 5/5   Wrist flexion: 5/5   : 5/5     Other   Sensation: " normal  Pulse: present      Left Hand Exam     Muscle Strength   Wrist extension: 5/5   Wrist flexion: 5/5   :  5/5     Other   Sensation: normal  Pulse: present      Right Elbow Exam     Muscle Strength   Pronation:  5/5   Supination:  4/5     Comments:  4/5 flexion  5/5 extension      Right Shoulder Exam     Tenderness   The patient is experiencing no tenderness.    Range of Motion   Active abduction:  10   Passive abduction:  140   Forward flexion:  20 (Passive 90)   Internal rotation 0 degrees:  Sacrum     Muscle Strength   Abduction: 4/5   Internal rotation: 4/5   External rotation: 4/5   Supraspinatus: 4/5           Strength/Myotome Testing     Left Wrist/Hand   Wrist extension: 5  Wrist flexion: 5    Right Wrist/Hand   Wrist extension: 5  Wrist flexion: 5      Physical Exam  Vitals and nursing note reviewed.   Constitutional:       Appearance: Normal appearance. He is well-developed. He is not ill-appearing or diaphoretic.   HENT:      Head: Normocephalic and atraumatic.      Right Ear: External ear normal.      Left Ear: External ear normal.   Eyes:      Conjunctiva/sclera: Conjunctivae normal.   Neck:      Trachea: No tracheal deviation.   Cardiovascular:      Rate and Rhythm: Normal rate.   Pulmonary:      Effort: Pulmonary effort is normal. No respiratory distress.   Abdominal:      General: There is no distension.   Musculoskeletal:         General: No tenderness.   Skin:     General: Skin is warm and dry.      Coloration: Skin is not jaundiced or pale.   Neurological:      Mental Status: He is alert and oriented to person, place, and time.   Psychiatric:         Mood and Affect: Mood normal.         Behavior: Behavior normal.         Thought Content: Thought content normal.         Judgment: Judgment normal.         I have personally reviewed pertinent films in PACS and my interpretation is  .  X-rays of right shoulder noted for superior riding of the humerus with complete loss of glenohumeral  space.         Split-Thickness Skin Graft Text: The defect edges were debeveled with a #15 scalpel blade.  Given the location of the defect, shape of the defect and the proximity to free margins a split thickness skin graft was deemed most appropriate.  Using a sterile surgical marker, the primary defect shape was transferred to the donor site. The split thickness graft was then harvested.  The skin graft was then placed in the primary defect and oriented appropriately.

## 2024-09-20 NOTE — TELEPHONE ENCOUNTER
Contacted Kimberly regarding the recent message received regarding collar pads.     Left a detailed message advising that she may pick the pads up in one of our office locations though if she prefers to have the RX sent to DME company can do this as well. Requested a call back to discuss further.

## 2024-09-24 DIAGNOSIS — S12.400A C5 CERVICAL FRACTURE (HCC): Primary | ICD-10-CM

## 2024-10-04 ENCOUNTER — EVALUATION (OUTPATIENT)
Dept: PHYSICAL THERAPY | Facility: CLINIC | Age: 74
End: 2024-10-04
Payer: COMMERCIAL

## 2024-10-04 DIAGNOSIS — M75.121 NONTRAUMATIC COMPLETE TEAR OF RIGHT ROTATOR CUFF: ICD-10-CM

## 2024-10-04 DIAGNOSIS — M75.41 SUBACROMIAL IMPINGEMENT OF RIGHT SHOULDER: ICD-10-CM

## 2024-10-04 DIAGNOSIS — S46.211D BICEPS STRAIN, RIGHT, SUBSEQUENT ENCOUNTER: ICD-10-CM

## 2024-10-04 PROCEDURE — 97110 THERAPEUTIC EXERCISES: CPT | Performed by: PHYSICAL THERAPIST

## 2024-10-04 NOTE — PROGRESS NOTES
PT Evaluation     Today's date: 10/5/2024  Patient name: Aron Oswald  : 1950  MRN: [id number]  Referring provider: Ronak Burgos*  Dx:   Encounter Diagnosis     ICD-10-CM    1. Nontraumatic complete tear of right rotator cuff  M75.121 Ambulatory Referral to Physical Therapy      2. Subacromial impingement of right shoulder  M75.41 Ambulatory Referral to Physical Therapy      3. Biceps strain, right, subsequent encounter  S46.211D Ambulatory Referral to Physical Therapy          Start Time: 925  Stop Time:   Total time in clinic (min): 55 minutes    Assessment  Impairments: abnormal coordination, abnormal muscle firing, abnormal or restricted ROM, activity intolerance, impaired physical strength, lacks appropriate home exercise program, pain with function and poor posture     Assessment details: Aron Oswald is a 73 y.o. male who presents with acute onset of shoulder near paralysis following fall from bed in his sleep. Patient presented to ED where he was diagnosed with a C5 cervical fracture and has since been placed in an Kennedy Hinkley cervical collar. Patient has since regained some elbow, wrist and hand mobility, however, continues to be unable to move his right arm away from his body and has limited elbow ROM/coordination. Due to these impairments, Patient has difficulty performing a/iadls, including bathing, dressing, and feeding. Examination findings demonstrate significant right shoulder girdle and biceps weakness severely limited his shoulder ROM. Patient's right UE PROM is WFL all planes compared to contralateral. 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 Deferred cervical screening secondary to presence of cervical collar and C5 fracture. Patient does deny experiencing tingling/numbness and radicular pain. Differential diagnosis includes severe GHJ osteoarthritis with likely chronic severe RC pathology and cervical radiculopathy.  Patient's clinical presentation is likely multifactorial given near paralysis of entire right UE at time of injury and continued diffuse weakness of UE. Patient may benefit from future EMG if weakness continues despite conservative treatment. Patient would benefit from skilled physical therapy to address their aforementioned impairments, improve their level of function and to improve their overall quality of life.  Understanding of Dx/Px/POC: good     Prognosis: fair    Goals  Short Term Goals: to be achieved by 4 weeks  1) Patient to be independent with basic HEP.  2) Decrease pain to 1/10 at it's worst.  3) Increase UE strength by 1/2 MMT grade in all deficient planes.  4) Increase UE ROM by > 5 deg in all deficient planes.  5) Patient to report decreased sleep interruption secondary to pain.    Long Term Goals: to be achieved by discharge  1) FOTO equal to or greater than 62.  2) Patient to be independent with comprehensive HEP.  3) Abolish pain for improved quality of life.  4) Increase UE strength to at least 4-/5 MMT grade in all deficient planes to improve a/iadls.  5) Increase UE ROM to within 10 deg of contralateral UE to improve a/iadls.  6) Patient to report no sleep interruption secondary to pain.    Plan  Patient would benefit from: skilled PT  Planned modality interventions: biofeedback, cryotherapy, hydrotherapy, unattended electrical stimulation, thermotherapy: hydrocollator packs and low level laser therapy    Planned therapy interventions: activity modification, ADL retraining, behavior modification, body mechanics training, functional ROM exercises, home exercise program, IADL retraining, joint mobilization, manual therapy, massage, neuromuscular re-education, patient education, postural training, strengthening, stretching, therapeutic activities and therapeutic exercise    Frequency: 2-3x week.  Duration in weeks: 12  Plan of Care beginning date: 10/4/2024  Plan of Care expiration date:  "12/27/2024  Treatment plan discussed with: patient        Subjective Evaluation    History of Present Illness  Mechanism of injury: Patient reports that on 8/27/24 he woke up and was on the floor, having fallen out of his bed. Patient reports that when he woke up he could not move most of his right UE. Patient went to the ED and was found to have a C5 fracture. Patient has been placed in an Effie Harwood cervical collar, which he has been instructed to wear 24/7. Patient is scheduled to return to his neurosurgeon next week. Patient is now able to flex and extend his elbow and his wrist. Patient has difficulty supinating his forearm and flexing his elbow. Patient is unable to raise his arm away from his body. Patient has since developed a dull, tired pain from his right lateral shoulder to his UT. Patient has intermittent crepitus in his shoulder, but no instability. Patient denies experiencing tingling/numbness. Patient requires assistance with a/iadls, including feeding, dressing, bathing, etc. At baseline prior to his fall, he had full function of his right UE. Patient had been prescribed Tylenol and Methyprednisolone, which had helped to manage his symptoms. Patient's quality of sleep is disrupted. Patient's shoulder feels cold at times. Patient denies experiencing dysphagia, dysarthria, dizziness, diplopia, HA, or drop attacks.    Per neurosurgery chart review, \"C5 vertebral bone fracture including the right lamina. patient had also suspicious bilateral internal carotid artery grade 1 injury versus fibromuscular disease\"    MRI cervical spine: \"C2-C3: Disc osteophyte complex with facet, uncovertebral and ligamentum flavum hypertrophy. Mild canal, mild to moderate right and mild left foraminal stenosis     C3-C4: Disc osteophyte complex with facet and uncovertebral hypertrophy. Mild canal, mild to moderate right and moderate to severe left foraminal stenosis     C4-C5: Small disc osteophyte complex with facet and " "uncovertebral hypertrophy. Mild canal and mild left foraminal stenosis     C5-C6: Disc osteophyte complex with facet and uncovertebral hypertrophy. Mild canal and moderate foraminal stenosis, right worse than left.     C6-C7: Small disc osteophyte complex that mildly indents the thecal sac . Mild left foraminal stenosis.     C7-T1: Disc osteophyte complex with facet and uncovertebral hypertrophy. Mild canal stenosis, moderate foraminal stenosis\"    XR cervical spine: \"Previously seen fracture through C5 not well visualized radiographically. No new fracture seen. There is no spondylolisthesis..      There is moderate multilevel disc space narrowing osteophytosis throughout the cervical spine worse at C6-7. \"    CT cervical spine: \"Acute fracture of the anterior inferior endplate of C5 versus anterior inferior C5 osteophyte fracture.     Nondisplaced fracture of the left L5 lamina extending into the spinous process.\"    CT thoracic spine: \"No acute fracture.\"    XR right shoulder: \"There is degenerative arthritis at the glenohumeral and acromioclavicular joints. The humeral head also appears to impinge on the undersurface of the acromion suggesting severe likely chronic rotator cuff abnormality.\"  Patient Goals  Patient goal: to improve function of right arm  Pain  Current pain ratin  At best pain ratin  At worst pain ratin  Location: right lateral shoulder to UT  Quality: dull, tired.    Social Support    Employment status: not working  Hand dominance: right    Treatments  No previous or current treatments        Objective     Postural Observations  Seated posture: fair  Standing posture: fair    Additional Postural Observation Details  Rigid cervical collar in place, possible presence of right biceps deformity indicating potential long head of biceps rupture    Active Range of Motion   Left Shoulder   Flexion: 105 degrees   Abduction: 78 degrees   External rotation BTH: T3   Internal rotation BTB: T10 "     Right Shoulder   Flexion: 33 degrees   Abduction: 25 degrees   External rotation BTH: Active external rotation behind the head: unable.   Internal rotation BTB: L4     Right Elbow   Flexion: 114 degrees     Passive Range of Motion   Left Shoulder   Flexion: 140 degrees   Abduction: 130 degrees   External rotation 90°: 45 degrees   Internal rotation 90°: 45 degrees     Right Shoulder   Flexion: 155 degrees   Abduction: 147 degrees   External rotation 90°: 67 degrees   Internal rotation 90°: 55 degrees     Right Elbow   Flexion: 145 degrees     Strength/Myotome Testing     Left Shoulder     Planes of Motion   Flexion: 3+   Abduction: 3+   External rotation at 0°: 4-   Internal rotation at 0°: 4     Right Shoulder     Planes of Motion   Flexion: 2-   Abduction: 2-   External rotation at 0°: 3-   Internal rotation at 0°: 3-     Left Elbow   Flexion: 5  Extension: 5    Right Elbow   Flexion: 3-  Extension: 4    Left Wrist/Hand   Wrist extension: 5  Wrist flexion: 5    Right Wrist/Hand   Wrist extension: 5  Wrist flexion: 5    Ambulation     Ambulation: Level Surfaces   Ambulation without assistive device: independent    Observational Gait   Gait: within functional limits     General Comments:      Cervical/Thoracic Comments  Deferred cervical screen secondary to cervical collar being in place.               POC expires Unit limit Auth  expiration date PT/OT + Visit Limit?   12/27/24 N/A N/A BOMN                 Visit/Unit Tracking  AUTH Status:  Date 10/4              N/A Used 1               Remaining                  Precautions: C5 cervical fracture with [place] cervical collar, falls risk, HTN      Manuals 10/4                                                                Neuro Re-Ed                                                                                                        Ther Ex             Patient education: pathophysiology, differential diagnosis, diagnostic imaging review GR             Scaption in side lying with UE ranger             Shoulder abduction AROM in side lying             ER AROM in side lying             Biceps curls             Triceps pull down             IR, ER, flex, abd isometrics                          Ther Activity                                       Gait Training                                       Modalities

## 2024-10-05 PROCEDURE — 97162 PT EVAL MOD COMPLEX 30 MIN: CPT | Performed by: PHYSICAL THERAPIST

## 2024-10-07 ENCOUNTER — OFFICE VISIT (OUTPATIENT)
Dept: PHYSICAL THERAPY | Facility: CLINIC | Age: 74
End: 2024-10-07
Payer: COMMERCIAL

## 2024-10-07 DIAGNOSIS — M75.41 SUBACROMIAL IMPINGEMENT OF RIGHT SHOULDER: ICD-10-CM

## 2024-10-07 DIAGNOSIS — M75.121 NONTRAUMATIC COMPLETE TEAR OF RIGHT ROTATOR CUFF: Primary | ICD-10-CM

## 2024-10-07 DIAGNOSIS — S46.211D BICEPS STRAIN, RIGHT, SUBSEQUENT ENCOUNTER: ICD-10-CM

## 2024-10-07 PROCEDURE — 97110 THERAPEUTIC EXERCISES: CPT | Performed by: PHYSICAL THERAPIST

## 2024-10-07 NOTE — PROGRESS NOTES
"Daily Note     Today's date: 10/7/2024  Patient name: Aron Oswald  : 1950  MRN: [id number]  Referring provider: Ronak Burgos*  Dx:   Encounter Diagnosis     ICD-10-CM    1. Nontraumatic complete tear of right rotator cuff  M75.121       2. Subacromial impingement of right shoulder  M75.41       3. Biceps strain, right, subsequent encounter  S46.211D           Start Time: 942  Stop Time:   Total time in clinic (min): 43 minutes    Subjective: Patient offers no significant changes to overall status since last visit.      Objective: See treatment diary below      Assessment: Tolerated treatment fair. Patient demonstrated fatigue post treatment and would benefit from continued PT. Patient with significant right shoulder weakness with inability lift arm away from body without assistance. Patient able to move right arm through full AAROM.      Plan: Continue per plan of care.  Progress treatment as tolerated.       POC expires Unit limit Auth  expiration date PT/OT + Visit Limit?   24 N/A N/A BOMN                 Visit/Unit Tracking  AUTH Status:  Date 10/4 10/7             N/A Used 1 2              Remaining                  Precautions: C5 cervical fracture with [place] cervical collar, falls risk, HTN      Manuals 10/4 10/7                                                               Neuro Re-Ed                                                                                                        Ther Ex             Patient education: pathophysiology, differential diagnosis, diagnostic imaging review GR            Scaption in side lying with UE ranger  x30           UBE  3' / 3'           Pulleys  20x5\"           Finger ladder  10x5\" with eccentric lowering           Shoulder abduction AROM in side lying  Unable           ER AROM in side lying  Unable           Biceps curls             Triceps pull down             IR, ER, flex, abd isometrics  15x5\"           Bent over row  2# 2x10   "         Ther Activity                                       Gait Training                                       Modalities

## 2024-10-08 ENCOUNTER — HOSPITAL ENCOUNTER (OUTPATIENT)
Dept: CT IMAGING | Facility: HOSPITAL | Age: 74
Discharge: HOME | End: 2024-10-08
Payer: COMMERCIAL

## 2024-10-08 ENCOUNTER — HOSPITAL ENCOUNTER (OUTPATIENT)
Dept: RADIOLOGY | Facility: HOSPITAL | Age: 74
Discharge: HOME | End: 2024-10-08
Payer: COMMERCIAL

## 2024-10-08 DIAGNOSIS — S12.400A C5 CERVICAL FRACTURE (HCC): ICD-10-CM

## 2024-10-08 DIAGNOSIS — R29.898 RIGHT ARM WEAKNESS: ICD-10-CM

## 2024-10-08 DIAGNOSIS — R93.89 ABNORMAL COMPUTED TOMOGRAPHY ANGIOGRAPHY (CTA) OF NECK: ICD-10-CM

## 2024-10-08 PROCEDURE — 72040 X-RAY EXAM NECK SPINE 2-3 VW: CPT

## 2024-10-08 PROCEDURE — 70498 CT ANGIOGRAPHY NECK: CPT

## 2024-10-08 RX ADMIN — IOHEXOL 85 ML: 350 INJECTION, SOLUTION INTRAVENOUS at 09:55

## 2024-10-09 ENCOUNTER — TELEPHONE (OUTPATIENT)
Dept: NEUROSURGERY | Facility: CLINIC | Age: 74
End: 2024-10-09

## 2024-10-09 ENCOUNTER — TELEPHONE (OUTPATIENT)
Age: 74
End: 2024-10-09

## 2024-10-09 ENCOUNTER — OFFICE VISIT (OUTPATIENT)
Dept: NEUROSURGERY | Facility: CLINIC | Age: 74
End: 2024-10-09
Payer: COMMERCIAL

## 2024-10-09 VITALS
OXYGEN SATURATION: 99 % | BODY MASS INDEX: 27.5 KG/M2 | TEMPERATURE: 97.1 F | SYSTOLIC BLOOD PRESSURE: 118 MMHG | HEIGHT: 71 IN | HEART RATE: 45 BPM | DIASTOLIC BLOOD PRESSURE: 66 MMHG | RESPIRATION RATE: 18 BRPM | WEIGHT: 196.4 LBS

## 2024-10-09 DIAGNOSIS — R29.898 WEAKNESS OF RIGHT SHOULDER: ICD-10-CM

## 2024-10-09 DIAGNOSIS — I77.3 FIBROMUSCULAR DYSPLASIA OF BOTH CAROTID ARTERIES (HCC): ICD-10-CM

## 2024-10-09 DIAGNOSIS — S12.400A CLOSED DISPLACED FRACTURE OF FIFTH CERVICAL VERTEBRA, UNSPECIFIED FRACTURE MORPHOLOGY, INITIAL ENCOUNTER (HCC): Primary | ICD-10-CM

## 2024-10-09 DIAGNOSIS — M12.811 ROTATOR CUFF ARTHROPATHY OF RIGHT SHOULDER: ICD-10-CM

## 2024-10-09 PROCEDURE — 99215 OFFICE O/P EST HI 40 MIN: CPT | Performed by: NURSE PRACTITIONER

## 2024-10-09 NOTE — PROGRESS NOTES
Ambulatory Visit  Name: Aron Oswald      : 1950      MRN: [id number]  Encounter Provider: Goran Fritz MD  Encounter Date: 10/9/2024   Encounter department: [facility]    Assessment & Plan  Weakness of right shoulder  As addressed in hPI  Ortho coinsult  2024 As per provider documentation --  Chronic reported cuff tear and acute cervical spine injury are contributing to weakness in the shoulder and upper extremity. Biceps strain .Nontraumatic complete tear of right rotator cuff, Subacromial impingement of right shoulder   Plan ----Advised that surgery not warranted at this time. Will do trial of conservative management. I will refer him to formal therapy which he is to start as soon as possible and do home exercises as directed. He will follow-up in 5 weeks at which point he will be reevaluated.   X-rays of right shoulder noted for superior riding of the humerus with complete loss of glenohumeral space.      During NSX visit today   He denied pain in the right shoulder or arm during ROM, can shrug shoulders, unable to lift right arm.  Patient reports ability to  lifti right forearm improved from -fall out of bed and he demonstrated same.    Orders:    EMG 1 Limb; Future    Closed displaced fracture of fifth cervical vertebra, unspecified fracture morphology, initial encounter ([facility])  6 weeks and 1 day status post diagnosed injury   Continue cervical collar   Right shoulder weakness otherwise no deficits , as addressed in problem right shoulder weakness    Imagining   10/8/2024 CTA neck w/wo ---Healing C5 fractures.    Xray cervical spine 2 or 3 V Previously noted C5 fracture not well seen radiographically.Stable cervical spine radiographs,      Plan  Reviewed imagining with patient/wife no abnormality   RTO on   no imagining    will flex x then order imagining (11 week post injury visit).  Reviewed red flag signs advised if he develops he should report  to the closest emergency department for assessment.  Advised if he has additional questions or concerns he should contact neurosurgery.  Rotator cuff arthropathy of right shoulder  As addressed in  weakness right shoulder   Orders:    EMG 1 Limb; Future    Fibromuscular dysplasia of both carotid arteries (HCC)  6 weeks f/u visit after dx with abnormality on CTA   Continues ASA 81 mg po   Patient has a documented diagnosis of decreased platelets, reports his PCP has been monitoring and recently informed his condition is stable   Patient denies ever  consulting with  hema/Onc for thrombocytopenia    9/17/24 platelets jeff from most recent to past 172, 122, 123  ,135, 180      Imagining   10/8/2024 CTA neck w/wo --Stable mild irregularity of the distal cervical internal carotid arteries favored to represent FMD. No significant stenosis, dissection flap or pseudoaneurysm  Collaborative imaging review and case discussion with Dr. Justice agreement with plan as documented below.    Plan   ASA as above   No neurosurgery intervention /f/u indicated   Forward OV note to PCP to inform neurosurgery recommendation for chronic ASA use 81 mg daily for bilateral al carotid artery FMD       History of Present Illness   Today is a 6 week hospital f/u with CTA Neck  and cervical spine for C5 fracture     8/27/2024 Presented in ED [facility] the transferred to hospitals as trauma. As per wife patient has night terrors , in ed he reported falling OOB, struck head on end table . He complained of a headache, left eye pain, left shoulder pain amd neck tenderness. He also reported pain with swallowing  Traumatic injuries demonstrated on imagining     Imagining   8/27/2024 CTA neck w/wo Subtle beaded appearance of the distal cervical internal carotid artery bilaterally most suggestive of fibromuscular dysplasia. In the setting of trauma, grade 1 vessel wall injury is not completely excluded. No stenosis, pseudoaneurysm or dissection.   8/27/2024 CT  cervical spine wo Acute fracture of the anterior inferior endplate of C5 versus anterior inferior C5 osteophyte fracture.   Nondisplaced fracture of the left L5 lamina extending into the spinous process.     Transferred to [facility] for trauma HLOC. He was admitted 8/27-8/30/2024.     Neurosurgery consulted and followed patient through 8/29/2024   C5 fracture, , s/p mechanical fall form bed. Wife reports patient has night terrors, had falls form bed 5-10 times in past 5 years. Neurosurgery management --conservative with a collar [place] at all times and [place] in shower., non-operative management per neurosurgery. Ongoing imagining surveillance as per protocol, upright cervical spine .  8/27/2024:CT cervical spine  Acute fracture of the anterior inferior endplate vs osteophyte fracture of C5. Non displaced fracture of the L C5 lamina extending to the spinous process   MRI cervical spine without contrast 8/28/2024: Redemonstration of C5 fractures as described above with ligamentous injury.  No epidural hematoma, cord compression or cord contusion.  Multilevel degenerative changes as described.  XR cervical spine 8/28/2024: Barely perceptible C5 fractures. No significant spinal malalignment. Prevertebral soft tissue swelling is noted.       Focal weakness in right shoulder/right upper extremity, with diminished ROM,.at onset could not move right arm,   Consider f/u in with MRI imagining , EMG brachial plexus     -CTA demonstrated distal ICA luminal irregularity, evaluated by neurosurgery - not felt to represent acute pathology or related to trauma. Neurosurgery consulted . Discussed with endovascular neurosurgery , Dr. Fritz , ordered ASA 81 mg p daily. OP f/u in NSX office with CTA in 6 weeks..  8/27/2024 -CTA neck w wo contrast 8/27/2024: Subtle beaded appearance of the distal cervical internal carotid artery bilaterally most suggestive of fibromuscular dysplasia. In the setting of trauma, grade 1 vessel  wall injury is not completely excluded. No stenosis, pseudoaneurysm or dissection.      9/13/2024--Initial OP neurosurgery visit  for cervical spine w/ AP---2 weeks post hospital discharge   Reports right arm pain that  radiates down from neck and right scapula.  . Right arm weakness.     Images   Cervical spine x-rays demonstrate multilevel degenerative disease otherwise difficult to view the C5 fracture.    Right shoulder x-rays demonstrate likely chronic degenerative arthritis at the glenohumeral and acromioclavicular joints. The humeral head also appears to impinge on the undersurface of the acromion suggesting severe rotator cuff abnormality.     Plan -  Ordered  Medrol Dose pack --right shoulder /arm weakness  F/U  4 weeks w/ Cervical spine xray   Referral to orthopedics -  Consider EMG test  of RUE in weakness continues.    F/U OV 6 weeks post hospital discharge for endovascular visit secondary to abnormality seen on CTA   4 weeks status post initial OP cervical spine and right shoulder weakness visit     Social ---  Patient is Jehov[employer]s Witness who will accept blood expanders, but not blood transfusions.    lives with wife        HPI  Review of Systems   Constitutional: Negative.    HENT:  Negative for tinnitus (chronic nothing new).    Eyes: Negative.    Respiratory: Negative.     Cardiovascular: Negative.    Gastrointestinal: Negative.    Endocrine: Negative.    Genitourinary: Negative.    Musculoskeletal:  Negative for gait problem and neck pain.   Skin: Negative.    Allergic/Immunologic: Negative.    Neurological:  Positive for weakness (right arm and   started pt recently improving). Negative for dizziness, numbness and headaches.   Hematological:  Bruises/bleeds easily (medication).   Psychiatric/Behavioral:  Negative for confusion.      I have personally reviewed the MA's review of systems and made changes as necessary.    Pertinent Medical History         Medical History Reviewed by  "provider this encounter:       Past Medical History   Past Medical History:   Diagnosis Date    Anxiety     COVID-19 virus infection 12/05/2022    Fractures 8/27/2024    C5 - fall from bed, right arm weakness    Hypertension     Refusal of blood transfusions as patient is [employer]      Past Surgical History:   Procedure Laterality Date    APPENDECTOMY       Family History   Problem Relation Age of Onset    No Known Problems Mother     Cancer Father         stomach    Diabetes Father      Current Outpatient Medications on File Prior to Visit   Medication Sig Dispense Refill    acetaminophen (TYLENOL) 325 mg tablet Take 3 tablets (975 mg total) by mouth every 8 (eight) hours      aspirin 81 mg chewable tablet Chew 1 tablet (81 mg total) daily       No current facility-administered medications on file prior to visit.     Allergies   Allergen Reactions    Penicillins Other (See Comments)      Current Outpatient Medications on File Prior to Visit   Medication Sig Dispense Refill    acetaminophen (TYLENOL) 325 mg tablet Take 3 tablets (975 mg total) by mouth every 8 (eight) hours      aspirin 81 mg chewable tablet Chew 1 tablet (81 mg total) daily       No current facility-administered medications on file prior to visit.      Social History     Tobacco Use    Smoking status: Never    Smokeless tobacco: Never   Vaping Use    Vaping status: Never Used   Substance and Sexual Activity    Alcohol use: Not Currently    Drug use: Never    Sexual activity: Yes     Partners: Female     Objective     Ht 5' 11\" (1.803 m)   Wt 89.1 kg (196 lb 6.4 oz)   BMI 27.39 kg/m²     Physical Exam  Vitals and nursing note reviewed. Exam conducted with a chaperone present (wife).   Eyes:      General: No scleral icterus.        Right eye: No discharge.         Left eye: No discharge.      Extraocular Movements: Extraocular movements intact.      Conjunctiva/sclera: Conjunctivae normal.   Neck:      Comments: Decreased cervical ROM " [place] collar   Pulmonary:      Effort: Pulmonary effort is normal. No respiratory distress.   Musculoskeletal:      Right lower leg: No edema.      Left lower leg: No edema.   Neurological:      Mental Status: He is oriented to person, place, and time.      Motor: Weakness present.      Gait: Gait is intact.      Deep Tendon Reflexes:      Reflex Scores:       Tricep reflexes are 2+ on the right side and 2+ on the left side.       Bicep reflexes are 2+ on the right side and 2+ on the left side.       Brachioradialis reflexes are 2+ on the right side and 2+ on the left side.       Patellar reflexes are 2+ on the right side and 2+ on the left side.       Achilles reflexes are 2+ on the right side and 2+ on the left side.  Psychiatric:         Mood and Affect: Mood normal.         Behavior: Behavior normal.       Neurologic Exam     Mental Status   Oriented to person, place, and time.   Level of consciousness: alert    Motor Exam   Right arm tone: decreased  Left arm tone: normal  Right leg tone: normal  Left leg tone: normal    Strength   Right deltoid: 4/5  Left deltoid: 5/5  Right biceps: 4/5  Left biceps: 5/5  Right triceps: 4/5  Left triceps: 5/5  Right wrist flexion: 5/5  Left wrist flexion: 5/5  Right wrist extension: 5/5  Left wrist extension: 5/5  Right interossei: 5/5  Left interossei: 5/5  Right iliopsoas: 5/5  Left iliopsoas: 5/5  Right quadriceps: 5/5  Left quadriceps: 5/5  Right hamstrin/5  Left hamstrin/5  Right glutei: 5/5  Left glutei: 5/5  Right anterior tibial: 5/5  Left anterior tibial: 5/5  Right posterior tibial: 5/5  Left posterior tibial: 5/5  Right gastroc: 5/5  Left gastroc: 5/5Weakness right shoulder abduction     Sensory Exam   Light touch normal.     Gait, Coordination, and Reflexes     Gait  Gait: normal    Reflexes   Right brachioradialis: 2+  Left brachioradialis: 2+  Right biceps: 2+  Left biceps: 2+  Right triceps: 2+  Left triceps: 2+  Right patellar: 2+  Left patellar:  2+  Right achilles: 2+  Left achilles: 2+  Right : 2+  Left : 2+  Right ankle clonus: absent  Left ankle clonus: absent      I have reviewed radiology reports from 40 including: CT C-spine and xray(s).    Administrative Statements   I have spent a total time of 40 minutes in caring for this patient on the day of the visit/encounter including Diagnostic results, Risks and benefits of tx options, Instructions for management, Patient and family education, Importance of tx compliance, Risk factor reductions, Impressions, Counseling / Coordination of care, Documenting in the medical record, Reviewing / ordering tests, medicine, procedures  , Obtaining or reviewing history  , and Communicating with other healthcare professionals .

## 2024-10-09 NOTE — TELEPHONE ENCOUNTER
Aron Oswald (73 yrs)                   1950                                PLEASE CALL TO HAVE HIS EMG MOVED UP TO BEFORE OUR APT ON  24 PT ONLY WANTS [place] LOCATION ONLY ! CALL WIFE ISRAEL @ [phone] PT IS CURRENTLY BOOKED FOR 2025  AND HAS CERVICAL FX THANKS !     SENT THIS MESSAGE TO DEPT TO GET EMG MOVED UP COPIED NB WIFE AND PT ADVISED THEY WILL GET CALL [place] WANTS  TO BE CALLED AS MENTIONED IN EMAIL -BA

## 2024-10-09 NOTE — TELEPHONE ENCOUNTER
Patient spouse called in to follow on a message that was left for pt. To follow with senior care. Not much information no message in the patients chart.

## 2024-10-10 ENCOUNTER — TELEPHONE (OUTPATIENT)
Age: 74
End: 2024-10-10

## 2024-10-10 ENCOUNTER — OFFICE VISIT (OUTPATIENT)
Dept: PHYSICAL THERAPY | Facility: CLINIC | Age: 74
End: 2024-10-10
Payer: COMMERCIAL

## 2024-10-10 DIAGNOSIS — S46.211D BICEPS STRAIN, RIGHT, SUBSEQUENT ENCOUNTER: ICD-10-CM

## 2024-10-10 DIAGNOSIS — M75.121 NONTRAUMATIC COMPLETE TEAR OF RIGHT ROTATOR CUFF: Primary | ICD-10-CM

## 2024-10-10 DIAGNOSIS — M75.41 SUBACROMIAL IMPINGEMENT OF RIGHT SHOULDER: ICD-10-CM

## 2024-10-10 PROCEDURE — 97110 THERAPEUTIC EXERCISES: CPT | Performed by: PHYSICAL THERAPIST

## 2024-10-10 NOTE — TELEPHONE ENCOUNTER
Patient's spouse calling to schedule sleep hospital follow up.  Per neurology 8/29/2024 it is needed by attending in 60 minute appointment. Please assist.

## 2024-10-10 NOTE — PROGRESS NOTES
"Daily Note     Today's date: 10/10/2024  Patient name: Aron Oswald  : 1950  MRN: [id number]  Referring provider: Ronak Burgos*  Dx:   Encounter Diagnosis     ICD-10-CM    1. Nontraumatic complete tear of right rotator cuff  M75.121       2. Subacromial impingement of right shoulder  M75.41       3. Biceps strain, right, subsequent encounter  S46.211D           Start Time: 1400  Stop Time: 1445  Total time in clinic (min): 45 minutes    Subjective: Patient reports no significant changes to overall status since last visit.      Objective: See treatment diary below      Assessment: Tolerated treatment well. Patient demonstrated fatigue post treatment and would benefit from continued PT. Patient continues to require minimal assistance to lift right UE t/o full range, however, is unable to do so independently. Updated and reviewed HEP.      Plan: Continue per plan of care.  Progress treatment as tolerated.       POC expires Unit limit Auth  expiration date PT/OT + Visit Limit?   24 N/A N/A BOMN                 Visit/Unit Tracking  AUTH Status:  Date 10/4 10/7 10/10            N/A Used 1 2 3             Remaining                  Precautions: C5 cervical fracture with [place] cervical collar, falls risk, HTN      Manuals 10/4 10/7 10/10                                                              Neuro Re-Ed                                                                                                        Ther Ex             Patient education: pathophysiology, differential diagnosis, diagnostic imaging review GR  HEP review          Scaption in side lying with UE ranger  x30           UBE  3' / 3' 3' / 3' L3          Pulleys  20x5\" 4 min          Finger ladder  10x5\" with eccentric lowering           Shoulder abduction AROM in side lying  Unable           ER AROM in side lying  Unable           Biceps curls   2# 2x10          Triceps pull down   GTB 3x10          IR, ER, flex, abd " "isometrics  15x5\"           Bent over row  2# 2x10 2# 3x10          Shoulder flexion, abduction AAROM with SPC with eccentric lowering   20x ea.          Ther Activity                                       Gait Training                                       Modalities                                              "

## 2024-10-11 PROBLEM — I77.3 FIBROMUSCULAR DYSPLASIA OF BOTH CAROTID ARTERIES (HCC): Status: ACTIVE | Noted: 2024-10-11

## 2024-10-11 NOTE — ASSESSMENT & PLAN NOTE
6 weeks and 1 day status post diagnosed injury   Continue cervical collar   Right shoulder weakness otherwise no deficits , as addressed in problem right shoulder weakness    Imagining   10/8/2024 CTA neck w/wo ---Healing C5 fractures.   0/8/2024 Xray cervical spine 2 or 3 V Previously noted C5 fracture not well seen radiographically.Stable cervical spine radiographs,      Plan  Reviewed imagining with patient/wife no abnormality   RTO on 11/13  no imagining    will flex x then order imagining (11 week post injury visit).  Reviewed red flag signs advised if he develops he should report to the closest emergency department for assessment.  Advised if he has additional questions or concerns he should contact neurosurgery.

## 2024-10-11 NOTE — ASSESSMENT & PLAN NOTE
6 weeks f/u visit after dx with abnormality on CTA   Continues ASA 81 mg po   Patient has a documented diagnosis of decreased platelets, reports his PCP has been monitoring and recently informed his condition is stable   Patient denies ever  consulting with  hema/Onc for thrombocytopenia    9/17/24 platelets jeff from most recent to past 172, 122, 123  ,135, 180      Imagining   10/8/2024 CTA neck w/wo --Stable mild irregularity of the distal cervical internal carotid arteries favored to represent FMD. No significant stenosis, dissection flap or pseudoaneurysm  Collaborative imaging review and case discussion with Dr. Justice agreement with plan as documented below.    Plan   ASA as above   No neurosurgery intervention /f/u indicated   Forward OV note to PCP to inform neurosurgery recommendation for chronic ASA use 81 mg daily for bilateral al carotid artery FMD

## 2024-10-12 PROBLEM — M12.811 ROTATOR CUFF ARTHROPATHY OF RIGHT SHOULDER: Status: ACTIVE | Noted: 2024-10-12

## 2024-10-12 PROBLEM — R29.898 WEAKNESS OF RIGHT SHOULDER: Status: ACTIVE | Noted: 2024-10-12

## 2024-10-12 NOTE — ASSESSMENT & PLAN NOTE
As addressed in hPI  Ortho coinsult  9/19/2024 As per provider documentation --  Chronic reported cuff tear and acute cervical spine injury are contributing to weakness in the shoulder and upper extremity. Biceps strain .Nontraumatic complete tear of right rotator cuff, Subacromial impingement of right shoulder   Plan ----Advised that surgery not warranted at this time. Will do trial of conservative management. I will refer him to formal therapy which he is to start as soon as possible and do home exercises as directed. He will follow-up in 5 weeks at which point he will be reevaluated.   X-rays of right shoulder noted for superior riding of the humerus with complete loss of glenohumeral space.      During NSX visit today   He denied pain in the right shoulder or arm during ROM, can shrug shoulders, unable to lift right arm.  Patient reports ability to  lifti right forearm improved from -fall out of bed and he demonstrated same.    Orders:    EMG 1 Limb; Future

## 2024-10-13 ENCOUNTER — RA CDI HCC (OUTPATIENT)
Dept: OTHER | Facility: HOSPITAL | Age: 74
End: 2024-10-13

## 2024-10-14 ENCOUNTER — OFFICE VISIT (OUTPATIENT)
Dept: PHYSICAL THERAPY | Facility: CLINIC | Age: 74
End: 2024-10-14
Payer: COMMERCIAL

## 2024-10-14 DIAGNOSIS — M75.41 SUBACROMIAL IMPINGEMENT OF RIGHT SHOULDER: ICD-10-CM

## 2024-10-14 DIAGNOSIS — M75.121 NONTRAUMATIC COMPLETE TEAR OF RIGHT ROTATOR CUFF: Primary | ICD-10-CM

## 2024-10-14 DIAGNOSIS — S46.211D BICEPS STRAIN, RIGHT, SUBSEQUENT ENCOUNTER: ICD-10-CM

## 2024-10-14 PROCEDURE — 97110 THERAPEUTIC EXERCISES: CPT

## 2024-10-14 NOTE — PROGRESS NOTES
"Daily Note     Today's date: 10/14/2024  Patient name: Aron Oswald  : 1950  MRN: [id number]  Referring provider: Ronak Burgos*  Dx:   Encounter Diagnosis     ICD-10-CM    1. Nontraumatic complete tear of right rotator cuff  M75.121       2. Subacromial impingement of right shoulder  M75.41       3. Biceps strain, right, subsequent encounter  S46.211D                      Subjective: Patient reports that he feels a little bit better compared to last week. Notes that he has some soreness in his L shoulder at the start of session today.       Objective: See treatment diary below      Assessment: Tolerated treatment well. Patient demonstrated fatigue post treatment and would benefit from continued PT. Patient continues to be challenged with active movements along with achieving full range during AAROM due to weakness.       Plan: Continue per plan of care.  Progress treatment as tolerated.       POC expires Unit limit Auth  expiration date PT/OT + Visit Limit?   24 N/A N/A BOMN                 Visit/Unit Tracking  AUTH Status:  Date 10/4 10/7 10/10 10/14           N/A Used 1 2 3 4            Remaining                  Precautions: C5 cervical fracture with [place] cervical collar, falls risk, HTN      Manuals 10/4 10/7 10/10 10/14                                                             Neuro Re-Ed                                                                                                        Ther Ex             Patient education: pathophysiology, differential diagnosis, diagnostic imaging review GR  HEP review          Scaption in side lying with UE ranger  x30           UBE  3' / 3' 3' / 3' L3 3'/3' L3         Pulleys  20x5\" 4 min 4 min         Finger ladder  10x5\" with eccentric lowering           Shoulder abduction AROM in side lying  Unable           ER AROM in side lying  Unable           Biceps curls   2# 2x10 2# 2x10         Triceps pull down   GTB 3x10 GTB 3x10         IR, " "ER, flex, abd isometrics  15x5\"  15x5\"         Bent over row  2# 2x10 2# 3x10 2# 3x10         Shoulder flexion, abduction AAROM with SPC with eccentric lowering   20x ea. 20x ea         Ther Activity                                       Gait Training                                       Modalities                                              "

## 2024-10-17 ENCOUNTER — APPOINTMENT (OUTPATIENT)
Dept: PHYSICAL THERAPY | Facility: CLINIC | Age: 74
End: 2024-10-17
Payer: COMMERCIAL

## 2024-10-21 ENCOUNTER — OFFICE VISIT (OUTPATIENT)
Dept: PHYSICAL THERAPY | Facility: CLINIC | Age: 74
End: 2024-10-21
Payer: COMMERCIAL

## 2024-10-21 DIAGNOSIS — M75.121 NONTRAUMATIC COMPLETE TEAR OF RIGHT ROTATOR CUFF: Primary | ICD-10-CM

## 2024-10-21 DIAGNOSIS — S46.211D BICEPS STRAIN, RIGHT, SUBSEQUENT ENCOUNTER: ICD-10-CM

## 2024-10-21 DIAGNOSIS — M75.41 SUBACROMIAL IMPINGEMENT OF RIGHT SHOULDER: ICD-10-CM

## 2024-10-21 PROCEDURE — 97110 THERAPEUTIC EXERCISES: CPT | Performed by: PHYSICAL THERAPIST

## 2024-10-21 NOTE — PROGRESS NOTES
Daily Note     Today's date: 10/21/2024  Patient name: Aron Oswald  : 1950  MRN: [id number]  Referring provider: Ronak Burgos*  Dx:   Encounter Diagnosis     ICD-10-CM    1. Nontraumatic complete tear of right rotator cuff  M75.121       2. Subacromial impingement of right shoulder  M75.41       3. Biceps strain, right, subsequent encounter  S46.211D           Start Time: 1105  Stop Time: 1145  Total time in clinic (min): 40 minutes    Subjective: Aron presents to PT today feeling okay with some soreness. Patient felt fine following previous session. Patient mentioned working on his HEP and having no issues performing them.       Objective: See treatment diary below      Assessment: Aron was able to tolerate treatment well today. No new exercises were performed today. Patient required minimal cuing with his exercises today. Patient had required cuing for corrective positioning with isometric strengthening in ER/IR TB triceps pull downs.Patient exhibited good technique with therapeutic exercises and would benefit from continued PT. Patient was able to complete all of his exercises without any modifications or adverse symptoms.       Plan: Continue per plan of care.  Progress treatment as tolerated.       POC expires Unit limit Auth  expiration date PT/OT + Visit Limit?   24 N/A N/A BOMN                 Visit/Unit Tracking  AUTH Status:  Date 10/4 10/7 10/10 10/14 10/21          N/A Used 1 2 3 4 5           Remaining                  Precautions: C5 cervical fracture with Aspen Visa cervical collar, falls risk, HTN      Manuals 10/4 10/7 10/10 10/14 10/21                                                            Neuro Re-Ed                                                                                                        Ther Ex             Patient education: pathophysiology, differential diagnosis, diagnostic imaging review GR  HEP review          Scaption in side lying with UE  "ranger  x30   NV        UBE  3' / 3' 3' / 3' L3 3'/3' L3 3'/3'        Pulleys  20x5\" 4 min 4 min 4'         Finger ladder  10x5\" with eccentric lowering           Shoulder abduction AROM in side lying  Unable           ER AROM in side lying  Unable           Biceps curls   2# 2x10 2# 2x10 2#  2x10        Triceps pull down   GTB 3x10 GTB 3x10 GTB  3x10        IR, ER, flex, abd isometrics  15x5\"  15x5\" 15x5\" ea        Bent over row  2# 2x10 2# 3x10 2# 3x10 NV        Shoulder flexion, abduction AAROM with SPC with eccentric lowering   20x ea. 20x ea 20x ea        Ther Activity                                       Gait Training                                       Modalities                                                "

## 2024-10-22 ENCOUNTER — OFFICE VISIT (OUTPATIENT)
Dept: INTERNAL MEDICINE CLINIC | Facility: CLINIC | Age: 74
End: 2024-10-22
Payer: COMMERCIAL

## 2024-10-22 VITALS
OXYGEN SATURATION: 99 % | TEMPERATURE: 96.5 F | BODY MASS INDEX: 28.29 KG/M2 | HEART RATE: 42 BPM | HEIGHT: 70 IN | WEIGHT: 197.6 LBS | SYSTOLIC BLOOD PRESSURE: 136 MMHG | DIASTOLIC BLOOD PRESSURE: 80 MMHG

## 2024-10-22 DIAGNOSIS — Z00.00 MEDICARE ANNUAL WELLNESS VISIT, SUBSEQUENT: ICD-10-CM

## 2024-10-22 DIAGNOSIS — D69.6 PLATELETS DECREASED (HCC): ICD-10-CM

## 2024-10-22 DIAGNOSIS — I49.8 BIGEMINY: ICD-10-CM

## 2024-10-22 DIAGNOSIS — Z23 ENCOUNTER FOR IMMUNIZATION: ICD-10-CM

## 2024-10-22 DIAGNOSIS — R00.1 BRADYCARDIA: Primary | ICD-10-CM

## 2024-10-22 PROCEDURE — 93000 ELECTROCARDIOGRAM COMPLETE: CPT | Performed by: INTERNAL MEDICINE

## 2024-10-22 PROCEDURE — G0008 ADMIN INFLUENZA VIRUS VAC: HCPCS

## 2024-10-22 PROCEDURE — 99214 OFFICE O/P EST MOD 30 MIN: CPT | Performed by: INTERNAL MEDICINE

## 2024-10-22 PROCEDURE — 90662 IIV NO PRSV INCREASED AG IM: CPT

## 2024-10-22 PROCEDURE — G0439 PPPS, SUBSEQ VISIT: HCPCS | Performed by: INTERNAL MEDICINE

## 2024-10-22 NOTE — ASSESSMENT & PLAN NOTE
Plan wellness last colonoscopy April 10, 2024 with recommendations recheck again in 3 years.  Patient has had Prevnar 13 and Prevnar 20, so is good for life on pneumonia shots.  I recommend yearly flu shot.    I recommend getting the Shingrix shot to help prevent Shingles.  You can get it a pharmacy, and they can administer it there.  It is a two shot series with the second shot needed between 2-6 months after the first shot.  I would not recommend getting the shot before an important or fun event in case you were to have a reaction to the shot like a sore arm or flu-like symptoms.  I make the same recommendation about any shot, as people can have a reaction to any shot.

## 2024-10-22 NOTE — PATIENT INSTRUCTIONS
Problem List Items Addressed This Visit          Blood    Platelets decreased (HCC)     Most  recent platelets were good                   Other    Medicare annual wellness visit, subsequent     Plan wellness last colonoscopy April 10, 2024 with recommendations recheck again in 3 years.  Patient has had Prevnar 13 and Prevnar 20, so is good for life on pneumonia shots.  I recommend yearly flu shot.    I recommend getting the Shingrix shot to help prevent Shingles.  You can get it a pharmacy, and they can administer it there.  It is a two shot series with the second shot needed between 2-6 months after the first shot.  I would not recommend getting the shot before an important or fun event in case you were to have a reaction to the shot like a sore arm or flu-like symptoms.  I make the same recommendation about any shot, as people can have a reaction to any shot.                Bradycardia - Primary     Patient not on beta-blocker, will check EKG to make sure not in any type of heart block    Orders:    POCT ECG           Relevant Orders    POCT ECG (Completed)     Other Visit Diagnoses       Encounter for immunization        Relevant Orders    influenza vaccine, high-dose, PF 0.5 mL (Fluzone High Dose) (Completed)    Bigeminy        Relevant Orders    Ambulatory Referral to Cardiology            Medicare Preventive Visit Patient Instructions  Thank you for completing your Welcome to Medicare Visit or Medicare Annual Wellness Visit today. Your next wellness visit will be due in one year (10/23/2025).  The screening/preventive services that you may require over the next 5-10 years are detailed below. Some tests may not apply to you based off risk factors and/or age. Screening tests ordered at today's visit but not completed yet may show as past due. Also, please note that scanned in results may not display below.  Preventive Screenings:  Service Recommendations Previous Testing/Comments   Colorectal Cancer  Screening  Colonoscopy    Fecal Occult Blood Test (FOBT)/Fecal Immunochemical Test (FIT)  Fecal DNA/Cologuard Test  Flexible Sigmoidoscopy Age: 45-75 years old   Colonoscopy: every 10 years (May be performed more frequently if at higher risk)  OR  FOBT/FIT: every 1 year  OR  Cologuard: every 3 years  OR  Sigmoidoscopy: every 5 years  Screening may be recommended earlier than age 45 if at higher risk for colorectal cancer. Also, an individualized decision between you and your healthcare provider will decide whether screening between the ages of 76-85 would be appropriate. Colonoscopy: 04/10/2024  FOBT/FIT: Not on file  Cologuard: Not on file  Sigmoidoscopy: Not on file          Prostate Cancer Screening Individualized decision between patient and health care provider in men between ages of 55-69   Medicare will cover every 12 months beginning on the day after your 50th birthday PSA: 3.680 ng/mL           Hepatitis C Screening Once for adults born between 1945 and 1965  More frequently in patients at high risk for Hepatitis C Hep C Antibody: 08/11/2021        Diabetes Screening 1-2 times per year if you're at risk for diabetes or have pre-diabetes Fasting glucose: 89 mg/dL (6/14/2023)  A1C: No results in last 5 years (No results in last 5 years)      Cholesterol Screening Once every 5 years if you don't have a lipid disorder. May order more often based on risk factors. Lipid panel: 06/14/2023         Other Preventive Screenings Covered by Medicare:  Abdominal Aortic Aneurysm (AAA) Screening: covered once if your at risk. You're considered to be at risk if you have a family history of AAA or a male between the age of 65-75 who smoking at least 100 cigarettes in your lifetime.  Lung Cancer Screening: covers low dose CT scan once per year if you meet all of the following conditions: (1) Age 55-77; (2) No signs or symptoms of lung cancer; (3) Current smoker or have quit smoking within the last 15 years; (4) You have a  tobacco smoking history of at least 20 pack years (packs per day x number of years you smoked); (5) You get a written order from a healthcare provider.  Glaucoma Screening: covered annually if you're considered high risk: (1) You have diabetes OR (2) Family history of glaucoma OR (3)  aged 50 and older OR (4)  American aged 65 and older  Osteoporosis Screening: covered every 2 years if you meet one of the following conditions: (1) Have a vertebral abnormality; (2) On glucocorticoid therapy for more than 3 months; (3) Have primary hyperparathyroidism; (4) On osteoporosis medications and need to assess response to drug therapy.  HIV Screening: covered annually if you're between the age of 15-65. Also covered annually if you are younger than 15 and older than 65 with risk factors for HIV infection. For pregnant patients, it is covered up to 3 times per pregnancy.    Immunizations:  Immunization Recommendations   Influenza Vaccine Annual influenza vaccination during flu season is recommended for all persons aged >= 6 months who do not have contraindications   Pneumococcal Vaccine   * Pneumococcal conjugate vaccine = PCV13 (Prevnar 13), PCV15 (Vaxneuvance), PCV20 (Prevnar 20)  * Pneumococcal polysaccharide vaccine = PPSV23 (Pneumovax) Adults 19-63 yo with certain risk factors or if 65+ yo  If never received any pneumonia vaccine: recommend Prevnar 20 (PCV20)  Give PCV20 if previously received 1 dose of PCV13 or PPSV23   Hepatitis B Vaccine 3 dose series if at intermediate or high risk (ex: diabetes, end stage renal disease, liver disease)   Respiratory syncytial virus (RSV) Vaccine - COVERED BY MEDICARE PART D  * RSVPreF3 (Arexvy) CDC recommends that adults 60 years of age and older may receive a single dose of RSV vaccine using shared clinical decision-making (SCDM)   Tetanus (Td) Vaccine - COST NOT COVERED BY MEDICARE PART B Following completion of primary series, a booster dose should be given  every 10 years to maintain immunity against tetanus. Td may also be given as tetanus wound prophylaxis.   Tdap Vaccine - COST NOT COVERED BY MEDICARE PART B Recommended at least once for all adults. For pregnant patients, recommended with each pregnancy.   Shingles Vaccine (Shingrix) - COST NOT COVERED BY MEDICARE PART B  2 shot series recommended in those 19 years and older who have or will have weakened immune systems or those 50 years and older     Health Maintenance Due:      Topic Date Due    Colorectal Cancer Screening  04/10/2027    Hepatitis C Screening  Completed     Immunizations Due:      Topic Date Due    Influenza Vaccine (1) 09/01/2024    COVID-19 Vaccine (4 - 2023-24 season) 09/01/2024     Advance Directives   What are advance directives?  Advance directives are legal documents that state your wishes and plans for medical care. These plans are made ahead of time in case you lose your ability to make decisions for yourself. Advance directives can apply to any medical decision, such as the treatments you want, and if you want to donate organs.   What are the types of advance directives?  There are many types of advance directives, and each state has rules about how to use them. You may choose a combination of any of the following:  Living will:  This is a written record of the treatment you want. You can also choose which treatments you do not want, which to limit, and which to stop at a certain time. This includes surgery, medicine, IV fluid, and tube feedings.   Durable power of  for healthcare (DPAHC):  This is a written record that states who you want to make healthcare choices for you when you are unable to make them for yourself. This person, called a proxy, is usually a family member or a friend. You may choose more than 1 proxy.  Do not resuscitate (DNR) order:  A DNR order is used in case your heart stops beating or you stop breathing. It is a request not to have certain forms of  treatment, such as CPR. A DNR order may be included in other types of advance directives.  Medical directive:  This covers the care that you want if you are in a coma, near death, or unable to make decisions for yourself. You can list the treatments you want for each condition. Treatment may include pain medicine, surgery, blood transfusions, dialysis, IV or tube feedings, and a ventilator (breathing machine).  Values history:  This document has questions about your views, beliefs, and how you feel and think about life. This information can help others choose the care that you would choose.  Why are advance directives important?  An advance directive helps you control your care. Although spoken wishes may be used, it is better to have your wishes written down. Spoken wishes can be misunderstood, or not followed. Treatments may be given even if you do not want them. An advance directive may make it easier for your family to make difficult choices about your care.   Weight Management   Why it is important to manage your weight:  Being overweight increases your risk of health conditions such as heart disease, high blood pressure, type 2 diabetes, and certain types of cancer. It can also increase your risk for osteoarthritis, sleep apnea, and other respiratory problems. Aim for a slow, steady weight loss. Even a small amount of weight loss can lower your risk of health problems.  How to lose weight safely:  A safe and healthy way to lose weight is to eat fewer calories and get regular exercise. You can lose up about 1 pound a week by decreasing the number of calories you eat by 500 calories each day.   Healthy meal plan for weight management:  A healthy meal plan includes a variety of foods, contains fewer calories, and helps you stay healthy. A healthy meal plan includes the following:  Eat whole-grain foods more often.  A healthy meal plan should contain fiber. Fiber is the part of grains, fruits, and vegetables that  is not broken down by your body. Whole-grain foods are healthy and provide extra fiber in your diet. Some examples of whole-grain foods are whole-wheat breads and pastas, oatmeal, brown rice, and bulgur.  Eat a variety of vegetables every day.  Include dark, leafy greens such as spinach, kale, theo greens, and mustard greens. Eat yellow and orange vegetables such as carrots, sweet potatoes, and winter squash.   Eat a variety of fruits every day.  Choose fresh or canned fruit (canned in its own juice or light syrup) instead of juice. Fruit juice has very little or no fiber.  Eat low-fat dairy foods.  Drink fat-free (skim) milk or 1% milk. Eat fat-free yogurt and low-fat cottage cheese. Try low-fat cheeses such as mozzarella and other reduced-fat cheeses.  Choose meat and other protein foods that are low in fat.  Choose beans or other legumes such as split peas or lentils. Choose fish, skinless poultry (chicken or turkey), or lean cuts of red meat (beef or pork). Before you cook meat or poultry, cut off any visible fat.   Use less fat and oil.  Try baking foods instead of frying them. Add less fat, such as margarine, sour cream, regular salad dressing and mayonnaise to foods. Eat fewer high-fat foods. Some examples of high-fat foods include french fries, doughnuts, ice cream, and cakes.  Eat fewer sweets.  Limit foods and drinks that are high in sugar. This includes candy, cookies, regular soda, and sweetened drinks.  Exercise:  Exercise at least 30 minutes per day on most days of the week. Some examples of exercise include walking, biking, dancing, and swimming. You can also fit in more physical activity by taking the stairs instead of the elevator or parking farther away from stores. Ask your healthcare provider about the best exercise plan for you.      © Copyright [employer] 2018 Information is for End User's use only and may not be sold, redistributed or otherwise used for commercial purposes. All  illustrations and images included in CareNotes® are the copyrighted property of [employer] or sones

## 2024-10-22 NOTE — PROGRESS NOTES
Labs show new elevation in the creatinine level.  I recommend discontinuing Lasix, repeating a basic metabolic panel in 2 weeks.   Written by Juan C Arboleda M.D. on 6/16/2021  4:57 PM PDT      LV with pt at [phone] and [phone] requesting call back to discuss lab results and BE recommendations.    Ambulatory Visit  Name: Aron Oswald      : 1950      MRN: [id number]  Encounter Provider: Jose Harmon MD  Encounter Date: 10/22/2024   Encounter department: [facility]  Medicare annual wellness visit, subsequent  Plan wellness last colonoscopy April 10, 2024 with recommendations recheck again in 3 years.  Patient has had Prevnar 13 and Prevnar 20, so is good for life on pneumonia shots.  I recommend yearly flu shot.    I recommend getting the Shingrix shot to help prevent Shingles.  You can get it a pharmacy, and they can administer it there.  It is a two shot series with the second shot needed between 2-6 months after the first shot.  I would not recommend getting the shot before an important or fun event in case you were to have a reaction to the shot like a sore arm or flu-like symptoms.  I make the same recommendation about any shot, as people can have a reaction to any shot.         Encounter for immunization    Orders:  •  influenza vaccine, high-dose, PF 0.5 mL (Fluzone High Dose)    Bradycardia  Patient not on beta-blocker, will check EKG to make sure not in any type of heart block.  EKG today shows Sinus rhythm with frequent PVCs and bigeminy pattern, no change from previous EKGs.  Will get evaluation with cardiology.  Effective rate probably about 75 bpm when the PVCs are taken into account    Orders:  •  POCT ECG    Platelets decreased (HCC)  Most  recent platelets were good         Bigeminy    Orders:  •  Ambulatory Referral to Cardiology; Future      Depression Screening and Follow-up Plan: Patient was screened for depression during today's encounter. They screened negative with a PHQ-2 score of 0.    Falls Plan of Care: balance, strength, and gait training instructions were provided.     Preventive health issues were discussed with patient, and age appropriate screening tests were ordered as noted in patient's After Visit Summary. Personalized  health advice and appropriate referrals for health education or preventive services given if needed, as noted in patient's After Visit Summary.    History of Present Illness     Patient here for annual wellness visit     Patient Care Team:  Jose Harmon MD as PCP - General (Internal Medicine)    Review of Systems   Constitutional:  Negative for chills, fatigue and fever.   HENT:  Negative for congestion, nosebleeds, postnasal drip, sore throat and trouble swallowing.    Eyes:  Negative for pain.   Respiratory:  Negative for cough, chest tightness, shortness of breath and wheezing.    Cardiovascular:  Negative for chest pain, palpitations and leg swelling.   Gastrointestinal:  Negative for abdominal pain, constipation, diarrhea, nausea and vomiting.   Endocrine: Negative for polydipsia and polyuria.   Genitourinary:  Negative for dysuria, flank pain and hematuria.   Musculoskeletal:  Negative for arthralgias.   Skin:  Negative for rash.   Neurological:  Negative for dizziness, tremors, light-headedness and headaches.   Hematological:  Does not bruise/bleed easily.   Psychiatric/Behavioral:  Negative for confusion and dysphoric mood. The patient is not nervous/anxious.      Medical History Reviewed by provider this encounter:  Tobacco  Allergies  Meds  Problems  Med Hx  Surg Hx  Fam Hx       Annual Wellness Visit Questionnaire   Aron is here for his Subsequent Wellness visit. Last Medicare Wellness visit information reviewed, patient interviewed and updates made to the record today.      Health Risk Assessment:   Patient rates overall health as good. Patient feels that their physical health rating is slightly worse. Patient is satisfied with their life. Eyesight was rated as same. Hearing was rated as same. Patient feels that their emotional and mental health rating is same. Patients states they are never, rarely angry. Patient states they are sometimes unusually tired/fatigued. Pain experienced in the last      ******INCOMPLETE******    Patient is a 42y old  Female who presents with a chief complaint of hypoglycemia (18 Apr 2024 07:09)    OVERNIGHT EVENTS/INTERVAL HPI:    REVIEW OF SYSTEMS:  All other review of systems is negative unless indicated above.    OBJECTIVE:  T(C): 36.9 (04-18-24 @ 06:07), Max: 37 (04-17-24 @ 22:08)  HR: 103 (04-18-24 @ 06:07) (97 - 103)  BP: 123/79 (04-18-24 @ 06:07) (123/79 - 139/68)  RR: 18 (04-18-24 @ 06:07) (16 - 18)  SpO2: 98% (04-18-24 @ 06:07) (98% - 98%)  Daily     Daily     Physical Exam:  General: in no acute distress  Eyes: EOMI intact bilaterally. Anicteric sclerae, moist conjunctivae  HENT: Moist mucous membranes  Neck: Trachea midline, supple  Lungs: CTA B/L. No wheezes, rales, or rhonchi  Cardiovascular: RRR. No murmurs, rubs, or gallops  Abdomen: Soft, non-tender non-distended; No rebound or guarding  Extremities: WWP, No clubbing, cyanosis or edema  MSK: No midline bony tenderness. No CVA tenderness bilaterally  Neurological: Alert and oriented x3  Skin: Warm and dry. No obvious rash     Medications:  MEDICATIONS  (STANDING):  acetaminophen     Tablet .. 650 milliGRAM(s) Oral every 6 hours  apixaban 5 milliGRAM(s) Oral every 12 hours  atorvastatin 80 milliGRAM(s) Oral at bedtime  clopidogrel Tablet 75 milliGRAM(s) Oral daily  clotrimazole 1% Vaginal Cream 1 Applicatorful Vaginal at bedtime  dextrose 10% Bolus 125 milliLiter(s) IV Bolus once  dextrose 5% + lactated ringers. 1000 milliLiter(s) (50 mL/Hr) IV Continuous <Continuous>  dextrose 5%. 1000 milliLiter(s) (50 mL/Hr) IV Continuous <Continuous>  dextrose 5%. 1000 milliLiter(s) (100 mL/Hr) IV Continuous <Continuous>  dextrose 50% Injectable 12.5 Gram(s) IV Push once  dextrose 50% Injectable 25 Gram(s) IV Push once  famotidine    Tablet 20 milliGRAM(s) Oral every 12 hours  gabapentin 600 milliGRAM(s) Oral three times a day  glucagon  Injectable 1 milliGRAM(s) IntraMuscular once  influenza   Vaccine 0.5 milliLiter(s) IntraMuscular once  lidocaine   4% Patch 1 Patch Transdermal once  melatonin 5 milliGRAM(s) Oral at bedtime  nortriptyline 10 milliGRAM(s) Oral at bedtime    MEDICATIONS  (PRN):  dextrose Oral Gel 15 Gram(s) Oral once PRN Blood Glucose LESS THAN 70 milliGRAM(s)/deciliter  loperamide 2 milliGRAM(s) Oral every 4 hours PRN increased ostomy output  oxyCODONE    IR 5 milliGRAM(s) Oral every 6 hours PRN Moderate Pain (4 - 6)  trimethobenzamide Injectable 200 milliGRAM(s) IntraMuscular every 8 hours PRN Nausea and/or Vomiting      Labs:                        9.9    13.18 )-----------( 451      ( 17 Apr 2024 06:35 )             35.8     04-17    137  |  102  |  10  ----------------------------<  48<LL>  4.7   |  24  |  0.78    Ca    9.3      17 Apr 2024 11:29  Phos  3.0     04-17  Mg     1.8     04-17    TPro  8.0  /  Alb  3.8  /  TBili  0.2  /  DBili  x   /  AST  25  /  ALT  9<L>  /  AlkPhos  153<H>  04-17      Urinalysis Basic - ( 17 Apr 2024 11:29 )    Color: x / Appearance: x / SG: x / pH: x  Gluc: 48 mg/dL / Ketone: x  / Bili: x / Urobili: x   Blood: x / Protein: x / Nitrite: x   Leuk Esterase: x / RBC: x / WBC x   Sq Epi: x / Non Sq Epi: x / Bacteria: x      SARS-CoV-2: NotDetec (31 Dec 2023 12:46)      Radiology: Reviewed 7 days has been none. Patient states that he has experienced no weight loss or gain in last 6 months.     Depression Screening:   PHQ-2 Score: 0      Fall Risk Screening:   In the past year, patient has experienced: history of falling in past year    Number of falls: 1  Injured during fall?: Yes    Feels unsteady when standing or walking?: No    Worried about falling?: No      Home Safety:  Patient does not have trouble with stairs inside or outside of their home. Patient has working smoke alarms and has working carbon monoxide detector. Home safety hazards include: none.     Nutrition:   Current diet is Regular.     Medications:   Patient is not currently taking any over-the-counter supplements. Patient is able to manage medications.     Activities of Daily Living (ADLs)/Instrumental Activities of Daily Living (IADLs):   Walk and transfer into and out of bed and chair?: Yes  Dress and groom yourself?: Yes    Bathe or shower yourself?: Yes    Feed yourself? Yes  Do your laundry/housekeeping?: Yes  Manage your money, pay your bills and track your expenses?: Yes  Make your own meals?: Yes    Do your own shopping?: Yes    ADL comments: I have help with dressing because of my neck brace. My wife makes the meals and helps with laundry and shopping.    Durable Medical Equipment Suppliers  [facility] [employer] [place]    Previous Hospitalizations:   Any hospitalizations or ED visits within the last 12 months?: Yes    How many hospitalizations have you had in the last year?: 1-2    Advance Care Planning:   Living will: No    Durable POA for healthcare: Yes    Advanced directive: No    Advanced directive counseling given: Yes      Cognitive Screening:   Provider or family/friend/caregiver concerned regarding cognition?: No    PREVENTIVE SCREENINGS      Cardiovascular Screening:    General: Screening Current and Risks and Benefits Discussed      Diabetes Screening:     General: Screening Current and Risks  and Benefits Discussed      Colorectal Cancer Screening:     General: Screening Current      Prostate Cancer Screening:    General: Screening Not Indicated, Risks and Benefits Discussed and Screening Current      Abdominal Aortic Aneurysm (AAA) Screening:    Risk factors include: age between 65-76 yo        General: Risks and Benefits Discussed and Screening Not Indicated      Lung Cancer Screening:     General: Screening Not Indicated and Risks and Benefits Discussed      Hepatitis C Screening:    General: Screening Current and Risks and Benefits Discussed    Screening, Brief Intervention, and Referral to Treatment (SBIRT)    Screening  Typical number of drinks in a day: 0  Typical number of drinks in a week: 0  Interpretation: Low risk drinking behavior.    AUDIT-C Screenin) How often did you have a drink containing alcohol in the past year? never  2) How many drinks did you have on a typical day when you were drinking in the past year? 0  3) How often did you have 6 or more drinks on one occasion in the past year? never    AUDIT-C Score: 0  Interpretation: Score 0-3 (male): Negative screen for alcohol misuse    Single Item Drug Screening:  How often have you used an illegal drug (including marijuana) or a prescription medication for non-medical reasons in the past year? never    Single Item Drug Screen Score: 0  Interpretation: Negative screen for possible drug use disorder    Brief Intervention  Alcohol & drug use screenings were reviewed. No concerns regarding substance use disorder identified.     Other Counseling Topics:   Car/seat belt/driving safety, skin self-exam and sunscreen.     Social Determinants of Health     Financial Resource Strain: Low Risk  (2023)    Overall Financial Resource Strain (CARDIA)    • Difficulty of Paying Living Expenses: Not hard at all   Food Insecurity: No Food Insecurity (10/22/2024)    Hunger Vital Sign    • Worried About Running Out of Food in the Last Year: Never  "true    • Ran Out of Food in the Last Year: Never true   Transportation Needs: No Transportation Needs (10/22/2024)    PRAPARE - Transportation    • Lack of Transportation (Medical): No    • Lack of Transportation (Non-Medical): No   Housing Stability: Low Risk  (10/22/2024)    Housing Stability Vital Sign    • Unable to Pay for Housing in the Last Year: No    • Number of Times Moved in the Last Year: 1    • Homeless in the Last Year: No   Utilities: Not At Risk (10/22/2024)    [facility] Utilities    • Threatened with loss of utilities: No     No results found.    Objective     /80   Pulse (!) 42   Temp (!) 96.5 °F (35.8 °C) (Probe)   Ht 5' 10.47\" (1.79 m)   Wt 89.6 kg (197 lb 9.6 oz)   SpO2 99%   BMI 27.97 kg/m²     Physical Exam  Constitutional:       General: He is not in acute distress.     Appearance: Normal appearance.   Cardiovascular:      Rate and Rhythm: Bradycardia present.      Heart sounds: Normal heart sounds. No murmur heard.  Neurological:      Mental Status: He is alert.       " OVERNIGHT EVENTS/INTERVAL HPI: o/n rubén. This morning, pt reports improvement of headache but continues to have localized chest pain, unchanged from previous days. Continues to have liquid output from ostomy and endorses nausea.     REVIEW OF SYSTEMS:  All other review of systems is negative unless indicated above.    OBJECTIVE:  T(C): 36.9 (04-18-24 @ 06:07), Max: 37 (04-17-24 @ 22:08)  HR: 103 (04-18-24 @ 06:07) (97 - 103)  BP: 123/79 (04-18-24 @ 06:07) (123/79 - 139/68)  RR: 18 (04-18-24 @ 06:07) (16 - 18)  SpO2: 98% (04-18-24 @ 06:07) (98% - 98%)  Daily     Daily     Physical Exam:  General: in no acute distress  Eyes: EOMI intact bilaterally.   HENT: Moist mucous membranes  Lungs: CTA B/L.   Cardiovascular: RRR.   Abdomen: Soft, non-tender non-distended  Neurological: Alert and oriented x3      Medications:  MEDICATIONS  (STANDING):  acetaminophen     Tablet .. 650 milliGRAM(s) Oral every 6 hours  apixaban 5 milliGRAM(s) Oral every 12 hours  atorvastatin 80 milliGRAM(s) Oral at bedtime  clopidogrel Tablet 75 milliGRAM(s) Oral daily  clotrimazole 1% Vaginal Cream 1 Applicatorful Vaginal at bedtime  dextrose 10% Bolus 125 milliLiter(s) IV Bolus once  dextrose 5% + lactated ringers. 1000 milliLiter(s) (50 mL/Hr) IV Continuous <Continuous>  dextrose 5%. 1000 milliLiter(s) (50 mL/Hr) IV Continuous <Continuous>  dextrose 5%. 1000 milliLiter(s) (100 mL/Hr) IV Continuous <Continuous>  dextrose 50% Injectable 12.5 Gram(s) IV Push once  dextrose 50% Injectable 25 Gram(s) IV Push once  famotidine    Tablet 20 milliGRAM(s) Oral every 12 hours  gabapentin 600 milliGRAM(s) Oral three times a day  glucagon  Injectable 1 milliGRAM(s) IntraMuscular once  influenza   Vaccine 0.5 milliLiter(s) IntraMuscular once  lidocaine   4% Patch 1 Patch Transdermal once  melatonin 5 milliGRAM(s) Oral at bedtime  nortriptyline 10 milliGRAM(s) Oral at bedtime    MEDICATIONS  (PRN):  dextrose Oral Gel 15 Gram(s) Oral once PRN Blood Glucose LESS THAN 70 milliGRAM(s)/deciliter  loperamide 2 milliGRAM(s) Oral every 4 hours PRN increased ostomy output  oxyCODONE    IR 5 milliGRAM(s) Oral every 6 hours PRN Moderate Pain (4 - 6)  trimethobenzamide Injectable 200 milliGRAM(s) IntraMuscular every 8 hours PRN Nausea and/or Vomiting      Labs:                        9.9    13.18 )-----------( 451      ( 17 Apr 2024 06:35 )             35.8     04-17    137  |  102  |  10  ----------------------------<  48<LL>  4.7   |  24  |  0.78    Ca    9.3      17 Apr 2024 11:29  Phos  3.0     04-17  Mg     1.8     04-17    TPro  8.0  /  Alb  3.8  /  TBili  0.2  /  DBili  x   /  AST  25  /  ALT  9<L>  /  AlkPhos  153<H>  04-17      Urinalysis Basic - ( 17 Apr 2024 11:29 )    Color: x / Appearance: x / SG: x / pH: x  Gluc: 48 mg/dL / Ketone: x  / Bili: x / Urobili: x   Blood: x / Protein: x / Nitrite: x   Leuk Esterase: x / RBC: x / WBC x   Sq Epi: x / Non Sq Epi: x / Bacteria: x      SARS-CoV-2: NotDetec (31 Dec 2023 12:46)      Radiology: Reviewed

## 2024-10-22 NOTE — ASSESSMENT & PLAN NOTE
Patient not on beta-blocker, will check EKG to make sure not in any type of heart block.  EKG today shows Sinus rhythm with frequent PVCs and bigeminy pattern, no change from previous EKGs.  Will get evaluation with cardiology.  Effective rate probably about 75 bpm when the PVCs are taken into account    Orders:    POCT ECG

## 2024-10-24 ENCOUNTER — HOSPITAL ENCOUNTER (OUTPATIENT)
Dept: NEUROLOGY | Facility: CLINIC | Age: 74
End: 2024-10-24
Payer: COMMERCIAL

## 2024-10-24 ENCOUNTER — APPOINTMENT (OUTPATIENT)
Dept: PHYSICAL THERAPY | Facility: CLINIC | Age: 74
End: 2024-10-24
Payer: COMMERCIAL

## 2024-10-24 DIAGNOSIS — S12.400A CLOSED DISPLACED FRACTURE OF FIFTH CERVICAL VERTEBRA, UNSPECIFIED FRACTURE MORPHOLOGY, INITIAL ENCOUNTER (HCC): ICD-10-CM

## 2024-10-24 DIAGNOSIS — M12.811 ROTATOR CUFF ARTHROPATHY OF RIGHT SHOULDER: ICD-10-CM

## 2024-10-24 DIAGNOSIS — R29.898 WEAKNESS OF RIGHT SHOULDER: ICD-10-CM

## 2024-10-24 PROCEDURE — 95911 NRV CNDJ TEST 9-10 STUDIES: CPT | Performed by: PSYCHIATRY & NEUROLOGY

## 2024-10-24 PROCEDURE — 95886 MUSC TEST DONE W/N TEST COMP: CPT | Performed by: PSYCHIATRY & NEUROLOGY

## 2024-10-28 ENCOUNTER — APPOINTMENT (OUTPATIENT)
Dept: PHYSICAL THERAPY | Facility: CLINIC | Age: 74
End: 2024-10-28
Payer: COMMERCIAL

## 2024-10-28 DIAGNOSIS — M75.121 NONTRAUMATIC COMPLETE TEAR OF RIGHT ROTATOR CUFF: Primary | ICD-10-CM

## 2024-10-28 DIAGNOSIS — S46.211D BICEPS STRAIN, RIGHT, SUBSEQUENT ENCOUNTER: ICD-10-CM

## 2024-10-28 DIAGNOSIS — M75.41 SUBACROMIAL IMPINGEMENT OF RIGHT SHOULDER: ICD-10-CM

## 2024-10-28 NOTE — PROGRESS NOTES
"Daily Note     Today's date: 10/28/2024  Patient name: Aron Oswald  : 1950  MRN: [id number]  Referring provider: Ronak Burgos*  Dx:   Encounter Diagnosis     ICD-10-CM    1. Nontraumatic complete tear of right rotator cuff  M75.121       2. Subacromial impingement of right shoulder  M75.41       3. Biceps strain, right, subsequent encounter  S46.211D                      Subjective: ***      Objective: See treatment diary below      Assessment: Tolerated treatment well. Patient demonstrated fatigue post treatment and would benefit from continued PT.       Plan: Continue per plan of care.  Progress treatment as tolerated.       POC expires Unit limit Auth  expiration date PT/OT + Visit Limit?   24 N/A N/A BOMN                 Visit/Unit Tracking  AUTH Status:  Date 10/4 10/7 10/10 10/14 10/21 10/28         N/A Used 1 2 3 4 5 6          Remaining                  Precautions: C5 cervical fracture with [place] cervical collar, falls risk, HTN      Manuals 10/4 10/7 10/10 10/14 10/21 10/28                                                           Neuro Re-Ed                                                                                                        Ther Ex             Patient education: pathophysiology, differential diagnosis, diagnostic imaging review GR  HEP review          [place] in side lying with UE ranger  x30   NV        UBE  3' / 3' 3' / 3' L3 3'/3' L3 3'/3'        Pulleys  20x5\" 4 min 4 min 4'         Finger ladder  10x5\" with eccentric lowering           Shoulder abduction AROM in side lying  Unable           ER AROM in side lying  Unable           Biceps curls   2# 2x10 2# 2x10 2#  2x10        Triceps pull down   GTB 3x10 GTB 3x10 GTB  3x10        IR, ER, flex, abd isometrics  15x5\"  15x5\" 15x5\" ea        Bent over row  2# 2x10 2# 3x10 2# 3x10 NV        Shoulder flexion, abduction AAROM with SPC with eccentric lowering   20x ea. 20x ea 20x ea        Ther Activity      "                                  Gait Training                                       Modalities

## 2024-10-31 ENCOUNTER — APPOINTMENT (OUTPATIENT)
Dept: PHYSICAL THERAPY | Facility: CLINIC | Age: 74
End: 2024-10-31
Payer: COMMERCIAL

## 2024-11-01 ENCOUNTER — OFFICE VISIT (OUTPATIENT)
Dept: OBGYN CLINIC | Facility: CLINIC | Age: 74
End: 2024-11-01
Payer: COMMERCIAL

## 2024-11-01 VITALS
DIASTOLIC BLOOD PRESSURE: 63 MMHG | BODY MASS INDEX: 27.58 KG/M2 | SYSTOLIC BLOOD PRESSURE: 134 MMHG | HEART RATE: 52 BPM | HEIGHT: 71 IN | WEIGHT: 197 LBS

## 2024-11-01 DIAGNOSIS — M24.811 INTERNAL DERANGEMENT OF RIGHT SHOULDER: Primary | ICD-10-CM

## 2024-11-01 PROCEDURE — 99213 OFFICE O/P EST LOW 20 MIN: CPT | Performed by: FAMILY MEDICINE

## 2024-11-01 NOTE — PROGRESS NOTES
Assessment/Plan:  Assessment & Plan   Diagnoses and all orders for this visit:    Internal derangement of right shoulder  -     MRI shoulder right wo contrast; Future          73-year-old right-hand-dominant male with onset right shoulder weakness following injury 8/27/2024.  Discussed with patient physical exam, impression, and plan.  Clinical exam, history, and imaging studies suggest that he has symptoms from pathology of the shoulder, particularly rotator cuff tear and impingement.  Symptoms persist despite conservative management of formal therapy and home exercises since 10/04/2024.  At this time I will refer him for MRI of the right shoulder to evaluate for rotator cuff tear and impingement as surgical intervention may be warranted.  He will return after having MRI done.      Subjective:   Patient ID: Aron Oswald is a 73 y.o. male.  Chief Complaint   Patient presents with    Right Shoulder - Follow-up, Pain        73-year-old right-hand-dominant male following up for onset of right shoulder weakness from injury 8/27/2024.  He was last seen by me nearly 6 weeks ago at which Clinical impression was chronic rotator cuff tear.  He was referred to formal therapy.  He has been attending formal therapy and doing home exercises since 10/04/2024.  States that symptoms have been improving, but are still bothersome.  He reports having pain described generalized to the shoulder, achy and sore, worse with activity particularly elevating the arm, associated with limited range of motion, and improved with resting.    Shoulder Pain  This is a new problem. The current episode started more than 1 month ago. The problem occurs daily. The problem has been waxing and waning. Associated symptoms include arthralgias and weakness. Pertinent negatives include no joint swelling or numbness. Exacerbated by: Arm movement. He has tried rest and position changes (Physical therapy, home exercise) for the symptoms. The treatment provided  "mild relief.               Review of Systems   Musculoskeletal:  Positive for arthralgias. Negative for joint swelling.   Neurological:  Positive for weakness. Negative for numbness.       Objective:  Vitals:    11/01/24 0927   BP: 134/63   Pulse: (!) 52   Weight: 89.4 kg (197 lb)   Height: 5' 10.5\" (1.791 m)      Right Shoulder Exam     Range of Motion   Active abduction:  40   Forward flexion:  40     Muscle Strength   Abduction: 4/5   Internal rotation: 5/5   External rotation: 4/5   Supraspinatus: 4/5     Tests   Drop arm: positive    Comments:  Positive empty can            Physical Exam  Vitals and nursing note reviewed.   Constitutional:       Appearance: Normal appearance. He is well-developed. He is not ill-appearing or diaphoretic.   HENT:      Head: Normocephalic and atraumatic.      Right Ear: External ear normal.      Left Ear: External ear normal.   Eyes:      Conjunctiva/sclera: Conjunctivae normal.   Neck:      Trachea: No tracheal deviation.   Cardiovascular:      Comments: Bradycardic  Pulmonary:      Effort: Pulmonary effort is normal. No respiratory distress.   Abdominal:      General: There is no distension.   Skin:     General: Skin is warm and dry.      Coloration: Skin is not jaundiced or pale.   Neurological:      Mental Status: He is alert and oriented to person, place, and time.   Psychiatric:         Mood and Affect: Mood normal.         Behavior: Behavior normal.         Thought Content: Thought content normal.         Judgment: Judgment normal.                   "

## 2024-11-04 ENCOUNTER — EVALUATION (OUTPATIENT)
Dept: PHYSICAL THERAPY | Facility: CLINIC | Age: 74
End: 2024-11-04
Payer: COMMERCIAL

## 2024-11-04 DIAGNOSIS — M75.41 SUBACROMIAL IMPINGEMENT OF RIGHT SHOULDER: ICD-10-CM

## 2024-11-04 DIAGNOSIS — S46.211D BICEPS STRAIN, RIGHT, SUBSEQUENT ENCOUNTER: ICD-10-CM

## 2024-11-04 DIAGNOSIS — M75.121 NONTRAUMATIC COMPLETE TEAR OF RIGHT ROTATOR CUFF: Primary | ICD-10-CM

## 2024-11-04 PROCEDURE — 97110 THERAPEUTIC EXERCISES: CPT | Performed by: PHYSICAL THERAPIST

## 2024-11-04 NOTE — PROGRESS NOTES
PT Re-Evaluation     Today's date: 2024  Patient name: Aron Oswald  : 1950  MRN: [id number]  Referring provider: Ronak Burgos*  Dx:   Encounter Diagnosis     ICD-10-CM    1. Nontraumatic complete tear of right rotator cuff  M75.121       2. Subacromial impingement of right shoulder  M75.41       3. Biceps strain, right, subsequent encounter  S46.211D           Start Time: 1022  Stop Time: 1135  Total time in clinic (min): 73 minutes    Assessment  Impairments: abnormal coordination, abnormal muscle firing, abnormal or restricted ROM, activity intolerance, impaired physical strength, lacks appropriate home exercise program, pain with function and poor posture     Assessment details: Since beginning physical therapy, Aron has attended a total number of 6 visits and has maintained excellent compliance with established POC. Patient has made gradual improvements in all areas, including decreased average intensity of pain, increased UE strength and ROM. Patient is reporting improved ability to perform a/iadls. Despite these improvements, Patient continues to present with significant loss of right shoulder strength and AROM. Recent EMG of UE ruled out peripheral neuropathy and cervical radiculopathy. Patient has upcoming MRI of right shoulder. Patient would continue to benefit from skilled physical therapy to address his aforementioned impairments, improve their level of function and to improve their overall quality of life.  Understanding of Dx/Px/POC: good     Prognosis: fair    Goals  Short Term Goals: to be achieved by 4 weeks  1) Patient to be independent with basic HEP. MET  2) Decrease pain to 1/10 at it's worst. NOT MET  3) Increase UE strength by 1/2 MMT grade in all deficient planes. PARTIALLY MET  4) Increase UE ROM by > 5 deg in all deficient planes. PARTIALLY MET  5) Patient to report decreased sleep interruption secondary to pain. MET    Long Term Goals: to be achieved by discharge  ALL GOALS PROGRESSING  1) FOTO equal to or greater than 62.  2) Patient to be independent with comprehensive HEP.  3) Abolish pain for improved quality of life.  4) Increase UE strength to at least 4-/5 MMT grade in all deficient planes to improve a/iadls.  5) Increase UE ROM to within 10 deg of contralateral UE to improve a/iadls.  6) Patient to report no sleep interruption secondary to pain.    Plan  Patient would benefit from: skilled PT  Planned modality interventions: biofeedback, cryotherapy, hydrotherapy, unattended electrical stimulation, thermotherapy: hydrocollator packs and low level laser therapy    Planned therapy interventions: activity modification, ADL retraining, behavior modification, body mechanics training, functional ROM exercises, home exercise program, IADL retraining, joint mobilization, manual therapy, massage, neuromuscular re-education, patient education, postural training, strengthening, stretching, therapeutic activities and therapeutic exercise    Frequency: 2-3x week.  Duration in weeks: 8  Plan of Care beginning date: 2024  Plan of Care expiration date: 2024  Treatment plan discussed with: patient        Subjective Evaluation    History of Present Illness  Mechanism of injury: Patient reports that he is able to raise his right arm further away from his body to reach for objects. Patient is able to reach across his body with greater ease. Patient is able to flex his elbow with greater ease, but continues to compensate with his opposite. Patient is now able to feed himself with his right arm. Patient continues to deny experiencing tingling/numbness. Recent EMG of UE negative for cervical radiculopathy or peripheral neuropathy. Patient has an MRI of his right shoulder scheduled on  and a f/u appointment with ortho on .  Patient Goals  Patient goal: to improve function of right arm  Pain  Current pain ratin  At best pain ratin  At worst pain rating:  "4  Location: right shoulder: diffuse  Quality: \"flash of pain\"    Social Support    Employment status: not working  Hand dominance: right    Treatments  No previous or current treatments        Objective     Postural Observations  Seated posture: fair  Standing posture: fair    Additional Postural Observation Details  Rigid cervical collar in place, possible presence of right biceps deformity indicating potential long head of biceps rupture    Active Range of Motion   Left Shoulder   Flexion: 105 degrees   Abduction: 78 degrees   External rotation BTH: T3   Internal rotation BTB: T10     Right Shoulder   Flexion: 50 degrees   Abduction: 50 degrees   External rotation BTH: Active external rotation behind the head: top of shoulder.   Internal rotation BTB: T8     Right Elbow   Flexion: 138 degrees     Passive Range of Motion   Left Shoulder   Flexion: 140 degrees   Abduction: 130 degrees   External rotation 90°: 45 degrees   Internal rotation 90°: 45 degrees     Right Shoulder   Flexion: 150 degrees   Abduction: 155 degrees   External rotation 90°: 70 degrees   Internal rotation 90°: 60 degrees     Right Elbow   Flexion: 145 degrees     Strength/Myotome Testing     Left Shoulder     Planes of Motion   Flexion: 3+   Abduction: 3+   External rotation at 0°: 4-   Internal rotation at 0°: 4     Right Shoulder     Planes of Motion   Flexion: 2-   Abduction: 2-   External rotation at 0°: 3-   Internal rotation at 0°: 5     Left Elbow   Flexion: 5  Extension: 5    Right Elbow   Flexion: 5  Extension: 5    Left Wrist/Hand   Wrist extension: 5  Wrist flexion: 5    Right Wrist/Hand   Wrist extension: 5  Wrist flexion: 5    Ambulation     Ambulation: Level Surfaces   Ambulation without assistive device: independent    Observational Gait   Gait: within functional limits     General Comments:      Cervical/Thoracic Comments  Deferred cervical screen secondary to cervical collar being in place.      Flowsheet Rows      Flowsheet " "Row Most Recent Value   PT/OT G-Codes    Current Score 36   Projected Score 62                 POC expires Unit limit Auth  expiration date PT/OT + Visit Limit?   12/30/24 N/A N/A BOMN                 Visit/Unit Tracking  AUTH Status:  Date 10/4 10/7 10/10 10/14 10/21 11/4         N/A Used 1 2 3 4 5 6          Remaining                  Precautions: C5 cervical fracture with Aspen Visa cervical collar, falls risk, HTN      Manuals 10/4 10/7 10/10 10/14 10/21 11/4       Reassessment      GR                                              Neuro Re-Ed                                                                                                        Ther Ex             Patient education: pathophysiology, differential diagnosis, diagnostic imaging review, conservative vs surgical intervention GR  HEP review   GR       Scaption in side lying with UE ranger  x30   NV        UBE  3' / 3' 3' / 3' L3 3'/3' L3 3'/3' 3' / 3'       Pulleys  20x5\" 4 min 4 min 4'         Finger ladder  10x5\" with eccentric lowering           Shoulder abduction AROM in side lying  Unable           ER AROM in side lying  Unable           Biceps curls   2# 2x10 2# 2x10 2#  2x10        Triceps pull down   GTB 3x10 GTB 3x10 GTB  3x10        IR, ER, flex, abd isometrics  15x5\"  15x5\" 15x5\" ea        Bent over row  2# 2x10 2# 3x10 2# 3x10 NV        Shoulder flexion, abduction AAROM with SPC with eccentric lowering   20x ea. 20x ea 20x ea        Ther Activity                                       Gait Training                                       Modalities                                            "

## 2024-11-07 ENCOUNTER — TELEPHONE (OUTPATIENT)
Age: 74
End: 2024-11-07

## 2024-11-07 ENCOUNTER — APPOINTMENT (OUTPATIENT)
Dept: PHYSICAL THERAPY | Facility: CLINIC | Age: 74
End: 2024-11-07
Payer: COMMERCIAL

## 2024-11-07 NOTE — TELEPHONE ENCOUNTER
LM on patient voicemail to return call to confirm that appointment was R/S from 03/05/2024 at 4PM to 04/09/2025 at 1PM.    Care Conference remains on 05/14/2025 with Dr. Silva. Should the CONSULT on 04/09/2025 need to be R/S, CARE CONFERENCE will also need to be R/S at that time.     Thank you.

## 2024-11-11 ENCOUNTER — APPOINTMENT (OUTPATIENT)
Dept: PHYSICAL THERAPY | Facility: CLINIC | Age: 74
End: 2024-11-11
Payer: COMMERCIAL

## 2024-11-11 DIAGNOSIS — M75.121 NONTRAUMATIC COMPLETE TEAR OF RIGHT ROTATOR CUFF: Primary | ICD-10-CM

## 2024-11-11 DIAGNOSIS — M75.41 SUBACROMIAL IMPINGEMENT OF RIGHT SHOULDER: ICD-10-CM

## 2024-11-11 DIAGNOSIS — S46.211D BICEPS STRAIN, RIGHT, SUBSEQUENT ENCOUNTER: ICD-10-CM

## 2024-11-11 NOTE — PROGRESS NOTES
"Daily Note     Today's date: 2024  Patient name: Aron Oswald  : 1950  MRN: [id number]  Referring provider: Ronak Burgos*  Dx:   Encounter Diagnosis     ICD-10-CM    1. Nontraumatic complete tear of right rotator cuff  M75.121       2. Subacromial impingement of right shoulder  M75.41       3. Biceps strain, right, subsequent encounter  S46.211D                      Subjective: ***      Objective: See treatment diary below      Assessment: Tolerated treatment well. Patient demonstrated fatigue post treatment and would benefit from continued PT.       Plan: Continue per plan of care.  Progress treatment as tolerated.       POC expires Unit limit Auth  expiration date PT/OT + Visit Limit?   24 N/A N/A BOMN                 Visit/Unit Tracking  AUTH Status:  Date 10/4 10/7 10/10 10/14 10/21 11/4 11/11        N/A Used 1 2 3 4 5 6 7         Remaining                  Precautions: C5 cervical fracture with Aspen Visa cervical collar, falls risk, HTN      Manuals 10/4 10/7 10/10 10/14 10/21 11/4 11/11      Reassessment      GR                                              Neuro Re-Ed             Standing shoulder ext.       *      Standing h abd       *      Multidirectional ball stability with MB on wall (V, H, CW/CCW circles)       *                                                          Ther Ex             Patient education: pathophysiology, differential diagnosis, diagnostic imaging review, conservative vs surgical intervention GR  HEP review   GR       Scaption in side lying with UE ranger  x30   NV  *      UBE  3' / 3' 3' / 3' L3 3'/3' L3 3'/3' 3' / 3'       Pulleys  20x5\" 4 min 4 min 4'         Finger ladder  10x5\" with eccentric lowering           Shoulder abduction AROM in side lying  Unable           ER AROM in side lying  Unable           Biceps curls   2# 2x10 2# 2x10 2#  2x10        Triceps pull down   GTB 3x10 GTB 3x10 GTB  3x10        IR, ER, flex, abd isometrics  15x5\"  15x5\" " "15x5\" ea        Bent over row  2# 2x10 2# 3x10 2# 3x10 NV        Shoulder flexion, abduction AAROM with SPC with eccentric lowering   20x ea. 20x ea 20x ea        Standing shoulder flexion AAROM with UE ranger       *      Standing shoulder abduction AAROM with UE ranger       *                                                          Ther Activity                                       Gait Training                                       Modalities                                            "

## 2024-11-12 ENCOUNTER — OFFICE VISIT (OUTPATIENT)
Dept: PHYSICAL THERAPY | Facility: CLINIC | Age: 74
End: 2024-11-12
Payer: COMMERCIAL

## 2024-11-12 DIAGNOSIS — S46.211D BICEPS STRAIN, RIGHT, SUBSEQUENT ENCOUNTER: ICD-10-CM

## 2024-11-12 DIAGNOSIS — M75.121 NONTRAUMATIC COMPLETE TEAR OF RIGHT ROTATOR CUFF: Primary | ICD-10-CM

## 2024-11-12 DIAGNOSIS — M75.41 SUBACROMIAL IMPINGEMENT OF RIGHT SHOULDER: ICD-10-CM

## 2024-11-12 PROCEDURE — 97110 THERAPEUTIC EXERCISES: CPT | Performed by: PHYSICAL THERAPIST

## 2024-11-12 PROCEDURE — 97112 NEUROMUSCULAR REEDUCATION: CPT | Performed by: PHYSICAL THERAPIST

## 2024-11-12 NOTE — PROGRESS NOTES
"Daily Note     Today's date: 2024  Patient name: Aron Oswald  : 1950  MRN: [id number]  Referring provider: Ronak Burgos*  Dx:   Encounter Diagnosis     ICD-10-CM    1. Nontraumatic complete tear of right rotator cuff  M75.121       2. Subacromial impingement of right shoulder  M75.41       3. Biceps strain, right, subsequent encounter  S46.211D           Start Time: 910  Stop Time: 1010  Total time in clinic (min): 60 minutes    Subjective: Patient reports that he has an appointment with neurosurgery tomorrow and hopes that his cervical collar will be removed.      Objective: See treatment diary below      Assessment: Tolerated treatment well. Patient demonstrated fatigue post treatment and would benefit from continued PT. Patient able to progress to strengthening above shoulder height with active assistance. Patient continues to present with significant UE weakness overall.      Plan: Continue per plan of care.  Progress treatment as tolerated.       POC expires Unit limit Auth  expiration date PT/OT + Visit Limit?   24 N/A N/A BOMN                 Visit/Unit Tracking  AUTH Status:  Date 10/4 10/7 10/10 10/14 10/21 11/4 11/12        N/A Used 1 2 3 4 5 6 7         Remaining                  Precautions: C5 cervical fracture with Aspen Visa cervical collar, falls risk, HTN      Manuals 10/4 10/7 10/10 10/14 10/21 11/4 11/12      Reassessment      GR                                              Neuro Re-Ed             Standing bent over shoulder ext.       2# 2x10 3\"      Standing bent over h abd       AA x15 with eccentric lowering      Multidirectional ball stability with MB on wall (V, H, CW/CCW circles)       GMB x10 ea.                                                           Ther Ex             Patient education: pathophysiology, differential diagnosis, diagnostic imaging review, conservative vs surgical intervention GR  HEP review   GR       Scaption in side lying with UE " "ranger  x30   NV  YTB 3x10      UBE  3' / 3' 3' / 3' L3 3'/3' L3 3'/3' 3' / 3' L4 4' / 4'      Pulleys  20x5\" 4 min 4 min 4'         Finger ladder  10x5\" with eccentric lowering           Shoulder abduction AROM in side lying  Unable           ER AROM in side lying  Unable           Biceps curls   2# 2x10 2# 2x10 2#  2x10        Triceps pull down   GTB 3x10 GTB 3x10 GTB  3x10        IR, ER, flex, abd isometrics  15x5\"  15x5\" 15x5\" ea        Bent over row  2# 2x10 2# 3x10 2# 3x10 NV        Shoulder flexion, abduction AAROM with SPC with eccentric lowering   20x ea. 20x ea 20x ea        Standing shoulder flexion AAROM with UE ranger       2# 2x10 with eccentric lowering      Standing shoulder abduction AAROM with UE ranger       Unable                                                          Ther Activity                                       Gait Training                                       Modalities                                              "

## 2024-11-13 ENCOUNTER — HOSPITAL ENCOUNTER (OUTPATIENT)
Dept: RADIOLOGY | Facility: HOSPITAL | Age: 74
Discharge: HOME | End: 2024-11-13
Payer: COMMERCIAL

## 2024-11-13 ENCOUNTER — OFFICE VISIT (OUTPATIENT)
Dept: NEUROSURGERY | Facility: CLINIC | Age: 74
End: 2024-11-13
Payer: COMMERCIAL

## 2024-11-13 VITALS
DIASTOLIC BLOOD PRESSURE: 80 MMHG | TEMPERATURE: 96.7 F | BODY MASS INDEX: 27.58 KG/M2 | HEIGHT: 71 IN | HEART RATE: 39 BPM | WEIGHT: 197 LBS | OXYGEN SATURATION: 99 % | SYSTOLIC BLOOD PRESSURE: 150 MMHG

## 2024-11-13 DIAGNOSIS — S12.401S CLOSED NONDISPLACED FRACTURE OF FIFTH CERVICAL VERTEBRA, UNSPECIFIED FRACTURE MORPHOLOGY, SEQUELA: ICD-10-CM

## 2024-11-13 DIAGNOSIS — I77.3 FIBROMUSCULAR DYSPLASIA OF BOTH CAROTID ARTERIES (HCC): ICD-10-CM

## 2024-11-13 DIAGNOSIS — S12.401S CLOSED NONDISPLACED FRACTURE OF FIFTH CERVICAL VERTEBRA, UNSPECIFIED FRACTURE MORPHOLOGY, SEQUELA: Primary | ICD-10-CM

## 2024-11-13 DIAGNOSIS — S12.400A C5 CERVICAL FRACTURE (HCC): ICD-10-CM

## 2024-11-13 PROCEDURE — 72050 X-RAY EXAM NECK SPINE 4/5VWS: CPT

## 2024-11-13 PROCEDURE — 99214 OFFICE O/P EST MOD 30 MIN: CPT | Performed by: NURSE PRACTITIONER

## 2024-11-13 NOTE — PATIENT INSTRUCTIONS
I have instructed the patient to wean from their cervical collar.  They should do this by not wearing the collar 1-2 hours per day for the next week, then 2-4 hours per day the week following, then 4-8 hours per day the subsequent week, such that within 3-4 weeks they will have weand out of the collar entirely. These periods without the collar should be during more sedentary periods, e.g. reading, watching TV, having meals, etc. They should continue to wear the collar when doing strenuous activity (such as therapy etc.) until the weaning period is complete. They do not need to wear the collar while sleeping, but can during this weaning period if by doing so they feel more comfortable.

## 2024-11-13 NOTE — PROGRESS NOTES
Ambulatory Visit  Name: Aron Oswald      : 1950      MRN: [id number]  Encounter Provider: BARBARA Ramirez  Encounter Date: 2024   Encounter department: [facility]    Assessment & Plan  Closed nondisplaced fracture of fifth cervical vertebra, unspecified fracture morphology, sequela    Orders:    CTA neck w wo contrast; Future    Fibromuscular dysplasia of both carotid arteries (HCC)  See above plan         C5 cervical fracture (HCC)  Patient seen in outpatient office today for further follow-up of C5 fractures  Patient originally presented to the hospital on 2024 after he sustained a fall out of bed.  Imaging demonstrated a C5 inferior endplate fracture with left C5 lamina fracture.  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  He was placed on aspirin 81 mg which he continues today.  While hospitalized he was noted to have significant right shoulder weakness.    Studies:    EMG right upper extremity: Normal    Plan:  Reviewed prior studies and imaging with patient and wife.  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  Patient is roughly 11 weeks out from injury.  He denies any neck pain and no pain with ROM except soreness behind his right ear at times.  Will send patient for cervical flexion/extension x-rays today, will call patient with results.  If these are stable he can slowly wean himself out of the collar with instructions provided.  Patient with new right ear pain/soreness at times especially looking to the right with history of cervical ICA FMD will order CTA neck to be completed in the next week.  Wanted CTA done sooner but patient wanted imaging done at [place] and the next available appointment was in the next few weeks.  Will call patient with  "results of cervical x-rays  Patient made aware to seek care sooner if he develops any new or worsening neurological change or red flag signs  Patient made aware to contact neurosurgery with any further questions or concerns.                 History of Present Illness     Aron Oswald is a 73yo male with past medical history significant for hypertension, ED, right shoulder pain, urinary urgency, FMD of bilateral carotid arteries, and bradycardia.  Patient seen in outpatient office today for further workup and evaluation of C5 fractures.    Patient originally presented to the hospital on 8/27/2024 after he sustained a fall out of bed.  Imaging work up demonstrated C5 inferior endplate fracture with left C5 lamina fracture.  Patient had neck and back pain after his fall with right arm weakness, initially could not move arm at all but then had ongoing focal shoulder weakness.  He was treated conservatively in a Vista collar with x-rays.  Outpatient EMG was recommended if there was no improvement in his symptoms.  CTA also with concerns for possible FMD versus grade 1 injury of the cervical ICA bilaterally.  Patient was started on aspirin 81 mg.    Patient was seen by orthopedics and thought it was his rotator cuff contributing to his weakness and he has an MRI of his right shoulder pending.  He also underwent an EMG which was normal.  He has had improvement since his hospitalization and is right shoulder strength.  He underwent a repeat CTA which demonstrated stable distal cervical ICA FMD, patient is to continue aspirin 81 mg.    Patient states he is doing well since he last saw us he denies any neck pain he does endorse some right shoulder soreness.  He states over the past weeks he has noticed a \"flash\" of pain/soreness behind his right ear which does not bother him right now but at times does especially if he turns his head to the right.  Patient is compliant with his collar.  He is doing physical therapy which has " "helped with his shoulder.  He continues to follow with orthopedics.  He denies any recent falls or traumas or difficulty with his balance.  He offers no other complaints    HPI  Review of Systems   Constitutional: Negative.    HENT:  Positive for ear pain (Right ear pops when yawns). Negative for tinnitus and trouble swallowing.    Eyes: Negative.    Respiratory: Negative.     Cardiovascular: Negative.    Gastrointestinal: Negative.    Endocrine: Negative.    Genitourinary: Negative.    Musculoskeletal: Negative.  Negative for gait problem, myalgias, neck pain and neck stiffness.        F/U  C 5 fx- s/p fall OOB on 8/27/24  EMG 10/24/24  CTA neck 10/8/24  XR C/S 10/8/24  Wearing neck brace  Aspirin/never/no previous neck sx  Recently c/o burning sensation outside and above left knee   Skin: Negative.    Allergic/Immunologic: Negative.    Neurological: Negative.    Hematological:  Bruises/bleeds easily.   Psychiatric/Behavioral: Negative.  Negative for sleep disturbance.    All other systems reviewed and are negative.    ROS reviewed with patient and agree and changes are made as needed    /80 (Patient Position: Sitting, Cuff Size: Standard)   Pulse (!) 39   Temp (!) 96.7 °F (35.9 °C) (Temporal)   Ht 5' 11\" (1.803 m)   Wt 89.4 kg (197 lb)   SpO2 99%   BMI 27.48 kg/m²     Physical Exam  Vitals reviewed.   Constitutional:       General: He is awake. He is not in acute distress.     Appearance: Normal appearance. He is not ill-appearing.      Comments: Vista collar in place   HENT:      Head: Normocephalic and atraumatic.   Eyes:      Extraocular Movements: Extraocular movements intact and EOM normal.      Conjunctiva/sclera: Conjunctivae normal.      Pupils: Pupils are equal, round, and reactive to light.   Neck:      Comments: When collar removed no pain with ROM of neck just stiffness  But when turning to the right he was some limited movement and pain/soreness behind right ear  Cardiovascular:      Rate " and Rhythm: Bradycardia present.   Pulmonary:      Effort: Pulmonary effort is normal. No respiratory distress.   Chest:      Chest wall: No tenderness.   Abdominal:      General: There is no distension.      Palpations: Abdomen is soft.      Tenderness: There is no abdominal tenderness.   Musculoskeletal:         General: Normal range of motion.      Cervical back: No tenderness. No spinous process tenderness or muscular tenderness.   Skin:     General: Skin is warm and dry.   Neurological:      Mental Status: He is alert and oriented to person, place, and time.      Gait: Gait is intact.      Deep Tendon Reflexes:      Reflex Scores:       Bicep reflexes are 1+ on the right side and 1+ on the left side.       Patellar reflexes are 1+ on the right side and 1+ on the left side.  Psychiatric:         Attention and Perception: Attention and perception normal.         Mood and Affect: Mood and affect normal.         Speech: Speech normal.         Behavior: Behavior normal. Behavior is cooperative.         Thought Content: Thought content normal.         Cognition and Memory: Cognition and memory normal.         Judgment: Judgment normal.       Neurologic Exam     Mental Status   Oriented to person, place, and time.   Follows 2 step commands.   Attention: normal. Concentration: normal.   Speech: speech is normal   Level of consciousness: alert  Knowledge: good. Able to perform simple calculations.   Able to name object.     Cranial Nerves     CN III, IV, VI   Pupils are equal, round, and reactive to light.  Extraocular motions are normal.   CN III: no CN III palsy  CN VI: no CN VI palsy  Conjugate gaze: present    CN V   Facial sensation intact.     CN VII   Facial expression full, symmetric.     CN VIII   CN VIII normal.     CN XI   CN XI normal.     CN XII   CN XII normal.     Motor Exam   Muscle bulk: normal  Overall muscle tone: normal    Strength   Strength 5/5 except as noted. R deltoid 4/5  Limited ROM of R arm,  can not pick arm up above head on right side     Sensory Exam   Light touch normal.     Gait, Coordination, and Reflexes     Gait  Gait: normal    Reflexes   Right biceps: 1+  Left biceps: 1+  Right patellar: 1+  Left patellar: 1+  Right Torres: absent  Left Torres: absent  Right ankle clonus: absent  Left ankle clonus: absent      SL AMB Radiology Results Review: I personally reviewed the following image studies in PACS and associated radiology reports: CT C-spine and xray(s). My interpretation of the radiology images/reports is:  .

## 2024-11-13 NOTE — TELEPHONE ENCOUNTER
Wife Kimberly called upset and frustrated due to patient's appointment to be evaluated for dementia being changed further out from March to April now. Wife states she is anxious to have him evaluated as soon as possible. States he is only having autonomic symptoms at the present time and feels it is important for him to be seen now to get a baseline. She is afraid the longer they wait for the appointment the more symptoms patient will begin to have. She also states she was hoping to have recommendations for support groups etc for initial diagnosis.    Please advise

## 2024-11-14 ENCOUNTER — TELEPHONE (OUTPATIENT)
Dept: NEUROSURGERY | Facility: CLINIC | Age: 74
End: 2024-11-14

## 2024-11-14 ENCOUNTER — TELEPHONE (OUTPATIENT)
Age: 74
End: 2024-11-14

## 2024-11-14 NOTE — TELEPHONE ENCOUNTER
Left message with SOKimberly, regarding messages sent 11/13/2024 and 11/14/2024 in regards to appointment changes and wanting  to be seen sooner. Was also requesting information on resources/support groups. I did state in message that information she is requesting will be forwarded to provider and any advice will be relayed back to her. Also informed on message that her husbands appointment is listed on the wait list should a sooner appointment become available.

## 2024-11-14 NOTE — TELEPHONE ENCOUNTER
Spoke with Aron over the phone.  Reviewed his cervical flexion/extension x-rays which does not demonstrate any instability or subluxation.  Patient can slowly wean himself out of the collar over the next few weeks with instructions provided.    Patient made aware to contact neurosurgery with any new or worsening symptoms otherwise he will follow-up once CTA neck completed in the next few weeks.  All questions were answered.  Patient made aware to contact neurosurgery with any further question concerns.

## 2024-11-14 NOTE — TELEPHONE ENCOUNTER
Received call from pt's wife stating that she was upset that she hasn't heard back from anyone regarding her 's appt being moved without her being informed. SO states that she would like a earlier appt to establish a neurologic baseline, that pt does not exhibit and dementia symptoms but does exhibit some autonomic nervous system symptoms.  SO is requesting a return phone call if possible. Please review and advise.

## 2024-11-14 NOTE — TELEPHONE ENCOUNTER
Pt's SO returned missed call from [facility]. Contact made with .  [facility] coordinator will call SO tomorrow in AM, if call is missed again SO advised to call [facility] and she will be connected to the .  SO verbalized understanding. Please review.

## 2024-11-14 NOTE — ASSESSMENT & PLAN NOTE
Patient seen in outpatient office today for further follow-up of C5 fractures  Patient originally presented to the hospital on 8/27/2024 after he sustained a fall out of bed.  Imaging demonstrated a C5 inferior endplate fracture with left C5 lamina fracture.  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  He was placed on aspirin 81 mg which he continues today.  While hospitalized he was noted to have significant right shoulder weakness.    Studies:    EMG right upper extremity: Normal    Plan:  Reviewed prior studies and imaging with patient and wife.  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  Patient is roughly 11 weeks out from injury.  He denies any neck pain and no pain with ROM except soreness behind his right ear at times.  Will send patient for cervical flexion/extension x-rays today, will call patient with results.  If these are stable he can slowly wean himself out of the collar with instructions provided.  Patient with new right ear pain/soreness at times especially looking to the right with history of cervical ICA FMD will order CTA neck to be completed in the next week.  Wanted CTA done sooner but patient wanted imaging done at [facility] and the next available appointment was in the next few weeks.  Will call patient with results of cervical x-rays  Patient made aware to seek care sooner if he develops any new or worsening neurological change or red flag signs  Patient made aware to contact neurosurgery with any further questions or concerns.

## 2024-11-18 ENCOUNTER — TELEPHONE (OUTPATIENT)
Age: 74
End: 2024-11-18

## 2024-11-18 ENCOUNTER — OFFICE VISIT (OUTPATIENT)
Age: 74
End: 2024-11-18
Payer: COMMERCIAL

## 2024-11-18 VITALS
SYSTOLIC BLOOD PRESSURE: 168 MMHG | OXYGEN SATURATION: 98 % | WEIGHT: 197.8 LBS | DIASTOLIC BLOOD PRESSURE: 80 MMHG | BODY MASS INDEX: 27.69 KG/M2 | HEART RATE: 40 BPM | HEIGHT: 71 IN

## 2024-11-18 DIAGNOSIS — G47.19 EXCESSIVE DAYTIME SLEEPINESS: Primary | ICD-10-CM

## 2024-11-18 DIAGNOSIS — R00.1 BRADYCARDIA: ICD-10-CM

## 2024-11-18 DIAGNOSIS — R06.83 SNORING: ICD-10-CM

## 2024-11-18 DIAGNOSIS — G47.52 DREAM ENACTMENT BEHAVIOR: ICD-10-CM

## 2024-11-18 PROCEDURE — 99204 OFFICE O/P NEW MOD 45 MIN: CPT | Performed by: INTERNAL MEDICINE

## 2024-11-18 NOTE — PROGRESS NOTES
Sleep Consultation   Aron Oswald 74 y.o. male MRN: [id number]      Reason for consultation: Dream enactment behavior    Requesting physician: Jose Harmon MD    Assessment/Plan    Suspected sleep apnea  Mallampati class 4, Body mass index is 27.59 kg/m²., Neck Circumference: 17.    He is at risk for obstructive sleep apnea based on STOP BANG survey based on snoring, tiredness, observed apneas, elevated blood pressure, age, male gender  S/s: Snoring, choking, gasping, witnessed apneas, excessive daytime sleepiness  [place] score: 13  I discussed in depth the diagnostic studies and treatment options involved with obstructive sleep apnea  I also discussed in depth the risk of leaving sleep apnea untreated including hypertension, heart failure, arrhythmia, MI and stroke.  The patient is agreeable to undergo testing and treatment of obstructive sleep apnea.      Plan  Ordered in lab diagnostic polysomnogram with RBD montage  Patient is amenable to CPAP    2.  Snoring  Reports loud snoring  I will monitor for improvement with treatment    3.  Excessive daytime sleepiness  [place] score of 13  I will monitor for improvement with treatment    4.  Dream enactment behavior  Described below  Has had episodes for the past 8 to 10 years  Recently had falls from the bed  Counseled patient on safety precautions    Plan  Ordered diagnostic PSG with RBD montage  Discussed possibility of neurological disorder in the future as patient reports anosmia and constipation    5.  Bradycardia  Blood pressure elevated and pulse is 40 bpm  He denies symptoms  Cardiology visit is scheduled for January, recommended he call cardiology and expedite and speak with his primary care physician    History of Present Illness   HPI:  Aron Oswald is a 74 y.o. male with PMHx Hypertension, PVCs, thyroid nodule who presents for evaluation of dream enactment behavior.  He reports excessive daytime sleepiness with an [place] score of 13.  He reports  choking and gasping during sleep.  He has been told he stops breathing at night.  His memory and concentration is decreased.  He goes to bed at 11:30 PM and falls asleep within 30 minutes.  He wakes up 1-2 times at night to urinate.  He wakes up at 5 AM.  He gets 8 hours of sleep and sometimes feels refreshed afterwards.  He drinks coffee in the morning.  He occasionally takes a nap but feels worse after a nap.  He reports snoring but is not loud and intermittent.     In August 2024 he fell on the floor from the bed. He noticed right arm weakness afterwards despite falling on left side. He has had dream enactment episodes 1-2 times a year where he fell on the floor. His wife states that this has been going on for years (8-10 years).     His wife states that he has a decreased sense of smell.  He has had constipation.          Review of Systems      Genitourinary none   Cardiology none   Gastrointestinal none   Neurology none   Constitutional none   Integumentary none   Psychiatry none   Musculoskeletal none   Pulmonary none   ENT none   Endocrine none   Hematological none         Historical Information   Past Medical History:   Diagnosis Date    Anxiety     COVID-19 virus infection 12/05/2022    Fractures 8/27/2024    C5 - fall from bed, right arm weakness    Hypertension     Refusal of blood transfusions as patient is [employer]ist      Past Surgical History:   Procedure Laterality Date    APPENDECTOMY       Family History   Problem Relation Age of Onset    No Known Problems Mother     Cancer Father         stomach    Diabetes Father      Social History     Socioeconomic History    Marital status: /Civil Union     Spouse name: Not on file    Number of children: Not on file    Years of education: Not on file    Highest education level: Not on file   Occupational History    Occupation: retired     Comment: construction   Tobacco Use    Smoking status: Never    Smokeless tobacco: Never   Vaping Use     "Vaping status: Never Used   Substance and Sexual Activity    Alcohol use: Not Currently    Drug use: Never    Sexual activity: Yes     Partners: Female   Other Topics Concern    Not on file   Social History Narrative     with kids and grandkids     Social Drivers of Health     Financial Resource Strain: Low Risk  (5/16/2023)    Overall Financial Resource Strain (CARDIA)     Difficulty of Paying Living Expenses: Not hard at all   Food Insecurity: No Food Insecurity (10/22/2024)    Hunger Vital Sign     Worried About Running Out of Food in the Last Year: Never true     Ran Out of Food in the Last Year: Never true   Transportation Needs: No Transportation Needs (10/22/2024)    PRAPARE - Transportation     Lack of Transportation (Medical): No     Lack of Transportation (Non-Medical): No   Physical Activity: Not on file   Stress: Not on file   Social Connections: Not on file   Intimate Partner Violence: Not on file   Housing Stability: Low Risk  (10/22/2024)    Housing Stability Vital Sign     Unable to Pay for Housing in the Last Year: No     Number of Times Moved in the Last Year: 1     Homeless in the Last Year: No       Occupational History: Retired    Meds/Allergies   Allergies   Allergen Reactions    Penicillins Other (See Comments)       Home medications:  Prior to Admission medications    Medication Sig Start Date End Date Taking? Authorizing Provider   aspirin 81 mg chewable tablet Chew 1 tablet (81 mg total) daily 8/31/24  Yes Alicia Boland PA-C       Vitals:   Blood pressure 168/80, pulse (!) 40, height 5' 11\" (1.803 m), weight 89.7 kg (197 lb 12.8 oz), SpO2 98%.,  Body mass index is 27.59 kg/m².  Neck Circumference: 17    Physical Exam  General: Awake alert and oriented x 3, conversant without conversational dyspnea, NAD, normal affect  HEENT:  PERRL, Sclera noninjected, nonicteric OU, Nares patent,  no craniofacial abnormalities, Mucous membranes, moist, no oral lesions, normal dentition, " "Mallampati class 4  NECK:  Trachea midline, no accessory muscle use, no stridor, no cervical or supraclavicular adenopathy, JVP not elevated  CARDIAC: Reg, single s1/S2, no m/r/g  PULM: CTA bilaterally no wheezing, rhonchi or rales  EXT: No cyanosis, no clubbing, no edema, normal capillary refill  NEURO: no focal neurologic deficits, AAOx3, moving all extremities appropriately    Labs: I have personally reviewed pertinent lab results.  Lab Results   Component Value Date    WBC 8.25 09/17/2024    HGB 14.2 09/17/2024    HCT 43.4 09/17/2024     (H) 09/17/2024     09/17/2024      Lab Results   Component Value Date    CALCIUM 8.6 08/30/2024    K 3.8 08/30/2024    CO2 26 08/30/2024     08/30/2024    BUN 18 08/30/2024    CREATININE 1.15 08/30/2024     Lab Results   Component Value Date    IRON 57 08/30/2024    TIBC 233 (L) 08/30/2024    FERRITIN 80 08/30/2024     Lab Results   Component Value Date    [id number] 285 08/29/2024     No results found for: \"FOLATE\"      Arterial Blood Gas result:  DHRUV Rubio MD  [facility] Sleep Medicine   "

## 2024-11-18 NOTE — TELEPHONE ENCOUNTER
[facility]  [address], [address]  [place] [address]  [phone]    Social Work Intake    MSW spoke with patient's spouse, Kimberly, to complete social work intake via phone, for patient's upcoming geriatric assessment on 11/20/24 with BARBARA [facility].     Social/Family History   Marital status:                                        Spouse's Name:  Kimberly     Does patient have children?  2 children who are not involved  (If yes, where do the children live?)   Family members assisting patient at home: Kimberly   Who is available to provide care in case of illness or crisis (please specify relation to patient / level of care able to provide)? Kimberly        Employment and Education   Education: Highest Level of Education: Trade School     Currently Employed: No    Retired:  Age 59    Type of employment: /contractor   : No    Benefits (if applicable): N/A     Lifestyle/Community Services   Clubs and Organizations: Muslim group   Major life events in past two years (ex: [facility], death in family, major illness etc.): Son in [place] and daughter has dropped contact with patient/patient spouse, which is upsetting to patient.  Did have major medical surgery   Have you ever used any community-based services (ex. homecare, waiver services, meal delivery service, or respite care?): No        Financial   Total Monthly income: Not disclosed     Source of income: Social Security  and Pension   Meet current needs? Yes     Funds available for home care?  No     Funds available for nursing home? No    Do you rent or own your home? Own       ACP REVIEW:  Does patient have POA: No  Does patient have a Living will: Yes  Any legal assistance needed for healthcare planning?: Yes - would like information to get POA  For all patients, we encourage seeing a  to establish a Financial Power of , a Health Care Power of , and an Advanced  "Directive (living will). Particularly for patients experiencing memory concerns, we strongly recommend that this is completed as soon as possible.     Safety Assessment  Is the patient still driving?  Yes, just not currently due to his neck brace.   Any concerns with patients ability to drive and recent driving accidents or citations in the last year? No, no concerns.  Spouse prefers patient drives and feels he is a safe    If the patient is not driving, do they have access to transportation?  Spouse is currently providing all transportation.      Is the patient taking medications as prescribed?  Yes, but he only takes one medication and does well with same     Is there a system in place for medication management? Patient is independent with all medication management and spouse reports there have never been any concerns about this   Has the patient been involved in any scams? No    Are firearms or weapons in the home? No  Recommendations per [employer]: removing them from the home, keep ammunition stored separately, encourage patient to consider \"gifting\" them, if necessary, ask local law enforcement for assistance in removing the firearms from the home. The family may receive compensation from a gun buy-back program.    Does the patient use an assistive device?  No  Any difficulty with gait or balance?  No, spouse just concerns about his posture from neck collar  Do you need any medical equipment at this time? No  Does the patient have difficulty with hearing or vision?  No concerns with vision.  Patient does only need readers.  Patient does have hearing problems, likely job related.  Patient has wax build up and fluid in the ears and will be getting tubes after the new year.      Any falls in the last year?  Fall in August 2024, OOB, with injury (fx cervical spine)     Any history of wandering? No    Does the patient live alone?  Home with spouse  What is the layout of the home, do they have difficulty " "with stairs? Two story bi-level.  Down stairs is technically ground level, should there be any time they are unable to do the stairs.  No concerns with stair management at this time  Do you feel comfortable leaving the patient home alone?  Yes and no, spouse feels most comfortable for only short periods of time.      Have you noticed any burned pans or other signs of issues with the stove or other appliances? No   Do you have any concerns about the patient's cooking or eating habits? No - Patient and spouse live a very healthy lifestyle.  Were doing the Keto diet prior to patient's fall  Have any of the patient's utilities been shut off in the last 12 months? No  Are there any concerns with pests, mold, lead, heat, water access? No  Do you have working smoke detectors and fire extinguishers in the home? Yes    To the best of your knowledge has the patient experienced any violence or abuse in the last 12 months? No  Have you thought about when it will no longer be safe for the patient to be left alone or any future planning if their memory were to decline and they have an increase in care needs? Spouse reports she has thought about future planning (I.e. home care, placement, insurance, etc) but there is no direct plan at this time.      Caregiver Review  Do you feel like you have a basic understanding of dementia/memory loss? Yes     Do you have any needs in caring for a person living with dementia? No    Do you have social supports? Yes      Spouse reports no \"obvious\" cognitive decline.  One instance of hallucination, but this only ever happened once.  Spouse is a retired nurse.  Spouse reports autonomic symptoms.    Education/resources to be provided to patient/family in person or via mail:   MSW to provide the following resources during visit:  -Elder Law  list  -[facility] Legal and Financial Planning  -[facility] Resource Guide  "

## 2024-11-19 ENCOUNTER — HOSPITAL ENCOUNTER (OUTPATIENT)
Dept: SLEEP CENTER | Facility: CLINIC | Age: 74
Discharge: HOME | End: 2024-11-19
Payer: COMMERCIAL

## 2024-11-19 DIAGNOSIS — G47.19 EXCESSIVE DAYTIME SLEEPINESS: ICD-10-CM

## 2024-11-19 PROCEDURE — 95810 POLYSOM 6/> YRS 4/> PARAM: CPT | Performed by: INTERNAL MEDICINE

## 2024-11-19 PROCEDURE — 95810 POLYSOM 6/> YRS 4/> PARAM: CPT

## 2024-11-20 ENCOUNTER — TELEPHONE (OUTPATIENT)
Age: 74
End: 2024-11-20

## 2024-11-20 PROBLEM — G47.33 OSA (OBSTRUCTIVE SLEEP APNEA): Status: ACTIVE | Noted: 2024-11-20

## 2024-11-20 NOTE — PROGRESS NOTES
Sleep Study Documentation    Pre-Sleep Study       Sleep testing procedure explained to patient:YES    Patient napped prior to study:YES- less than 30 minutes. Napped after 2PM: yes    Caffeine:Dayshift worker after 12PM.  Caffeine use:YES- coffee  6 ounces    Alcohol:Dayshift workers after 5PM: Alcohol use:NO    Typical day for patient:YES       Study Documentation    Sleep Study Indications: snoring and excessive daytime sleepiness and RBD    Sleep Study: Diagnostic   Snore:Mild  Supplemental O2: no    Minimum SaO2 93  Baseline SaO2 95      EKG abnormalities: yes: PVC's noted throughout the study     EEG abnormalities: no    Were abnormal behaviors in sleep observed:NO    Is Total Sleep Study Recording Time < 2 hours: N/A    Is Total Sleep Study Recording Time > 2 hours but study is incomplete: N/A    Is Total Sleep Study Recording Time 6 hours or more but sleep was not obtained: NO      Post-Sleep Study    Medication used at bedtime or during sleep study:YES over the counter sleep aid    Patient reports time it took to fall asleep:30 to 60 minutes    Patient reports waking up during study:3 or more times.  Patient reports returning to sleep in 10 to 30 minutes.    Patient reports sleeping 2 to 4 hours without dreaming.    Does the Patient feel this is a typical night of sleep:worse than usual    Patient rated sleepiness: Somewhat sleepy or tired    PAP treatment:no.

## 2024-11-20 NOTE — TELEPHONE ENCOUNTER
Left message on spouse's phone, Kimberly, to call back and reschedule missed appointment for patient that was for today, 11/20/2024 at 10:30 am. Should spouse call back, please reschedule patient for next available with BARBARA Hair.

## 2024-11-21 PROBLEM — Z00.00 MEDICARE ANNUAL WELLNESS VISIT, SUBSEQUENT: Status: RESOLVED | Noted: 2021-07-28 | Resolved: 2024-11-21

## 2024-11-26 ENCOUNTER — APPOINTMENT (OUTPATIENT)
Dept: CT IMAGING | Facility: CLINIC | Age: 74
End: 2024-11-26
Payer: COMMERCIAL

## 2024-11-26 ENCOUNTER — HOSPITAL ENCOUNTER (OUTPATIENT)
Dept: MRI IMAGING | Facility: CLINIC | Age: 74
Discharge: HOME | End: 2024-11-26
Payer: COMMERCIAL

## 2024-11-26 DIAGNOSIS — M24.811 INTERNAL DERANGEMENT OF RIGHT SHOULDER: ICD-10-CM

## 2024-11-26 PROCEDURE — 73221 MRI JOINT UPR EXTREM W/O DYE: CPT

## 2024-11-27 ENCOUNTER — TELEPHONE (OUTPATIENT)
Dept: NEUROSURGERY | Facility: CLINIC | Age: 74
End: 2024-11-27

## 2024-11-27 ENCOUNTER — HOSPITAL ENCOUNTER (OUTPATIENT)
Dept: CT IMAGING | Facility: CLINIC | Age: 74
Discharge: HOME | End: 2024-11-27
Payer: COMMERCIAL

## 2024-11-27 DIAGNOSIS — S12.401S CLOSED NONDISPLACED FRACTURE OF FIFTH CERVICAL VERTEBRA, UNSPECIFIED FRACTURE MORPHOLOGY, SEQUELA: ICD-10-CM

## 2024-11-27 PROCEDURE — 70498 CT ANGIOGRAPHY NECK: CPT

## 2024-11-27 RX ADMIN — IOHEXOL 75 ML: 350 INJECTION, SOLUTION INTRAVENOUS at 11:21

## 2024-11-27 NOTE — TELEPHONE ENCOUNTER
Patient transferred to this RN from [facility],    Patient stated that he is in the process of weaning out of the cervical collar, he has CT of neck scheduled today and he wanted to ask if his imaging should be expanded.  He reports that the pain that he noted to CRNP Check-[facility]ye at [facility] 11/13/2024 is now occurring on the left intermittently, when occurring pain is maybe a 1/10.  Patient denies any changes in vision, no c/o dizziness, slurred speech or difficulty ambulating, eating, swallowing, no c/o headache.      Red flag s/s reviewed with patient, patient aware that should they occur that he would need to seek immediate ER care.  .    Patient informed that the imaging will be covering his whole neck not just the right side so the imaging that is ordered will cover his needs.      Patient informed that in the absence of any red flag s/s there are no additional interventions at this time.     Pt reminded of next apt and the intent to review the imaging at that appointment, Patient educated on the immediate findings process informing that if there were changes on th imaging that were emergent or urgent it would not sit till his next OV that we would be reaching out sooner.  Patient appreciative for this information.    This RN to forward information to CRNP Check-Tye as KYLER.  Patient agreeable with plan.

## 2024-12-02 ENCOUNTER — TELEPHONE (OUTPATIENT)
Age: 74
End: 2024-12-02

## 2024-12-02 DIAGNOSIS — I10 ESSENTIAL HYPERTENSION: Primary | ICD-10-CM

## 2024-12-02 DIAGNOSIS — N52.9 ERECTILE DYSFUNCTION, UNSPECIFIED ERECTILE DYSFUNCTION TYPE: ICD-10-CM

## 2024-12-02 RX ORDER — SILDENAFIL 50 MG/1
50 TABLET, FILM COATED ORAL DAILY PRN
Qty: 10 TABLET | Refills: 5 | Status: SHIPPED | OUTPATIENT
Start: 2024-12-02

## 2024-12-02 RX ORDER — AMLODIPINE BESYLATE 5 MG/1
5 TABLET ORAL DAILY
Qty: 100 TABLET | Refills: 3 | Status: SHIPPED | OUTPATIENT
Start: 2024-12-02

## 2024-12-02 NOTE — TELEPHONE ENCOUNTER
Done and done, let pt know.  I also recommend follow-up in office as scheduled, sooner if having a problem,  for check of blood pressure since restarting the blood pressure medication

## 2024-12-02 NOTE — TELEPHONE ENCOUNTER
Pt. Called and said his BP was a little high and he wanted Dr. Harmon to send in for the medications he was on before he believes it was Amlodipine 5mg. And also he would like 50 mg. Sildenafil.  He has an appt. To see the doctor on 1/22.  If he needs to come in sooner please call the pt. And schedule him.  Ty

## 2024-12-04 ENCOUNTER — OFFICE VISIT (OUTPATIENT)
Dept: OBGYN CLINIC | Facility: CLINIC | Age: 74
End: 2024-12-04
Payer: COMMERCIAL

## 2024-12-04 ENCOUNTER — HOSPITAL ENCOUNTER (OUTPATIENT)
Dept: ULTRASOUND IMAGING | Facility: CLINIC | Age: 74
Discharge: HOME | End: 2024-12-04
Payer: COMMERCIAL

## 2024-12-04 VITALS
HEIGHT: 71 IN | BODY MASS INDEX: 27.58 KG/M2 | DIASTOLIC BLOOD PRESSURE: 65 MMHG | WEIGHT: 197 LBS | HEART RATE: 44 BPM | OXYGEN SATURATION: 97 % | SYSTOLIC BLOOD PRESSURE: 148 MMHG

## 2024-12-04 DIAGNOSIS — E04.1 THYROID NODULE: ICD-10-CM

## 2024-12-04 DIAGNOSIS — M75.41 SUBACROMIAL IMPINGEMENT OF RIGHT SHOULDER: ICD-10-CM

## 2024-12-04 DIAGNOSIS — S46.211D BICEPS STRAIN, RIGHT, SUBSEQUENT ENCOUNTER: ICD-10-CM

## 2024-12-04 DIAGNOSIS — M75.121 NONTRAUMATIC COMPLETE TEAR OF RIGHT ROTATOR CUFF: ICD-10-CM

## 2024-12-04 DIAGNOSIS — M75.101 ROTATOR CUFF SYNDROME OF BOTH SHOULDERS: ICD-10-CM

## 2024-12-04 DIAGNOSIS — M19.011 ARTHRITIS OF RIGHT GLENOHUMERAL JOINT: Primary | ICD-10-CM

## 2024-12-04 DIAGNOSIS — M62.838 TRAPEZIUS MUSCLE SPASM: ICD-10-CM

## 2024-12-04 DIAGNOSIS — M75.102 ROTATOR CUFF SYNDROME OF BOTH SHOULDERS: ICD-10-CM

## 2024-12-04 PROCEDURE — 99214 OFFICE O/P EST MOD 30 MIN: CPT | Performed by: FAMILY MEDICINE

## 2024-12-04 PROCEDURE — 20610 DRAIN/INJ JOINT/BURSA W/O US: CPT | Performed by: FAMILY MEDICINE

## 2024-12-04 PROCEDURE — 76536 US EXAM OF HEAD AND NECK: CPT

## 2024-12-04 RX ORDER — CHOLECALCIFEROL (VITAMIN D3) 50 MCG
2000 TABLET ORAL DAILY
COMMUNITY
Start: 2024-11-20

## 2024-12-04 RX ORDER — TRIAMCINOLONE ACETONIDE 40 MG/ML
40 INJECTION, SUSPENSION INTRA-ARTICULAR; INTRAMUSCULAR
Status: COMPLETED | OUTPATIENT
Start: 2024-12-04 | End: 2024-12-04

## 2024-12-04 RX ORDER — BUPIVACAINE HYDROCHLORIDE 2.5 MG/ML
1 INJECTION, SOLUTION INFILTRATION; PERINEURAL
Status: COMPLETED | OUTPATIENT
Start: 2024-12-04 | End: 2024-12-04

## 2024-12-04 RX ORDER — BUPIVACAINE HYDROCHLORIDE 2.5 MG/ML
4 INJECTION, SOLUTION INFILTRATION; PERINEURAL
Status: COMPLETED | OUTPATIENT
Start: 2024-12-04 | End: 2024-12-04

## 2024-12-04 RX ADMIN — TRIAMCINOLONE ACETONIDE 40 MG: 40 INJECTION, SUSPENSION INTRA-ARTICULAR; INTRAMUSCULAR at 13:15

## 2024-12-04 RX ADMIN — BUPIVACAINE HYDROCHLORIDE 1 ML: 2.5 INJECTION, SOLUTION INFILTRATION; PERINEURAL at 13:15

## 2024-12-04 RX ADMIN — BUPIVACAINE HYDROCHLORIDE 4 ML: 2.5 INJECTION, SOLUTION INFILTRATION; PERINEURAL at 13:15

## 2024-12-04 NOTE — PROGRESS NOTES
Assessment/Plan:  Assessment & Plan   Diagnoses and all orders for this visit:    Arthritis of right glenohumeral joint  -     Ambulatory Referral to Physical Therapy; Future    Nontraumatic complete tear of right rotator cuff  -     Ambulatory Referral to Physical Therapy; Future    Subacromial impingement of right shoulder  -     Ambulatory Referral to Physical Therapy; Future  -     Large joint arthrocentesis: R subacromial bursa    Biceps strain, right, subsequent encounter  -     Ambulatory Referral to Physical Therapy; Future    Trapezius muscle spasm  -     Ambulatory Referral to Physical Therapy; Future    Rotator cuff syndrome of both shoulders  -     Ambulatory Referral to Physical Therapy; Future    Other orders  -     Cholecalciferol (Vitamin D) 50 MCG (2000 UT) tablet; Take 2,000 Units by mouth daily  -     Melatonin 5 MG TABS; Take 5 mg by mouth daily at bedtime  -     Psyllium (METAMUCIL 4 IN 1 FIBER PO)  -     Multiple Vitamins-Minerals (Multivitamin Adults) TABS        74-year-old right-hand-dominant male with onset of right shoulder weakness following injury 8/27/2024.  Discussed with patient MRI results, impression, and plan.  The right shoulder noted for complete tears of supraspinatus and spontaneous tendons with retracted tendon fibers to the level of glenoid, there is moderate subscapularis tendinosis, severe cystitis and infraspinatus muscular fatty atrophy.  There is mildly advanced glenohumeral joint degenerative changes with superior subluxation humeral head and moderate AC joint degenerative changes.  Imaging studies and history suggest that symptoms are due to aggravation of degenerative changes and rotator cuff pathology.  I advised that rotator cuff tear is likely chronic.  I discussed with patient that surgical options would likely include arthroplasty.  I advised patient on failing consider management prior to considering surgical invention to which he agreed.  I offered patient  "steroid injection.  I administered mixture 3 cc 0.25% bupivacaine  and 1 cc Kenalog to the right shoulder subacromial space without complication.  He was advised if no improvement will refer to orthopedic surgeon.  He will follow-up as needed.        Subjective:   Patient ID: Aron Oswald is a 74 y.o. male.  Chief Complaint   Patient presents with    Right Shoulder - Follow-up, Pain        74-year-old right-hand-dominant male following for onset of right shoulder weakness after injury 8/27/2024.  He was last seen by me 5 weeks ago at which point he was referred for MRI of the right shoulder due to concern for rotator cuff tear.  He has been increasing pain described as generalized to the shoulder but worse to the lateral aspect, rating distally to the upper arm, worse with moving the arm particularly elevating, associated with limited range of motion and weakness, and improved with resting.  He states that for majority of right arm movements he has to assist with the left upper extremity.  With increasing physical activity he feels worsening pain.      Shoulder Pain  This is a new problem. The current episode started more than 1 month ago. The problem occurs daily. The problem has been unchanged. Associated symptoms include arthralgias and weakness. Pertinent negatives include no joint swelling or numbness. Exacerbated by: Arm use. He has tried rest and position changes (Physical therapy, home exercise) for the symptoms. The treatment provided mild relief.               Review of Systems   Musculoskeletal:  Positive for arthralgias. Negative for joint swelling.   Neurological:  Positive for weakness. Negative for numbness.       Objective:  Vitals:    12/04/24 1234   BP: 148/65   Pulse: (!) 44   SpO2: 97%   Weight: 89.4 kg (197 lb)   Height: 5' 11\" (1.803 m)      Ortho Exam    Physical Exam  Vitals and nursing note reviewed.   Constitutional:       Appearance: Normal appearance. He is well-developed. He is not " "ill-appearing or diaphoretic.   HENT:      Head: Normocephalic and atraumatic.      Right Ear: External ear normal.      Left Ear: External ear normal.   Eyes:      Conjunctiva/sclera: Conjunctivae normal.   Neck:      Trachea: No tracheal deviation.   Cardiovascular:      Comments: Bradycardic  Pulmonary:      Effort: Pulmonary effort is normal. No respiratory distress.   Abdominal:      General: There is no distension.   Skin:     General: Skin is warm and dry.      Coloration: Skin is not jaundiced or pale.   Neurological:      Mental Status: He is alert and oriented to person, place, and time.   Psychiatric:         Mood and Affect: Mood normal.         Behavior: Behavior normal.         Thought Content: Thought content normal.         Judgment: Judgment normal.         I have personally reviewed pertinent films in PACS and my interpretation is  .  Complete tear supraspinatus with tendon retraction and muscle atrophy.  Glenohumeral joint degenerative change.    Large joint arthrocentesis: R subacromial bursa  [facility] Protocol:  procedure performed by consultantConsent: Verbal consent obtained.  Risks and benefits: risks, benefits and alternatives were discussed  Consent given by: patient  Time out: Immediately prior to procedure a \"time out\" was called to verify the correct patient, procedure, equipment, support staff and site/side marked as required.  Patient understanding: patient states understanding of the procedure being performed  Patient consent: the patient's understanding of the procedure matches consent given  Procedure consent: procedure consent matches procedure scheduled  Relevant documents: relevant documents present and verified  Test results: test results available and properly labeled  Site marked: the operative site was marked  Radiology Images displayed and confirmed. If images not available, report reviewed: imaging studies available  Required items: required blood products, implants, " devices, and special equipment available  Patient identity confirmed: verbally with patient  Supporting Documentation  Indications: pain   Procedure Details  Location: shoulder - R subacromial bursa  Preparation: Patient was prepped and draped in the usual sterile fashion  Needle size: 22 G  Ultrasound guidance: no  Approach: lateral  Medications administered: 4 mL bupivacaine 0.25 %; 1 mL bupivacaine 0.25 %; 40 mg triamcinolone acetonide 40 mg/mL    Patient tolerance: patient tolerated the procedure well with no immediate complications  Dressing:  Sterile dressing applied

## 2024-12-05 ENCOUNTER — RESULTS FOLLOW-UP (OUTPATIENT)
Dept: INTERNAL MEDICINE CLINIC | Facility: CLINIC | Age: 74
End: 2024-12-05

## 2024-12-05 DIAGNOSIS — E04.1 THYROID NODULE: Primary | ICD-10-CM

## 2024-12-06 ENCOUNTER — TELEMEDICINE (OUTPATIENT)
Age: 74
End: 2024-12-06

## 2024-12-06 ENCOUNTER — TELEPHONE (OUTPATIENT)
Age: 74
End: 2024-12-06

## 2024-12-06 VITALS — BODY MASS INDEX: 27.58 KG/M2 | WEIGHT: 197 LBS | HEIGHT: 71 IN | RESPIRATION RATE: 20 BRPM

## 2024-12-06 DIAGNOSIS — S12.400A C5 CERVICAL FRACTURE (HCC): Primary | ICD-10-CM

## 2024-12-06 DIAGNOSIS — I77.3 FIBROMUSCULAR DYSPLASIA OF BOTH CAROTID ARTERIES (HCC): ICD-10-CM

## 2024-12-06 NOTE — TELEPHONE ENCOUNTER
Patients wife called in patient recently had a sleep study done and it showed he does have apnea  Patient is interested in getting a CPAP machine and would like to speak with someone from clinical about this     # [phone]  Or  # [phone]

## 2024-12-06 NOTE — PROGRESS NOTES
Virtual Regular Visit  Name: Aron Oswald      : 1950      MRN: [id number]  Encounter Provider: BARBARA Martini  Encounter Date: 2024   Encounter department: [facility]      Verification of patient location:  Patient is located at {[facility] Virtual Patient Location:53429} in the following state in which I hold an active license {[facility] virtual patient location:[address]} :Assessment & Plan        Encounter provider BARBARA Martini    The patient was identified by name and date of birth. Aron Oswald was informed that this is a telemedicine visit and that the visit is being conducted through {AMB VIRTUAL VISIT MEDIUM:07877}.  {Telemedicine confidentiality :56263} {Telemedicine participants:79299}  He acknowledged consent and understanding of privacy and security of the video platform. The patient has agreed to participate and understands they can discontinue the visit at any time.    Patient is aware this is a billable service.     History of Present Illness {?Quick Links Encounters * My Last Note * Last Note in Specialty * Snapshot * Since Last Visit * History :75979}    HPI  Review of Systems    Objective {?Quick Links Trend Vitals * Enter New Vitals * Results Review * Timeline (Adult) * Labs * Imaging * Cardiology * Procedures * Lung Cancer Screening * Surgical eConsent :73094}  There were no vitals taken for this visit.    Physical Exam    Visit Time  Total Visit Duration: ***

## 2024-12-06 NOTE — ASSESSMENT & PLAN NOTE
Possible FMD vs grade 1 injury bilateral cervical ICA  Noted on CTA completed 8/27 s/p fall out of bed during which he sustained C5 fracture.  Started on ASA 81 mg at that time which he continues.  Vitrual exam is non-focal. C/o neck stiffness, pinching when he rotates to right.    Imaging:  CTA neck w/wo, 11/27/24: Negative CTA neck for large vessel occlusion, dissection, aneurysm, or high-grade stenosis. Unchanged findings suggestive of fibromuscular dysplasia in bilateral ICA distal cervical segments. Healed chronic C5 fracture deformity.    Plan:  Reviewed imaging extensively with patient.  Discussed that findings on CTA are consistent with FMD rather than traumatic injury.  Advised to continue ASA 81 mg daily.  Briefly reviewed natural history of FMD.  Referral placed for PT.  No further follow up or treatment needed.

## 2024-12-06 NOTE — PROGRESS NOTES
Name: Aron Oswald      : 1950      MRN: [id number]  Encounter Provider: BARBARA Martini  Encounter Date: 2024   Encounter department: [facility]  :  Assessment & Plan  C5 cervical fracture (HCC)  Resolved - patient weaned from brace at last appointment.    Orders:    Ambulatory Referral to Physical Therapy; Future    Fibromuscular dysplasia of both carotid arteries (HCC)  Possible FMD vs grade 1 injury bilateral cervical ICA  Noted on CTA completed  s/p fall out of bed during which he sustained C5 fracture.  Started on ASA 81 mg at that time which he continues.  Vitrual exam is non-focal. C/o neck stiffness, pinching when he rotates to right.    Imaging:  CTA neck w/wo, 24: Negative CTA neck for large vessel occlusion, dissection, aneurysm, or high-grade stenosis. Unchanged findings suggestive of fibromuscular dysplasia in bilateral ICA distal cervical segments. Healed chronic C5 fracture deformity.    Plan:  Reviewed imaging extensively with patient.  Discussed that findings on CTA are consistent with FMD rather than traumatic injury.  Advised to continue ASA 81 mg daily.  Briefly reviewed natural history of FMD.  Referral placed for PT.  No further follow up or treatment needed.                History of Present Illness   74-year-old male with past medical history of hypertension, DASIA, who presents for follow up for concern for bilateral cervical ICA FMD vs injury. Finding was discovered on CTA completed on  after patient fell out of bed sustaining C5 fracture. He was started on ASA 81 mg daily and treated for fracture with brace and serial x-rays.     Follow up CTA completed in October confirmed that findings likely represented FMD. AT last appointment with our practice on , patient was advised to obtain repeat CTA. He presents today to review those findings.    He states he has been well. He was evaluated by orthopedist for R shoulder  "pain and s/p bursa injection on 12/4. He has not experienced any relief yet. He had sleep study in November and is confused about follow up (I see none scheduled). I advised patient to call [facility] Sleep office and discuss next steps. He continues to endorse occasional pain and stiffness to his neck, especially when turning his face to the side.      Review of Systems   Constitutional: Negative.    HENT:  Positive for ear pain (Right ear pops when yawns). Negative for tinnitus and trouble swallowing.    Eyes: Negative.    Respiratory: Negative.     Cardiovascular: Negative.    Gastrointestinal: Negative.    Endocrine: Negative.    Genitourinary: Negative.    Musculoskeletal:  Positive for neck pain (sharp pain every once in a while) and neck stiffness (turning to the right). Negative for gait problem and myalgias.        F/U  C 5 fx- s/p fall OOB on 8/27/24  EMG 10/24/24  CTA neck 10/8/24  XR C/S 10/8/24  Wearing neck brace  Aspirin/never/no previous neck sx  Recently c/o burning sensation outside and above left knee   Skin: Negative.    Allergic/Immunologic: Negative.    Neurological: Negative.    Hematological:  Bruises/bleeds easily.   Psychiatric/Behavioral: Negative.  Negative for sleep disturbance.    All other systems reviewed and are negative.   I have personally reviewed the MA's review of systems and made changes as necessary.    Pertinent Medical History            Objective   Resp 20   Ht 5' 11\" (1.803 m)   Wt 89.4 kg (197 lb)   BMI 27.48 kg/m²     Physical Exam  Constitutional:       Comments: Virtual visit.   Speech fluid.   Face symmetric.  Moving all extremities.  Aox3.       Neurological Exam    Radiology Results Review: I personally reviewed the following image studies in PACS and associated radiology reports: CT head. My interpretation of the radiology images/reports is: as above.    Administrative Statements   I have spent a total time of 15 minutes in caring for this patient on the " day of the visit/encounter including Diagnostic results, Risks and benefits of tx options, Instructions for management, Patient and family education, Importance of tx compliance, Risk factor reductions, Impressions, Counseling / Coordination of care, Documenting in the medical record, Reviewing / ordering tests, medicine, procedures  , and Obtaining or reviewing history  .

## 2024-12-06 NOTE — ASSESSMENT & PLAN NOTE
Resolved - patient weaned from brace at last appointment.    Orders:    Ambulatory Referral to Physical Therapy; Future

## 2024-12-09 ENCOUNTER — TELEPHONE (OUTPATIENT)
Age: 74
End: 2024-12-09

## 2024-12-09 DIAGNOSIS — G47.33 OSA (OBSTRUCTIVE SLEEP APNEA): Primary | ICD-10-CM

## 2024-12-09 NOTE — TELEPHONE ENCOUNTER
Incoming call from patient's spouse, Kimberly.    Patient stated she called earlier today and has not heard back from Dr. Rubio's office. RN placed Kimberly on a brief hold to try and do a warm transfer to the Sleep medicine office. Unable to warm transfer pt.     Pt requesting a call back today to discuss message below.

## 2024-12-09 NOTE — TELEPHONE ENCOUNTER
Wife and Patient called in as they want to speak directly to Dr Rubio only to discuss Sleep Study results (as they dont feel they received results) as they have ques such as one question is:  pt was advised to take Melatonin prior to Sleep study. However, he ordered sleep study due to acting out during his dreams and Melatonin typically stops that from occurring. So they bother are concern that since he took Melatonin, they did not get a true reading since that would have stopped those behaviors he typically has.     Wife and Patient does not want to speak to any staff members, but wants to speak to Dr Rubio directly due to their questions, as they do not want any information missed during msg.      CB # [phone], may leave a msg with call back number.      Routed to provider

## 2024-12-09 NOTE — TELEPHONE ENCOUNTER
Spoke to patients wife.  I will order CPAP as patient's RDI is elevated indicating sleep apnea.  Discussed him taking melatonin for RBD and increase the dose if he is still having nighttime behaviors.

## 2024-12-10 NOTE — TELEPHONE ENCOUNTER
Notified office staff via Teams DME chat to process CPAP Rx and contact patient to scheduled compliance follow up.

## 2024-12-11 ENCOUNTER — TELEPHONE (OUTPATIENT)
Age: 74
End: 2024-12-11

## 2024-12-11 ENCOUNTER — CONSULT (OUTPATIENT)
Dept: CARDIOLOGY CLINIC | Facility: CLINIC | Age: 74
End: 2024-12-11
Payer: COMMERCIAL

## 2024-12-11 VITALS
DIASTOLIC BLOOD PRESSURE: 60 MMHG | HEIGHT: 71 IN | HEART RATE: 73 BPM | RESPIRATION RATE: 16 BRPM | SYSTOLIC BLOOD PRESSURE: 130 MMHG | OXYGEN SATURATION: 98 % | BODY MASS INDEX: 26.32 KG/M2 | WEIGHT: 188 LBS

## 2024-12-11 DIAGNOSIS — I49.3 PVC (PREMATURE VENTRICULAR CONTRACTION): ICD-10-CM

## 2024-12-11 DIAGNOSIS — I49.8 BIGEMINY: ICD-10-CM

## 2024-12-11 DIAGNOSIS — G47.33 OSA (OBSTRUCTIVE SLEEP APNEA): ICD-10-CM

## 2024-12-11 DIAGNOSIS — I10 PRIMARY HYPERTENSION: Primary | ICD-10-CM

## 2024-12-11 PROCEDURE — 99204 OFFICE O/P NEW MOD 45 MIN: CPT | Performed by: INTERNAL MEDICINE

## 2024-12-11 NOTE — PROGRESS NOTES
[facility]  [address] [place] [address]  Cardiology Consult  Aron Oswald  1950  [id number]    Assessment & Plan  Primary hypertension  -The blood pressure is well controlled with the current medications.  No side effect profile has been noted. He will continue with the current dose of antihypertensive medications.  I have asked him to maintain the blood pressure logs to make the necessary changes in the antihypertensive medications if necessary.    -DASH dietary pattern has been suggested.  Diet rich in fruits, vegetables, whole grains and low-fat dairy products with reduced content of had treated and total fat has been addressed.    PVC (premature ventricular contraction)  -He has been found to have frequent PVCs.  The most likely etiology will be underlying hypertension.  He is asymptomatic.  I plan to get an echocardiogram for the assessment for left ventricular systolic function as well as a 48-hour Holter monitoring for the burden of PVCs.  DASIA (obstructive sleep apnea)  -The relationship of sleep apnea with the arrhythmia/cardiomyopathy has been discussed.    Continue all medications. Previous studies reviewed with patient, medications reviewed and possible side effects discussed. Continue risk factor modification. Optimize weight, regular exercise and follow up with appropriate specialists and primary care physician as discussed.  All questions answered. Patient advised to report any problems prompting to medical attention. Return for follow up visit in 4 to 6 weeks or earlier if needed    Chief Complaint   Patient presents with    Establish Care       Interval History: Aron Oswald          PMH:     Patient Active Problem List   Diagnosis    Essential hypertension    Erectile dysfunction    Sensation of plugged ear on right side    Platelets decreased (HCC)    Snoring    Cervical spine fracture (HCC)    Abnormal computed tomography angiography (CTA) of neck    Fall  from bed    REM sleep behavior disorder    Acute pain of right shoulder    Urinary urgency    Thyroid nodule    C5 cervical fracture (HCC)    Fibromuscular dysplasia of both carotid arteries (HCC)    Rotator cuff arthropathy of right shoulder    Weakness of right shoulder    Bradycardia    DASIA (obstructive sleep apnea)    PVC (premature ventricular contraction)     Past Medical History:   Diagnosis Date    Anxiety     COVID-19 virus infection 12/05/2022    Fractures 8/27/2024    C5 - fall from bed, right arm weakness    Hypertension     Refusal of blood transfusions as patient is Shinto      Social History     Socioeconomic History    Marital status: /Civil Union     Spouse name: Not on file    Number of children: Not on file    Years of education: Not on file    Highest education level: Not on file   Occupational History    Occupation: retired     Comment: construction   Tobacco Use    Smoking status: Never    Smokeless tobacco: Never   Vaping Use    Vaping status: Never Used   Substance and Sexual Activity    Alcohol use: Not Currently    Drug use: Never    Sexual activity: Yes     Partners: Female   Other Topics Concern    Not on file   Social History Narrative     with kids and grandkids     Social Drivers of Health     Financial Resource Strain: Low Risk  (5/16/2023)    Overall Financial Resource Strain (CARDIA)     Difficulty of Paying Living Expenses: Not hard at all   Food Insecurity: No Food Insecurity (10/22/2024)    Hunger Vital Sign     Worried About Running Out of Food in the Last Year: Never true     Ran Out of Food in the Last Year: Never true   Transportation Needs: No Transportation Needs (10/22/2024)    PRAPARE - Transportation     Lack of Transportation (Medical): No     Lack of Transportation (Non-Medical): No   Physical Activity: Not on file   Stress: Not on file   Social Connections: Not on file   Intimate Partner Violence: Not on file   Housing Stability: Low Risk   "(10/22/2024)    Housing Stability Vital Sign     Unable to Pay for Housing in the Last Year: No     Number of Times Moved in the Last Year: 1     Homeless in the Last Year: No      Family History   Problem Relation Age of Onset    No Known Problems Mother     Cancer Father         stomach    Diabetes Father      Past Surgical History:   Procedure Laterality Date    APPENDECTOMY         Current Outpatient Medications:     amLODIPine (NORVASC) 5 mg tablet, Take 1 tablet (5 mg total) by mouth daily, Disp: 100 tablet, Rfl: 3    aspirin 81 mg chewable tablet, Chew 1 tablet (81 mg total) daily, Disp: , Rfl:     Cholecalciferol (Vitamin D) 50 MCG (2000 UT) tablet, Take 2,000 Units by mouth daily, Disp: , Rfl:     Melatonin 5 MG TABS, Take 5 mg by mouth daily at bedtime, Disp: , Rfl:     Multiple Vitamins-Minerals (Multivitamin Adults) TABS, , Disp: , Rfl:     Psyllium (METAMUCIL 4 IN 1 FIBER PO), , Disp: , Rfl:     sildenafil (VIAGRA) 50 MG tablet, Take 1 tablet (50 mg total) by mouth daily as needed for erectile dysfunction, Disp: 10 tablet, Rfl: 5  Allergies   Allergen Reactions    Penicillins Other (See Comments)             Review of Systems:  Review of Systems   Constitutional: Negative.  Negative for chills and fever.   HENT:  Negative for ear pain and sore throat.    Eyes:  Negative for pain and visual disturbance.   Respiratory:  Negative for cough and shortness of breath.    Cardiovascular:  Negative for chest pain and palpitations.   Gastrointestinal:  Negative for abdominal pain and vomiting.   Genitourinary:  Negative for dysuria and hematuria.   Musculoskeletal:  Negative for arthralgias and back pain.   Skin:  Negative for color change and rash.   Neurological:  Negative for seizures and syncope.   All other systems reviewed and are negative.        /60 (BP Location: Left arm, Patient Position: Sitting, Cuff Size: Standard)   Pulse 73   Resp 16   Ht 5' 11\" (1.803 m)   Wt 85.3 kg (188 lb)   SpO2 98% "   BMI 26.22 kg/m²     Physical Exam:  Physical Exam  Vitals reviewed.   Constitutional:       Appearance: Normal appearance.   HENT:      Head: Normocephalic.   Eyes:      Pupils: Pupils are equal, round, and reactive to light.   Cardiovascular:      Rate and Rhythm: Normal rate and regular rhythm.      Heart sounds: Normal heart sounds.      Comments: Few PVC's  Pulmonary:      Effort: Pulmonary effort is normal.      Breath sounds: Normal breath sounds. No wheezing.   Abdominal:      General: Abdomen is flat.      Palpations: Abdomen is soft.   Skin:     General: Skin is warm.   Neurological:      Mental Status: He is alert.

## 2024-12-11 NOTE — TELEPHONE ENCOUNTER
Pt's wife Kimberly called wanting to let the cardiologist know that pt's echo is scheduled for 12/18.    FYI

## 2024-12-11 NOTE — ASSESSMENT & PLAN NOTE
-He has been found to have frequent PVCs.  The most likely etiology will be underlying hypertension.  He is asymptomatic.  I plan to get an echocardiogram for the assessment for left ventricular systolic function as well as a 48-hour Holter monitoring for the burden of PVCs.

## 2024-12-12 ENCOUNTER — TELEPHONE (OUTPATIENT)
Dept: SLEEP CENTER | Facility: CLINIC | Age: 74
End: 2024-12-12

## 2024-12-13 ENCOUNTER — TELEPHONE (OUTPATIENT)
Age: 74
End: 2024-12-13

## 2024-12-13 ENCOUNTER — CONSULT (OUTPATIENT)
Age: 74
End: 2024-12-13
Payer: COMMERCIAL

## 2024-12-13 ENCOUNTER — APPOINTMENT (OUTPATIENT)
Dept: LAB | Facility: CLINIC | Age: 74
End: 2024-12-13
Payer: COMMERCIAL

## 2024-12-13 VITALS
BODY MASS INDEX: 26.88 KG/M2 | SYSTOLIC BLOOD PRESSURE: 108 MMHG | DIASTOLIC BLOOD PRESSURE: 64 MMHG | TEMPERATURE: 97.3 F | OXYGEN SATURATION: 98 % | HEIGHT: 71 IN | HEART RATE: 72 BPM | WEIGHT: 192 LBS

## 2024-12-13 DIAGNOSIS — S12.401A CLOSED NONDISPLACED FRACTURE OF FIFTH CERVICAL VERTEBRA, UNSPECIFIED FRACTURE MORPHOLOGY, INITIAL ENCOUNTER (HCC): ICD-10-CM

## 2024-12-13 DIAGNOSIS — R41.3 MEMORY CHANGES: Primary | ICD-10-CM

## 2024-12-13 DIAGNOSIS — R39.15 URINARY URGENCY: ICD-10-CM

## 2024-12-13 DIAGNOSIS — I10 ESSENTIAL HYPERTENSION: ICD-10-CM

## 2024-12-13 DIAGNOSIS — G47.33 OSA (OBSTRUCTIVE SLEEP APNEA): ICD-10-CM

## 2024-12-13 DIAGNOSIS — R41.3 MEMORY CHANGES: ICD-10-CM

## 2024-12-13 LAB
DME PARACHUTE DELIVERY DATE REQUESTED: NORMAL
DME PARACHUTE ITEM DESCRIPTION: NORMAL
DME PARACHUTE ORDER STATUS: NORMAL
DME PARACHUTE SUPPLIER NAME: NORMAL
DME PARACHUTE SUPPLIER PHONE: NORMAL
FOLATE SERPL-MCNC: 10.3 NG/ML
TSH SERPL DL<=0.05 MIU/L-ACNC: 1.02 UIU/ML (ref 0.45–4.5)

## 2024-12-13 PROCEDURE — 36415 COLL VENOUS BLD VENIPUNCTURE: CPT

## 2024-12-13 PROCEDURE — 82746 ASSAY OF FOLIC ACID SERUM: CPT

## 2024-12-13 PROCEDURE — 99205 OFFICE O/P NEW HI 60 MIN: CPT | Performed by: NURSE PRACTITIONER

## 2024-12-13 PROCEDURE — 84443 ASSAY THYROID STIM HORMONE: CPT

## 2024-12-13 NOTE — PROGRESS NOTES
Assessment & Plan:   Geriatric Evaluation  Memory  Pt independent with adl, independent for iadls.  Memory loss:  denies any memory issues at this time.   MOCA 22/30.  Deficits in delayed recall.   CTH 8/2024:   No intracranial hemorrhage or calvarial fracture. Mild microangiopathic change   Gerilabs:  B12 WNL.   History, physical, and cognitive screening are most consistent with:  MCI  Contributing factors:  vascular/cardiac issues, DASIA  Further eval warrants the following:  ST, Labs - TSH, Folate  Memory meds:  no indications at this time  Cont supportive cares  Caregiver stress concerns:  wife is able to manage him at home without any difficulties. She is concerned about possible LBD diagnosis and would like pt to cont to be evaluated concerning same.  SW needs this visit:  no needs at this visit.   Discussed safe environment  Home:  Ensure pt has phone and reinforce to not use stove or shower while gone.  Level of care:  patient is able to be fully independent at this time. Does not require any supervision or help.   Fall preventions:  continue fall precautions, patient has not had any falls when awake. Only while asleep. Educated patient to move any sharp or harmful objects away from the bed if there would be another incident to prevent risk of injury.   Medication management:  patient is able to manage medications at this time, he does not have any difficulties managing his pills. Takes them on time, does not have issues taking too much or non-compliance.   Driving safety:  patient has no difficulty driving at this time. No car accidents or citations, wife states patient is safe when he drives. No issues or concerns at this time, educated wife to monitor driving and report any deviations from normal.   Scam safety:  Do not answer suspicious mail, phone calls, email, or text message without having second person review d/t safety risk.  Do not give out personal info to questionable sources- read company safety  policies for how they might contact you.  Continue to engage in physical, cognitive, social activity.  Cont doing games, puzzles, trivia, current event discussions  Cont daily walks or exercise video  Cont outings with friends and family.  Avoid social isolation.  Referral to cognitive therapy:  ST.   Optimize chronic and acute conditions  Cont to f/u with providers and ensure medication compliance  Vascular risk factors:  HTN, Bradycardia, PVC  Geriatric syndromes:  Urinary urgency, Fall.   Chronic condition concerns:  ongoing cardiac and vascular conditions, following closely with specialty providers.   Ensure good diet (ex Mediterranean diet) and adequate hydration  Monitor for changes in behavior/mood- if noted, notify provider for eval  Encourage mindfulness and positive socialization for general wellness.  Do not overwhelm pt with info.    Do one task at a time. Use slower pace.  Keep to one conversation at a time.  Do not multitask.  Decision making:  At this point, recommend patient is fully able to make medical and financial decisions.  In future, if capacity is of concern, would refer to neuropsychology for full eval.  Encourage independence as able.  Recommended next follow up: care conference, and 6 month f/u with repeat MOCA.     Mobility  Baseline ambulation: independent with no assistive devices.  Fall type: no falls when awake   PT OT needs: Currently in PT services.   Fall precautions  Educated to move sharp/harmful objects away from the bed to prevent injury if patient would fall out of bed again in the future.     Mood  Baseline mood:  pleasant and cooperative. Does endorse feeling frustrated with his issue with his shoulder, but he is working with PT which he is hopeful should improve with consistent visits  GDS 0  Pharmalogical interventions:  not indicated at this time.   Non pharmalogical interventions:  Encourage relaxation techniques, emotional support.     Medication Review  Medications are  appropriate for current conditions  Patient is not risk for increased side effects due to polypharmacy  Patient is taking the following medications that meet Beer's Criteria to monitor/avoid:  n/a.   Avoid medications that worsen cognition - check with provider or pharmacist before starting new or OTC meds    5.  Hypertension:  BP is 108/64 in office today. BP has not been stable, medications have just been adjusted per Cardiology.   Continues on amlodipine for BP management.   Denies any dizziness or headaches.   Continue to follow up with PCP/Cardiology for BP management.       6. DASIA   Patient does not use CPAP at bedtime. Per patient he is going to be receiving a CPAP machine shortly per sleep med.   Continues on Melatonin at night time. Doesn't feel like there is much of a difference with the melatonin.   Reports intermittently napping during the day.   Encourage good sleep hygiene, avoid day time napping.       HPI:  We had the pleasure of evaluating Aron Oswald who is a 74 y.o. male in Geriatric Consultation today.  Previous MOCA:  no MOCA on file.  Comorbidities include HTN, urinary urgency, DASIA, PVC, bradycardia.   Mr. Oswald is in the office with his wife. He lives with wife.     Memory report per wife (d/t memory issues): over the past few years he's been having vivid dreams that turns into falls from bed from trashing/rolling out. Recently was hospitalized due to a fall out of bed that caused a cervical fracture. One side of the body had became flaccid, but resolved in a short period of time which he was able to get himself up off the floor. Was able to take wife to PT, then wife noticed facial swelling that required him to go to ED. Memory is not an issue, but is having issues with his HR, cold hands. Continues to follow with cardiology for echo, etc. Sometimes his wife needs to take extra time explaining things, this is not everyday.    Memory report per patient:  does remember seeing geriatrics doctor  at this hospital. Feels like his memory sometime comes and goes. He is able to recall long and short term memory. Has not been having nightmares or dreams as much anymore. 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 Still having issues after the fall, he fell on his left side but has been having issues with the right shoulder the most.     Current activities:  Cognitive:  does not do anything formally, but does watch news and reads a lot for Confucianism.  Physical:  in PT due to fall, but before did stay active  Social:  involved in Confucianism and with friends/family.       He has difficulty finding the right word while speaking: No  Patient requires repeat information or ask the same question repeatedly: No  He has no problems operating household appliance such as TV remote, kitchen appliances, computer.    Functional concerns:  Bathing independent  Dressing independent  Feeding yourself independent  Tolieting independent  Continence - no  but does have urinary urgency which leads to intermittent leakage. Constipation has been an ongoing issue.   Transferring independent with no assistive devices.      Can you make your own light meals Yes   Do light cleaning/chores Yes  Do you take care of your own medications Yes  Do you do your own shopping Yes  Do you handle your own financial affairs such as balancing your checkbook, paying bills, investments: Yes - wife is beginning to help more   Do have any difficulties with handling your financial affairs: No  Do you use a cell phone Yes - always had issues with his cell phone  Do you drive: Yes       Have you had any recent accidents, citations or getting lost in familiar places :No    Psychosocial concerns:  Have you or your family noted any change in your mood or personality:No  Are you currently or have you been treated in the past for depression or anxiety: No  Any hallucination or delusion: No   Sleep  "Issues: Yes - takes melatonin, but still having nightmares which causes him to jerk  Hearing and vision issue: Yes - most recent eye exam yesterday, only wears readers   ADL/IADL:  independent   Appetite/swallow:  appetite good, swallow is ok   Pain:  generalized pain from fall - \"soreness\"    Family Review of Behavior [facility]:    pacing. No    agressive/combative behavior. No    agitated. No   wandering. No   resistance to care. No   hoarding/hiding objects.No    suspicious  No  withdrawn No  rummaging/pillaging.No    misplacing/losing objects No  personal hygiene problems.No  forgetfulness of actions No     Past Medical, surgical, social, medication and allergy history and patients previous records reviewed.    Family member with dementia and what type? Yes - mother (dementia with parkinson's like symptoms)   Does patient have previous diagnosis of dementia?  No.   Does patient take any \"memory medications\"? No.   Stroke history No  Have you had any head trauma Yes  Does patient have history of alcohol abuse Yes -- couple years ago, he is completely sober now.     ROS:  Review of Systems   Constitutional:  Negative for activity change, appetite change, fatigue and unexpected weight change.   HENT: Negative.  Negative for hearing loss and trouble swallowing.    Eyes: Negative.  Negative for visual disturbance.   Respiratory:  Negative for cough, choking, chest tightness and shortness of breath.    Cardiovascular:  Negative for chest pain, palpitations and leg swelling.   Gastrointestinal:  Positive for constipation (intermittent, on metamucil chronically). Negative for abdominal distention, abdominal pain and diarrhea.   Endocrine: Negative.    Genitourinary:  Positive for frequency (especially at nighttime) and urgency. Negative for difficulty urinating and dysuria.   Musculoskeletal:  Positive for arthralgias (complains of right should discomfort the most), gait problem, myalgias, neck pain and neck stiffness. "   Skin: Negative.    Allergic/Immunologic: Negative.    Neurological:  Negative for dizziness, syncope, speech difficulty, weakness, numbness and headaches.   Hematological: Negative.    Psychiatric/Behavioral:  Positive for sleep disturbance. Negative for decreased concentration, dysphoric mood and hallucinations. The patient is not nervous/anxious.        Allergies:   Allergies   Allergen Reactions    Penicillins Other (See Comments)       Medications:      Current Outpatient Medications:     amLODIPine (NORVASC) 5 mg tablet, Take 1 tablet (5 mg total) by mouth daily, Disp: 100 tablet, Rfl: 3    aspirin 81 mg chewable tablet, Chew 1 tablet (81 mg total) daily, Disp: , Rfl:     Cholecalciferol (Vitamin D) 50 MCG (2000 UT) tablet, Take 2,000 Units by mouth daily, Disp: , Rfl:     Melatonin 5 MG TABS, Take 5 mg by mouth daily at bedtime, Disp: , Rfl:     Multiple Vitamins-Minerals (Multivitamin Adults) TABS, , Disp: , Rfl:     Psyllium (METAMUCIL 4 IN 1 FIBER PO), , Disp: , Rfl:     sildenafil (VIAGRA) 50 MG tablet, Take 1 tablet (50 mg total) by mouth daily as needed for erectile dysfunction, Disp: 10 tablet, Rfl: 5    Vitals:  There were no vitals filed for this visit.    History:  Past Medical History:   Diagnosis Date    Anxiety     COVID-19 virus infection 12/05/2022    Fractures 8/27/2024    C5 - fall from bed, right arm weakness    Hypertension     Refusal of blood transfusions as patient is Episcopalian      Past Surgical History:   Procedure Laterality Date    APPENDECTOMY       Family History   Problem Relation Age of Onset    No Known Problems Mother     Cancer Father         stomach    Diabetes Father      Social History     Socioeconomic History    Marital status: /Civil Union     Spouse name: Not on file    Number of children: Not on file    Years of education: Not on file    Highest education level: Not on file   Occupational History    Occupation: retired     Comment: construction    Tobacco Use    Smoking status: Never    Smokeless tobacco: Never   Vaping Use    Vaping status: Never Used   Substance and Sexual Activity    Alcohol use: Not Currently    Drug use: Never    Sexual activity: Yes     Partners: Female   Other Topics Concern    Not on file   Social History Narrative     with kids and grandkids     Social Drivers of Health     Financial Resource Strain: Low Risk  (5/16/2023)    Overall Financial Resource Strain (CARDIA)     Difficulty of Paying Living Expenses: Not hard at all   Food Insecurity: No Food Insecurity (10/22/2024)    Hunger Vital Sign     Worried About Running Out of Food in the Last Year: Never true     Ran Out of Food in the Last Year: Never true   Transportation Needs: No Transportation Needs (10/22/2024)    PRAPARE - Transportation     Lack of Transportation (Medical): No     Lack of Transportation (Non-Medical): No   Physical Activity: Not on file   Stress: Not on file   Social Connections: Not on file   Intimate Partner Violence: Not on file   Housing Stability: Low Risk  (10/22/2024)    Housing Stability Vital Sign     Unable to Pay for Housing in the Last Year: No     Number of Times Moved in the Last Year: 1     Homeless in the Last Year: No       Past Surgical History:   Procedure Laterality Date    APPENDECTOMY         Physical Exam  Observed Ambulation:  independent with no assistive devices  Physical Exam  Constitutional:       General: He is not in acute distress.     Appearance: Normal appearance. He is normal weight. He is not ill-appearing.   HENT:      Head: Normocephalic and atraumatic.   Cardiovascular:      Rate and Rhythm: Normal rate and regular rhythm.      Pulses: Normal pulses.      Heart sounds: Normal heart sounds. No murmur heard.     No gallop.   Pulmonary:      Effort: Pulmonary effort is normal. No respiratory distress.      Breath sounds: Normal breath sounds. No wheezing.   Abdominal:      General: Abdomen is flat. Bowel sounds are  normal. There is no distension.      Palpations: Abdomen is soft.      Tenderness: There is no abdominal tenderness.   Musculoskeletal:         General: Tenderness (right shoulder) present. No deformity or signs of injury. Normal range of motion.      Cervical back: Normal range of motion and neck supple.   Skin:     General: Skin is warm and dry.   Neurological:      General: No focal deficit present.      Mental Status: He is alert and oriented to person, place, and time. Mental status is at baseline.      Motor: No weakness.      Gait: Gait normal.   Psychiatric:         Mood and Affect: Mood normal.         Behavior: Behavior normal.      I have spent a total time of 62 minutes in caring for this patient on the day of the visit/encounter including Diagnostic results, Prognosis, Risks and benefits of tx options, Instructions for management, Patient and family education, Importance of tx compliance, Risk factor reductions, Impressions, Counseling / Coordination of care, Documenting in the medical record, Reviewing / ordering tests, medicine, procedures  , Obtaining or reviewing history  , and Communicating with other healthcare professionals .

## 2024-12-13 NOTE — TELEPHONE ENCOUNTER
Spoke to patient's wife and discussed CPAP.  Explained that his respiratory disturbance index was elevated and he is symptomatic.  He is amenable to CPAP.  Recommended he continue melatonin until next appointment.

## 2024-12-18 LAB
DME PARACHUTE DELIVERY DATE ACTUAL: NORMAL
DME PARACHUTE DELIVERY DATE EXPECTED: NORMAL
DME PARACHUTE DELIVERY DATE REQUESTED: NORMAL
DME PARACHUTE ITEM DESCRIPTION: NORMAL
DME PARACHUTE ORDER STATUS: NORMAL
DME PARACHUTE SUPPLIER NAME: NORMAL
DME PARACHUTE SUPPLIER PHONE: NORMAL

## 2024-12-19 ENCOUNTER — OFFICE VISIT (OUTPATIENT)
Dept: INTERNAL MEDICINE CLINIC | Age: 74
End: 2024-12-19
Payer: COMMERCIAL

## 2024-12-19 VITALS
SYSTOLIC BLOOD PRESSURE: 126 MMHG | BODY MASS INDEX: 26.74 KG/M2 | WEIGHT: 191 LBS | HEART RATE: 52 BPM | TEMPERATURE: 97.3 F | OXYGEN SATURATION: 97 % | HEIGHT: 71 IN | DIASTOLIC BLOOD PRESSURE: 60 MMHG

## 2024-12-19 DIAGNOSIS — K14.8 TONGUE LESION: ICD-10-CM

## 2024-12-19 DIAGNOSIS — E04.1 THYROID NODULE: ICD-10-CM

## 2024-12-19 DIAGNOSIS — I10 ESSENTIAL HYPERTENSION: ICD-10-CM

## 2024-12-19 DIAGNOSIS — G47.33 OSA (OBSTRUCTIVE SLEEP APNEA): ICD-10-CM

## 2024-12-19 DIAGNOSIS — I49.3 PVC (PREMATURE VENTRICULAR CONTRACTION): ICD-10-CM

## 2024-12-19 DIAGNOSIS — Z76.89 ENCOUNTER TO ESTABLISH CARE: Primary | ICD-10-CM

## 2024-12-19 DIAGNOSIS — H61.23 BILATERAL IMPACTED CERUMEN: ICD-10-CM

## 2024-12-19 PROCEDURE — 99214 OFFICE O/P EST MOD 30 MIN: CPT | Performed by: INTERNAL MEDICINE

## 2024-12-19 PROCEDURE — G2211 COMPLEX E/M VISIT ADD ON: HCPCS | Performed by: INTERNAL MEDICINE

## 2024-12-19 NOTE — ASSESSMENT & PLAN NOTE
- blood pressure is well controlled   -continue with amlodipine  -continue with a low-salt diet and with exercise

## 2024-12-19 NOTE — ASSESSMENT & PLAN NOTE
-EKG done on 10/22/2024 showed PVCs and bigeminy pattern  -Will follow-up with the results of his echo and Holter monitor that are already scheduled  -Follow-up with cardiology as scheduled

## 2024-12-19 NOTE — PROGRESS NOTES
Name: Aron Oswald      : 1950      MRN: [id number]  Encounter Provider: Whitney Bey DO  Encounter Date: 2024   Encounter department: [facility]  :  Assessment & Plan  Encounter to establish care  - Patient has established care with our practice and will sign for release of his medical records from his previous PCP.  -follow-up in 10 months for an annual physical exam.         PVC (premature ventricular contraction)  -EKG done on 10/22/2024 showed PVCs and bigeminy pattern  -Will follow-up with the results of his echo and Holter monitor that are already scheduled  -Follow-up with cardiology as scheduled       Essential hypertension   - blood pressure is well controlled   -continue with amlodipine  -continue with a low-salt diet and with exercise         DASIA (obstructive sleep apnea)  -Recently started CPAP machine  -He was encouraged to continue with the CPAP in order to avoid the complications of DASIA including cardiac arrhythmia and A-fib.  -If he cannot tolerate the CPAP, patient was urged to reach out to sleep medicine for alternative treatment.       Thyroid nodule  -Scheduled for thyroid biopsy  -Will follow-up with the results       Bilateral impacted cerumen  -He has an appointment with ENT and his wife is not sure if he has ever had ruptured tympanic membrane so we will hold off on prescribing any Debrox eardrops and on ear lavage.  -Follow-up with ENT       Tongue lesion  -Scheduled for biopsy  -Will follow-up with the results especially with his remote history of alcohol use disorder.              History of Present Illness     HPI    Patient presents to establish care.    Last primary care physician-wanted a new Doctor    Past medical history-PVCs, htn, thyroid nodule, cervical fracture in August, DASIA on cpap, ED,     Past surgical history-appendectomy, tonsillectomy,    Medications- see list     Allergies - penicillin     Alcohol use -none, used to  for a time in his past    Nicotine use - never    Recreational drug use -none    Work- retired      Health maintenance-last wellness visit was within the year   Last colonoscopy was recently  4/10/24 - and they found a  lot of polyps and he was told to return in 3 years     Immunization- up to date with the covid shot x 3, flu shot, pneumococcal vaccine    Family history-  Htn - dad   Dm - dad  Urethral ca, stomach ca - dad    Today, presents with his wife to establish care and coordinate with his specialists.  Wife states that he has a lot of doctors visits coming up with a lot of investigations lined up.  They are expecting results of his thyroid biopsy and tongue biopsy   He will be getting a Holter monitor and echo   He is seeing senior care as well and he was diagnosed with mild cog impairment   + family hx of dementia in mom in her 90s  Wife feels that alll his issues popped up at the same time   Wife states that he was not being seeing regularly     Review of Systems   Constitutional:  Negative for activity change, chills, fatigue, fever and unexpected weight change.   HENT:  Positive for congestion and rhinorrhea (occasionally - chronic). Negative for ear pain, postnasal drip, sinus pressure and sore throat.    Eyes:  Negative for pain.   Respiratory:  Negative for cough, choking, chest tightness, shortness of breath and wheezing.    Cardiovascular:  Negative for chest pain, palpitations and leg swelling.   Gastrointestinal:  Positive for constipation (since his fall - goes every 2-3 days - takes metamucil everyday). Negative for abdominal pain, diarrhea, nausea and vomiting.   Genitourinary:  Positive for frequency (nocturia x 2-3 at night - has a urology appt) and urgency. Negative for dysuria and hematuria.   Musculoskeletal:  Negative for arthralgias, back pain, gait problem, joint swelling, myalgias and neck stiffness.   Skin:  Negative for pallor and rash.   Neurological:  Positive  "for weakness (weakness of the RUE after the fall, seeing orthopedics - has a complete tear of the rotator cuff on the R). Negative for dizziness, tremors, seizures, syncope, light-headedness and headaches.   Hematological:  Negative for adenopathy.   Psychiatric/Behavioral:  Negative for behavioral problems.        Objective   /60 (BP Location: Left arm, Patient Position: Sitting, Cuff Size: Standard)   Pulse (!) 52   Temp (!) 97.3 °F (36.3 °C) (Temporal)   Ht 5' 11\" (1.803 m)   Wt 86.6 kg (191 lb)   SpO2 97%   BMI 26.64 kg/m²      Physical Exam  Constitutional:       General: He is not in acute distress.     Appearance: He is well-developed. He is not diaphoretic.   HENT:      Head: Normocephalic and atraumatic.      Right Ear: External ear normal. There is impacted cerumen.      Left Ear: External ear normal. There is impacted cerumen.      Nose: Nose normal.      Mouth/Throat:      Mouth: Mucous membranes are dry.      Tongue: Lesions (Papular tongue lesion on the right) present.      Pharynx: Posterior oropharyngeal erythema (oropharyngeal erythema with dry mucous memb and Mallampati class 4 oropharynx) present. No oropharyngeal exudate.   Eyes:      General: No scleral icterus.        Right eye: No discharge.         Left eye: No discharge.      Conjunctiva/sclera: Conjunctivae normal.      Pupils: Pupils are equal, round, and reactive to light.   Neck:      Thyroid: No thyromegaly.      Vascular: No JVD.      Trachea: No tracheal deviation.   Cardiovascular:      Rate and Rhythm: Normal rate and regular rhythm. Extrasystoles (Irregular heart sound with extra beats) are present.     Heart sounds: No murmur heard.     No systolic murmur is present.      No friction rub. No gallop.   Pulmonary:      Effort: Pulmonary effort is normal. No respiratory distress.      Breath sounds: Normal breath sounds. No wheezing or rales.   Chest:      Chest wall: No tenderness.   Abdominal:      General: Bowel sounds " are normal. There is no distension.      Palpations: Abdomen is soft. There is no mass.      Tenderness: There is no abdominal tenderness. There is no guarding or rebound.   Musculoskeletal:         General: No tenderness or deformity. Normal range of motion.      Cervical back: Normal range of motion and neck supple.   Lymphadenopathy:      Cervical: No cervical adenopathy.   Skin:     General: Skin is warm and dry.      Coloration: Skin is not pale.      Findings: No erythema or rash.   Neurological:      Mental Status: He is alert and oriented to person, place, and time.      Cranial Nerves: No cranial nerve deficit.      Motor: No abnormal muscle tone.      Coordination: Coordination normal.      Deep Tendon Reflexes: Reflexes are normal and symmetric.   Psychiatric:         Behavior: Behavior normal.

## 2024-12-19 NOTE — ASSESSMENT & PLAN NOTE
-Recently started CPAP machine  -He was encouraged to continue with the CPAP in order to avoid the complications of DASIA including cardiac arrhythmia and A-fib.  -If he cannot tolerate the CPAP, patient was urged to reach out to sleep medicine for alternative treatment.

## 2024-12-30 ENCOUNTER — RESULTS FOLLOW-UP (OUTPATIENT)
Dept: CARDIOLOGY CLINIC | Facility: CLINIC | Age: 74
End: 2024-12-30

## 2024-12-30 ENCOUNTER — HOSPITAL ENCOUNTER (OUTPATIENT)
Dept: NON INVASIVE DIAGNOSTICS | Facility: CLINIC | Age: 74
Discharge: HOME | End: 2024-12-30
Payer: COMMERCIAL

## 2024-12-30 VITALS
SYSTOLIC BLOOD PRESSURE: 126 MMHG | HEIGHT: 71 IN | HEART RATE: 50 BPM | DIASTOLIC BLOOD PRESSURE: 60 MMHG | BODY MASS INDEX: 26.74 KG/M2 | WEIGHT: 191 LBS

## 2024-12-30 DIAGNOSIS — I10 PRIMARY HYPERTENSION: ICD-10-CM

## 2024-12-30 DIAGNOSIS — I49.3 PVC (PREMATURE VENTRICULAR CONTRACTION): ICD-10-CM

## 2024-12-30 LAB
AORTIC ROOT: 3.2 CM
APICAL FOUR CHAMBER EJECTION FRACTION: 43 %
AV REGURGITATION PRESSURE HALF TIME: 518 MS
BSA FOR ECHO PROCEDURE: 2.07 M2
E WAVE DECELERATION TIME: 197 MS
E/A RATIO: 0.83
FRACTIONAL SHORTENING: 33 (ref 28–44)
INTERVENTRICULAR SEPTUM IN DIASTOLE (PARASTERNAL SHORT AXIS VIEW): 1 CM
INTERVENTRICULAR SEPTUM: 1 CM (ref 0.6–1.1)
LAAS-AP2: 21.5 CM2
LAAS-AP4: 24.1 CM2
LEFT ATRIUM AREA SYSTOLE SINGLE PLANE A4C: 24.2 CM2
LEFT ATRIUM SIZE: 5.3 CM
LEFT ATRIUM VOLUME (MOD BIPLANE): 67 ML
LEFT ATRIUM VOLUME INDEX (MOD BIPLANE): 32.4 ML/M2
LEFT INTERNAL DIMENSION IN SYSTOLE: 2.6 CM (ref 2.1–4)
LEFT VENTRICULAR INTERNAL DIMENSION IN DIASTOLE: 3.9 CM (ref 3.5–6)
LEFT VENTRICULAR POSTERIOR WALL IN END DIASTOLE: 1.2 CM
LEFT VENTRICULAR STROKE VOLUME: 40 ML
LVSV (TEICH): 40 ML
MV E'TISSUE VEL-SEP: 12 CM/S
MV PEAK A VEL: 0.99 M/S
MV PEAK E VEL: 82 CM/S
MV STENOSIS PRESSURE HALF TIME: 57 MS
MV VALVE AREA P 1/2 METHOD: 3.86
RIGHT ATRIUM AREA SYSTOLE A4C: 18.1 CM2
RIGHT VENTRICLE ID DIMENSION: 3.2 CM
SL CV AV DECELERATION TIME RETROGRADE: 1785 MS
SL CV AV PEAK GRADIENT RETROGRADE: 35 MMHG
SL CV LEFT ATRIUM LENGTH A2C: 6.5 CM
SL CV LV EF: 60
SL CV PED ECHO LEFT VENTRICLE DIASTOLIC VOLUME (MOD BIPLANE) 2D: 66 ML
SL CV PED ECHO LEFT VENTRICLE SYSTOLIC VOLUME (MOD BIPLANE) 2D: 26 ML
TR MAX PG: 40 MMHG
TR PEAK VELOCITY: 3.2 M/S
TRICUSPID ANNULAR PLANE SYSTOLIC EXCURSION: 3.3 CM
TRICUSPID VALVE PEAK REGURGITATION VELOCITY: 3.15 M/S

## 2024-12-30 PROCEDURE — 93226 XTRNL ECG REC<48 HR SCAN A/R: CPT

## 2024-12-30 PROCEDURE — 93225 XTRNL ECG REC<48 HRS REC: CPT

## 2024-12-30 PROCEDURE — 93306 TTE W/DOPPLER COMPLETE: CPT

## 2024-12-30 PROCEDURE — 93306 TTE W/DOPPLER COMPLETE: CPT | Performed by: INTERNAL MEDICINE

## 2025-01-01 ENCOUNTER — TELEPHONE (OUTPATIENT)
Dept: OTHER | Facility: OTHER | Age: 75
End: 2025-01-01

## 2025-01-01 NOTE — TELEPHONE ENCOUNTER
Patient calling to let the office know that he will be dropping his Holter monitor off tomorrow at the location where it was put on. He thinks that it shut off after 24hrs. this morning.  I told him that they will check it tomorrow when he goes to drop it off. He understood.

## 2025-01-02 NOTE — TELEPHONE ENCOUNTER
Spoke with patient and he verbally understood keep monitor  on for 48 hours and he will drop it off at correct location and he verbally understood

## 2025-01-02 NOTE — TELEPHONE ENCOUNTER
We do not take 24 hour Holter patient would have to take monitor to testing [address] [facility] 1st floor thank you

## 2025-01-03 PROCEDURE — 93227 XTRNL ECG REC<48 HR R&I: CPT | Performed by: INTERNAL MEDICINE

## 2025-01-06 NOTE — TELEPHONE ENCOUNTER
Called patient, spoke with wife Kimberly.  Made aware of results and Dr. Herman result note.  Scheduled for follow up with Dr. Herman and added to wait list.  Declined sooner appointments with another provider.  Will call office if patient develops symptoms.  Currently denies any symptoms.

## 2025-01-07 ENCOUNTER — EVALUATION (OUTPATIENT)
Dept: PHYSICAL THERAPY | Facility: CLINIC | Age: 75
End: 2025-01-07
Payer: MEDICARE

## 2025-01-07 DIAGNOSIS — S12.401D CLOSED NONDISPLACED FRACTURE OF FIFTH CERVICAL VERTEBRA WITH ROUTINE HEALING, UNSPECIFIED FRACTURE MORPHOLOGY, SUBSEQUENT ENCOUNTER: Primary | ICD-10-CM

## 2025-01-07 PROCEDURE — 97110 THERAPEUTIC EXERCISES: CPT | Performed by: PHYSICAL THERAPIST

## 2025-01-07 PROCEDURE — 97162 PT EVAL MOD COMPLEX 30 MIN: CPT | Performed by: PHYSICAL THERAPIST

## 2025-01-07 NOTE — PROGRESS NOTES
PT Evaluation     Today's date: 2025  Patient name: Aron Oswald Jr.  : 1950  MRN: [id number]  Referring provider: Madiha Hallman CRNP  Dx:   Encounter Diagnosis     ICD-10-CM    1. Closed nondisplaced fracture of fifth cervical vertebra with routine healing, unspecified fracture morphology, subsequent encounter  S12.401D Ambulatory Referral to Physical Therapy     PT plan of care cert/re-cert          Start Time: 1000  Stop Time: 1110  Total time in clinic (min): 70 minutes    Assessment  Impairments: abnormal muscle firing, abnormal or restricted ROM, activity intolerance, impaired physical strength, lacks appropriate home exercise program, pain with function and poor posture     Assessment details: Aron Oswald is a 73 y.o. male who presents with acute onset of shoulder near paralysis following fall from bed in his sleep. Patient presented to ED where he was diagnosed with a C5 cervical fracture and had been placed in an [facility] cervical collar. Patient has since regained some elbow, wrist and hand mobility, however, continues to have very little ability to move his right arm away from his body and has limited elbow ROM/coordination despite formal physical therapy intervention. MRI of right shoulder demonstrates complete tears of the supraspinatus and infraspinatus tendons with retracted tendon fibers to the level of the glenoid. Patient's primary complaints at this time are cervical stiffness with limited ROM and pain. Due to these impairments, Patient has difficulty performing a/iadls, including bathing, dressing, and feeding. Examination findings demonstrate significant right shoulder girdle and RC weakness severely limiting his shoulder ROM. Patient's right UE PROM is near equal bilaterally in all planes, however, does have restricted ROM bilaterally. Patient does deny experiencing tingling/numbness and radicular pain. Cervical ROM and segmental mobility limited in all planes. Patient  would benefit from skilled physical therapy to address their aforementioned impairments, improve their level of function and to improve their overall quality of life.  Understanding of Dx/Px/POC: excellent     Prognosis: good    Goals  Short Term Goals: to be achieved by 4 weeks  1) Patient to be independent with basic HEP.  2) Decrease pain to 1/10 at it's worst.  3) Increase UE strength by 1/2 MMT grade in all deficient planes.  4) Increase cervical ROM by > 5 deg in all deficient planes.  5) Patient to report decreased sleep interruption secondary to pain.  6) Increase shoulder ROM by > 5 deg in all deficient planes.    Long Term Goals: to be achieved by discharge  1) FOTO equal to or greater than 73.  2) Patient to be independent with comprehensive HEP.  3) Abolish pain for improved quality of life.  4) Increase UE strength to at least 4-/5 MMT grade in all deficient planes to improve a/iadls.  5) Increase cervical ROM to WFL all planes to improve a/iadls.  6) Patient to report no sleep interruption secondary to pain.  7) Increase shoulder ROM by > 10 deg in all deficient planes to improve a/iadls.    Plan  Patient would benefit from: skilled PT  Planned modality interventions: biofeedback, cryotherapy, hydrotherapy, unattended electrical stimulation, thermotherapy: hydrocollator packs and low level laser therapy    Planned therapy interventions: activity modification, ADL retraining, behavior modification, body mechanics training, functional ROM exercises, home exercise program, IADL retraining, joint mobilization, manual therapy, massage, neuromuscular re-education, patient education, postural training, strengthening, stretching, therapeutic activities and therapeutic exercise    Frequency: 2-3x week.  Duration in weeks: 12  Plan of Care beginning date: 1/7/2025  Plan of Care expiration date: 4/1/2025  Treatment plan discussed with: patient        Subjective Evaluation    History of Present Illness  Mechanism  "of injury: Patient reports that on 8/27/24 he woke up and was on the floor, having fallen out of his bed. Patient reports that when he woke up he could not move most of his right UE. Patient went to the ED and was found to have a C5 fracture. Patient was placed in an [facility] cervical collar, which he was instructed to wear 24/7. Patient referred to formal physical therapy to address his right shoulder weakness, which minimally improved. Patient's cervical collar has since been removed and he has been referred back to PT to address c/o stiffness and pain in his neck, however, would also like to address continued dysfunction of right shoulder. Patient denies experiencing tingling/numbness. At baseline prior to his fall, he had full function of his right UE. Patient had been prescribed Tylenol and Methyprednisolone, which had helped to manage his symptoms. Patient's quality of sleep is disrupted. Patient denies experiencing dysphagia, dysarthria, dizziness, diplopia, HA, or drop attacks. Patient received a steroid injection to his right shoulder approximately 1 month ago, which helped helped to reduce his shoulder pain. Patient's primary complaint is limited cervical rotation with crepitus and pain. Patient also has limitation looking up and down. Patient has since returned to driving. Patient has noticed increased size in his right SC joint with associated crepitus.     XR cervical spine: \"Stable C5 with previously reported fracture not appreciated radiographically.     Cervical low-dose straightening.     Degenerative cervical spine.     No acute osseous abnormality.\"    CTA neck: \"Negative CTA neck for large vessel occlusion, dissection, aneurysm, or high-grade stenosis.     Unchanged findings suggestive of fibromuscular dysplasia in bilateral ICA distal cervical segments.     Healed chronic C5 fracture deformity.     Incidental thyroid nodule(s) for which nonemergent thyroid ultrasound is recommended.   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     Additional chronic/incidental findings as detailed above.\"      MRI right shoulder: \"Complete tears of the supraspinatus and infraspinatus tendons with retracted tendon fibers to the level of the glenoid. Moderate subscapularis tendinosis.     Severe supraspinatus and infraspinatus muscular fatty atrophy.     Moderate bicipital tendinosis and partial interstitial tearing.     Moderately advanced glenohumeral joint osteoarthrosis with superior subluxation of the humeral head.     Moderate acromioclavicular joint osteoarthrosis.\"  Patient Goals  Patient goals for therapy: decreased pain, increased motion and increased strength    Pain  Current pain ratin  At best pain ratin  At worst pain ratin  Location: bilateral neck, right shoulder  Quality: tight and dull ache          Objective     Postural Observations  Seated posture: fair  Standing posture: fair      Tenderness   Cervical Spine   No tenderness in the spinous process, left transverse process and right transverse process.     Active Range of Motion   Cervical/Thoracic Spine       Cervical    Flexion: 25 degrees   Extension: 25 degrees      Left lateral flexion: 13 degrees      Right lateral flexion: 15 degrees      Left rotation: 30 degrees  Right rotation: 30 degrees     Left Shoulder   Flexion: 105 degrees   Abduction: 90 degrees   External rotation BTH: T3   Internal rotation BTB: T9     Right Shoulder   Flexion: 62 degrees   Abduction: 55 degrees   External rotation BTH: C7   Internal rotation BTB: T10     Passive Range of Motion   Cervical/Thoracic Spine   Cervical     Flexion (degrees):  Restriction level: moderate  Left lateral flexion (degrees):  Restriction level: maximal  Right lateral flexion (degrees):  Restriction level: maximal  Left rotation (degrees):  Restriction level: moderate  Right rotation (degrees):  Restriction level: " moderate  Left Shoulder   Flexion: 150 degrees   Abduction: 133 degrees   External rotation 90°: 45 degrees   Internal rotation 90°: 40 degrees     Right Shoulder   Flexion: 150 degrees   Abduction: 148 degrees   External rotation 90°: 40 degrees   Internal rotation 90°: 50 degrees     Joint Play     Hypomobile: C1, C2, C3, C4, C5, C6 and C7     Strength/Myotome Testing     Left Shoulder     Planes of Motion   Flexion: 3-   Abduction: 3-   External rotation at 0°: 5   Internal rotation at 0°: 5     Right Shoulder     Planes of Motion   Flexion: 2+   Abduction: 2+   External rotation at 0°: 3+   Internal rotation at 0°: 5     Left Elbow   Flexion: 5  Extension: 5    Right Elbow   Flexion: 5  Extension: 5    Left Wrist/Hand   Wrist extension: 5  Wrist flexion: 5    Right Wrist/Hand   Wrist extension: 5  Wrist flexion: 5    Ambulation     Ambulation: Level Surfaces   Ambulation without assistive device: independent    Observational Gait   Gait: within functional limits       Flowsheet Rows      Flowsheet Row Most Recent Value   PT/OT G-Codes    Current Score 68   Projected Score 73                 POC expires Unit limit Auth  expiration date PT/OT + Visit Limit?   4/1/25 4 N/a BOMN                 Visit/Unit Tracking  AUTH Status:  Date 1/7              N/A Used 1               Remaining                  Precautions: HTN, h/o C5 fx      Manuals 1/7            Gr. II-III cervical side glides             UT, levator str.             Gr. II-III cervical rotation, SB mobs                          Neuro Re-Ed             Supine DNF             Supine upper cervical retraction with towel beneath occiput             Bent over row             Webslide: M, L row                                                    Ther Ex             Patient education: pathophysiology, diagnostic imaging review GR            UBE             Pulleys             Finger ladder with eccentric lowering             Side lying ER             UT, levator  str.             Cervical rotation SNAGS             Cervical isometrics - all planes             Scaption in side lying with UE ranger with TB             TB ER             TB IR             Ther Activity                                       Gait Training                                       Modalities

## 2025-01-08 ENCOUNTER — TELEPHONE (OUTPATIENT)
Age: 75
End: 2025-01-08

## 2025-01-08 ENCOUNTER — EVALUATION (OUTPATIENT)
Age: 75
End: 2025-01-08
Payer: MEDICARE

## 2025-01-08 DIAGNOSIS — G47.33 OSA (OBSTRUCTIVE SLEEP APNEA): ICD-10-CM

## 2025-01-08 DIAGNOSIS — R41.841 COGNITIVE COMMUNICATION DEFICIT: Primary | ICD-10-CM

## 2025-01-08 DIAGNOSIS — R41.3 MEMORY CHANGES: ICD-10-CM

## 2025-01-08 DIAGNOSIS — I10 ESSENTIAL HYPERTENSION: ICD-10-CM

## 2025-01-08 PROCEDURE — 96125 COGNITIVE TEST BY HC PRO: CPT

## 2025-01-08 NOTE — PROGRESS NOTES
Speech-Language Pathology Initial Evaluation    Today's date: 2025   Patient’s name: Aron Oswald Jr.  : 1950  MRN: [id number]  Safety measures: memory difficulties, HTN, hx of etoh abuse  Referring provider: Ludy Person,*    Encounter Diagnosis     ICD-10-CM    1. Cognitive communication deficit  R41.841       2. Memory changes  R41.3 Ambulatory Referral to Speech Therapy      3. DASIA (obstructive sleep apnea)  G47.33       4. Essential hypertension  I10           Assessment:  Patient presents with mild cognitive-linguistic impairment c/b deficits with attention, orientation (to personal information and location), immediate and delayed memory, with evident word finding difficulties during structured tasks as well as spontaneous discourse. Pt was administered RBANs examination Form D this date. Pt scored a rating of low average in the area of immediate memory recall, high average in the area of visuospatial/constructional, average for language function, low average for attention to task, and rating low average in the area of delayed memory recall. Pt received a score of average in the overall summation of index scores. Though testing results indicate WFL across sub-tests/domains, patient reports increased difficulty with remembering information and executive fx skills with respect to bill paying and money management. The patient was interested in obtaining a baseline re: cognitive-linguistic fx 2/2 family hx positive for dementia with Parkinson's-like symptoms. During the assessment, the patient was not observed using any internal memory strategies and often interjected tangential or irrelevant remarks, impacting his performance on tasks. Patient would benefit from OP skilled ST services to target increased functional recall, improve executive functioning skills (e.g. processing, problem-solving, thought organization, etc.), increase verbal fluency, and receive education on word-finding &  memory aids/strategies, in order to experience successful completion of daily tasks, follow directions within functional activities and unstructured tasks, support positive communication interactions with both familiar and unfamiliar listeners, promote safety, and facilitate overall improved quality of life.      Short-term goals:  Patient will be educated on the use of internal and external memory aids and compensatory strategies to facilitate increased recall of routine, personal information, and recent events, to be achieved in 2-4 weeks.  Patient will be educated on and demonstrate understanding of word finding strategies (i.e., circumlocution) for improved generative naming and verbal expression skills, to be achieved in 2-4 weeks.  Patient will utilize word finding strategies during semantic feature analysis treatment activity, with 80% accuracy, IND, in 3 out of 4 consecutive sessions, to facilitate improved naming and verbal expression skills, to be achieved in 8-10 weeks.  Patient will complete auditory immediate and short term memory tasks to facilitate increased ability to retell narratives and recall information within functional living environment, with 80% accuracy, IND using preferred memory strategy, in 3 out of 4 consecutive sessions, to be achieved in 10-12 weeks.  Patient will complete complex auditory attention processing tasks (e.g., sentence unscramble, ranking numbers/words, etc.) to improve working memory, with 80% accuracy, IND, in 3 out of 4 consecutive sessions, to be achieved in 10-12 weeks.  Patient will complete thought organization tasks (e.g., sequencing, deduction puzzles, etc.) with 80% accuracy, IND, in 3 out of 4 consecutive sessions, to facilitate increased executive functioning, working memory, problem solving, and processing skills, to be achieved in 10-12 weeks..  To target mental manipulation and working memory, patient will participate in word finding activity (i.e.,  "anagrams) with 80% accuracy, IND, in 3 out of 4 consecutive sessions, to be achieved in 10-12 weeks.   Patient will answer questions regarding story read aloud with 80% accuracy, IND, in 3 out of 4 consecutive sessions, to facilitate improved auditory comprehension and recall, to be achieved in 8-10 weeks.   Patient will complete attention tasks (divided, auditory, alternating, sustained) by responding to multiple tasks or details within tasks at the same time in a distracting environment, with 80% accuracy, given min cues, in 3 out of 4 consecutive sessions, to improve attention and working memory, to be achieved in 10-12 weeks.      Long-term goals:  Patient will complete cognitive-linguistic therapy that addresses patient's specific deficits in processing speed, short-term working memory, attention to detail, monitoring, sequencing, and organization skills, with instruction, to alleviate effects of executive functioning disorder deficits by discharge.  Patient will improve ability to facilitate cognitive function and communication skills, including use of compensatory strategies in a variety of functional living tasks, to improve quality of life and to maximize level of independence.         Plan:  Patient would benefit from outpatient skilled Speech Therapy services: Cognitive-linguistic therapy    Frequency: 1-2x weekly  Duration: 3 months    Intervention certification from: 1/8/2025  Intervention certification to: 4/8/2025      Subjective:  History of present illness: Patient is a 74 y.o. male who was referred to outpatient skilled Speech Therapy services for a cognitive-linguistic evaluation. PMH remarkable for HTN, urinary urgency, DASIA, PVC, bradycardia and cervical spine fracture 2/2 fall (8/24)    Patient's goal(s): \"I'm not really sure. My wife is concerned about the diagnosis of my sleep issue and the reason for the fall and that's something that points towards Parkinson's or Taz Body Dementia. 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 The RBANS is intended for use with adolescents to adults, ages 12 to 89 years. The following results were obtained during the administration of the assessment.    Form: D    Cognitive Domain/Subtest: Index Score: Percentile Rank: Classification:   IMMEDIATE MEMORY 87 19%ile Low Average        1. List Learning (21/40)        2. Story Memory (15/24)       VISUOSPATIAL/  CONSTRUCTIONAL 112 79%ile High Average        3. Figure Copy (20/20)        4. Line Orientation (17/20)       LANGUAGE 96 39%ile Average        5. Picture Naming (10/10)        6. Semantic Fluency (17/40)       ATTENTION 82 12%ile Low Average        7. Digit Span (8/16)        8. Coding (31/89)       DELAYED MEMORY 88 21%ile Low Average        9. List Recall (3/10)        10. List Recognition (18/20)        11. Story Recall (7/12)        12. Figure Recall (16/20)         Sum of Index Scores:  465   Total Score:  90   Percentile: 25%ile   Classification: Average       *Patient named 15 concrete category members (clothing) in 60 sec (norm=15+). -- AVERAGE    *Patient named 12 abstract category members (words beginning with letter 'd') in 60 sec (norm=10+). -- AVERAGE        Visit Tracking:  POC expires Unit limit Auth Expiration date PT/OT + Visit Limit?   4/8/25 N/A 12/13/25 BOMN PCY                           Visit/Unit " Tracking  AUTH Status:  Date 1/8/25  IE              No Used                Remaining                          Intervention Comments:  - 45 minutes standard cognitive-linguistic evaluation  - 60 minutes scoring, report write-up, and establishment of POC

## 2025-01-08 NOTE — TELEPHONE ENCOUNTER
Patient's wife Kimberly davis. After receiving results of Holter monitor, patient would like to be seen in office sooner than scheduled appointment with Dr. Herman. Appointment scheduled for 1/21/25 with Shun Sanchez PA-C.

## 2025-01-08 NOTE — TELEPHONE ENCOUNTER
Spoke with  through secure chat,  suggested that pt see Shun DAVIDSON on 1/21/25 and then he will see pt in February.     Spoke with pt wife. Relayed  response, pt wife verbally understood.

## 2025-01-10 ENCOUNTER — OFFICE VISIT (OUTPATIENT)
Dept: PHYSICAL THERAPY | Facility: CLINIC | Age: 75
End: 2025-01-10
Payer: MEDICARE

## 2025-01-10 DIAGNOSIS — S12.401D CLOSED NONDISPLACED FRACTURE OF FIFTH CERVICAL VERTEBRA WITH ROUTINE HEALING, UNSPECIFIED FRACTURE MORPHOLOGY, SUBSEQUENT ENCOUNTER: Primary | ICD-10-CM

## 2025-01-10 PROCEDURE — 97112 NEUROMUSCULAR REEDUCATION: CPT

## 2025-01-10 PROCEDURE — 97110 THERAPEUTIC EXERCISES: CPT

## 2025-01-10 NOTE — PROGRESS NOTES
"Daily Note    Today's date: 01/10/25  Patient name: Aron Oswald Jr.  : 1950  MRN: [id number]  Referring provider: Madiha Hallman CRNP  Dx:   Encounter Diagnosis     ICD-10-CM    1. Closed nondisplaced fracture of fifth cervical vertebra with routine healing, unspecified fracture morphology, subsequent encounter  S12.401D           Start Time: 1145  Stop Time: 1230  Total time in clinic (min): 45 minutes      Subjective: Aron reports no changes today.    Objective: See treatment diary below.    Assessment: Aron tolerated treatment well with consistent cuing throughout. TE's were performed with increased reps and increased resistance. New TE's were demonstrated with proper technique, and tolerated well. Following treatment, the patient demonstrated fatigue and would benefit from continued physical therapy.    Plan: Continue per plan of care.          POC expires Unit limit Auth  expiration date PT/OT + Visit Limit?   25 4 N/a BOMN                 Visit/Unit Tracking  AUTH Status:  Date 1/7 1/10             N/A Used 1 2              Remaining                  Precautions: HTN, h/o C5 fx      Manuals 1/7 1/10           Gr. II-III cervical side glides             UT, levator str.             Gr. II-III cervical rotation, SB mobs                          Neuro Re-Ed             Supine DNF  10x10\"            Supine upper cervical retraction with towel beneath occiput             Bent over row             Webslide: M, L row  3x10 RTB                                                  Ther Ex             Patient education: pathophysiology, diagnostic imaging review GR            UBE  4'/4'            Pulleys  5'           Finger ladder with eccentric lowering  10x5\"            Side lying ER             UT, levator str.             Cervical rotation SNAGS  10x10\"            Cervical isometrics - all planes             Scaption in side lying with UE ranger with TB             TB ER             TB IR        "      Ther Activity                                       Gait Training                                       Modalities                                            Handy Paula, PT  1/10/2025,12:26 PM

## 2025-01-13 ENCOUNTER — TELEPHONE (OUTPATIENT)
Dept: SURGICAL ONCOLOGY | Facility: CLINIC | Age: 75
End: 2025-01-13

## 2025-01-13 ENCOUNTER — APPOINTMENT (OUTPATIENT)
Age: 75
End: 2025-01-13
Payer: MEDICARE

## 2025-01-13 ENCOUNTER — OFFICE VISIT (OUTPATIENT)
Dept: PHYSICAL THERAPY | Facility: CLINIC | Age: 75
End: 2025-01-13
Payer: MEDICARE

## 2025-01-13 ENCOUNTER — TELEPHONE (OUTPATIENT)
Age: 75
End: 2025-01-13

## 2025-01-13 DIAGNOSIS — S12.401D CLOSED NONDISPLACED FRACTURE OF FIFTH CERVICAL VERTEBRA WITH ROUTINE HEALING, UNSPECIFIED FRACTURE MORPHOLOGY, SUBSEQUENT ENCOUNTER: Primary | ICD-10-CM

## 2025-01-13 PROCEDURE — 97112 NEUROMUSCULAR REEDUCATION: CPT | Performed by: PHYSICAL THERAPIST

## 2025-01-13 PROCEDURE — 97110 THERAPEUTIC EXERCISES: CPT | Performed by: PHYSICAL THERAPIST

## 2025-01-13 NOTE — TELEPHONE ENCOUNTER
Spouse calls on behalf of patient. She is interested in a biopsy for the patient for bio markers that may indicate Lewy body dementia or Parkinson's dementia in the patient.

## 2025-01-13 NOTE — PROGRESS NOTES
Referring Physician: Whitney Bey DO  A copy of this note was sent to the referring physician.       Diagnoses and all orders for this visit:    Urinary urgency  -     POCT urine dip  -     Urinalysis with microscopic    Other orders  -     Saw Palmetto 160 MG CAPS; Take by mouth daily            Assessment and plan:       1.  Lower urinary tract symptoms  -Discussed pharmacotherapy or expectant management.  Patient prefers to avoid any medications at the present time  -    2.  Prostate cancer screening  - PSA 3.68  -     3.  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  He is generally minimally symptomatic from a urinary standpoint.  We did discuss pharmacotherapy which may offer him some benefit but he prefers for expectant management.  This is certainly very reasonable    He does have large red blood cells present on dipstick today.  This appears to be a new finding his prior UAs were completely negative.  I have submitted this for microscopy and counseled the patient that if hematuria is confirmed I would like to schedule him for cystoscopy and upper tract imaging.  Otherwise he will follow-up as needed    Mukul Bragg MD      Chief Complaint     Urinary evaluation      History of Present Illness     Aron Oswald Jr. is a 74 y.o. presents for urinary evaluation.  Familial history of 2  cancers is noted.    Detailed Urologic History     - please refer to HPI    Review of Systems     Review of Systems   Constitutional: Negative for activity change and fatigue.   HENT: Negative for congestion.    Eyes: Negative for visual disturbance.   Respiratory: Negative for shortness of breath and wheezing.    Cardiovascular: Negative for chest pain and leg swelling.   Gastrointestinal: Negative for abdominal pain.   Endocrine: Negative for polyuria.   Genitourinary:  Negative for dysuria, flank pain, hematuria and urgency.   Musculoskeletal: Negative for back pain.   Allergic/Immunologic: Negative for immunocompromised state.   Neurological: Negative for dizziness and numbness.   Psychiatric/Behavioral: Negative for dysphoric mood.   All other systems reviewed and are negative.    AUA SYMPTOM SCORE      Flowsheet Row Most Recent Value   AUA SYMPTOM SCORE    How often have you had a sensation of not emptying your bladder completely after you finished urinating? 0 (P)     How often have you had to urinate again less than two hours after you finished urinating? 2 (P)     How often have you found you stopped and started again several times when you urinate? 1 (P)     How often have you found it difficult to postpone urination? 5 (P)     How often have you had a weak urinary stream? 2 (P)     How often have you had to push or strain to begin urination? 0 (P)     How many times did you most typically get up to urinate from the time you went to bed at night until the time you got up in the morning? 3 (P)     Quality of Life: If you were to spend the rest of your life with your urinary condition just the way it is now, how would you feel about that? 3 (P)     AUA SYMPTOM SCORE 13 (P)                Allergies     Allergies   Allergen Reactions    Penicillins Other (See Comments)       Physical Exam       Physical Exam  Constitutional:       General: He is not in acute distress.     Appearance: He is well-developed.   HENT:      Head: Normocephalic and atraumatic.   Cardiovascular:      Comments: Negative lower extremity edema  Pulmonary:      Effort: Pulmonary effort is normal.      Breath sounds: Normal breath sounds.   Abdominal:      Palpations: Abdomen is soft.   Musculoskeletal:         General: Normal range of motion.      Cervical back: Normal range of motion.   Skin:     General: Skin is warm.   Neurological:      Mental Status: He is alert and oriented to person, place, and  time.   Psychiatric:         Behavior: Behavior normal.           Vital Signs  There were no vitals filed for this visit.      Current Medications       Current Outpatient Medications:     amLODIPine (NORVASC) 5 mg tablet, Take 1 tablet (5 mg total) by mouth daily, Disp: 100 tablet, Rfl: 3    aspirin 81 mg chewable tablet, Chew 1 tablet (81 mg total) daily, Disp: , Rfl:     Cholecalciferol (Vitamin D) 50 MCG (2000 UT) tablet, Take 2,000 Units by mouth daily, Disp: , Rfl:     Melatonin 5 MG TABS, Take 5 mg by mouth daily at bedtime, Disp: , Rfl:     Multiple Vitamins-Minerals (Multivitamin Adults) TABS, , Disp: , Rfl:     Psyllium (METAMUCIL 4 IN 1 FIBER PO), , Disp: , Rfl:     sildenafil (VIAGRA) 50 MG tablet, Take 1 tablet (50 mg total) by mouth daily as needed for erectile dysfunction, Disp: 10 tablet, Rfl: 5      Active Problems     Patient Active Problem List   Diagnosis    Essential hypertension    Erectile dysfunction    Sensation of plugged ear on right side    Platelets decreased (HCC)    Snoring    Cervical spine fracture (HCC)    Abnormal computed tomography angiography (CTA) of neck    Fall from bed    REM sleep behavior disorder    Acute pain of right shoulder    Urinary urgency    Thyroid nodule    C5 cervical fracture (HCC)    Fibromuscular dysplasia of both carotid arteries (HCC)    Rotator cuff arthropathy of right shoulder    Weakness of right shoulder    Bradycardia    DASIA (obstructive sleep apnea)    PVC (premature ventricular contraction)    Memory changes         Past Medical History     Past Medical History:   Diagnosis Date    Allergic     Anxiety     COVID-19 virus infection 12/05/2022    Fractures 8/27/2024    C5 - fall from bed, right arm weakness    Hypertension     Refusal of blood transfusions as patient is Restorationism          Surgical History     Past Surgical History:   Procedure Laterality Date    APPENDECTOMY           Family History     Family History   Problem Relation Age  of Onset    No Known Problems Mother     Cancer Father         Stomach, urethral cancers    Diabetes Father     Hearing loss Father     Glaucoma Father     Diabetes Brother     Prostate cancer Brother          Social History     Social History     Social History     Tobacco Use   Smoking Status Never   Smokeless Tobacco Never         Pertinent Lab Values     Lab Results   Component Value Date    CREATININE 1.15 08/30/2024       Lab Results   Component Value Date    PSA 3.680 09/17/2024    PSA 2.98 06/14/2023    PSA 2.3 08/11/2021       @RESULTRCNT(1H])@      Pertinent Imaging       Holter monitor  Result Date: 1/3/2025  Narrative: INDICATIONS: PVCs     Impression: This Holter monitoring and analysis was done for a period of 47 hours and 59 minutes. The rhythm was sinus. Minimum heart rate was 45 bpm. Average heart rate was 73 bpm. Maximum heart rate was 106 bpm. Ventricular ectopic activity consisted of 14769 beats (28.8%), of which 16 were in 4 runs, 2088 were in triplets, 4980 were in couplets, 62326 were in single PVCs, 1074 were single VEs, 16779 were in bigeminy, 3947 were in trigeminy.  The longest and fastest ventricular run consisted of 4 beats with a maximum heart rate of 132 bpm. Supraventricular ectopic activity consisted of 97 beats, of which 16 were in 1 runs, 61 were late beats, 20 were single PACs. No irregular rhythm episodes were detected. Patient's rhythm included 5 hours 52 minutes 52 seconds of bradycardia. The slowest single episode of bradycardia lasted 31 seconds with a minimum heart rate of 45 bpm.The longest R-R interval 1.6 seconds. Patient's rhythm included 1 minutes 17 seconds of tachycardia. The fastest single episode of tachycardia lasted 14 seconds with a maximum heart rate of 106 bpm. No complaints were noted in the patient's diary during the study. CONCLUSIONS: Sinus rhythm with significant ventricular ectopic activity as detailed above. Absence of symptoms during the study.     Echo  "complete w/ contrast if indicated  Result Date: 12/30/2024  Narrative:   Left Ventricle: Left ventricular cavity size is normal. Wall thickness is normal. The left ventricular ejection fraction is 60%. Systolic function is normal.  Study remarkable for frequent ventricular ectopy. Wall motion is normal. Diastolic function is mildly abnormal, consistent with grade I (abnormal) relaxation.   Left Atrium: The atrium is mildly dilated.   Right Atrium: The atrium is mildly dilated.   Mitral Valve: There is mild to moderate regurgitation.   Tricuspid Valve: There is mild to moderate regurgitation. Estimated RVSP 40 mmHg.   Pulmonic Valve: There is mild to moderate regurgitation.         Portions of the record may have been created with voice recognition software.  Occasional wrong word or \"sound a like\" substitutions may have occurred due to the inherent limitations of voice recognition software.  In addition some of the content generated from this outpatient encounter includes information designed for patient education and/or communication back to the referring provider.  Read the chart carefully and recognize, using context, where substitutions have occurred.    "

## 2025-01-13 NOTE — PROGRESS NOTES
"Daily Note     Today's date: 2025  Patient name: Aron Oswald Jr.  : 1950  MRN: [id number]  Referring provider: Madiha Hallman CRNP  Dx:   Encounter Diagnosis     ICD-10-CM    1. Closed nondisplaced fracture of fifth cervical vertebra with routine healing, unspecified fracture morphology, subsequent encounter  S12.401D           Start Time: 1147  Stop Time: 1240  Total time in clinic (min): 53 minutes    Subjective: Patient reports that his neck remains stiff. Patient noticed that swallowing pills was difficult this morning, but overall hasn't had trouble swallowing foods.      Objective: See treatment diary below      Assessment: Tolerated treatment well. Patient demonstrated fatigue post treatment and would benefit from continued PT. Patient tolerated gentle cervical spine strengthening well. Educated Patient to contact neurology if he continues to have difficulty swallowing.      Plan: Continue per plan of care.  Progress treatment as tolerated.         POC expires Unit limit Auth  expiration date PT/OT + Visit Limit?   25 4 N/a BOMN                 Visit/Unit Tracking  AUTH Status:  Date 1/7 1/10 1/13            N/A Used 1 2 3             Remaining                  Precautions: HTN, h/o C5 fx      Manuals 1/7 1/10 1/13          Gr. II-III cervical side glides             UT, levator str.             Gr. II-III cervical rotation, SB mobs                          Neuro Re-Ed             Supine DNF  10x10\"  20x5\"          Supine upper cervical retraction with towel beneath occiput             Bent over row   4# 2x10          Webslide: M, L row  3x10 RTB                                                  Ther Ex             Patient education: pathophysiology, diagnostic imaging review GR            UBE  4'/4'  4' / 4'          Pulleys  5'           Finger ladder with eccentric lowering  10x5\"            Side lying ER             UT, levator str.             Cervical rotation SNAGS  10x10\"  15x5\" " "b/l          Cervical isometrics - all planes   YTB 10x5\" ea.          Scaption in side lying with UE ranger with TB             TB ER             TB IR             Wall slides with DB   2# x10 with assistance PRN          Ther Activity                                       Gait Training                                       Modalities                                              "

## 2025-01-13 NOTE — TELEPHONE ENCOUNTER
Called patient and spoke to Aron. He wishes to not reschedule his consult appointment at this time. Patient stated that he does need this appointment right now as he will be seeing another doctor that will help him address multiple health concerns he has. I stated that I understood and he can always call our office back if he wishes to reschedule in the future. He was thankful for the call.

## 2025-01-14 ENCOUNTER — OFFICE VISIT (OUTPATIENT)
Dept: UROLOGY | Facility: CLINIC | Age: 75
End: 2025-01-14
Payer: MEDICARE

## 2025-01-14 ENCOUNTER — OFFICE VISIT (OUTPATIENT)
Age: 75
End: 2025-01-14
Payer: MEDICARE

## 2025-01-14 VITALS
HEART RATE: 78 BPM | HEIGHT: 71 IN | WEIGHT: 190.4 LBS | BODY MASS INDEX: 26.65 KG/M2 | SYSTOLIC BLOOD PRESSURE: 118 MMHG | DIASTOLIC BLOOD PRESSURE: 60 MMHG | OXYGEN SATURATION: 99 %

## 2025-01-14 DIAGNOSIS — G47.33 OSA (OBSTRUCTIVE SLEEP APNEA): ICD-10-CM

## 2025-01-14 DIAGNOSIS — I10 ESSENTIAL HYPERTENSION: ICD-10-CM

## 2025-01-14 DIAGNOSIS — R41.3 MEMORY CHANGES: ICD-10-CM

## 2025-01-14 DIAGNOSIS — R41.841 COGNITIVE COMMUNICATION DEFICIT: Primary | ICD-10-CM

## 2025-01-14 DIAGNOSIS — R39.15 URINARY URGENCY: Primary | ICD-10-CM

## 2025-01-14 LAB
BACTERIA UR QL AUTO: ABNORMAL /HPF
BILIRUB UR QL STRIP: NEGATIVE
CLARITY UR: CLEAR
COLOR UR: ABNORMAL
GLUCOSE UR STRIP-MCNC: NEGATIVE MG/DL
HGB UR QL STRIP.AUTO: ABNORMAL
KETONES UR STRIP-MCNC: ABNORMAL MG/DL
LEUKOCYTE ESTERASE UR QL STRIP: NEGATIVE
NITRITE UR QL STRIP: NEGATIVE
NON-SQ EPI CELLS URNS QL MICRO: ABNORMAL /HPF
PH UR STRIP.AUTO: 5.5 [PH]
PROT UR STRIP-MCNC: ABNORMAL MG/DL
RBC #/AREA URNS AUTO: ABNORMAL /HPF
SL AMB  POCT GLUCOSE, UA: NORMAL
SL AMB LEUKOCYTE ESTERASE,UA: NORMAL
SL AMB POCT BILIRUBIN,UA: NORMAL
SL AMB POCT BLOOD,UA: NORMAL
SL AMB POCT CLARITY,UA: CLEAR
SL AMB POCT COLOR,UA: YELLOW
SL AMB POCT KETONES,UA: 5
SL AMB POCT NITRITE,UA: NORMAL
SL AMB POCT PH,UA: 6
SL AMB POCT SPECIFIC GRAVITY,UA: 1.02
SL AMB POCT URINE PROTEIN: NORMAL
SL AMB POCT UROBILINOGEN: 0.2
SP GR UR STRIP.AUTO: 1.02 (ref 1–1.03)
UROBILINOGEN UR STRIP-ACNC: <2 MG/DL
WBC #/AREA URNS AUTO: ABNORMAL /HPF

## 2025-01-14 PROCEDURE — 97129 THER IVNTJ 1ST 15 MIN: CPT

## 2025-01-14 PROCEDURE — 99203 OFFICE O/P NEW LOW 30 MIN: CPT | Performed by: UROLOGY

## 2025-01-14 PROCEDURE — 81002 URINALYSIS NONAUTO W/O SCOPE: CPT | Performed by: UROLOGY

## 2025-01-14 PROCEDURE — 97130 THER IVNTJ EA ADDL 15 MIN: CPT

## 2025-01-14 PROCEDURE — 81001 URINALYSIS AUTO W/SCOPE: CPT | Performed by: UROLOGY

## 2025-01-14 RX ORDER — BACITRACIN 500 UNIT/G
OINTMENT (GRAM) TOPICAL DAILY
COMMUNITY

## 2025-01-14 NOTE — PROGRESS NOTES
Daily Speech Treatment Note    Today's date: 2025   Patient’s name: Aron Oswald Jr.  : 1950  MRN: [id number]  Safety measures: memory difficulties, HTN, hx of etoh abuse   Referring provider: Ludy Person,*    Encounter Diagnosis     ICD-10-CM    1. Cognitive communication deficit  R41.841       2. Memory changes  R41.3       3. Essential hypertension  I10       4. DASIA (obstructive sleep apnea)  G47.33           Visit Tracking:  POC expires Unit limit Auth Expiration date PT/OT + Visit Limit?   25 N/A 25 BOMN PCY                                           Visit/Unit Tracking  AUTH Status:  Date 25  IE                         No Used  1  2                         Remaining   9  8                            Subjective/Behavioral:  -Patient pleasant, engaged, and cooperative. Patient attended today's session unaccompanied. Patient underwent halter monitor for cardiac check and is awaiting a f/u with cardiology.    Objective/Assessment:  -Reviewed testing results and goals in plan care with patient. Patient is in agreement at this time. Discussed results of sleep study and rx for CPAP related to cognitive-linguistic functioning; reviewed eval results from geriatrician services as well.     Short-term goals:  Patient will be educated on the use of internal and external memory aids and compensatory strategies to facilitate increased recall of routine, personal information, and recent events, to be achieved in 2-4 weeks.  25: Patient educated on internal memory strategies and external memory aids (e.g. repetition, association, visualization, grouping, writing things down, etc.) and given practical examples of how to implement in functional scenarios. Patient given handouts outlining and explaining memory strategies/aids, as well as a list of cognitively stimulating activities. Patient expressed verbal comprehension of strategies; reciprocal agreement noted.     Patient will  be educated on and demonstrate understanding of word finding strategies (i.e., circumlocution) for improved generative naming and verbal expression skills, to be achieved in 2-4 weeks.  Patient will utilize word finding strategies during semantic feature analysis treatment activity, with 80% accuracy, IND, in 3 out of 4 consecutive sessions, to facilitate improved naming and verbal expression skills, to be achieved in 8-10 weeks.  Patient will complete auditory immediate and short term memory tasks to facilitate increased ability to retell narratives and recall information within functional living environment, with 80% accuracy, IND using preferred memory strategy, in 3 out of 4 consecutive sessions, to be achieved in 10-12 weeks.  Patient will complete complex auditory attention processing tasks (e.g., sentence unscramble, ranking numbers/words, etc.) to improve working memory, with 80% accuracy, IND, in 3 out of 4 consecutive sessions, to be achieved in 10-12 weeks.  Patient will complete thought organization tasks (e.g., sequencing, deduction puzzles, etc.) with 80% accuracy, IND, in 3 out of 4 consecutive sessions, to facilitate increased executive functioning, working memory, problem solving, and processing skills, to be achieved in 10-12 weeks..  To target mental manipulation and working memory, patient will participate in word finding activity (i.e., anagrams) with 80% accuracy, IND, in 3 out of 4 consecutive sessions, to be achieved in 10-12 weeks.   Patient will answer questions regarding story read aloud with 80% accuracy, IND, in 3 out of 4 consecutive sessions, to facilitate improved auditory comprehension and recall, to be achieved in 8-10 weeks.   Patient will complete attention tasks (divided, auditory, alternating, sustained) by responding to multiple tasks or details within tasks at the same time in a distracting environment, with 80% accuracy, given min cues, in 3 out of 4 consecutive sessions, to  improve attention and working memory, to be achieved in 10-12 weeks.      Plan:  -Patient was provided with home exercises/activities to target goals in plan of care at the end of today's session.  -Continue with current plan of care.

## 2025-01-15 ENCOUNTER — RESULTS FOLLOW-UP (OUTPATIENT)
Dept: UROLOGY | Facility: CLINIC | Age: 75
End: 2025-01-15

## 2025-01-15 ENCOUNTER — APPOINTMENT (OUTPATIENT)
Dept: PHYSICAL THERAPY | Facility: CLINIC | Age: 75
End: 2025-01-15
Payer: MEDICARE

## 2025-01-15 DIAGNOSIS — R41.3 MEMORY CHANGES: Primary | ICD-10-CM

## 2025-01-15 DIAGNOSIS — R31.29 MICROSCOPIC HEMATURIA: Primary | ICD-10-CM

## 2025-01-15 DIAGNOSIS — G47.52 REM SLEEP BEHAVIOR DISORDER: ICD-10-CM

## 2025-01-15 NOTE — PROGRESS NOTES
"Daily Note     Today's date: 1/15/2025  Patient name: Aron Oswald Jr.  : 1950  MRN: [id number]  Referring provider: Madiha Hallman CRNP  Dx: No diagnosis found.               Subjective: ***      Objective: See treatment diary below      Assessment: Tolerated treatment {Tolerated treatment :}. Patient {assessment:}      Plan: {PLAN:}       POC expires Unit limit Auth  expiration date PT/OT + Visit Limit?   25 4 N/a BOMN                 Visit/Unit Tracking  AUTH Status:  Date 1/7 1/10 1/13            N/A Used 1 2 3             Remaining                  Precautions: HTN, h/o C5 fx      Manuals 1/7 1/10 1/13          Gr. II-III cervical side glides             UT, levator str.             Gr. II-III cervical rotation, SB mobs                          Neuro Re-Ed             Supine DNF  10x10\"  20x5\"          Supine upper cervical retraction with towel beneath occiput             Bent over row   4# 2x10          Webslide: M, L row  3x10 RTB                                                  Ther Ex             Patient education: pathophysiology, diagnostic imaging review GR            UBE  4'/4'  4' / 4'          Pulleys  5'           Finger ladder with eccentric lowering  10x5\"            Side lying ER             UT, levator str.             Cervical rotation SNAGS  10x10\"  15x5\" b/l          Cervical isometrics - all planes   YTB 10x5\" ea.          Scaption in side lying with UE ranger with TB             TB ER             TB IR             Wall slides with DB   2# x10 with assistance PRN          Ther Activity                                       Gait Training                                       Modalities                                                "

## 2025-01-15 NOTE — TELEPHONE ENCOUNTER
Please let the patient know yesterday's urinalysis did confirm significant microscopic hematuria.  I recommend that he is scheduled for cystoscopy with a renal ultrasound prior which I have ordered.

## 2025-01-16 ENCOUNTER — HOSPITAL ENCOUNTER (OUTPATIENT)
Dept: ULTRASOUND IMAGING | Facility: HOSPITAL | Age: 75
End: 2025-01-16
Attending: INTERNAL MEDICINE
Payer: MEDICARE

## 2025-01-16 DIAGNOSIS — E04.2 MULTIPLE THYROID NODULES: ICD-10-CM

## 2025-01-16 PROCEDURE — 88173 CYTOPATH EVAL FNA REPORT: CPT | Performed by: PATHOLOGY

## 2025-01-16 PROCEDURE — 88172 CYTP DX EVAL FNA 1ST EA SITE: CPT | Performed by: PATHOLOGY

## 2025-01-16 PROCEDURE — 10005 FNA BX W/US GDN 1ST LES: CPT

## 2025-01-16 PROCEDURE — 10006 FNA BX W/US GDN EA ADDL: CPT

## 2025-01-16 RX ORDER — LIDOCAINE HYDROCHLORIDE 10 MG/ML
5 INJECTION, SOLUTION EPIDURAL; INFILTRATION; INTRACAUDAL; PERINEURAL ONCE
Status: DISCONTINUED | OUTPATIENT
Start: 2025-01-16 | End: 2025-01-20 | Stop reason: HOSPADM

## 2025-01-20 ENCOUNTER — OFFICE VISIT (OUTPATIENT)
Age: 75
End: 2025-01-20
Payer: MEDICARE

## 2025-01-20 ENCOUNTER — RESULTS FOLLOW-UP (OUTPATIENT)
Dept: INTERNAL MEDICINE CLINIC | Facility: CLINIC | Age: 75
End: 2025-01-20

## 2025-01-20 ENCOUNTER — APPOINTMENT (OUTPATIENT)
Dept: PHYSICAL THERAPY | Facility: CLINIC | Age: 75
End: 2025-01-20
Payer: MEDICARE

## 2025-01-20 DIAGNOSIS — R41.841 COGNITIVE COMMUNICATION DEFICIT: Primary | ICD-10-CM

## 2025-01-20 DIAGNOSIS — R41.3 MEMORY CHANGES: ICD-10-CM

## 2025-01-20 DIAGNOSIS — I10 ESSENTIAL HYPERTENSION: ICD-10-CM

## 2025-01-20 DIAGNOSIS — G47.33 OSA (OBSTRUCTIVE SLEEP APNEA): ICD-10-CM

## 2025-01-20 PROCEDURE — 97129 THER IVNTJ 1ST 15 MIN: CPT

## 2025-01-20 PROCEDURE — 88173 CYTOPATH EVAL FNA REPORT: CPT | Performed by: PATHOLOGY

## 2025-01-20 PROCEDURE — 97130 THER IVNTJ EA ADDL 15 MIN: CPT

## 2025-01-20 PROCEDURE — 88172 CYTP DX EVAL FNA 1ST EA SITE: CPT | Performed by: PATHOLOGY

## 2025-01-20 NOTE — PROGRESS NOTES
Daily Speech Treatment Note    Today's date: 2025   Patient’s name: Aron Oswald Jr.  : 1950  MRN: [id number]  Safety measures: memory difficulties, HTN, hx of etoh abuse   Referring provider: Ludy Person,*    Encounter Diagnosis     ICD-10-CM    1. Cognitive communication deficit  R41.841       2. Memory changes  R41.3       3. Essential hypertension  I10       4. DASIA (obstructive sleep apnea)  G47.33           Visit Tracking:  POC expires Unit limit Auth Expiration date PT/OT + Visit Limit?   25 N/A 25 BOMN PCY                                           Visit/Unit Tracking  AUTH Status:  Date 25  IE                       No Used  1  2  3                       Remaining   9  8  7                          Subjective/Behavioral:  -Patient pleasant, engaged, and cooperative. Patient attended today's session unaccompanied. No new concerns reported.     Objective/Assessment: Patient reported successfully utilizing memory strategy of repetition and confirmation of neighbor's name after introduction this morning while shoveling. Patient benefited from verbal cues to utilize rehearsal strategy for recall tasks.      Short-term goals:  Patient will be educated on the use of internal and external memory aids and compensatory strategies to facilitate increased recall of routine, personal information, and recent events, to be achieved in 2-4 weeks.  25: Patient educated on internal memory strategies and external memory aids (e.g. repetition, association, visualization, grouping, writing things down, etc.) and given practical examples of how to implement in functional scenarios. Patient given handouts outlining and explaining memory strategies/aids, as well as a list of cognitively stimulating activities. Patient expressed verbal comprehension of strategies; reciprocal agreement noted.   25: Reviewed internal memory strategies and external aids educational handouts 2/2  poor recall of strategies when asked by clinician to name a few strategies used to improve recall.     Patient will be educated on and demonstrate understanding of word finding strategies (i.e., circumlocution) for improved generative naming and verbal expression skills, to be achieved in 2-4 weeks.  Patient will utilize word finding strategies during semantic feature analysis treatment activity, with 80% accuracy, IND, in 3 out of 4 consecutive sessions, to facilitate improved naming and verbal expression skills, to be achieved in 8-10 weeks.  Patient will complete auditory immediate and short term memory tasks to facilitate increased ability to retell narratives and recall information within functional living environment, with 80% accuracy, IND using preferred memory strategy, in 3 out of 4 consecutive sessions, to be achieved in 10-12 weeks.  1/20/25: Patient IND immediately recalled a list of ten words utilizing the association and rehearsal strategies (part/whole coding) with 90% accuracy. He then recalled that same list of ten words with 90% accuracy, IND, after a ten minute delay. Accuracy increased to 100% given min gesture cue.     Patient will complete complex auditory attention processing tasks (e.g., sentence unscramble, ranking numbers/words, etc.) to improve working memory, with 80% accuracy, IND, in 3 out of 4 consecutive sessions, to be achieved in 10-12 weeks.  Patient will complete thought organization tasks (e.g., sequencing, deduction puzzles, etc.) with 80% accuracy, IND, in 3 out of 4 consecutive sessions, to facilitate increased executive functioning, working memory, problem solving, and processing skills, to be achieved in 10-12 weeks.  1/20/25: Patient completed a calendar deduction activity in which he had to answer clues and use the method of elimination to deduce the end date at the completion of the task. He completed this activity with 17% accuracy, IND. Accuracy increased to 100% given  mod verbal semantic cues, direct verbal instruction, visual cues (highlighting) and immediate, direct and indirect verbal feedback. Given as HEP.    To target mental manipulation and working memory, patient will participate in word finding activity (i.e., anagrams) with 80% accuracy, IND, in 3 out of 4 consecutive sessions, to be achieved in 10-12 weeks.   Patient will answer questions regarding story read aloud with 80% accuracy, IND, in 3 out of 4 consecutive sessions, to facilitate improved auditory comprehension and recall, to be achieved in 8-10 weeks.   Patient will complete attention tasks (divided, auditory, alternating, sustained) by responding to multiple tasks or details within tasks at the same time in a distracting environment, with 80% accuracy, given min cues, in 3 out of 4 consecutive sessions, to improve attention and working memory, to be achieved in 10-12 weeks.      Plan:  -Patient was provided with home exercises/activities to target goals in plan of care at the end of today's session.  -Continue with current plan of care.

## 2025-01-21 ENCOUNTER — OFFICE VISIT (OUTPATIENT)
Dept: PHYSICAL THERAPY | Facility: CLINIC | Age: 75
End: 2025-01-21
Payer: MEDICARE

## 2025-01-21 DIAGNOSIS — S12.401D CLOSED NONDISPLACED FRACTURE OF FIFTH CERVICAL VERTEBRA WITH ROUTINE HEALING, UNSPECIFIED FRACTURE MORPHOLOGY, SUBSEQUENT ENCOUNTER: Primary | ICD-10-CM

## 2025-01-21 PROCEDURE — 97112 NEUROMUSCULAR REEDUCATION: CPT

## 2025-01-21 PROCEDURE — 97110 THERAPEUTIC EXERCISES: CPT

## 2025-01-21 NOTE — PROGRESS NOTES
"Daily Note     Today's date: 2025  Patient name: Aron Oswald Jr.  : 1950  MRN: [id number]  Referring provider: Madiha Hallman CRNP  Dx:   Encounter Diagnosis     ICD-10-CM    1. Closed nondisplaced fracture of fifth cervical vertebra with routine healing, unspecified fracture morphology, subsequent encounter  S12.401D                      Subjective: Pt noted no changes since last treatment session.       Objective: See treatment diary below      Assessment:  Continued with treatment session, with focus on progressions of strengthening without increase in pain. Tolerated treatment well. Patient exhibited good technique with therapeutic exercises and would benefit from continued PT. S/P treatment session, overall noted no immediate changes.       Plan: Continue per plan of care.        POC expires Unit limit Auth  expiration date PT/OT + Visit Limit?   25 4 N/a BOMN                 Visit/Unit Tracking  AUTH Status:  Date 1/7 1/10 1/13 1/21           N/A Used 1 2 3 4            Remaining                  Precautions: HTN, h/o C5 fx      Manuals 1/7 1/10 1/13 1/21         Gr. II-III cervical side glides             UT, levator str.             Gr. II-III cervical rotation, SB mobs                          Neuro Re-Ed             Supine DNF  10x10\"  20x5\"          Supine upper cervical retraction with towel beneath occiput             Bent over row   4# 2x10 4# 2x 19          Webslide: M, L row  3x10 RTB  RTB 2x 10                                                 Ther Ex             Patient education: pathophysiology, diagnostic imaging review GR            UBE  4'/4'  4' / 4' 4'/4'         Pulleys  5'           Finger ladder with eccentric lowering  10x5\"            Side lying ER             UT, levator str.             Cervical rotation SNAGS  10x10\"  15x5\" b/l 15x 5\"          Cervical isometrics - all planes   YTB 10x5\" ea.          Scaption in side lying with UE ranger with TB             TB " ER    NV         TB IR    NV         Wall slides with DB   2# x10 with assistance PRN Wall slides no DB 2x 10b/l          Ther Activity                                       Gait Training                                       Modalities

## 2025-01-22 ENCOUNTER — HOSPITAL ENCOUNTER (OUTPATIENT)
Dept: ULTRASOUND IMAGING | Facility: CLINIC | Age: 75
Discharge: HOME | End: 2025-01-22
Payer: MEDICARE

## 2025-01-22 ENCOUNTER — TELEPHONE (OUTPATIENT)
Age: 75
End: 2025-01-22

## 2025-01-22 DIAGNOSIS — R31.29 MICROSCOPIC HEMATURIA: ICD-10-CM

## 2025-01-22 PROCEDURE — 76775 US EXAM ABDO BACK WALL LIM: CPT

## 2025-01-22 NOTE — TELEPHONE ENCOUNTER
Patient wife was calling just wanted to advise Dr. Bey that the patient has had some specialist and follow up appointments the past few months and had a thyroid biopsy and just would like the  To go over everything to know what has been going on.    Please advise out for any concerns

## 2025-01-23 ENCOUNTER — OFFICE VISIT (OUTPATIENT)
Dept: PHYSICAL THERAPY | Facility: CLINIC | Age: 75
End: 2025-01-23
Payer: MEDICARE

## 2025-01-23 DIAGNOSIS — S12.401D CLOSED NONDISPLACED FRACTURE OF FIFTH CERVICAL VERTEBRA WITH ROUTINE HEALING, UNSPECIFIED FRACTURE MORPHOLOGY, SUBSEQUENT ENCOUNTER: Primary | ICD-10-CM

## 2025-01-23 PROCEDURE — 97112 NEUROMUSCULAR REEDUCATION: CPT

## 2025-01-23 PROCEDURE — 97110 THERAPEUTIC EXERCISES: CPT

## 2025-01-23 NOTE — PROGRESS NOTES
"Daily Note     Today's date: 2025  Patient name: Aron Oswald Jr.  : 1950  MRN: [id number]  Referring provider: Madiha Hallman CRNP  Dx:   Encounter Diagnosis     ICD-10-CM    1. Closed nondisplaced fracture of fifth cervical vertebra with routine healing, unspecified fracture morphology, subsequent encounter  S12.401D           Start Time: 1216  Stop Time: 1254  Total time in clinic (min): 38 minutes    Subjective: No changes but did note having some discomfort in his neck today. No more than the normal.       Objective: See treatment diary below      Assessment: Continued with treatment  session at this time some progressions added with no change in pain status. Tolerated treatment well. Patient exhibited good technique with therapeutic exercises and would benefit from continued PT. No changes in pain status noted at the end of treatment session.   Reviewed a RTB for HEP use.     Plan: Continue per plan of care.        POC expires Unit limit Auth  expiration date PT/OT + Visit Limit?   25 4 N/a BOMN                 Visit/Unit Tracking  AUTH Status:  Date 1/7 1/10 1/13 1/21 1/23          N/A Used 1 2 3 4 5           Remaining                  Precautions: HTN, h/o C5 fx      Manuals 1/7 1/10 1/13 1/21 1/23        Gr. II-III cervical side glides             UT, levator str.             Gr. II-III cervical rotation, SB mobs                          Neuro Re-Ed             Supine DNF  10x10\"  20x5\"          Supine upper cervical retraction with towel beneath occiput             Bent over row   4# 2x10 4# 2x 19  4# 2x 10         Webslide: M, L row  3x10 RTB  RTB 2x 10  RTB 3x 10                                                Ther Ex             Patient education: pathophysiology, diagnostic imaging review GR            UBE  4'/4'  4' / 4' 4'/4' 5'/5'        Pulleys  5'           Finger ladder with eccentric lowering  10x5\"            Side lying ER             UT, levator str.             Cervical " "rotation SNAGS  10x10\"  15x5\" b/l 15x 5\"  15x 5\"         Cervical isometrics - all planes   YTB 10x5\" ea.          Scaption in side lying with UE ranger with TB             TB ER    NV RTB 2x 10  (R)        TB IR    NV RTB 2x 10 (R)        Wall slides with DB   2# x10 with assistance PRN Wall slides no DB 2x 10b/l  Wall slided 20x ea.         Ther Activity                                       Gait Training                                       Modalities                                                  "

## 2025-01-24 NOTE — TELEPHONE ENCOUNTER
Called patient's wife and left a message that Dr Bey recommends following with specialists regarding the thyroid nodule.  He should schedule an appointment with Dr Bey if he has any further questions.

## 2025-01-27 ENCOUNTER — OFFICE VISIT (OUTPATIENT)
Dept: PHYSICAL THERAPY | Facility: CLINIC | Age: 75
End: 2025-01-27
Payer: MEDICARE

## 2025-01-27 DIAGNOSIS — S12.401D CLOSED NONDISPLACED FRACTURE OF FIFTH CERVICAL VERTEBRA WITH ROUTINE HEALING, UNSPECIFIED FRACTURE MORPHOLOGY, SUBSEQUENT ENCOUNTER: Primary | ICD-10-CM

## 2025-01-27 PROCEDURE — 97140 MANUAL THERAPY 1/> REGIONS: CPT | Performed by: PHYSICAL THERAPIST

## 2025-01-27 PROCEDURE — 97110 THERAPEUTIC EXERCISES: CPT | Performed by: PHYSICAL THERAPIST

## 2025-01-27 NOTE — PROGRESS NOTES
"Daily Note     Today's date: 2025  Patient name: Aron Oswald Jr.  : 1950  MRN: [id number]  Referring provider: Madiha Hallman CRNP  Dx:   Encounter Diagnosis     ICD-10-CM    1. Closed nondisplaced fracture of fifth cervical vertebra with routine healing, unspecified fracture morphology, subsequent encounter  S12.401D           Start Time: 1140  Stop Time: 1225  Total time in clinic (min): 45 minutes    Subjective: Patient offers no new complaints.      Objective: See treatment diary below      Assessment: Tolerated treatment well. Patient demonstrated fatigue post treatment and would benefit from continued PT. Patient reported improved cervical ROM following manual techniques. Patient's cervical spine remains significantly limited in all planes.      Plan: Continue per plan of care.  Progress treatment as tolerated.         POC expires Unit limit Auth  expiration date PT/OT + Visit Limit?   25 4 N/a BOMN                 Visit/Unit Tracking  AUTH Status:  Date 1/7 1/10 1/13 1/21 1/23 1/27         N/A Used 1 2 3 4 5 6          Remaining                  Precautions: HTN, h/o C5 fx      Manuals 1/7 1/10 1/13 1/21 1/23 1/27       Gr. II-III cervical side glides      GR       UT, levator str.      UT str. GR       Gr. II-III cervical rotation in sitting      GR       Gr. II-III C2, C3 CPA mobs      GR       Neuro Re-Ed             Supine DNF  10x10\"  20x5\"          Supine upper cervical retraction with towel beneath occiput             Bent over row   4# 2x10 4# 2x 19  4# 2x 10         Webslide: M, L row  3x10 RTB  RTB 2x 10  RTB 3x 10         Prone shoulder ext.      2# 2x10 3\"       Prone h. Abd.      2x10 3\"                    Ther Ex             Patient education: pathophysiology, diagnostic imaging review GR            UBE  4'/4'  4' / 4' 4'/4' 5'/5' 5' / 5'       Pulleys  5'           Finger ladder with eccentric lowering  10x5\"            Side lying ER             UT, levator str.          " "   Cervical rotation SNAGS  10x10\"  15x5\" b/l 15x 5\"  15x 5\"         Cervical isometrics - all planes   YTB 10x5\" ea.          Scaption in side lying with UE ranger with TB             TB ER    NV RTB 2x 10  (R)        TB IR    NV RTB 2x 10 (R)        Wall slides with DB   2# x10 with assistance PRN Wall slides no DB 2x 10b/l  Wall slided 20x ea.         Ther Activity                                       Gait Training                                       Modalities                                                    "

## 2025-01-28 ENCOUNTER — RESULTS FOLLOW-UP (OUTPATIENT)
Dept: INTERNAL MEDICINE CLINIC | Facility: CLINIC | Age: 75
End: 2025-01-28

## 2025-01-30 ENCOUNTER — OFFICE VISIT (OUTPATIENT)
Dept: PHYSICAL THERAPY | Facility: CLINIC | Age: 75
End: 2025-01-30
Payer: MEDICARE

## 2025-01-30 DIAGNOSIS — S12.401D CLOSED NONDISPLACED FRACTURE OF FIFTH CERVICAL VERTEBRA WITH ROUTINE HEALING, UNSPECIFIED FRACTURE MORPHOLOGY, SUBSEQUENT ENCOUNTER: Primary | ICD-10-CM

## 2025-01-30 PROCEDURE — 97112 NEUROMUSCULAR REEDUCATION: CPT | Performed by: PHYSICAL THERAPIST

## 2025-01-30 PROCEDURE — 97110 THERAPEUTIC EXERCISES: CPT | Performed by: PHYSICAL THERAPIST

## 2025-01-30 NOTE — PROGRESS NOTES
"Daily Note     Today's date: 2025  Patient name: Aron Oswald Jr.  : 1950  MRN: [id number]  Referring provider: Madiha Hallman CRNP  Dx:   Encounter Diagnosis     ICD-10-CM    1. Closed nondisplaced fracture of fifth cervical vertebra with routine healing, unspecified fracture morphology, subsequent encounter  S12.401D           Start Time: 1216  Stop Time: 1258  Total time in clinic (min): 42 minutes    Subjective: Patient reports that his neck felt a little better after last visit, but remains stiff.      Objective: See treatment diary below      Assessment: Tolerated treatment well. Patient demonstrated fatigue post treatment and would benefit from continued PT. Patient with gradually improving shoulder girdle strength, but continues to remain weak with limited ability to reach above shoulder height.      Plan: Continue per plan of care.  Progress treatment as tolerated.         POC expires Unit limit Auth  expiration date PT/OT + Visit Limit?   25 4 N/a BOMN                 Visit/Unit Tracking  AUTH Status:  Date 1/7 1/10 1/13 1/21 1/23 1/27 1/30        N/A Used 1 2 3 4 5 6 7         Remaining                  Precautions: HTN, h/o C5 fx      Manuals 1/7 1/10 1/13 1/21 1/23 1/27 1/30      Gr. II-III cervical side glides      GR       UT, levator str.      UT str. GR       Gr. II-III cervical rotation in sitting      GR       Gr. II-III C2, C3 CPA mobs      GR       Neuro Re-Ed             Supine DNF  10x10\"  20x5\"          Supine upper cervical retraction with towel beneath occiput             Bent over row   4# 2x10 4# 2x 19  4# 2x 10         Webslide: M, L row  3x10 RTB  RTB 2x 10  RTB 3x 10         Prone shoulder ext.      2# 2x10 3\" 2# 2x10 3\"      Prone h. Abd.      2x10 3\" 2x10 3\"      MB vertical rolls against wall       GMB 2x10      Ther Ex             Patient education: pathophysiology, diagnostic imaging review GR            UBE  4'/4'  4' / 4' 4'/4' 5'/5' 5' / 5' 5' / 5' L3    " "  Pulleys  5'           Finger ladder with eccentric lowering  10x5\"            Side lying ER       2# 2x10      UT, levator str.             Cervical rotation SNAGS  10x10\"  15x5\" b/l 15x 5\"  15x 5\"         Cervical isometrics - all planes   YTB 10x5\" ea.          Scaption in side lying with UE ranger with TB             TB ER    NV RTB 2x 10  (R)  RTB 2x10      TB IR    NV RTB 2x 10 (R)  RTB 3x10      Wall slides with DB   2# x10 with assistance PRN Wall slides no DB 2x 10b/l  Wall slided 20x ea.         Shoulder flexion AAROM with UE ranger in standing       3# x20                                                          Ther Activity                                       Gait Training                                       Modalities                                                      "

## 2025-02-03 ENCOUNTER — OFFICE VISIT (OUTPATIENT)
Dept: PHYSICAL THERAPY | Facility: CLINIC | Age: 75
End: 2025-02-03
Payer: MEDICARE

## 2025-02-03 DIAGNOSIS — S12.401D CLOSED NONDISPLACED FRACTURE OF FIFTH CERVICAL VERTEBRA WITH ROUTINE HEALING, UNSPECIFIED FRACTURE MORPHOLOGY, SUBSEQUENT ENCOUNTER: Primary | ICD-10-CM

## 2025-02-03 PROCEDURE — 97110 THERAPEUTIC EXERCISES: CPT

## 2025-02-03 PROCEDURE — 97112 NEUROMUSCULAR REEDUCATION: CPT

## 2025-02-03 NOTE — PROGRESS NOTES
"Daily Note     Today's date: 2/3/2025  Patient name: Aron Oswald Jr.  : 1950  MRN: [id number]  Referring provider: Madiha Hallman CRNP  Dx:   Encounter Diagnosis     ICD-10-CM    1. Closed nondisplaced fracture of fifth cervical vertebra with routine healing, unspecified fracture morphology, subsequent encounter  S12.401D           Start Time: 1136  Stop Time: 1210  Total time in clinic (min): 34 minutes    Subjective: pt noted no new changes in pain status.       Objective: See treatment diary below      Assessment:  Continued with treatment session with focus on strengthening as well as postural stabilization. Overall was able to make progressions with no changes in pain status. Tolerated treatment well. Patient exhibited good technique with therapeutic exercises and would benefit from continued PT      Plan: Continue per plan of care.        POC expires Unit limit Auth  expiration date PT/OT + Visit Limit?   25 4 N/a BOMN                 Visit/Unit Tracking  AUTH Status:  Date 1/7 1/10 1/13 1/21 1/23 1/27 1/30 2/3       N/A Used 1 2 3 4 5 6 7 8        Remaining                  Precautions: HTN, h/o C5 fx      Manuals 1/7 1/10 1/13 1/21 1/23 1/27 1/30 2/3     Gr. II-III cervical side glides      GR       UT, levator str.      UT str. GR       Gr. II-III cervical rotation in sitting      GR       Gr. II-III C2, C3 CPA mobs      GR                                                           Neuro Re-Ed             Supine DNF  10x10\"  20x5\"          Supine upper cervical retraction with towel beneath occiput             Bent over row   4# 2x10 4# 2x 19  4# 2x 10    2# 3x 10      Webslide: M, L row  3x10 RTB  RTB 2x 10  RTB 3x 10          Prone scpation         Bent over 2x 10      Prone shoulder ext.      2# 2x10 3\" 2# 2x10 3\" Bent over 2# 3x 10      Prone h. Abd.      2x10 3\" 2x10 3\" Bent over2x 10 3\"  minimal range.      MB vertical rolls against wall       GMB 2x10 GTB 2x 10      Ther Ex          " "   Patient education: pathophysiology, diagnostic imaging review GR            UBE  4'/4'  4' / 4' 4'/4' 5'/5' 5' / 5' 5' / 5' L3 5'/5'     Pulleys  5'           Finger ladder with eccentric lowering  10x5\"            Side lying ER       2# 2x10      UT, levator str.             Cervical rotation SNAGS  10x10\"  15x5\" b/l 15x 5\"  15x 5\"         Cervical isometrics - all planes   YTB 10x5\" ea.          Scaption in side lying with UE ranger with TB             TB ER    NV RTB 2x 10  (R)  RTB 2x10 RTB 2x 10      TB IR    NV RTB 2x 10 (R)  RTB 3x10 RTB 2x 10      Wall slides with DB   2# x10 with assistance PRN Wall slides no DB 2x 10b/l  Wall slided 20x ea.         Shoulder flexion AAROM with UE ranger in standing       3# x20                                                          Ther Activity                                       Gait Training                                       Modalities                                                        "

## 2025-02-03 NOTE — PROGRESS NOTES
[facility]  [address], [address]  [place] [address]  [phone]    Care Conference    NAME: Aron Oswald Jr.  AGE: 74 y.o. SEX: male  YOB: 1950  DATE OF ASSESSMENT: 12/13/24  DATE OF CONFERENCE: 02/11/25    Family Present: patient and his wife  Staff Present: BARBARA Hair    Patient / Family Goals of Care: Review cognitive decline and associated symptoms, discuss treatment options and care planning.     Medical Concerns (Current/Historical): Hypertension, DASIA    Geriatric Syndromes/Age Related Syndromes: Mild cognitive impairment    Neuropsychological  Mild cognitive impairment  [place]air Cognitive Assessment: 22/30  Geriatric Depression Screen: 0/15    History, physical, and cognitive screening are most consistent with:  MCI  Contributing factors:  vascular/cardiac issues, DASIA  Memory meds:  no indications at this time  Cont supportive cares  Caregiver stress concerns:  wife is able to manage him at home without any difficulties. She is concerned about possible LBD diagnosis and would like pt to cont to be evaluated concerning same.  SW needs this visit:  no needs at this visit.   Discussed safe environment  Home:  Ensure pt has phone and reinforce to not use stove or shower while gone.  Level of care:  patient is able to be fully independent at this time. Does not require any supervision or help.   Fall preventions:  continue fall precautions, patient has not had any falls when awake. Only while asleep. Educated patient to move any sharp or harmful objects away from the bed if there would be another incident to prevent risk of injury.   Medication management:  patient is able to manage medications at this time, he does not have any difficulties managing his pills. Takes them on time, does not have issues taking too much or non-compliance.   Driving safety:  patient has no difficulty driving at this time. No car accidents or citations, wife states patient  is safe when he drives. No issues or concerns at this time, educated wife to monitor driving and report any deviations from normal.   Scam safety:  Do not answer suspicious mail, phone calls, email, or text message without having second person review d/t safety risk.  Do not give out personal info to questionable sources- read company safety policies for how they might contact you.  Continue to engage in physical, cognitive, social activity.  Cont doing games, puzzles, trivia, current event discussions  Cont daily walks or exercise video  Cont outings with friends and family.  Avoid social isolation.  Referral to cognitive therapy:  ST.   Optimize chronic and acute conditions  Cont to f/u with providers and ensure medication compliance  Vascular risk factors:  HTN, Bradycardia, PVC  Geriatric syndromes:  Urinary urgency, Fall.   Chronic condition concerns:  ongoing cardiac and vascular conditions, following closely with specialty providers.   Ensure good diet (ex Mediterranean diet) and adequate hydration  Monitor for changes in behavior/mood- if noted, notify provider for eval  Encourage mindfulness and positive socialization for general wellness.  Do not overwhelm pt with info.    Do one task at a time. Use slower pace.  Keep to one conversation at a time.  Do not multitask.  Decision making:  At this point, recommend patient is fully able to make medical and financial decisions.  In future, if capacity is of concern, would refer to neuropsychology for full eval.  Encourage independence as able.  Repeat cognitive assessment in 12 months.  Pt's wife will call sooner if needed    Diagnostic Studies  Review of bloodwork: B12 WNL  Review of previous imaging: [facility] 8/2024: No intracranial hemorrhage or calvarial fracture. Mild microangiopathic change     Physical Finding Impacting Function   Fall Risk: low   Activities of Daily Living: Independent   Instrumental Activities of Daily Living: Independent    Encourage  appropriate footwear at all times  Review fall risk prevention tips and adjust within the home environment as needed    Medications Reviewed   Medications are appropriate for current conditions  Patient is not risk for increased side effects due to polypharmacy  Patient is taking the following medications that meet Beer's Criteria to monitor/avoid:  n/a.   Avoid medications that worsen cognition - check with provider or pharmacist before starting new or OTC meds    Other Findings   Overall health  BMI: 26.56 kg/m2  Maintain well-balanced diet  Continue following with primary care physician regularly  Hypertension  BP is 108/64 in office today. BP has not been stable, medications have just been adjusted per Cardiology.   Continues on amlodipine for BP management.   Denies any dizziness or headaches.   Continue to follow up with PCP/Cardiology for BP management  DASIA  Patient does not use CPAP at bedtime. Per patient he is going to be receiving a CPAP machine shortly per sleep med.   Continues on Melatonin at night time. Doesn't feel like there is much of a difference with the melatonin.   Reports intermittently napping during the day.   Encourage good sleep hygiene, avoid day time napping.    Mood  Baseline mood:  pleasant and cooperative. Does endorse feeling frustrated with his issue with his shoulder, but he is working with PT which he is hopeful should improve with consistent visits  GDS 0  Pharmalogical interventions:  not indicated at this time.   Non pharmalogical interventions:  Encourage relaxation techniques, emotional support.  Mobility  Baseline ambulation: independent with no assistive devices.  Fall type: no falls when awake   PT OT needs: Currently in PT services.   Fall precautions  Educated to move sharp/harmful objects away from the bed to prevent injury if patient would fall out of bed again in the future.     Recommended Health Maintenance   Immunizations, if not contraindicated:   Influenza  vaccine yearly  Pneumo vaccine every 5 years (65 years and over)  Shingles vaccine  COVID-19 vaccine    Social / Safety Considerations  Consider a [facility] for positive socialization, physical exercise, cognitive stimulation and family respite  Stay in touch with family and friends  Plan self-care activities for your mental well-being each week  Consider volunteering through [facility]'s ([phone]) or [employer]  Recommend review of fall risk prevention tips  Recommend use of fall precautions including fall alert device  Consider assistance for medication administration such as blister packaging or use of an automated pill dispenser  Consider contacting your local [facility] Agency on Aging for possible eligible programs such as OPTIONS, Caregiver Support Program, or WAIVER    Long Term Care Issues/ACP  Maintain updated advance directives and provide a copy to your primary care provider  Consider caregiver support groups and educational resources through the Alzheimer's Association; access Alzheimer's Association  Helpline at 1-123.910.3785  [facility] does offer a monthly caregiver support group. If interested, please speak with a .  Utilize reorientation and redirection as needed (dependent on situation)  Educational information provided    Patient and family verbalized understanding of above care plan.    For care coordination purposes, this care plan will be shared with your primary care provider. With any questions, please contact our office at [phone]. Virtual Regular Visit  Name: Aron CORLEY Darek Dumont      : 1950      MRN: [id number]  Encounter Provider: BARBARA Thomas  Encounter Date: 2025   Encounter department: [facility]  Pt seen with his wife for virtual memory care conference.   No new medical concerns at this time.  No new meds, no falls, no hospital visits.  Memory same since last visit  Pt  "has upcoming appt with neuro for PD eval as his wife is concerned with family history of LBD.  Time spent today reviewing memory loss ladder, definition of dementia, types of dementia, contributing factors to cognitive changes, interventions to help delay progression of memory loss, safety concerns to be aware of, and acute changes to notify pcp of.  We also touched on some medications that can contribute to worsening cognition as well as available \"memory medications.\"         Verification of patient location:  Patient is located at Home in the following state in which I hold an active license [place] :  Assessment & Plan  MCI (mild cognitive impairment)  MOCA:  22/30.  independent adls/iadls  Most consistent with mci  Engage in regular social, physical, cognitive activity.  Optimize acute and chronic conditions  Frequent reminders and close monitoring for safety  Monitor for acute changes in behavior/personality (agitation or lethargy).  Notify pcp/ED for reversible causes eval.  Wife concerned with PD/LBD- has upcoming neuro appt for further eval.         ACP (advance care planning)  No ACP docs on file per epic check  Recommend establishing care partner and goals of care.  Share goc with care partner, formalize as POA and living will as able  Once docs formalized, bring to  provider for upload to medical chart  Cont to discuss and update goc as needed.           Encounter provider BARBARA Thomas    The patient was identified by name and date of birth. Aron JORY Oswald Jr. was informed that this is a telemedicine visit and that the visit is being conducted through the Epic Embedded platform. He agrees to proceed..  My office door was closed. No one else was in the room.  He acknowledged consent and understanding of privacy and security of the video platform. The patient has agreed to participate and understands they can discontinue the visit at any time.    Patient is aware this is a billable service.     History " of Present Illness       Review of Systems   Constitutional:  Negative for activity change and appetite change.   HENT:  Negative for hearing loss.    Eyes:  Negative for visual disturbance.   Respiratory:  Negative for cough and shortness of breath.    Cardiovascular:  Negative for chest pain.   Gastrointestinal:  Negative for abdominal pain.   Psychiatric/Behavioral:  Positive for decreased concentration (forgetful). Negative for dysphoric mood and sleep disturbance. The patient is not nervous/anxious.        Objective   There were no vitals taken for this visit.    Physical Exam  Vitals and nursing note reviewed.   Constitutional:       General: He is not in acute distress.     Appearance: Normal appearance. He is not ill-appearing.   HENT:      Head: Normocephalic.   Pulmonary:      Effort: Pulmonary effort is normal. No respiratory distress.      Breath sounds: No wheezing (none audible).   Musculoskeletal:         General: Normal range of motion.   Skin:     General: Skin is dry.   Neurological:      General: No focal deficit present.      Mental Status: He is alert and oriented to person, place, and time. Mental status is at baseline.   Psychiatric:         Mood and Affect: Mood normal.         Behavior: Behavior normal.

## 2025-02-04 ENCOUNTER — OFFICE VISIT (OUTPATIENT)
Age: 75
End: 2025-02-04
Payer: MEDICARE

## 2025-02-04 DIAGNOSIS — R41.841 COGNITIVE COMMUNICATION DEFICIT: Primary | ICD-10-CM

## 2025-02-04 DIAGNOSIS — R41.3 MEMORY CHANGES: ICD-10-CM

## 2025-02-04 PROCEDURE — 97130 THER IVNTJ EA ADDL 15 MIN: CPT

## 2025-02-04 PROCEDURE — 97129 THER IVNTJ 1ST 15 MIN: CPT

## 2025-02-04 NOTE — PROGRESS NOTES
Daily Speech Treatment Note    Today's date: 2025   Patient’s name: Aron Oswald Jr.  : 1950  MRN: [id number]  Safety measures: memory difficulties, HTN, hx of etoh abuse   Referring provider: Ludy Person,*    Encounter Diagnosis     ICD-10-CM    1. Cognitive communication deficit  R41.841       2. Memory changes  R41.3             Visit Tracking:  POC expires Unit limit Auth Expiration date PT/OT + Visit Limit?   25 N/A 25 BOMN PCY                                           Visit/Unit Tracking  AUTH Status:  Date 25  IE    2/                   No Used  1  2  3  4                     Remaining   9  8  7  6                        Subjective/Behavioral:  Discussed medical history with patient and patient was a good historian of [facility].    Objective/Assessment:     Short-term goals:  Patient will be educated on the use of internal and external memory aids and compensatory strategies to facilitate increased recall of routine, personal information, and recent events, to be achieved in 2-4 weeks.  25: Patient educated on internal memory strategies and external memory aids (e.g. repetition, association, visualization, grouping, writing things down, etc.) and given practical examples of how to implement in functional scenarios. Patient given handouts outlining and explaining memory strategies/aids, as well as a list of cognitively stimulating activities. Patient expressed verbal comprehension of strategies; reciprocal agreement noted.   25: Reviewed internal memory strategies and external aids educational handouts 2/2 poor recall of strategies when asked by clinician to name a few strategies used to improve recall.   : Reviewed the importance writing information down such as a grocery list to aid with recall.    Patient will be educated on and demonstrate understanding of word finding strategies (i.e., circumlocution) for improved generative naming and verbal  expression skills, to be achieved in 2-4 weeks.  Patient will utilize word finding strategies during semantic feature analysis treatment activity, with 80% accuracy, IND, in 3 out of 4 consecutive sessions, to facilitate improved naming and verbal expression skills, to be achieved in 8-10 weeks.  Patient will complete auditory immediate and short term memory tasks to facilitate increased ability to retell narratives and recall information within functional living environment, with 80% accuracy, IND using preferred memory strategy, in 3 out of 4 consecutive sessions, to be achieved in 10-12 weeks.  1/20/25: Patient IND immediately recalled a list of ten words utilizing the association and rehearsal strategies (part/whole coding) with 90% accuracy. He then recalled that same list of ten words with 90% accuracy, IND, after a ten minute delay. Accuracy increased to 100% given min gesture cue.     Patient will complete complex auditory attention processing tasks (e.g., sentence unscramble, ranking numbers/words, etc.) to improve working memory, with 80% accuracy, IND, in 3 out of 4 consecutive sessions, to be achieved in 10-12 weeks.  Patient will complete thought organization tasks (e.g., sequencing, deduction puzzles, etc.) with 80% accuracy, IND, in 3 out of 4 consecutive sessions, to facilitate increased executive functioning, working memory, problem solving, and processing skills, to be achieved in 10-12 weeks.  1/20/25: Patient completed a calendar deduction activity in which he had to answer clues and use the method of elimination to deduce the end date at the completion of the task. He completed this activity with 17% accuracy, IND. Accuracy increased to 100% given mod verbal semantic cues, direct verbal instruction, visual cues (highlighting) and immediate, direct and indirect verbal feedback. Given as HEP.    To target mental manipulation and working memory, patient will participate in word finding activity  (i.e., anagrams) with 80% accuracy, IND, in 3 out of 4 consecutive sessions, to be achieved in 10-12 weeks.     2/4: Anagrams: Task completed in 12/20 opp (60% acc) independently, increasing to 20/20 opp (100% acc) from mod cueing. Patient benefited from written cues, additional processing time, semantic cues, phonemic cues that lead to increased accuracy    Patient will answer questions regarding story read aloud with 80% accuracy, IND, in 3 out of 4 consecutive sessions, to facilitate improved auditory comprehension and recall, to be achieved in 8-10 weeks.   Patient will complete attention tasks (divided, auditory, alternating, sustained) by responding to multiple tasks or details within tasks at the same time in a distracting environment, with 80% accuracy, given min cues, in 3 out of 4 consecutive sessions, to improve attention and working memory, to be achieved in 10-12 weeks.      Plan:  -Patient was provided with home exercises/activities to target goals in plan of care at the end of today's session.  -Continue with current plan of care.

## 2025-02-06 ENCOUNTER — APPOINTMENT (OUTPATIENT)
Dept: PHYSICAL THERAPY | Facility: CLINIC | Age: 75
End: 2025-02-06
Payer: MEDICARE

## 2025-02-06 DIAGNOSIS — S12.401D CLOSED NONDISPLACED FRACTURE OF FIFTH CERVICAL VERTEBRA WITH ROUTINE HEALING, UNSPECIFIED FRACTURE MORPHOLOGY, SUBSEQUENT ENCOUNTER: Primary | ICD-10-CM

## 2025-02-06 NOTE — PROGRESS NOTES
"Daily Note     Today's date: 2025  Patient name: Aron Oswald Jr.  : 1950  MRN: [id number]  Referring provider: Madiha Hallman CRNP  Dx:   Encounter Diagnosis     ICD-10-CM    1. Closed nondisplaced fracture of fifth cervical vertebra with routine healing, unspecified fracture morphology, subsequent encounter  S12.401D                      Subjective: ***      Objective: See treatment diary below      Assessment: Tolerated treatment {Tolerated treatment :8697166198}. Patient {assessment:4166554951}      Plan: {PLAN:1090205308}       POC expires Unit limit Auth  expiration date PT/OT + Visit Limit?   25 4 N/a BOMN                 Visit/Unit Tracking  AUTH Status:  Date 1/7 1/10 1/13 1/21 1/23 1/27 1/30 2/3       N/A Used 1 2 3 4 5 6 7 8        Remaining                  Precautions: HTN, h/o C5 fx      Manuals 1/7 1/10 1/13 1/21 1/23 1/27 1/30 2/3     Gr. II-III cervical side glides      GR       UT, levator str.      UT str. GR       Gr. II-III cervical rotation in sitting      GR       Gr. II-III C2, C3 CPA mobs      GR                                                           Neuro Re-Ed             Supine DNF  10x10\"  20x5\"          Supine upper cervical retraction with towel beneath occiput             Bent over row   4# 2x10 4# 2x 19  4# 2x 10    2# 3x 10      Webslide: M, L row  3x10 RTB  RTB 2x 10  RTB 3x 10          Prone scpation         Bent over 2x 10      Prone shoulder ext.      2# 2x10 3\" 2# 2x10 3\" Bent over 2# 3x 10      Prone h. Abd.      2x10 3\" 2x10 3\" Bent over2x 10 3\"  minimal range.      MB vertical rolls against wall       GMB 2x10 GTB 2x 10      Ther Ex             Patient education: pathophysiology, diagnostic imaging review GR            UBE  4'/4'  4' / 4' 4'/4' 5'/5' 5' / 5' 5' / 5' L3 5'/5'     Pulleys  5'           Finger ladder with eccentric lowering  10x5\"            Side lying ER       2# 2x10      UT, levator str.             Cervical rotation SNAGS  10x10\"  " "15x5\" b/l 15x 5\"  15x 5\"         Cervical isometrics - all planes   YTB 10x5\" ea.          Scaption in side lying with UE ranger with TB             TB ER    NV RTB 2x 10  (R)  RTB 2x10 RTB 2x 10      TB IR    NV RTB 2x 10 (R)  RTB 3x10 RTB 2x 10      Wall slides with DB   2# x10 with assistance PRN Wall slides no DB 2x 10b/l  Wall slided 20x ea.         Shoulder flexion AAROM with UE ranger in standing       3# x20                                                          Ther Activity                                       Gait Training                                       Modalities                                                          "

## 2025-02-10 ENCOUNTER — OFFICE VISIT (OUTPATIENT)
Dept: PHYSICAL THERAPY | Facility: CLINIC | Age: 75
End: 2025-02-10
Payer: MEDICARE

## 2025-02-10 DIAGNOSIS — S12.401D CLOSED NONDISPLACED FRACTURE OF FIFTH CERVICAL VERTEBRA WITH ROUTINE HEALING, UNSPECIFIED FRACTURE MORPHOLOGY, SUBSEQUENT ENCOUNTER: Primary | ICD-10-CM

## 2025-02-10 PROCEDURE — 97112 NEUROMUSCULAR REEDUCATION: CPT

## 2025-02-10 PROCEDURE — 97110 THERAPEUTIC EXERCISES: CPT

## 2025-02-10 NOTE — PROGRESS NOTES
"Daily Note     Today's date: 2/10/2025  Patient name: Aron Oswald Jr.  : 1950  MRN: [id number]  Referring provider: Madiha Hallman CRNP  Dx:   Encounter Diagnosis     ICD-10-CM    1. Closed nondisplaced fracture of fifth cervical vertebra with routine healing, unspecified fracture morphology, subsequent encounter  S12.401D                      Subjective: Patient offers no complaints upon arrival. Notes that sometimes depending on how he moves his right arm he feels a clicking sensation.       Objective: See treatment diary below      Assessment: Tolerated treatment well. Patient challenged with RTC strengthening exercises, most notably shoulder ER secondary to weakness. Patient demonstrated fatigue post treatment, exhibited good technique with therapeutic exercises, and would benefit from continued PT      Plan: Continue per plan of care.        POC expires Unit limit Auth  expiration date PT/OT + Visit Limit?   25 4 N/a BOMN                 Visit/Unit Tracking  AUTH Status:  Date 1/7 1/10 1/13 1/21 1/23 1/27 1/30 2/3 2/10      N/A Used 1 2 3 4 5 6 7 8 9       Remaining                  Precautions: HTN, h/o C5 fx      Manuals 1/7 1/10 1/13 1/21 1/23 1/27 1/30 2/3 2/10    Gr. II-III cervical side glides      GR       UT, levator str.      UT str. GR       Gr. II-III cervical rotation in sitting      GR       Gr. II-III C2, C3 CPA mobs      GR                                                           Neuro Re-Ed             Supine DNF  10x10\"  20x5\"          Supine upper cervical retraction with towel beneath occiput             Bent over row   4# 2x10 4# 2x 19  4# 2x 10    2# 3x 10  2# 3x10    Webslide: M, L row  3x10 RTB  RTB 2x 10  RTB 3x 10          Prone scaption         Bent over 2x10  Bent over 3x10    Prone shoulder ext.      2# 2x10 3\" 2# 2x10 3\" Bent over 2# 3x 10  Bent over 2# 3x 10     Prone h. Abd.      2x10 3\" 2x10 3\" Bent over2x 10 3\"  minimal range.  Bent over 2x 10 3\"  minimal " "range    MB vertical rolls against wall       GMB 2x10 GTB 2x 10  GMB 2x10    Ther Ex             Patient education: pathophysiology, diagnostic imaging review GR            UBE  4'/4'  4' / 4' 4'/4' 5'/5' 5' / 5' 5' / 5' L3 5'/5' 5'/5'    Pulleys  5'           Finger ladder with eccentric lowering  10x5\"            Side lying ER       2# 2x10  2x10     UT, levator str.             Cervical rotation SNAGS  10x10\"  15x5\" b/l 15x 5\"  15x 5\"         Cervical isometrics - all planes   YTB 10x5\" ea.          Scaption in side lying with UE ranger with TB             TB ER    NV RTB 2x 10  (R)  RTB 2x10 RTB 2x 10  RTB 3x10    TB IR    NV RTB 2x 10 (R)  RTB 3x10 RTB 2x 10  RTB 3x10    Wall slides with DB   2# x10 with assistance PRN Wall slides no DB 2x 10b/l  Wall slided 20x ea.         Shoulder flexion AAROM with UE ranger in standing       3# x20                                                          Ther Activity                                       Gait Training                                       Modalities                                                            "

## 2025-02-11 ENCOUNTER — PROCEDURE VISIT (OUTPATIENT)
Dept: UROLOGY | Facility: CLINIC | Age: 75
End: 2025-02-11
Payer: MEDICARE

## 2025-02-11 ENCOUNTER — TELEMEDICINE (OUTPATIENT)
Age: 75
End: 2025-02-11
Payer: MEDICARE

## 2025-02-11 VITALS
WEIGHT: 191.8 LBS | BODY MASS INDEX: 26.85 KG/M2 | DIASTOLIC BLOOD PRESSURE: 80 MMHG | OXYGEN SATURATION: 99 % | HEART RATE: 53 BPM | HEIGHT: 71 IN | SYSTOLIC BLOOD PRESSURE: 146 MMHG

## 2025-02-11 DIAGNOSIS — G31.84 MCI (MILD COGNITIVE IMPAIRMENT): Primary | ICD-10-CM

## 2025-02-11 DIAGNOSIS — R35.0 BENIGN PROSTATIC HYPERPLASIA WITH URINARY FREQUENCY: Primary | ICD-10-CM

## 2025-02-11 DIAGNOSIS — R31.29 MICROSCOPIC HEMATURIA: ICD-10-CM

## 2025-02-11 DIAGNOSIS — N40.1 BENIGN PROSTATIC HYPERPLASIA WITH URINARY FREQUENCY: Primary | ICD-10-CM

## 2025-02-11 DIAGNOSIS — Z71.89 ACP (ADVANCE CARE PLANNING): ICD-10-CM

## 2025-02-11 LAB
SL AMB  POCT GLUCOSE, UA: NORMAL
SL AMB LEUKOCYTE ESTERASE,UA: NORMAL
SL AMB POCT BILIRUBIN,UA: NORMAL
SL AMB POCT BLOOD,UA: NORMAL
SL AMB POCT CLARITY,UA: CLEAR
SL AMB POCT COLOR,UA: YELLOW
SL AMB POCT KETONES,UA: NORMAL
SL AMB POCT NITRITE,UA: NORMAL
SL AMB POCT PH,UA: 6
SL AMB POCT SPECIFIC GRAVITY,UA: 1.01
SL AMB POCT URINE PROTEIN: NORMAL
SL AMB POCT UROBILINOGEN: 0.2

## 2025-02-11 PROCEDURE — 99483 ASSMT & CARE PLN PT COG IMP: CPT | Performed by: NURSE PRACTITIONER

## 2025-02-11 PROCEDURE — 87086 URINE CULTURE/COLONY COUNT: CPT | Performed by: UROLOGY

## 2025-02-11 PROCEDURE — 99214 OFFICE O/P EST MOD 30 MIN: CPT | Performed by: UROLOGY

## 2025-02-11 PROCEDURE — 81002 URINALYSIS NONAUTO W/O SCOPE: CPT | Performed by: UROLOGY

## 2025-02-11 PROCEDURE — 52000 CYSTOURETHROSCOPY: CPT | Performed by: UROLOGY

## 2025-02-11 RX ORDER — FINASTERIDE 5 MG/1
5 TABLET, FILM COATED ORAL DAILY
Qty: 90 TABLET | Refills: 3 | Status: SHIPPED | OUTPATIENT
Start: 2025-02-11 | End: 2025-02-12

## 2025-02-11 RX ORDER — TAMSULOSIN HYDROCHLORIDE 0.4 MG/1
0.4 CAPSULE ORAL
Qty: 90 CAPSULE | Refills: 3 | Status: SHIPPED | OUTPATIENT
Start: 2025-02-11 | End: 2025-02-12

## 2025-02-11 NOTE — PROGRESS NOTES
Referring Physician: Whitney Bey DO  A copy of this note was sent to the referring physician.       Diagnoses and all orders for this visit:    Benign prostatic hyperplasia with urinary frequency  -     tamsulosin (FLOMAX) 0.4 mg; Take 1 capsule (0.4 mg total) by mouth daily with dinner  -     finasteride (PROSCAR) 5 mg tablet; Take 1 tablet (5 mg total) by mouth daily    Microscopic hematuria  -     Cystoscopy  -     POCT urine dip  -     Urine culture            Assessment and plan:       1.  BPH with bladder stones  -Mild to moderate symptomatology  -Recommended initiating pharmacotherapy to start.  We discussed a TURP with cystoscopy AllePak see if symptoms or not well-controlled over time.    2.  Prostate cancer screening  - PSA 3.68  -     3.  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  Dosing adverse effects were reviewed.    Follow-up in 3 months time for symptom reassessment with advanced practitioner team.  If his symptoms are poorly controlled at that time or in the future, recommendation will be for a TURP with cystolitholapaxy.  We reviewed the procedure today    Mukul Bragg MD      Chief Complaint     Urinary evaluation      History of Present Illness     Aron Oswald Jr. is a 74 y.o. returns in follow-up for mild lower urinary tract symptoms.  He was found to have microscopic materia at the prior office visit and returns for cystoscopy.    Detailed Urologic History     - please refer to HPI    Review of Systems     Review of Systems   Constitutional: Negative for activity change and fatigue.   HENT: Negative for congestion.    Eyes: Negative for visual disturbance.   Respiratory: Negative for shortness of breath and wheezing.    Cardiovascular: Negative for chest pain and leg swelling.   Gastrointestinal:  Negative for abdominal pain.   Endocrine: Negative for polyuria.   Genitourinary: Negative for dysuria, flank pain, hematuria and urgency.   Musculoskeletal: Negative for back pain.   Allergic/Immunologic: Negative for immunocompromised state.   Neurological: Negative for dizziness and numbness.   Psychiatric/Behavioral: Negative for dysphoric mood.   All other systems reviewed and are negative.    AUA SYMPTOM SCORE      Flowsheet Row Most Recent Value   AUA SYMPTOM SCORE    How often have you had a sensation of not emptying your bladder completely after you finished urinating? 0 (P)     How often have you had to urinate again less than two hours after you finished urinating? 2 (P)     How often have you found you stopped and started again several times when you urinate? 1 (P)     How often have you found it difficult to postpone urination? 5 (P)     How often have you had a weak urinary stream? 2 (P)     How often have you had to push or strain to begin urination? 0 (P)     How many times did you most typically get up to urinate from the time you went to bed at night until the time you got up in the morning? 3 (P)     Quality of Life: If you were to spend the rest of your life with your urinary condition just the way it is now, how would you feel about that? 3 (P)     AUA SYMPTOM SCORE 13 (P)                Allergies     Allergies   Allergen Reactions    Penicillins Other (See Comments)       Physical Exam       Physical Exam  Constitutional:       General: He is not in acute distress.     Appearance: He is well-developed.   HENT:      Head: Normocephalic and atraumatic.   Cardiovascular:      Comments: Negative lower extremity edema  Pulmonary:      Effort: Pulmonary effort is normal.      Breath sounds: Normal breath sounds.   Abdominal:      Palpations: Abdomen is soft.   Musculoskeletal:         General: Normal range of motion.      Cervical back: Normal range of motion.   Skin:     General: Skin is warm.  "  Neurological:      Mental Status: He is alert and oriented to person, place, and time.   Psychiatric:         Behavior: Behavior normal.           Vital Signs  Vitals:    02/11/25 1252   BP: 146/80   BP Location: Left arm   Patient Position: Sitting   Cuff Size: Large   Pulse: (!) 53   SpO2: 99%   Weight: 87 kg (191 lb 12.8 oz)   Height: 5' 11\" (1.803 m)         Current Medications       Current Outpatient Medications:     Cholecalciferol (Vitamin D) 50 MCG (2000 UT) tablet, Take 2,000 Units by mouth daily, Disp: , Rfl:     finasteride (PROSCAR) 5 mg tablet, Take 1 tablet (5 mg total) by mouth daily, Disp: 90 tablet, Rfl: 3    Melatonin 5 MG TABS, Take 5 mg by mouth daily at bedtime, Disp: , Rfl:     Multiple Vitamins-Minerals (Multivitamin Adults) TABS, , Disp: , Rfl:     Saw Palmetto 160 MG CAPS, Take by mouth daily, Disp: , Rfl:     sildenafil (VIAGRA) 50 MG tablet, Take 1 tablet (50 mg total) by mouth daily as needed for erectile dysfunction, Disp: 10 tablet, Rfl: 5    tamsulosin (FLOMAX) 0.4 mg, Take 1 capsule (0.4 mg total) by mouth daily with dinner, Disp: 90 capsule, Rfl: 3    amLODIPine (NORVASC) 5 mg tablet, Take 1 tablet (5 mg total) by mouth daily (Patient not taking: Reported on 2/11/2025), Disp: 100 tablet, Rfl: 3    Psyllium (METAMUCIL 4 IN 1 FIBER PO), , Disp: , Rfl:       Active Problems     Patient Active Problem List   Diagnosis    Essential hypertension    Erectile dysfunction    Sensation of plugged ear on right side    Platelets decreased (HCC)    Snoring    Cervical spine fracture (HCC)    Abnormal computed tomography angiography (CTA) of neck    Fall from bed    REM sleep behavior disorder    Acute pain of right shoulder    Urinary urgency    Thyroid nodule    C5 cervical fracture (HCC)    Fibromuscular dysplasia of both carotid arteries (HCC)    Rotator cuff arthropathy of right shoulder    Weakness of right shoulder    Bradycardia    DASIA (obstructive sleep apnea)    PVC (premature " ventricular contraction)    Memory changes         Past Medical History     Past Medical History:   Diagnosis Date    Allergic     Anxiety     COVID-19 virus infection 12/05/2022    Fractures 8/27/2024    C5 - fall from bed, right arm weakness    Hypertension     Refusal of blood transfusions as patient is Bahai          Surgical History     Past Surgical History:   Procedure Laterality Date    APPENDECTOMY      US GUIDED THYROID BIOPSY  1/16/2025         Family History     Family History   Problem Relation Age of Onset    No Known Problems Mother     Cancer Father         Stomach, urethral cancers    Diabetes Father     Hearing loss Father     Glaucoma Father     Diabetes Brother     Prostate cancer Brother          Social History     Social History     Social History     Tobacco Use   Smoking Status Never   Smokeless Tobacco Never         Pertinent Lab Values     Lab Results   Component Value Date    CREATININE 1.15 08/30/2024       Lab Results   Component Value Date    PSA 3.680 09/17/2024    PSA 2.98 06/14/2023    PSA 2.3 08/11/2021       @RESULTRCNT(1H])@      Pertinent Imaging       Holter monitor  Result Date: 1/3/2025  Narrative: INDICATIONS: PVCs     Impression: This Holter monitoring and analysis was done for a period of 47 hours and 59 minutes. The rhythm was sinus. Minimum heart rate was 45 bpm. Average heart rate was 73 bpm. Maximum heart rate was 106 bpm. Ventricular ectopic activity consisted of 65103 beats (28.8%), of which 16 were in 4 runs, 2088 were in triplets, 4980 were in couplets, 50094 were in single PVCs, 1074 were single VEs, 62382 were in bigeminy, 3947 were in trigeminy.  The longest and fastest ventricular run consisted of 4 beats with a maximum heart rate of 132 bpm. Supraventricular ectopic activity consisted of 97 beats, of which 16 were in 1 runs, 61 were late beats, 20 were single PACs. No irregular rhythm episodes were detected. Patient's rhythm included 5 hours 52  "minutes 52 seconds of bradycardia. The slowest single episode of bradycardia lasted 31 seconds with a minimum heart rate of 45 bpm.The longest R-R interval 1.6 seconds. Patient's rhythm included 1 minutes 17 seconds of tachycardia. The fastest single episode of tachycardia lasted 14 seconds with a maximum heart rate of 106 bpm. No complaints were noted in the patient's diary during the study. CONCLUSIONS: Sinus rhythm with significant ventricular ectopic activity as detailed above. Absence of symptoms during the study.     Echo complete w/ contrast if indicated  Result Date: 12/30/2024  Narrative:   Left Ventricle: Left ventricular cavity size is normal. Wall thickness is normal. The left ventricular ejection fraction is 60%. Systolic function is normal.  Study remarkable for frequent ventricular ectopy. Wall motion is normal. Diastolic function is mildly abnormal, consistent with grade I (abnormal) relaxation.   Left Atrium: The atrium is mildly dilated.   Right Atrium: The atrium is mildly dilated.   Mitral Valve: There is mild to moderate regurgitation.   Tricuspid Valve: There is mild to moderate regurgitation. Estimated RVSP 40 mmHg.   Pulmonic Valve: There is mild to moderate regurgitation.         Portions of the record may have been created with voice recognition software.  Occasional wrong word or \"sound a like\" substitutions may have occurred due to the inherent limitations of voice recognition software.  In addition some of the content generated from this outpatient encounter includes information designed for patient education and/or communication back to the referring provider.  Read the chart carefully and recognize, using context, where substitutions have occurred.    "

## 2025-02-11 NOTE — PROGRESS NOTES
Cystoscopy     Date/Time  2/11/2025 1:00 PM     Performed by  Mukul Bragg MD   Authorized by  Mukul Bragg MD         Procedure Details:  Procedure type: cystoscopy       Office Cystoscopy Procedure Note    Indication:    Hematuria    Informed consent   The risks, benefits, complications, treatment options, and expected outcomes were discussed with the patient. The patient concurred with the proposed plan and provided informed consent.    Anesthesia  Lidocaine jelly 2%    Procedure  The patient was placed in the supineposition, was prepped and draped in the usual manner using sterile technique, and 2% lidocaine jelly instilled into the urethra.  A 17 F flexible cystoscope was then inserted into the urethra and the urethra and bladder carefully examined.  The following findings were noted:    Findings:  Urethra:  Normal  Prostate:  Significant BPH with elevated medial lobe, moderate intravesical prostatic protrusion.  3 bladder stones are present 1 approaches 1 cm in size.  Bladder:  Normal  Ureteral orifices:  Normal  Other findings:  None     Specimens: None                 Complications:    None; patient tolerated the procedure well           Disposition: To home after 30 minute observation.           Condition: Stable

## 2025-02-11 NOTE — PROGRESS NOTES
Virtual Regular Visit  Name: Aron Oswald Jr.      : 1950      MRN: [id number]  Encounter Provider: BARBARA Thomas  Encounter Date: 2025   Encounter department: [facility]      Verification of patient location:  Patient is located at Home in the following state in which I hold an active license [place] :  Assessment & Plan  MCI (mild cognitive impairment)  MOCA:  .  independent adls/dependent iadls  Most consistent with mci  Engage in regular social, physical, cognitive activity.  Optimize acute and chronic conditions  Frequent reminders and close monitoring for safety  Monitor for acute changes in behavior/personality (agitation or lethargy).  Notify pcp/ED for reversible causes eval.         ACP (advance care planning)  No ACP docs on file per epic check  Recommend establishing care partner and goals of care.  Share goc with care partner, formalize as POA and living will as able  Once docs formalized, bring to  provider for upload to medical chart  Cont to discuss and update goc as needed.             Encounter provider BARBARA Thomas    The patient was identified by name and date of birth. Aron Oswald Jr. was informed that this is a telemedicine visit and that the visit is being conducted through the Epic Embedded platform. He agrees to proceed..  My office door was closed. No one else was in the room.  He acknowledged consent and understanding of privacy and security of the video platform. The patient has agreed to participate and understands they can discontinue the visit at any time.    Patient is aware this is a billable service.     History of Present Illness     Pt seen for memory care conference via video with his wife.  No concerns at present.  They are following up with various docs to check on pt.  Pt has upcoming visit with neuro this month to [facility] for PD/LBD.  No hospital visits, no changes in meds, no change in health history.  Time spent today  "reviewing memory loss ladder, definition of dementia, types of dementia, contributing factors to cognitive changes, interventions to help delay progression of memory loss, safety concerns to be aware of, and acute changes to notify pcp of.  We also touched on some medications that can contribute to worsening cognition as well as available \"memory medications.\"           Review of Systems   Constitutional:  Negative for activity change and appetite change.   HENT:  Positive for hearing loss.    Eyes:  Negative for visual disturbance.   Respiratory:  Negative for cough and shortness of breath.    Cardiovascular:  Negative for chest pain.   Gastrointestinal:  Negative for abdominal pain.   Musculoskeletal:  Negative for back pain.   Psychiatric/Behavioral:  Positive for decreased concentration (forgetful). Negative for dysphoric mood and sleep disturbance. The patient is not nervous/anxious.        Objective   There were no vitals taken for this visit.    Physical Exam  Vitals and nursing note reviewed.   Constitutional:       General: He is not in acute distress.     Appearance: Normal appearance. He is not ill-appearing.   HENT:      Head: Normocephalic.   Pulmonary:      Effort: Pulmonary effort is normal. No respiratory distress.      Breath sounds: No wheezing (none audible).   Musculoskeletal:         General: Normal range of motion.   Skin:     General: Skin is dry.   Neurological:      General: No focal deficit present.      Mental Status: He is alert and oriented to person, place, and time. Mental status is at baseline.   Psychiatric:         Mood and Affect: Mood normal.         Behavior: Behavior normal.       "

## 2025-02-11 NOTE — ASSESSMENT & PLAN NOTE
No ACP docs on file per epic check  Recommend establishing care partner and goals of care.  Share goc with care partner, formalize as POA and living will as able  Once docs formalized, bring to  provider for upload to medical chart  Cont to discuss and update goc as needed.

## 2025-02-11 NOTE — ASSESSMENT & PLAN NOTE
MOCA:  22/30.  independent adls/dependent iadls  Most consistent with mci  Engage in regular social, physical, cognitive activity.  Optimize acute and chronic conditions  Frequent reminders and close monitoring for safety  Monitor for acute changes in behavior/personality (agitation or lethargy).  Notify pcp/ED for reversible causes eval.

## 2025-02-11 NOTE — PROGRESS NOTES
[facility]  [address], [address]  [place] [address]  [phone]    Care Conference: Resources Provided    MSW provided the following resources, along with a copy of care plan:  Oneil Ernesto PERDOMO [id number]    General Information  - 10 Ways to Love Your Brain  - Memory loss ladder  - Alzheimer's Association information sheet on Mild Cognitive Impairment diagnosis  - Lewy Body Dementia packet (family requested)    Caregiver Support  - Flyer for [facility] Virtual Caregiver Support Group (meeting 2x per month by Zoom)    Safety  - CDC fall prevention / home safety checklist    Community Resources  - [facility] Resource Guide (contains information about higher levels of care, homecare, etc.)    Resources provided at initial assessment: Elder Law  list, [facility] Legal and Financial Planning, [facility] Resource Guide

## 2025-02-12 ENCOUNTER — TELEPHONE (OUTPATIENT)
Age: 75
End: 2025-02-12

## 2025-02-12 DIAGNOSIS — N40.1 BENIGN PROSTATIC HYPERPLASIA WITH URINARY FREQUENCY: Primary | ICD-10-CM

## 2025-02-12 DIAGNOSIS — R35.0 BENIGN PROSTATIC HYPERPLASIA WITH URINARY FREQUENCY: Primary | ICD-10-CM

## 2025-02-12 LAB — BACTERIA UR CULT: NORMAL

## 2025-02-12 RX ORDER — ALFUZOSIN HYDROCHLORIDE 10 MG/1
10 TABLET, EXTENDED RELEASE ORAL DAILY
Qty: 90 TABLET | Refills: 3 | Status: SHIPPED | OUTPATIENT
Start: 2025-02-12

## 2025-02-12 NOTE — TELEPHONE ENCOUNTER
Patient's wife, Kimberly, called stating he should not take flomax, rapaflo, anything in the 5ARI category or avodart. He can take alfuoszin, doxazosin, and terazosin. She just wanted to let Dr. Bragg know as she forgot the information at the appointment yesterday. Please advise.    She also is going to do some more research on the [place] body website.

## 2025-02-12 NOTE — TELEPHONE ENCOUNTER
Appears flomax and finasteride were sent to pharmacy yesterday. Both of these were discontinued and Alfuzosin sent to pharmacy

## 2025-02-13 ENCOUNTER — EVALUATION (OUTPATIENT)
Dept: PHYSICAL THERAPY | Facility: CLINIC | Age: 75
End: 2025-02-13
Payer: MEDICARE

## 2025-02-13 DIAGNOSIS — S12.401D CLOSED NONDISPLACED FRACTURE OF FIFTH CERVICAL VERTEBRA WITH ROUTINE HEALING, UNSPECIFIED FRACTURE MORPHOLOGY, SUBSEQUENT ENCOUNTER: Primary | ICD-10-CM

## 2025-02-13 PROCEDURE — 97110 THERAPEUTIC EXERCISES: CPT | Performed by: PHYSICAL THERAPIST

## 2025-02-13 NOTE — PROGRESS NOTES
PT Re-Evaluation     Today's date: 2025  Patient name: Aron Oswald Jr.  : 1950  MRN: [id number]  Referring provider: Madiha Hallman CRNP  Dx:   Encounter Diagnosis     ICD-10-CM    1. Closed nondisplaced fracture of fifth cervical vertebra with routine healing, unspecified fracture morphology, subsequent encounter  S12.401D           Start Time: 1145  Stop Time: 1235  Total time in clinic (min): 50 minutes    Assessment  Impairments: abnormal muscle firing, abnormal or restricted ROM, activity intolerance, impaired physical strength, lacks appropriate home exercise program, pain with function and poor posture     Assessment details: Aron Oswald is a 73 y.o. male who presents with acute onset of shoulder near paralysis following fall from bed in his sleep. Patient presented to ED where he was diagnosed with a C5 cervical fracture and had been placed in an [employer] cervical collar. Patient has since regained all elbow, wrist and hand mobility, however, continues to have limited ability to move his right arm away from his body. MRI of right shoulder demonstrates complete tears of the supraspinatus and infraspinatus tendons with retracted tendon fibers to the level of the glenoid. Patient's primary complaints at this time are cervical stiffness with limited ROM and pain. Patient has attended a total of 10 physical therapy appointments to date and has maintained good compliance with established POC, although admits to lack of HEP compliance. Patient overall presents with improved right shoulder ROM and decreased pain. Patient continues to present with significant loss of cervical ROM and segmental mobility and RC/scapular weakness. Due to these impairments, Patient has difficulty performing a/iadls, including bathing, dressing, and feeding. Patient would benefit from skilled physical therapy to address their aforementioned impairments, improve their level of function and to improve their overall  quality of life.  Understanding of Dx/Px/POC: excellent     Prognosis: good    Goals  Short Term Goals: to be achieved by 4 weeks  1) Patient to be independent with basic HEP. MET  2) Decrease pain to 1/10 at it's worst. PROGRESSED, BUT NOT MET  3) Increase UE strength by 1/2 MMT grade in all deficient planes. PARTIALLY MET  4) Increase cervical ROM by > 5 deg in all deficient planes. PARTIALLY MET  5) Patient to report decreased sleep interruption secondary to pain. MET  6) Increase shoulder ROM by > 5 deg in all deficient planes. MET    Long Term Goals: to be achieved by discharge ALL GOALS PROGRESSING  1) FOTO equal to or greater than 73.  2) Patient to be independent with comprehensive HEP.  3) Abolish pain for improved quality of life.  4) Increase UE strength to at least 4-/5 MMT grade in all deficient planes to improve a/iadls.  5) Increase cervical ROM to WFL all planes to improve a/iadls.  6) Patient to report no sleep interruption secondary to pain.  7) Increase shoulder ROM by > 10 deg in all deficient planes to improve a/iadls.    Plan  Patient would benefit from: skilled PT  Planned modality interventions: biofeedback, cryotherapy, hydrotherapy, unattended electrical stimulation, thermotherapy: hydrocollator packs and low level laser therapy    Planned therapy interventions: activity modification, ADL retraining, behavior modification, body mechanics training, functional ROM exercises, home exercise program, IADL retraining, joint mobilization, manual therapy, massage, neuromuscular re-education, patient education, postural training, strengthening, stretching, therapeutic activities and therapeutic exercise    Frequency: 2-3x week.  Duration in weeks: 8  Plan of Care beginning date: 2/13/2025  Plan of Care expiration date: 4/10/2025  Treatment plan discussed with: patient        Subjective Evaluation    History of Present Illness  Mechanism of injury: Patient reports that since returning to physical  therapy his right shoulder mobility has been better. Patient's right shoulder pain has resolved. Patient Patient is able to reach away from his body with greater ease. Patient estimates his right shoulder overall level of function to be approximately 75-80% of his premorbid. Patient states that he would still like to regain some more strength and functional mobility.     Patient reports that his cervical spine pain and mobility have remained the same. Patient's cervical ROM remains restricted in all directions.   Patient Goals  Patient goals for therapy: decreased pain, increased motion and increased strength    Pain  Current pain ratin  At best pain ratin  At worst pain ratin  Location: left > right cervical spine, occiput  Quality: tight, sore, ache.    Treatments  Current treatment: physical therapy        Objective     Postural Observations  Seated posture: fair  Standing posture: fair      Tenderness   Cervical Spine   No tenderness in the spinous process, left transverse process and right transverse process.     Active Range of Motion   Cervical/Thoracic Spine       Cervical    Flexion: 50 degrees   Extension: 25 degrees      Left lateral flexion: 18 degrees      Right lateral flexion: 18 degrees      Left rotation: 30 degrees  Right rotation: 42 degrees     Left Shoulder   Flexion: 105 degrees   Abduction: 90 degrees   External rotation BTH: T3   Internal rotation BTB: T9     Right Shoulder   Flexion: 78 degrees   Abduction: 75 degrees   External rotation BTH: T2   Internal rotation BTB: T8     Passive Range of Motion   Cervical/Thoracic Spine   Cervical     Flexion (degrees):  Restriction level: moderate  Left lateral flexion (degrees):  Restriction level: maximal  Right lateral flexion (degrees):  Restriction level: maximal  Left rotation (degrees):  Restriction level: moderate  Right rotation (degrees):  Restriction level: moderate  Left Shoulder   Flexion: 150 degrees   Abduction: 133 degrees  "  External rotation 90°: 45 degrees   Internal rotation 90°: 40 degrees     Right Shoulder   Flexion: 152 degrees   Abduction: 150 degrees   External rotation 90°: 58 degrees   Internal rotation 90°: 67 degrees     Joint Play     Hypomobile: C1, C2, C3, C4, C5, C6 and C7     Strength/Myotome Testing     Left Shoulder     Planes of Motion   Flexion: 3-   Abduction: 3-   External rotation at 0°: 5   Internal rotation at 0°: 5     Right Shoulder     Planes of Motion   Flexion: 2+   Abduction: 2+   External rotation at 0°: 3+   Internal rotation at 0°: 5     Left Elbow   Flexion: 5  Extension: 5    Right Elbow   Flexion: 5  Extension: 5    Left Wrist/Hand   Wrist extension: 5  Wrist flexion: 5    Right Wrist/Hand   Wrist extension: 5  Wrist flexion: 5    Ambulation     Ambulation: Level Surfaces   Ambulation without assistive device: independent    Observational Gait   Gait: within functional limits                  POC expires Unit limit Auth  expiration date PT/OT + Visit Limit?   4/10/25 4 N/a BOMN                 Visit/Unit Tracking  AUTH Status:  Date 1/7 1/10 1/13 1/21 1/23 1/27 1/30 2/3 2/10 2/13     N/A Used 1 2 3 4 5 6 7 8 9 10      Remaining                  Precautions: HTN, h/o C5 fx      Manuals 1/7 1/10 1/13 1/21 1/23 1/27 1/30 2/3 2/10 2/13   Gr. II-III cervical side glides      GR       UT, levator str.      UT str. GR       Gr. II-III cervical rotation in sitting      GR       Gr. II-III C2, C3 CPA mobs      GR       Reassessment          GR                                          Neuro Re-Ed             Supine DNF  10x10\"  20x5\"          Supine upper cervical retraction with towel beneath occiput             Bent over row   4# 2x10 4# 2x 19  4# 2x 10    2# 3x 10  2# 3x10    Webslide: M, L row  3x10 RTB  RTB 2x 10  RTB 3x 10          Prone scaption         Bent over 2x10  Bent over 3x10    Prone shoulder ext.      2# 2x10 3\" 2# 2x10 3\" Bent over 2# 3x 10  Bent over 2# 3x 10     Prone h. Abd.      2x10 " "3\" 2x10 3\" Bent over2x 10 3\"  minimal range.  Bent over 2x 10 3\"  minimal range    MB vertical rolls against wall       GMB 2x10 GTB 2x 10  GMB 2x10    Ther Ex             Patient education: pathophysiology, diagnostic imaging review GR         GR   UBE  4'/4'  4' / 4' 4'/4' 5'/5' 5' / 5' 5' / 5' L3 5'/5' 5'/5' 5' / 5'   Pulleys  5'           Finger ladder with eccentric lowering  10x5\"            Side lying ER       2# 2x10  2x10     UT, levator str.             Cervical rotation SNAGS  10x10\"  15x5\" b/l 15x 5\"  15x 5\"         Cervical isometrics - all planes   YTB 10x5\" ea.          Scaption in side lying with UE ranger with TB             TB ER    NV RTB 2x 10  (R)  RTB 2x10 RTB 2x 10  RTB 3x10    TB IR    NV RTB 2x 10 (R)  RTB 3x10 RTB 2x 10  RTB 3x10    Wall slides with DB   2# x10 with assistance PRN Wall slides no DB 2x 10b/l  Wall slided 20x ea.         Shoulder flexion AAROM with UE ranger in standing       3# x20                                                          Ther Activity                                       Gait Training                                       Modalities                                            "

## 2025-02-14 NOTE — PROGRESS NOTES
Speech-Language Pathology Progress Update    Today's date: 2025   Patient’s name: Aron Oswald Jr.  : 1950  MRN: [id number]  Safety measures:   Referring provider: Ludy Person,*    Encounter Diagnosis     ICD-10-CM    1. Cognitive communication deficit  R41.841       2. Memory changes  R41.3           Assessment:   Patient continues to present with mild cognitive-linguistic impairment c/b deficits with attention, orientation (to personal information and location), immediate and delayed memory, with evident word finding difficulties during structured tasks as well as spontaneous discourse. Patient is tolerating treatment well and has met the goal of internal/external memory aids education.Patient would benefit from continued  OP skilled ST services to target increased functional recall, improve executive functioning skills (e.g. processing, problem-solving, thought organization, etc.), increase verbal fluency, and receive education on word-finding, in order to experience successful completion of daily tasks, follow directions within functional activities and unstructured tasks, support positive communication interactions with both familiar and unfamiliar listeners, promote safety, and facilitate overall improved quality of life.    Short-term goals:  Patient will be educated on the use of internal and external memory aids and compensatory strategies to facilitate increased recall of routine, personal information, and recent events, to be achieved in 2-4 weeks.MET  Patient will be educated on and demonstrate understanding of word finding strategies (i.e., circumlocution) for improved generative naming and verbal expression skills, to be achieved in 2-4 weeks. PARTIALLY MET/ONGOING  Patient will utilize word finding strategies during semantic feature analysis treatment activity, with 80% accuracy, IND, in 3 out of 4 consecutive sessions, to facilitate improved naming and verbal expression  skills, to be achieved in 8-10 weeks.PARTIALLY MET/ONGOING  Patient will complete auditory immediate and short term memory tasks to facilitate increased ability to retell narratives and recall information within functional living environment, with 80% accuracy, IND using preferred memory strategy, in 3 out of 4 consecutive sessions, to be achieved in 10-12 weeks.PARTIALLY MET/ONGOING  Patient will complete complex auditory attention processing tasks (e.g., sentence unscramble, ranking numbers/words, etc.) to improve working memory, with 80% accuracy, IND, in 3 out of 4 consecutive sessions, to be achieved in 10-12 weeks.PARTIALLY MET/ONGOING  Patient will complete thought organization tasks (e.g., sequencing, deduction puzzles, etc.) with 80% accuracy, IND, in 3 out of 4 consecutive sessions, to facilitate increased executive functioning, working memory, problem solving, and processing skills, to be achieved in 10-12 weeks..PARTIALLY MET/ONGOING  To target mental manipulation and working memory, patient will participate in word finding activity (i.e., anagrams) with 80% accuracy, IND, in 3 out of 4 consecutive sessions, to be achieved in 10-12 weeks. PARTIALLY MET/ONGOING  Patient will answer questions regarding story read aloud with 80% accuracy, IND, in 3 out of 4 consecutive sessions, to facilitate improved auditory comprehension and recall, to be achieved in 8-10 weeks. PARTIALLY MET/ONGOING  Patient will complete attention tasks (divided, auditory, alternating, sustained) by responding to multiple tasks or details within tasks at the same time in a distracting environment, with 80% accuracy, given min cues, in 3 out of 4 consecutive sessions, to improve attention and working memory, to be achieved in 10-12 weeks.  PARTIALLY MET/ONGOING    Long-term goals:  Patient will complete cognitive-linguistic therapy that addresses patient's specific deficits in processing speed, short-term working memory, attention to  "detail, monitoring, sequencing, and organization skills, with instruction, to alleviate effects of executive functioning disorder deficits by discharge.PARTIALLY MET/ONGOING  Patient will improve ability to facilitate cognitive function and communication skills, including use of compensatory strategies in a variety of functional living tasks, to improve quality of life and to maximize level of independence.   PARTIALLY MET/ONGOING      Plan:  Patient would benefit from outpatient skilled Speech Therapy services: Cognitive-linguistic therapy    Frequency: 1-2x weekly  Duration: 3 months    Intervention certification from: 1/8/2025  Intervention certification to: 4/8/2025      Subjective:    Patient's goal(s): \"to pay attention\"    Pain: Absent      Objective (testing):  No testing today serves as progress update.       Treatment:  Discussed POC, external/internal memory aids, and attention deficits associated with memory changes.    Patient engaged in divided attention task where patient was given \"blink\" cards. Blink cards are designed by 6 different shapes, 5 different number of shapes on a card, and 6 different colors. Patient to sort cards by predetermined rule.  Sort by colors: Time: 5:55 mins/secs, 80% accuracy independently, increasing to 100% accuracy min visual cues. Minor difficulties interpreting colors d/t mild color blindness.  Sort by shapes: Time: 4:29 mins/sec, 90% accuracy independently, increasing to  100% accuracy min visual cues.    Difficulties multi-tasking while completing task.      Visit Tracking:  POC expires Unit limit Auth Expiration date PT/OT + Visit Limit?   4/8/25 N/A 12/13/25 BOMN PCY                                           Visit/Unit Tracking  AUTH Status:  Date 1/8/25  IE  1/14 1/20  2/4 2/18  PU                 No Used  1  2  3  4  5                   Remaining   9  8  7  6  5                      Intervention comments:  15 mins of cog tx, 30 mins of additional cog tx  "

## 2025-02-17 ENCOUNTER — OFFICE VISIT (OUTPATIENT)
Dept: PHYSICAL THERAPY | Facility: CLINIC | Age: 75
End: 2025-02-17
Payer: MEDICARE

## 2025-02-17 DIAGNOSIS — S12.401D CLOSED NONDISPLACED FRACTURE OF FIFTH CERVICAL VERTEBRA WITH ROUTINE HEALING, UNSPECIFIED FRACTURE MORPHOLOGY, SUBSEQUENT ENCOUNTER: Primary | ICD-10-CM

## 2025-02-17 PROCEDURE — 97112 NEUROMUSCULAR REEDUCATION: CPT

## 2025-02-17 PROCEDURE — 97110 THERAPEUTIC EXERCISES: CPT

## 2025-02-17 NOTE — PROGRESS NOTES
"Daily Note    Today's date: 25  Patient name: Aron Oswald Jr.  : 1950  MRN: [id number]  Referring provider: Madiha Hallman CRNP  Dx:   Encounter Diagnosis     ICD-10-CM    1. Closed nondisplaced fracture of fifth cervical vertebra with routine healing, unspecified fracture morphology, subsequent encounter  S12.401D           Start Time: 1215  Stop Time: 1300  Total time in clinic (min): 45 minutes      Subjective: Aron reports no c/o today. He feels his arm is getting higher.    Objective: See treatment diary below.    Assessment: Aron tolerated treatment well with consistent cuing throughout. TE's were performed with increased reps and increased resistance. No new TE's were performed today. Following treatment, the patient demonstrated fatigue and would benefit from continued physical therapy.    Plan: Continue per plan of care.  Progress treatment as tolerated.           POC expires Unit limit Auth  expiration date PT/OT + Visit Limit?   4/10/25 4 N/a BOMN                 Visit/Unit Tracking  AUTH Status:  Date 1/7 1/10 1/13 1/21 1/23 1/27 1/30 2/3 2/10 2/13 2/17    N/A Used 1 2 3 4 5 6 7 8 9 10 11     Remaining                  Precautions: HTN, h/o C5 fx      Manuals /3 2/10 2/13   Gr. II-III cervical side glides      GR       UT, levator str.      UT str. GR       Gr. II-III cervical rotation in sitting      GR       Gr. II-III C2, C3 CPA mobs      GR       Reassessment          GR                                          Neuro Re-Ed             Supine DNF   20x5\"          Supine upper cervical retraction with towel beneath occiput             Bent over row   4# 2x10 4# 2x 19  4# 2x 10    2# 3x 10  2# 3x10    Webslide: M, L row 3x10 BTB   RTB 2x 10  RTB 3x 10          Prone scaption         Bent over 2x10  Bent over 3x10    Prone shoulder ext.      2# 2x10 3\" 2# 2x10 3\" Bent over 2# 3x 10  Bent over 2# 3x 10     Prone h. Abd.      2x10 3\" 2x10 3\" Bent " "over2x 10 3\"  minimal range.  Bent over 2x 10 3\"  minimal range    MB vertical rolls against wall       GMB 2x10 GTB 2x 10  GMB 2x10    Ther Ex             Patient education: pathophysiology, diagnostic imaging review          GR   UBE 4'/4'   4' / 4' 4'/4' 5'/5' 5' / 5' 5' / 5' L3 5'/5' 5'/5' 5' / 5'   Pulleys             Finger ladder with eccentric lowering             Side lying ER 3x10 1#      2# 2x10  2x10     UT, levator str.             Cervical rotation SNAGS   15x5\" b/l 15x 5\"  15x 5\"         Cervical isometrics - all planes   YTB 10x5\" ea.          Scaption in side lying with UE ranger with TB             TB ER RTB 3x10    NV RTB 2x 10  (R)  RTB 2x10 RTB 2x 10  RTB 3x10    TB IR GTB 3x10    NV RTB 2x 10 (R)  RTB 3x10 RTB 2x 10  RTB 3x10    Wall slides with DB   2# x10 with assistance PRN Wall slides no DB 2x 10b/l  Wall slided 20x ea.         Shoulder flexion AAROM with UE ranger in standing       3# x20                                                          Ther Activity                                       Gait Training                                       Modalities                                            Handy Paula, PT  2/17/2025,1:03 PM  "

## 2025-02-18 ENCOUNTER — OFFICE VISIT (OUTPATIENT)
Age: 75
End: 2025-02-18
Payer: MEDICARE

## 2025-02-18 DIAGNOSIS — R41.3 MEMORY CHANGES: ICD-10-CM

## 2025-02-18 DIAGNOSIS — R41.841 COGNITIVE COMMUNICATION DEFICIT: Primary | ICD-10-CM

## 2025-02-18 PROCEDURE — 97129 THER IVNTJ 1ST 15 MIN: CPT

## 2025-02-18 PROCEDURE — 97130 THER IVNTJ EA ADDL 15 MIN: CPT

## 2025-02-20 ENCOUNTER — APPOINTMENT (OUTPATIENT)
Dept: PHYSICAL THERAPY | Facility: CLINIC | Age: 75
End: 2025-02-20
Payer: MEDICARE

## 2025-02-21 ENCOUNTER — CONSULT (OUTPATIENT)
Dept: NEUROLOGY | Facility: CLINIC | Age: 75
End: 2025-02-21
Payer: MEDICARE

## 2025-02-21 VITALS
HEIGHT: 71 IN | WEIGHT: 191 LBS | SYSTOLIC BLOOD PRESSURE: 122 MMHG | DIASTOLIC BLOOD PRESSURE: 72 MMHG | HEART RATE: 61 BPM | BODY MASS INDEX: 26.74 KG/M2

## 2025-02-21 DIAGNOSIS — G25.2 RESTING TREMOR: Primary | ICD-10-CM

## 2025-02-21 DIAGNOSIS — R41.3 MEMORY CHANGES: ICD-10-CM

## 2025-02-21 PROCEDURE — 99205 OFFICE O/P NEW HI 60 MIN: CPT | Performed by: STUDENT IN AN ORGANIZED HEALTH CARE EDUCATION/TRAINING PROGRAM

## 2025-02-21 NOTE — PROGRESS NOTES
"Name: Aron Oswald Jr.      : 1950      MRN: [id number]  Encounter Provider: Kiera Coffman MD  Encounter Date: 2025   Encounter department: [facility]  :  Assessment & Plan  Resting tremor  No clear evidence of parkinsonian symptoms on exam today. However, given pt's history of REM sleep behavior disorder and reported intermittent rest tremors, would be concerned that he is developing parkinson's disease. Discussed possible therapeutic options in the future, would defer management at this time given minimal symptoms. Will continue to monitor clinically         CC:   Tremors     History of Present Illness:     73 yo M PMHx HTN who presents for evaluation of tremors.     Initially presented to sleep medicine in 2024 for dream enactment behavior and episodes of apnea during sleep. Presented for evaluation because he has more recently been having falls from bed during sleep. Previously endorsed episodes 1-2 times per year for 8-10 years. He also endorsed decreased sense of smell, but he is unsure if this has only been since he had COVID. Wife also endorses RUE rest tremor which she has noticed while they have been in bed. She describes the tremor as \"feeling like a cat purring.\"    Sleep study was performed, which showed DASIA, as well as brief increases in muscle tone during REM sleep which did not meet criteria for REM sleep without atonia, but could not r/o RBD. He was instructed to start taking melatonin, which he has found helpful.     Past Medical History:     Past Medical History:   Diagnosis Date    Allergic     Anxiety     COVID-19 virus infection 2022    Fractures 2024    C5 - fall from bed, right arm weakness    Hypertension     Refusal of blood transfusions as patient is Caodaism        Patient Active Problem List   Diagnosis    Essential hypertension    Erectile dysfunction    Sensation of plugged ear on right side    Platelets " decreased (HCC)    Snoring    Cervical spine fracture (HCC)    Abnormal computed tomography angiography (CTA) of neck    Fall from bed    REM sleep behavior disorder    Acute pain of right shoulder    Urinary urgency    Thyroid nodule    C5 cervical fracture (HCC)    Fibromuscular dysplasia of both carotid arteries (HCC)    Rotator cuff arthropathy of right shoulder    Weakness of right shoulder    Bradycardia    DASIA (obstructive sleep apnea)    PVC (premature ventricular contraction)    MCI (mild cognitive impairment)    ACP (advance care planning)       Medications:      Current Outpatient Medications   Medication Sig Dispense Refill    alfuzosin (UROXATRAL) 10 mg 24 hr tablet Take 1 tablet (10 mg total) by mouth daily 90 tablet 3    Cholecalciferol (Vitamin D) 50 MCG (2000 UT) tablet Take 2,000 Units by mouth daily      Melatonin 5 MG TABS Take 5 mg by mouth daily at bedtime      Multiple Vitamins-Minerals (Multivitamin Adults) TABS       Saw [place] 160 MG CAPS Take by mouth daily      sildenafil (VIAGRA) 50 MG tablet Take 1 tablet (50 mg total) by mouth daily as needed for erectile dysfunction 10 tablet 5    amLODIPine (NORVASC) 5 mg tablet Take 1 tablet (5 mg total) by mouth daily (Patient not taking: Reported on 2/11/2025) 100 tablet 3    Psyllium (METAMUCIL 4 IN 1 FIBER PO)  (Patient not taking: Reported on 2/11/2025)       No current facility-administered medications for this visit.        Allergies:      Allergies   Allergen Reactions    Penicillins Other (See Comments)       Family History:     Family History   Problem Relation Age of Onset    No Known Problems Mother     Cancer Father         Stomach, urethral cancers    Diabetes Father     Hearing loss Father     Glaucoma Father     Diabetes Brother     Prostate cancer Brother        Social History:       Social History     Socioeconomic History    Marital status: /Civil Union     Spouse name: Not on file    Number of children: Not on file     "Years of education: Not on file    Highest education level: Not on file   Occupational History    Occupation: retired     Comment: construction   Tobacco Use    Smoking status: Never    Smokeless tobacco: Never   Vaping Use    Vaping status: Never Used   Substance and Sexual Activity    Alcohol use: Not Currently    Drug use: Never    Sexual activity: Yes     Partners: Female   Other Topics Concern    Not on file   Social History Narrative     with kids and grandkids     Social Drivers of Health     Financial Resource Strain: Low Risk  (5/16/2023)    Overall Financial Resource Strain (CARDIA)     Difficulty of Paying Living Expenses: Not hard at all   Food Insecurity: No Food Insecurity (10/22/2024)    Hunger Vital Sign     Worried About Running Out of Food in the Last Year: Never true     Ran Out of Food in the Last Year: Never true   Transportation Needs: No Transportation Needs (10/22/2024)    PRAPARE - Transportation     Lack of Transportation (Medical): No     Lack of Transportation (Non-Medical): No   Physical Activity: Not on file   Stress: Not on file   Social Connections: Not on file   Intimate Partner Violence: Not on file   Housing Stability: Low Risk  (10/22/2024)    Housing Stability Vital Sign     Unable to Pay for Housing in the Last Year: No     Number of Times Moved in the Last Year: 1     Homeless in the Last Year: No         Objective:   /72 (BP Location: Left arm, Patient Position: Sitting, Cuff Size: Large)   Pulse 61   Ht 5' 11\" (1.803 m)   Wt 86.6 kg (191 lb)   BMI 26.64 kg/m²     General: Patient is not in any acute/apparent distress, well nourished, well developed and cooperative.   HEENT: normocephalic, atraumatic, moist membranes  Neck: supple  Extremities: no edema noted   Skin: no lesions or rash  Musculosketal: no bony abnormalities    Neurologic Examination:   Mental status: alert, awake, oriented X 3 and following commands.     Speech/Language: Speech is fluent " without any dysarthria, no aphasia noted, can name, repeat, read and write and comprehension intact    Cranial Nerves:   CN I: smell not tested  CN II: Visual fields full to confrontation, fundus - no papilledema noted.  CN III, IV, VI: Extraocular movements intact bilaterally. Pupils equal round and reactive to light bilaterally.  CN V: Facial sensation is normal.  CN VII: Full and symmetric facial movement.  CN VIII: Hearing is normal.  CN IX, X: Palate elevates symmetrically.   CN XI: Shoulder shrug strength is normal.  CN XII: Tongue midline without atrophy or fasciculations.    Motor:   Strength 5/5 in all 4 extremities.  Normal rapid alternating movements. Bulk/tone - normal.  Fasiculations - none    Sensory:   Sensation intact to soft touch in all 4 extremities.    Cerebellar:   Finger-to-nose intact    Reflexes: 2+ in all 4 extremities  Pathologic reflexes - babinski reflex negative    Gait:   Normal gait       Review of Systems:     Review of Systems   Constitutional:  Negative for appetite change, fatigue and fever.   HENT:  Positive for tinnitus. Negative for hearing loss, trouble swallowing and voice change.    Eyes: Negative.  Negative for photophobia, pain and visual disturbance.   Respiratory: Negative.  Negative for shortness of breath.    Cardiovascular:  Positive for palpitations.   Gastrointestinal: Negative.  Negative for nausea and vomiting.   Endocrine: Negative.  Negative for cold intolerance.   Genitourinary:  Positive for frequency and urgency. Negative for dysuria.   Musculoskeletal:  Positive for back pain, gait problem, myalgias (Shoulders), neck pain (Sometimes) and neck stiffness.   Skin: Negative.  Negative for rash.   Allergic/Immunologic: Negative.    Neurological:  Positive for tremors (Right hand), speech difficulty and weakness (Right side more). Negative for dizziness, seizures, syncope, facial asymmetry, light-headedness, numbness and headaches.   Hematological: Negative.  Does  not bruise/bleed easily.   Psychiatric/Behavioral:  Positive for sleep disturbance (Falling back to sleep). Negative for confusion and hallucinations.     I have personally reviewed the MA's review of systems and made changes as necessary.    I have spent a total time of 60 minutes in caring for this patient on the day of the visit/encounter including Risks and benefits of tx options, Instructions for management, Patient and family education, Risk factor reductions, Impressions, Documenting in the medical record, Reviewing/placing orders in the medical record (including tests, medications, and/or procedures), and Obtaining or reviewing history  .

## 2025-02-24 ENCOUNTER — OFFICE VISIT (OUTPATIENT)
Dept: PHYSICAL THERAPY | Facility: CLINIC | Age: 75
End: 2025-02-24
Payer: MEDICARE

## 2025-02-24 DIAGNOSIS — S12.401D CLOSED NONDISPLACED FRACTURE OF FIFTH CERVICAL VERTEBRA WITH ROUTINE HEALING, UNSPECIFIED FRACTURE MORPHOLOGY, SUBSEQUENT ENCOUNTER: Primary | ICD-10-CM

## 2025-02-24 PROCEDURE — 97112 NEUROMUSCULAR REEDUCATION: CPT

## 2025-02-24 PROCEDURE — 97110 THERAPEUTIC EXERCISES: CPT

## 2025-02-24 NOTE — PROGRESS NOTES
"Daily Note     Today's date: 2025  Patient name: Aron Oswald Jr.  : 1950  MRN: [id number]  Referring provider: Madiha Hallman CRNP  Dx:   Encounter Diagnosis     ICD-10-CM    1. Closed nondisplaced fracture of fifth cervical vertebra with routine healing, unspecified fracture morphology, subsequent encounter  S12.401D           Start Time: 1145  Stop Time: 1223  Total time in clinic (min): 38 minutes    Subjective: Pt noted feeling good and no changes. \"Normal amount of p!\".       Objective: See treatment diary below      Assessment:  Continued with treatment session overall progressing well with no increase in pain reported. Tolerated treatment well. Patient exhibited good technique with therapeutic exercises and would benefit from continued PT  S/p treatment session may note some additional tenderness s/p treatment session with additional progressions.     Plan: Continue per plan of care.        POC expires Unit limit Auth  expiration date PT/OT + Visit Limit?   4/10/25 4 N/a BOMN                 Visit/Unit Tracking  AUTH Status:  Date 1/7 1/10 1/13 1/21 1/23 1/27 1/30 2/3 2/10 2/13 2/17 2/24   N/A Used 1 2 3 4 5 6 7 8 9 10 11     Remaining                  Precautions: HTN, h/o C5 fx      Manuals  2/3 2/10 2/13   Gr. II-III cervical side glides      GR       UT, levator str.      UT str. GR       Gr. II-III cervical rotation in sitting      GR       Gr. II-III C2, C3 CPA mobs      GR       Reassessment          GR                                          Neuro Re-Ed             Supine DNF             Supine upper cervical retraction with towel beneath occiput             Bent over row    4# 2x 19  4# 2x 10    2# 3x 10  2# 3x10    Webslide: M, L row 3x10 BTB 3x 10  BTB   RTB 2x 10  RTB 3x 10          Prone scaption   Bent over 2x 10       Bent over 2x10  Bent over 3x10    Prone shoulder ext.  Bent over 2x 10     2# 2x10 3\" 2# 2x10 3\" Bent over 2# 3x 10  Bent over " "2# 3x 10     Prone h. Abd.      2x10 3\" 2x10 3\" Bent over2x 10 3\"  minimal range.  Bent over 2x 10 3\"  minimal range    MB vertical rolls against wall       GMB 2x10 GTB 2x 10  GMB 2x10     Bent over rows  2x 10            Ther Ex             Patient education: pathophysiology, diagnostic imaging review          GR   UBE 4'/4'  5'/5'  4'/4' 5'/5' 5' / 5' 5' / 5' L3 5'/5' 5'/5' 5' / 5'   Pulleys             Finger ladder with eccentric lowering             Side lying ER 3x10 1# 3x 10 1#      2# 2x10  2x10     UT, levator str.             Cervical rotation SNAGS    15x 5\"  15x 5\"         Cervical isometrics - all planes             Scaption in side lying with UE ranger with TB             TB ER RTB 3x10  RTB 3x 10   NV RTB 2x 10  (R)  RTB 2x10 RTB 2x 10  RTB 3x10    TB IR GTB 3x10  GTB 3x 10   NV RTB 2x 10 (R)  RTB 3x10 RTB 2x 10  RTB 3x10    Wall slides with DB    Wall slides no DB 2x 10b/l  Wall slided 20x ea.         Shoulder flexion AAROM with UE ranger in standing       3# x20                                                          Ther Activity                                       Gait Training                                       Modalities                                              "

## 2025-02-25 ENCOUNTER — OFFICE VISIT (OUTPATIENT)
Dept: CARDIOLOGY CLINIC | Facility: CLINIC | Age: 75
End: 2025-02-25
Payer: MEDICARE

## 2025-02-25 VITALS
SYSTOLIC BLOOD PRESSURE: 146 MMHG | RESPIRATION RATE: 16 BRPM | HEIGHT: 71 IN | OXYGEN SATURATION: 100 % | WEIGHT: 191 LBS | DIASTOLIC BLOOD PRESSURE: 78 MMHG | HEART RATE: 73 BPM | BODY MASS INDEX: 26.74 KG/M2

## 2025-02-25 DIAGNOSIS — I34.0 NONRHEUMATIC MITRAL VALVE REGURGITATION: ICD-10-CM

## 2025-02-25 DIAGNOSIS — G47.33 OSA (OBSTRUCTIVE SLEEP APNEA): ICD-10-CM

## 2025-02-25 DIAGNOSIS — I10 ESSENTIAL HYPERTENSION: Primary | ICD-10-CM

## 2025-02-25 DIAGNOSIS — I49.3 PVC (PREMATURE VENTRICULAR CONTRACTION): ICD-10-CM

## 2025-02-25 PROCEDURE — 99214 OFFICE O/P EST MOD 30 MIN: CPT | Performed by: INTERNAL MEDICINE

## 2025-02-25 RX ORDER — METOPROLOL SUCCINATE 25 MG/1
25 TABLET, EXTENDED RELEASE ORAL DAILY
Qty: 90 TABLET | Refills: 3 | Status: SHIPPED | OUTPATIENT
Start: 2025-02-25

## 2025-02-25 NOTE — ASSESSMENT & PLAN NOTE
The low-dose beta-blockers has been started.  -DASH dietary pattern has been suggested.  Diet rich in fruits, vegetables, whole grains and low-fat dairy products with reduced content of had treated and total fat has been addressed.  The importance of blood pressure control in reducing the incidence of stroke, congestive heart failure and myocardial infarction has been discussed.

## 2025-02-25 NOTE — PROGRESS NOTES
[facility]  [address] [id number]  Cardiology Follow Up    Aron Oswald Jr.  1950  [id number]    Assessment & Plan  PVC (premature ventricular contraction)  -The Holter monitoring have shown the evidence of PVC burden to be 28%.  He has no evidence of having any cardiomyopathy.  He is asymptomatic.  He does have a history of hypertension therefore I have started him today on Toprol-XL 25 mg.  -Had a prolonged discussion with the patient in the presence of his wife about the association of PVCs with a cardiomyopathy.  I plan to repeat the Holter monitoring after another 3 months of the introduction of the beta-blocker therapy.  Essential hypertension  The low-dose beta-blockers has been started.  -DASH dietary pattern has been suggested.  Diet rich in fruits, vegetables, whole grains and low-fat dairy products with reduced content of had treated and total fat has been addressed.  The importance of blood pressure control in reducing the incidence of stroke, congestive heart failure and myocardial infarction has been discussed.    DASIA (obstructive sleep apnea)  The relationship of sleep apnea with the cardiomyopathy as well as with the arrhythmias has been discussed.  Compliance with the CPAP machine once again addressed.  Nonrheumatic mitral valve regurgitation  Is mild in nature.  He is asymptomatic.  No indications for infective endocarditis prophylaxis.    Continue all medications. Previous studies reviewed with patient, medications reviewed and possible side effects discussed. Continue risk factor modification. Optimize weight, regular exercise and follow up with appropriate specialists and primary care physician as discussed.  All questions answered. Patient advised to report any problems prompting to medical attention. Return for follow up visit in 6 months or earlier if needed    Chief Complaint   Patient presents with   • Follow-up       Interval History: Aron  JORY Oswald Jr. is a pleasant 74 years old who was evaluated today for Holter monitoring and the echocardiogram that was done for the assessment for left ventricular systolic function and the PVCs burden.  From the cardiac standpoint of view he has no symptoms of having any chest pain, shortness of breath, dizziness, or any syncopal episodes.  Exercise capacity is above average taking the age factor into account.  No hospital admission with his CAD or congestive heart failure.          PMH:     Patient Active Problem List   Diagnosis   • Essential hypertension   • Erectile dysfunction   • Sensation of plugged ear on right side   • Platelets decreased (HCC)   • Snoring   • Cervical spine fracture (HCC)   • Abnormal computed tomography angiography (CTA) of neck   • Fall from bed   • REM sleep behavior disorder   • Acute pain of right shoulder   • Urinary urgency   • Thyroid nodule   • C5 cervical fracture (HCC)   • Fibromuscular dysplasia of both carotid arteries (HCC)   • Rotator cuff arthropathy of right shoulder   • Weakness of right shoulder   • Bradycardia   • DASIA (obstructive sleep apnea)   • PVC (premature ventricular contraction)   • MCI (mild cognitive impairment)   • ACP (advance care planning)     Past Medical History:   Diagnosis Date   • Allergic    • Anxiety    • COVID-19 virus infection 12/05/2022   • Fractures 8/27/2024    C5 - fall from bed, right arm weakness   • Hypertension    • Refusal of blood transfusions as patient is Mos[place]      Social History     Socioeconomic History   • Marital status: /Civil Union     Spouse name: Not on file   • Number of children: Not on file   • Years of education: Not on file   • Highest education level: Not on file   Occupational History   • Occupation: retired     Comment: construction   Tobacco Use   • Smoking status: Never   • Smokeless tobacco: Never   Vaping Use   • Vaping status: Never Used   Substance and Sexual Activity   • Alcohol use: Not  Currently   • Drug use: Never   • Sexual activity: Yes     Partners: Female   Other Topics Concern   • Not on file   Social History Narrative     with kids and grandkids     Social Drivers of Health     Financial Resource Strain: Low Risk  (5/16/2023)    Overall Financial Resource Strain (CARDIA)    • Difficulty of Paying Living Expenses: Not hard at all   Food Insecurity: No Food Insecurity (10/22/2024)    Hunger Vital Sign    • Worried About Running Out of Food in the Last Year: Never true    • Ran Out of Food in the Last Year: Never true   Transportation Needs: No Transportation Needs (10/22/2024)    PRAPARE - Transportation    • Lack of Transportation (Medical): No    • Lack of Transportation (Non-Medical): No   Physical Activity: Not on file   Stress: Not on file   Social Connections: Not on file   Intimate Partner Violence: Not on file   Housing Stability: Low Risk  (10/22/2024)    Housing Stability Vital Sign    • Unable to Pay for Housing in the Last Year: No    • Number of Times Moved in the Last Year: 1    • Homeless in the Last Year: No      Family History   Problem Relation Age of Onset   • No Known Problems Mother    • Cancer Father         Stomach, urethral cancers   • Diabetes Father    • Hearing loss Father    • Glaucoma Father    • Diabetes Brother    • Prostate cancer Brother      Past Surgical History:   Procedure Laterality Date   • APPENDECTOMY     • US GUIDED THYROID BIOPSY  1/16/2025       Current Outpatient Medications:   •  alfuzosin (UROXATRAL) 10 mg 24 hr tablet, Take 1 tablet (10 mg total) by mouth daily, Disp: 90 tablet, Rfl: 3  •  Cholecalciferol (Vitamin D) 50 MCG (2000 UT) tablet, Take 2,000 Units by mouth daily, Disp: , Rfl:   •  Melatonin 5 MG TABS, Take 5 mg by mouth daily at bedtime, Disp: , Rfl:   •  metoprolol succinate (TOPROL-XL) 25 mg 24 hr tablet, Take 1 tablet (25 mg total) by mouth daily, Disp: 90 tablet, Rfl: 3  •  Multiple Vitamins-Minerals (Multivitamin Adults)  "TABS, , Disp: , Rfl:   •  Saw [place] 160 MG CAPS, Take by mouth daily, Disp: , Rfl:   •  sildenafil (VIAGRA) 50 MG tablet, Take 1 tablet (50 mg total) by mouth daily as needed for erectile dysfunction, Disp: 10 tablet, Rfl: 5  Allergies   Allergen Reactions   • Penicillins Other (See Comments)           Review of Systems:  Review of Systems   Constitutional: Negative.  Negative for chills and fever.   HENT:  Negative for ear pain and sore throat.    Eyes:  Negative for pain and visual disturbance.   Respiratory:  Negative for cough and shortness of breath.    Cardiovascular:  Negative for chest pain and palpitations.   Gastrointestinal:  Negative for abdominal pain and vomiting.   Genitourinary:  Negative for dysuria and hematuria.   Musculoskeletal:  Negative for arthralgias and back pain.   Skin:  Negative for color change and rash.   Neurological:  Negative for seizures and syncope.   All other systems reviewed and are negative.        /78 (BP Location: Left arm, Patient Position: Sitting, Cuff Size: Standard)   Pulse 73   Resp 16   Ht 5' 11\" (1.803 m)   Wt 86.6 kg (191 lb)   SpO2 100%   BMI 26.64 kg/m²     Physical Exam:  Physical Exam  Vitals reviewed.   Constitutional:       Appearance: Normal appearance.   HENT:      Head: Normocephalic.   Eyes:      Pupils: Pupils are equal, round, and reactive to light.   Cardiovascular:      Rate and Rhythm: Normal rate and regular rhythm.      Heart sounds: Normal heart sounds.   Pulmonary:      Effort: Pulmonary effort is normal.      Breath sounds: Normal breath sounds. No wheezing.   Abdominal:      General: Abdomen is flat.      Palpations: Abdomen is soft.   Skin:     General: Skin is warm.   Neurological:      Mental Status: He is alert.         "

## 2025-02-25 NOTE — ASSESSMENT & PLAN NOTE
-The Holter monitoring have shown the evidence of PVC burden to be 28%.  He has no evidence of having any cardiomyopathy.  He is asymptomatic.  He does have a history of hypertension therefore I have started him today on Toprol-XL 25 mg.  -Had a prolonged discussion with the patient in the presence of his wife about the association of PVCs with a cardiomyopathy.  I plan to repeat the Holter monitoring after another 3 months of the introduction of the beta-blocker therapy.

## 2025-02-25 NOTE — ASSESSMENT & PLAN NOTE
The relationship of sleep apnea with the cardiomyopathy as well as with the arrhythmias has been discussed.  Compliance with the CPAP machine once again addressed.

## 2025-02-26 ENCOUNTER — OFFICE VISIT (OUTPATIENT)
Age: 75
End: 2025-02-26
Payer: MEDICARE

## 2025-02-26 DIAGNOSIS — R41.841 COGNITIVE COMMUNICATION DEFICIT: Primary | ICD-10-CM

## 2025-02-26 DIAGNOSIS — R41.3 MEMORY CHANGES: ICD-10-CM

## 2025-02-26 PROCEDURE — 97129 THER IVNTJ 1ST 15 MIN: CPT

## 2025-02-26 PROCEDURE — 97130 THER IVNTJ EA ADDL 15 MIN: CPT

## 2025-02-26 NOTE — PROGRESS NOTES
Daily Speech Treatment Note    Today's date: 2025   Patient’s name: Aron Oswald Jr.  : 1950  MRN: [id number]  Safety measures:   Referring provider: Ludy Person,*    Encounter Diagnosis     ICD-10-CM    1. Cognitive communication deficit  R41.841       2. Memory changes  R41.3         Visit Tracking:  POC expires Unit limit Auth Expiration date PT/OT + Visit Limit?   25 N/A 25 BOMN PCY                                           Visit/Unit Tracking  AUTH Status:  Date 25  IE    PU                 No Used  1  2  3  4  5                   Remaining   9  8  7  6  5                     Subjective/Behavioral:  -Patient reported no new concerns for today's session.      Objective/Assessment:  -Reviewed testing results and goals in plan care with patient. Patient is in agreement at this time.    Short-term goals:  Patient will be educated on and demonstrate understanding of word finding strategies (i.e., circumlocution) for improved generative naming and verbal expression skills, to be achieved in 2-4 weeks.   Patient will utilize word finding strategies during semantic feature analysis treatment activity, with 80% accuracy, IND, in 3 out of 4 consecutive sessions, to facilitate improved naming and verbal expression skills, to be achieved in 8-10 weeks.  Patient will complete auditory immediate and short term memory tasks to facilitate increased ability to retell narratives and recall information within functional living environment, with 80% accuracy, IND using preferred memory strategy, in 3 out of 4 consecutive sessions, to be achieved in 10-12 weeks.  Patient will complete complex auditory attention processing tasks (e.g., sentence unscramble, ranking numbers/words, etc.) to improve working memory, with 80% accuracy, IND, in 3 out of 4 consecutive sessions, to be achieved in 10-12 weeks.  Patient will complete thought organization tasks (e.g., sequencing,  deduction puzzles, etc.) with 80% accuracy, IND, in 3 out of 4 consecutive sessions, to facilitate increased executive functioning, working memory, problem solving, and processing skills, to be achieved in 10-12 weeks.    Patient was given a math decoding handout where patient was given a chain of different line segments that contained different symbols. According to the key each symbol represented a math equation such as @= subtract 2. Patient to complete the chain. Task completed in 33/40 opp (82% acc) independently, increasing to 40/40 opp (100% acc) from min cueing. Patient benefited from visual cues that lead to increased accuracy       To target mental manipulation and working memory, patient will participate in word finding activity (i.e., anagrams) with 80% accuracy, IND, in 3 out of 4 consecutive sessions, to be achieved in 10-12 weeks.    Completing second half of analogies task: Task completed in 15/20 opp (75% acc) independently, increasing to 20/20 opp (100% acc) from min cueing. Patient benefited from verbal models and phonemic cues that lead to increased accuracy.     Patient will answer questions regarding story read aloud with 80% accuracy, IND, in 3 out of 4 consecutive sessions, to facilitate improved auditory comprehension and recall, to be achieved in 8-10 weeks.   Patient will complete attention tasks (divided, auditory, alternating, sustained) by responding to multiple tasks or details within tasks at the same time in a distracting environment, with 80% accuracy, given min cues, in 3 out of 4 consecutive sessions, to improve attention and working memory, to be achieved in 10-12 weeks.     Plan:  -Patient was provided with home exercises/activities to target goals in plan of care at the end of today's session.  -Continue with current plan of care.

## 2025-02-27 ENCOUNTER — OFFICE VISIT (OUTPATIENT)
Dept: PHYSICAL THERAPY | Facility: CLINIC | Age: 75
End: 2025-02-27
Payer: MEDICARE

## 2025-02-27 DIAGNOSIS — S12.401D CLOSED NONDISPLACED FRACTURE OF FIFTH CERVICAL VERTEBRA WITH ROUTINE HEALING, UNSPECIFIED FRACTURE MORPHOLOGY, SUBSEQUENT ENCOUNTER: Primary | ICD-10-CM

## 2025-02-27 PROCEDURE — 97110 THERAPEUTIC EXERCISES: CPT | Performed by: PHYSICAL THERAPIST

## 2025-02-27 NOTE — PROGRESS NOTES
"Daily Note     Today's date: 2025  Patient name: Aron Oswald Jr.  : 1950  MRN: [id number]  Referring provider: Madiha Hallman CRNP  Dx:   Encounter Diagnosis     ICD-10-CM    1. Closed nondisplaced fracture of fifth cervical vertebra with routine healing, unspecified fracture morphology, subsequent encounter  S12.401D           Start Time: 1135  Stop Time: 1225  Total time in clinic (min): 50 minutes    Subjective: Patient reports that his neck feels a little stiff and painful with movement today.      Objective: See treatment diary below      Assessment: Tolerated treatment well. Patient would benefit from continued PT. Patient reported feeling less stiffness in his cervical spine following cervical mobilization/stretching exercises. Encouraged patient to perform HEP regularly to maintain mobility.      Plan: Continue per plan of care.  Progress treatment as tolerated.         POC expires Unit limit Auth  expiration date PT/OT + Visit Limit?   4/10/25 4 N/a BOMN                 Visit/Unit Tracking  AUTH Status:  Date  2/3 2/10 2/13 2/17 2/24   N/A Used 13  3 4 5 6 7 8 9 10 11 12    Remaining                  Precautions: HTN, h/o C5 fx      Manuals  2/3 2/10 2/13   Gr. II-III cervical side glides      GR       UT, levator str.      UT str. GR       Gr. II-III cervical rotation in sitting      GR       Gr. II-III C2, C3 CPA mobs      GR       Reassessment          GR                                          Neuro Re-Ed             Supine DNF   NV          Supine upper cervical retraction with towel beneath occiput   NV          Bent over row     4# 2x 10    2# 3x 10  2# 3x10    Webslide: M, L row 3x10 BTB 3x 10  BTB    RTB 3x 10          Prone scaption   Bent over 2x 10       Bent over 2x10  Bent over 3x10    Prone shoulder ext.  Bent over 2x 10     2# 2x10 3\" 2# 2x10 3\" Bent over 2# 3x 10  Bent over 2# 3x 10     Prone h. Abd.      2x10 " "3\" 2x10 3\" Bent over2x 10 3\"  minimal range.  Bent over 2x 10 3\"  minimal range    MB vertical rolls against wall       GMB 2x10 GTB 2x 10  GMB 2x10     Bent over rows  2x 10            Ther Ex             Patient education: pathophysiology, diagnostic imaging review          GR   UBE 4'/4'  5'/5' 5' / 5' L3  5'/5' 5' / 5' 5' / 5' L3 5'/5' 5'/5' 5' / 5'   Pulleys             Finger ladder with eccentric lowering             Side lying ER 3x10 1# 3x 10 1#      2# 2x10  2x10     UT, levator str.             Cervical rotation SNAGS   15x5\" b/l  15x 5\"         Cervical isometrics - all planes             Scaption in side lying with UE ranger with TB             TB ER RTB 3x10  RTB 3x 10    RTB 2x 10  (R)  RTB 2x10 RTB 2x 10  RTB 3x10    TB IR GTB 3x10  GTB 3x 10    RTB 2x 10 (R)  RTB 3x10 RTB 2x 10  RTB 3x10    Wall slides with DB     Wall slided 20x ea.         Shoulder flexion AAROM with UE ranger in standing       3# x20      1st rib mobilization with strap   X20 b/l          Levator str. With ipsilateral shoulder in flexion against wall   X20 b/l                                    Ther Activity                                       Gait Training                                       Modalities                                                "

## 2025-03-03 ENCOUNTER — OFFICE VISIT (OUTPATIENT)
Dept: PHYSICAL THERAPY | Facility: CLINIC | Age: 75
End: 2025-03-03
Payer: MEDICARE

## 2025-03-03 DIAGNOSIS — S12.401D CLOSED NONDISPLACED FRACTURE OF FIFTH CERVICAL VERTEBRA WITH ROUTINE HEALING, UNSPECIFIED FRACTURE MORPHOLOGY, SUBSEQUENT ENCOUNTER: Primary | ICD-10-CM

## 2025-03-03 PROCEDURE — 97110 THERAPEUTIC EXERCISES: CPT | Performed by: PHYSICAL THERAPIST

## 2025-03-03 PROCEDURE — 97112 NEUROMUSCULAR REEDUCATION: CPT | Performed by: PHYSICAL THERAPIST

## 2025-03-03 NOTE — PROGRESS NOTES
"Daily Note     Today's date: 3/3/2025  Patient name: Aron Oswald Jr.  : 1950  MRN: [id number]  Referring provider: Madiha Hallman CRNP  Dx:   Encounter Diagnosis     ICD-10-CM    1. Closed nondisplaced fracture of fifth cervical vertebra with routine healing, unspecified fracture morphology, subsequent encounter  S12.401D           Start Time: 1145  Stop Time: 1258  Total time in clinic (min): 73 minutes    Subjective: Patient reports that his neck has been feeling better since his last visit.      Objective: See treatment diary below      Assessment: Tolerated treatment well. Patient demonstrated fatigue post treatment and would benefit from continued PT. Patient challenged with progressions to shoulder girdle strengthening with increased fatigue at end of session.      Plan: Continue per plan of care.  Progress treatment as tolerated.         POC expires Unit limit Auth  expiration date PT/OT + Visit Limit?   4/10/25 4 N/a BOMN                 Visit/Unit Tracking  AUTH Status:  Date 2/27 3/3  1/21 1/23 1/27 1/30 2/3 2/10 2/13 2/17 2/24   N/A Used 13 14  4 5 6 7 8 9 10 11 12    Remaining                  Precautions: HTN, h/o C5 fx      Manuals 2/17 2/24 2/27 3/3  1/27 1/30 2/3 2/10 2/13   Gr. II-III cervical side glides      GR       UT, levator str.      UT str. GR       Gr. II-III cervical rotation in sitting      GR       Gr. II-III C2, C3 CPA mobs      GR       Reassessment          GR                                          Neuro Re-Ed             Supine DNF   NV          Supine upper cervical retraction with towel beneath occiput   NV          Bent over row        2# 3x 10  2# 3x10    Webslide: M, L row 3x10 BTB 3x 10  BTB   [place]: M: 15# 3x15    L: 15# 3x10          Prone scaption   Bent over 2x 10       Bent over 2x10  Bent over 3x10    Prone shoulder ext.  Bent over 2x 10     2# 2x10 3\" 2# 2x10 3\" Bent over 2# 3x 10  Bent over 2# 3x 10     Prone h. Abd.      2x10 3\" 2x10 3\" Bent over2x 10 " "3\"  minimal range.  Bent over 2x 10 3\"  minimal range    MB vertical rolls against wall       GMB 2x10 GTB 2x 10  GMB 2x10     Bent over rows  2x 10            Supine h abd with TB    RTB 3x10         Multidirectional MB on wall (V, H, CW/CCW circles)     RMB to fatigue         Ther Ex             Patient education: pathophysiology, diagnostic imaging review          GR   UBE 4'/4'  5'/5' 5' / 5' L3 5' / 5' L3  5' / 5' 5' / 5' L3 5'/5' 5'/5' 5' / 5'   Pulleys             Finger ladder with eccentric lowering             Side lying ER 3x10 1# 3x 10 1#      2# 2x10  2x10     UT, levator str.             Cervical rotation SNAGS   15x5\" b/l          Cervical isometrics - all planes             Scaption in side lying with UE ranger with TB             TB ER RTB 3x10  RTB 3x 10   RTB 2x10   RTB 2x10 RTB 2x 10  RTB 3x10    TB IR GTB 3x10  GTB 3x 10   GTB until fatigue   RTB 3x10 RTB 2x 10  RTB 3x10    Wall slides with DB             Shoulder flexion AAROM with UE ranger in standing       3# x20      1st rib mobilization with strap   X20 b/l          Levator str. With ipsilateral shoulder in flexion against wall   X20 b/l                                    Ther Activity                                       Gait Training                                       Modalities                                                  "

## 2025-03-05 ENCOUNTER — OFFICE VISIT (OUTPATIENT)
Age: 75
End: 2025-03-05
Payer: MEDICARE

## 2025-03-05 DIAGNOSIS — R41.841 COGNITIVE COMMUNICATION DEFICIT: Primary | ICD-10-CM

## 2025-03-05 DIAGNOSIS — R41.3 MEMORY CHANGES: ICD-10-CM

## 2025-03-05 PROCEDURE — 97129 THER IVNTJ 1ST 15 MIN: CPT

## 2025-03-05 PROCEDURE — 97130 THER IVNTJ EA ADDL 15 MIN: CPT

## 2025-03-05 NOTE — PROGRESS NOTES
"Daily Speech Treatment Note    Today's date: 3/5/2025   Patient’s name: Aron Oswald Jr.  : 1950  MRN: [id number]  Safety measures: memory difficulties, HTN, hx of etoh abuse   Referring provider: Ludy Person,*    Encounter Diagnosis     ICD-10-CM    1. Cognitive communication deficit  R41.841       2. Memory changes  R41.3           Visit Tracking:  POC expires Unit limit Auth Expiration date PT/OT + Visit Limit?   25 N/A 25 BOMN PCY                                           Visit/Unit Tracking  AUTH Status:  Date 25  IE    PU  2/26  3/5             No Used  1  2  3  4  5  1  2               Remaining   9  8  7  6  5  9  8                 Subjective/Behavioral:  -Patient pleasant, engaged, and cooperative. Patient attended today's session unaccompanied. Patient reported wife commented, \"I don't follow through with paying attention during conversation.\"    Objective/Assessment: Patient demonstrated disorganized planning and reduced deductive reasoning for complex thought organization task; reduced self-monitoring of responses noted.       Short-term goals:  Patient will be educated on and demonstrate understanding of word finding strategies (i.e., circumlocution) for improved generative naming and verbal expression skills, to be achieved in 2-4 weeks.   Patient will utilize word finding strategies during semantic feature analysis treatment activity, with 80% accuracy, IND, in 3 out of 4 consecutive sessions, to facilitate improved naming and verbal expression skills, to be achieved in 8-10 weeks.  Patient will complete auditory immediate and short term memory tasks to facilitate increased ability to retell narratives and recall information within functional living environment, with 80% accuracy, IND using preferred memory strategy, in 3 out of 4 consecutive sessions, to be achieved in 10-12 weeks.  3/5/25: Patient utilized repetition/rehearsal, association " (attribute/object), and visualization strategies to immediately recall a list of ten words with 70% accuracy, IND. Accuracy increased to 100% given mod verbal semantic cues. Patient then recalled the same ten words after a ten minute delay with 90% accuracy, IND.     Patient will complete complex auditory attention processing tasks (e.g., sentence unscramble, ranking numbers/words, etc.) to improve working memory, with 80% accuracy, IND, in 3 out of 4 consecutive sessions, to be achieved in 10-12 weeks.  Patient will complete thought organization tasks (e.g., sequencing, deduction puzzles, etc.) with 80% accuracy, IND, in 3 out of 4 consecutive sessions, to facilitate increased executive functioning, working memory, problem solving, and processing skills, to be achieved in 10-12 weeks.  2/26/25: Patient was given a math decoding handout where patient was given a chain of different line segments that contained different symbols. According to the key each symbol represented a math equation such as @= subtract 2. Patient to complete the chain. Task completed in 33/40 opp (82% acc) independently, increasing to 40/40 opp (100% acc) from min cueing. Patient benefited from visual cues that lead to increased accuracy  3/5/25: Patient completed a project planning/deduction puzzle containing 10 elements with 50% accuracy, IND. Accuracy increased to 100% given mod verbal cues including direct verbal instruction, semantic cues, and immediate direct and indirect verbal feedback. Given as HEP.    To target mental manipulation and working memory, patient will participate in word finding activity (i.e., anagrams) with 80% accuracy, IND, in 3 out of 4 consecutive sessions, to be achieved in 10-12 weeks.  2/26/25:Completing second half of analogies task: Task completed in 15/20 opp (75% acc) independently, increasing to 20/20 opp (100% acc) from min cueing. Patient benefited from verbal models and phonemic cues that lead to increased  accuracy.   Patient will answer questions regarding story read aloud with 80% accuracy, IND, in 3 out of 4 consecutive sessions, to facilitate improved auditory comprehension and recall, to be achieved in 8-10 weeks.   Patient will complete attention tasks (divided, auditory, alternating, sustained) by responding to multiple tasks or details within tasks at the same time in a distracting environment, with 80% accuracy, given min cues, in 3 out of 4 consecutive sessions, to improve attention and working memory, to be achieved in 10-12 weeks.   3/5/25: Patient completed sustained attention tasks by selecting cards (from a deck of playing cards) based upon a predetermined rule with the following accuracy: Even spades: completed in 1:37 with 1 error (>90% accuracy); odd diamonds: completed in 1:19 with 1 error (>90% accuracy). Given as HEP.    Plan:  -Patient was provided with home exercises/activities to target goals in plan of care at the end of today's session.  -Continue with current plan of care.

## 2025-03-06 ENCOUNTER — NURSE TRIAGE (OUTPATIENT)
Age: 75
End: 2025-03-06

## 2025-03-06 ENCOUNTER — OFFICE VISIT (OUTPATIENT)
Dept: PHYSICAL THERAPY | Facility: CLINIC | Age: 75
End: 2025-03-06
Payer: MEDICARE

## 2025-03-06 DIAGNOSIS — S12.401D CLOSED NONDISPLACED FRACTURE OF FIFTH CERVICAL VERTEBRA WITH ROUTINE HEALING, UNSPECIFIED FRACTURE MORPHOLOGY, SUBSEQUENT ENCOUNTER: Primary | ICD-10-CM

## 2025-03-06 PROCEDURE — 97140 MANUAL THERAPY 1/> REGIONS: CPT | Performed by: PHYSICAL THERAPIST

## 2025-03-06 PROCEDURE — 97110 THERAPEUTIC EXERCISES: CPT | Performed by: PHYSICAL THERAPIST

## 2025-03-06 NOTE — TELEPHONE ENCOUNTER
"FOLLOW UP: Received call form pt's spouse, Kimberly, voicing concern about pt's b/l feet / lower leg swelling starting one week ago. Spouse stated he has +3 pitting edema on his R foot and +2 pitting edema on his L. She stated the swelling travels about 6 inches up his leg. Denies any redness, pain, fever, chest pain, nor sob. She stated the swelling has been pretty constant since last week. He has not been elevating his legs but is starting to today.    Spouse wanted to let Dr. Herman know to see what his appropriate next steps should be.     Advised will send a message to the provider to review and advise.     REASON FOR CONVERSATION: b/l feet swelling      DISPOSITION: Discuss with Provider and Call Back Patient        Answer Assessment - Initial Assessment Questions  1. ONSET: \"When did the swelling start?\" (e.g., minutes, hours, days)      One week ago   2. LOCATION: \"What part of the leg is swollen?\"  \"Are both legs swollen or just one leg?\"      B/l feet swelling traveling 6 inches up to his lower leg - +3 on R, +2 on L  3. SEVERITY: \"How bad is the swelling?\" (e.g., localized; mild, moderate, severe)      Constant since starting   4. REDNESS: \"Does the swelling look red or infected?\"      denies  5. PAIN: \"Is the swelling painful to touch?\" If Yes, ask: \"How painful is it?\"   (Scale 1-10; mild, moderate or severe)      denies  6. FEVER: \"Do you have a fever?\" If Yes, ask: \"What is it, how was it measured, and when did it start?\"       denies  7. CAUSE: \"What do you think is causing the leg swelling?\"      Unusure  8. MEDICAL HISTORY: \"Do you have a history of blood clots (e.g., DVT), cancer, heart failure, kidney disease, or liver failure?\"      HTN, PVC's  9. RECURRENT SYMPTOM: \"Have you had leg swelling before?\" If Yes, ask: \"When was the last time?\" \"What happened that time?\"      Yes but not as bad currently   10. OTHER SYMPTOMS: \"Do you have any other symptoms?\" (e.g., chest pain, difficulty breathing)    "     Has not been elevating his legs but is now. Denies chest pain and sob.    Protocols used: Leg Swelling and Edema-Adult-[place]

## 2025-03-06 NOTE — TELEPHONE ENCOUNTER
Received a call from Kimberly calling back to make aware Aron salt intake was on the higher side yesterday. Advised I can added t the message.

## 2025-03-06 NOTE — PROGRESS NOTES
"Daily Note     Today's date: 3/6/2025  Patient name: Aron Osawld Jr.  : 1950  MRN: [id number]  Referring provider: Madiha Hallman CRNP  Dx:   Encounter Diagnosis     ICD-10-CM    1. Closed nondisplaced fracture of fifth cervical vertebra with routine healing, unspecified fracture morphology, subsequent encounter  S12.401D           Start Time: 1217  Stop Time: 1300  Total time in clinic (min): 43 minutes    Subjective: Patient reports that his neck and shoulder feel maybe a little better.      Objective: See treatment diary below      Assessment: Tolerated treatment well. Patient demonstrated fatigue post treatment and would benefit from continued PT. Patient continues to present with cervical stiffness in all planes, encouraged to be more compliant with HEP.      Plan: Continue per plan of care.  Progress treatment as tolerated.         POC expires Unit limit Auth  expiration date PT/OT + Visit Limit?   4/10/25 4 N/a BOMN                 Visit/Unit Tracking  AUTH Status:  Date 2/27 3/3 3/6  1/23 1/27 1/30 2/3 2/10 2/13 2/17 2/24   N/A Used 13 14 15  5 6 7 8 9 10 11 12    Remaining                  Precautions: HTN, h/o C5 fx      Manuals 2/17 2/24 2/27 3/3 3/6  1/30 2/3 2/10 2/13   Gr. II-III cervical side glides     GR        UT, levator str.     GR        Gr. II-III cervical rotation, side bend in sitting     Supine  GR        Gr. II-III C2, C3 CPA mobs             Reassessment          GR                                          Neuro Re-Ed             Supine DNF   NV          Supine upper cervical retraction with towel beneath occiput   NV          Bent over row        2# 3x 10  2# 3x10    Webslide: M, L row 3x10 BTB 3x 10  BTB   [place]: M: 15# 3x15    L: 15# 3x10          Prone scaption   Bent over 2x 10       Bent over 2x10  Bent over 3x10    Prone shoulder ext.  Bent over 2x 10      2# 2x10 3\" Bent over 2# 3x 10  Bent over 2# 3x 10     Prone h. Abd.       2x10 3\" Bent over2x 10 3\"  minimal " "range.  Bent over 2x 10 3\"  minimal range    MB vertical rolls against wall       GMB 2x10 GTB 2x 10  GMB 2x10     Bent over rows  2x 10            Supine h abd with TB    RTB 3x10         Multidirectional MB on wall (V, H, CW/CCW circles)     RMB to fatigue         Ther Ex             Patient education: pathophysiology, diagnostic imaging review          GR   UBE 4'/4'  5'/5' 5' / 5' L3 5' / 5' L3 5' / 5' L5  5' / 5' L3 5'/5' 5'/5' 5' / 5'   Pulleys             Finger ladder with eccentric lowering             Side lying ER 3x10 1# 3x 10 1#      2# 2x10  2x10     UT, levator str.             Cervical rotation SNAGS   15x5\" b/l  15x5\" b/l        Cervical isometrics - all planes             Scaption in side lying with UE ranger with TB             TB ER RTB 3x10  RTB 3x 10   RTB 2x10   RTB 2x10 RTB 2x 10  RTB 3x10    TB IR GTB 3x10  GTB 3x 10   GTB until fatigue   RTB 3x10 RTB 2x 10  RTB 3x10    Wall slides with DB             Shoulder flexion AAROM with UE ranger in standing       3# x20      1st rib mobilization with strap   X20 b/l  X20 b/l        Levator str. With ipsilateral shoulder in flexion against wall   X20 b/l  15x5\" b/l                                  Ther Activity                                       Gait Training                                       Modalities                                                    "

## 2025-03-10 ENCOUNTER — OFFICE VISIT (OUTPATIENT)
Dept: PHYSICAL THERAPY | Facility: CLINIC | Age: 75
End: 2025-03-10
Payer: MEDICARE

## 2025-03-10 DIAGNOSIS — S12.401D CLOSED NONDISPLACED FRACTURE OF FIFTH CERVICAL VERTEBRA WITH ROUTINE HEALING, UNSPECIFIED FRACTURE MORPHOLOGY, SUBSEQUENT ENCOUNTER: Primary | ICD-10-CM

## 2025-03-10 PROCEDURE — 97112 NEUROMUSCULAR REEDUCATION: CPT | Performed by: PHYSICAL THERAPIST

## 2025-03-10 PROCEDURE — 97110 THERAPEUTIC EXERCISES: CPT | Performed by: PHYSICAL THERAPIST

## 2025-03-10 NOTE — PROGRESS NOTES
Daily Note     Today's date: 3/10/2025  Patient name: Aron Oswald Jr.  : 1950  MRN: [id number]  Referring provider: Madiha Hallman CRNP  Dx:   Encounter Diagnosis     ICD-10-CM    1. Closed nondisplaced fracture of fifth cervical vertebra with routine healing, unspecified fracture morphology, subsequent encounter  S12.401D           Start Time: 1214  Stop Time: 1300  Total time in clinic (min): 46 minutes    Subjective: Patient reports that his neck and shoulder feel a little better.      Objective: See treatment diary below      Assessment: Tolerated treatment well. Patient demonstrated fatigue post treatment and would benefit from continued PT. Patient challenged with progressions to strengthening. Patient slowly gaining strength overhead against gravity, but requires assistance.      Plan: Continue per plan of care.  Progress treatment as tolerated.         POC expires Unit limit Auth  expiration date PT/OT + Visit Limit?   4/10/25 4 N/a BOMN                 Visit/Unit Tracking  AUTH Status:  Date 2/27 3/3 3/6 3/10  1/27 1/30 2/3 2/10 2/13 2/17 2/24   N/A Used 13 14 15 16  6 7 8 9 10 11 12    Remaining                  Precautions: HTN, h/o C5 fx      Manuals 2/17 2/24 2/27 3/3 3/6 3/10  2/3 2/10 2/13   Gr. II-III cervical side glides     GR        UT, levator str.     GR        Gr. II-III cervical rotation, side bend in sitting     Supine  GR        Gr. II-III C2, C3 CPA mobs             Reassessment          GR                                          Neuro Re-Ed             Supine DNF   NV          Supine upper cervical retraction with towel beneath occiput   NV          Bent over row        2# 3x 10  2# 3x10    [place]: M, L row 3x10 BTB 3x 10  BTB   [place]: M: 15# 3x15    L: 15# 3x10  [place]: M, L 15# 3x15 ea.        Prone scaption   Bent over 2x 10       Bent over 2x10  Bent over 3x10    Prone shoulder ext.  Bent over 2x 10       Bent over 2# 3x 10  Bent over 2# 3x 10     Prone h. Abd.        " Bent over2x 10 3\"  minimal range.  Bent over 2x 10 3\"  minimal range    MB vertical rolls against wall        GTB 2x 10  GMB 2x10     Bent over rows  2x 10            Supine h abd with TB    RTB 3x10         Multidirectional MB on wall (V, H, CW/CCW circles)     RMB to fatigue         Ther Ex             Patient education: pathophysiology, diagnostic imaging review          GR   UBE 4'/4'  5'/5' 5' / 5' L3 5' / 5' L3 5' / 5' L5 5' / 5' L5  5'/5' 5'/5' 5' / 5'   Pulleys             Finger ladder with eccentric lowering             Side lying ER 3x10 1# 3x 10 1#        2x10     UT, levator str.             Cervical rotation SNAGS   15x5\" b/l  15x5\" b/l        Cervical isometrics - all planes             Scaption in side lying with UE ranger with TB             TB ER RTB 3x10  RTB 3x 10   RTB 2x10    RTB 2x 10  RTB 3x10    TB IR GTB 3x10  GTB 3x 10   GTB until fatigue    RTB 2x 10  RTB 3x10    Wall slides with DB             Shoulder flexion AAROM with UE ranger in standing      5# 3x15       1st rib mobilization with strap   X20 b/l  X20 b/l        Levator str. With ipsilateral shoulder in flexion against wall   X20 b/l  15x5\" b/l        Biceps curls      10# 2x10       Scaption wall slides      RTB 2x10       Ther Activity                                       Gait Training                                       Modalities                                                      "

## 2025-03-11 NOTE — TELEPHONE ENCOUNTER
Spoke with pt and his wife and Dr. Herman;s message was relayed. Pt's wife stated that pt's holter monitor was scheduled too early and she was advised to call back central scheduling and have it schedule according to Dr. Herman's recommendations.     They verbalized understanding of Dr. Herman's message.

## 2025-03-12 ENCOUNTER — OFFICE VISIT (OUTPATIENT)
Age: 75
End: 2025-03-12
Payer: MEDICARE

## 2025-03-12 DIAGNOSIS — R41.841 COGNITIVE COMMUNICATION DEFICIT: Primary | ICD-10-CM

## 2025-03-12 DIAGNOSIS — R41.3 MEMORY CHANGES: ICD-10-CM

## 2025-03-12 PROCEDURE — 92507 TX SP LANG VOICE COMM INDIV: CPT

## 2025-03-12 NOTE — PROGRESS NOTES
Daily Speech Treatment Note    Today's date: 3/12/2025   Patient’s name: Aron Oswald Jr.  : 1950  MRN: [id number]  Safety measures: memory difficulties, HTN, hx of etoh abuse   Referring provider: Ludy Person,*    Encounter Diagnosis     ICD-10-CM    1. Cognitive communication deficit  R41.841       2. Memory changes  R41.3             Visit Tracking:  POC expires Unit limit Auth Expiration date PT/OT + Visit Limit?   25 N/A 25 BOMN PCY                                           Visit/Unit Tracking  AUTH Status:  Date 25  IE    PU  2/26  3/5  3/12           No Used  1  2  3  4  5  1  2  3             Remaining   9  8  7  6  5  9  8  7               Subjective/Behavioral:  -Patient pleasant, engaged, and cooperative. Patient's wife, Kimberly, present for today's session. No new concerns reported.     Objective/Assessment: Discussed patient's POC, weaknesses and strength's, PLOF, and emerging carryover of strategy use with his wife. Wife relayed difficulties noted at home, as well as ADLs she would prefer he resume IND with once again (e.g. keeping track of own appointments, writing his own lists, etc.).     Short-term goals:  Patient will be educated on and demonstrate understanding of word finding strategies (i.e., circumlocution) for improved generative naming and verbal expression skills, to be achieved in 2-4 weeks.   Patient will utilize word finding strategies during semantic feature analysis treatment activity, with 80% accuracy, IND, in 3 out of 4 consecutive sessions, to facilitate improved naming and verbal expression skills, to be achieved in 8-10 weeks.  Patient will complete auditory immediate and short term memory tasks to facilitate increased ability to retell narratives and recall information within functional living environment, with 80% accuracy, IND using preferred memory strategy, in 3 out of 4 consecutive sessions, to be achieved in 10-  weeks.  3/5/25: Patient utilized repetition/rehearsal, association (attribute/object), and visualization strategies to immediately recall a list of ten words with 70% accuracy, IND. Accuracy increased to 100% given mod verbal semantic cues. Patient then recalled the same ten words after a ten minute delay with 90% accuracy, IND.     Patient will complete complex auditory attention processing tasks (e.g., sentence unscramble, ranking numbers/words, etc.) to improve working memory, with 80% accuracy, IND, in 3 out of 4 consecutive sessions, to be achieved in 10-12 weeks.    Patient will complete thought organization tasks (e.g., sequencing, deduction puzzles, etc.) with 80% accuracy, IND, in 3 out of 4 consecutive sessions, to facilitate increased executive functioning, working memory, problem solving, and processing skills, to be achieved in 10-12 weeks.  2/26/25: Patient was given a math decoding handout where patient was given a chain of different line segments that contained different symbols. According to the key each symbol represented a math equation such as @= subtract 2. Patient to complete the chain. Task completed in 33/40 opp (82% acc) independently, increasing to 40/40 opp (100% acc) from min cueing. Patient benefited from visual cues that lead to increased accuracy  3/5/25: Patient completed a project planning/deduction puzzle containing 10 elements with 50% accuracy, IND. Accuracy increased to 100% given mod verbal cues including direct verbal instruction, semantic cues, and immediate direct and indirect verbal feedback. Given as HEP.    To target mental manipulation and working memory, patient will participate in word finding activity (i.e., anagrams) with 80% accuracy, IND, in 3 out of 4 consecutive sessions, to be achieved in 10-12 weeks.  2/26/25:Completing second half of analogies task: Task completed in 15/20 opp (75% acc) independently, increasing to 20/20 opp (100% acc) from min cueing. Patient  benefited from verbal models and phonemic cues that lead to increased accuracy.   Patient will answer questions regarding story read aloud with 80% accuracy, IND, in 3 out of 4 consecutive sessions, to facilitate improved auditory comprehension and recall, to be achieved in 8-10 weeks.   3/12/25: Patient answered questions regarding a story 7 sentences in length with 67% accuracy, IND. Accuracy increased to 100% given mod -max verbal cues including rereading of passage, verbal semantic cues, gestures, verbal shaping of answer, and clinician model.   Patient will complete attention tasks (divided, auditory, alternating, sustained) by responding to multiple tasks or details within tasks at the same time in a distracting environment, with 80% accuracy, given min cues, in 3 out of 4 consecutive sessions, to improve attention and working memory, to be achieved in 10-12 weeks.   3/5/25: Patient completed sustained attention tasks by selecting cards (from a deck of playing cards) based upon a predetermined rule with the following accuracy: Even spades: completed in 1:37 with 1 error (>90% accuracy); odd diamonds: completed in 1:19 with 1 error (>90% accuracy). Given as HEP.    Plan:  -Patient was provided with home exercises/activities to target goals in plan of care at the end of today's session.  -Continue with current plan of care.

## 2025-03-13 ENCOUNTER — EVALUATION (OUTPATIENT)
Dept: PHYSICAL THERAPY | Facility: CLINIC | Age: 75
End: 2025-03-13
Payer: MEDICARE

## 2025-03-13 DIAGNOSIS — S12.401D CLOSED NONDISPLACED FRACTURE OF FIFTH CERVICAL VERTEBRA WITH ROUTINE HEALING, UNSPECIFIED FRACTURE MORPHOLOGY, SUBSEQUENT ENCOUNTER: Primary | ICD-10-CM

## 2025-03-13 PROCEDURE — 97110 THERAPEUTIC EXERCISES: CPT | Performed by: PHYSICAL THERAPIST

## 2025-03-13 NOTE — PROGRESS NOTES
PT Re-Evaluation     Today's date: 3/13/2025  Patient name: Aron Oswald Jr.  : 1950  MRN: [id number]  Referring provider: Madiha Hallman CRNP  Dx:   Encounter Diagnosis     ICD-10-CM    1. Closed nondisplaced fracture of fifth cervical vertebra with routine healing, unspecified fracture morphology, subsequent encounter  S12.401D           Start Time: 1220  Stop Time: 1300  Total time in clinic (min): 40 minutes    Assessment  Impairments: abnormal muscle firing, abnormal or restricted ROM, activity intolerance, impaired physical strength, lacks appropriate home exercise program, pain with function and poor posture     Assessment details: Aron Oswald is a 73 y.o. male who presents with acute onset of shoulder near paralysis following fall from bed in his sleep. Patient presented to ED where he was diagnosed with a C5 cervical fracture and had been placed in an [place] cervical collar. Patient has since regained all elbow, wrist and hand mobility, however, continues to have limited ability to move his right arm away from his body. MRI of right shoulder demonstrates complete tears of the supraspinatus and infraspinatus tendons with retracted tendon fibers to the level of the glenoid. Patient's primary complaints at this time are cervical stiffness with limited ROM and pain. Patient has attended a total of 17 physical therapy appointments to date and has maintained good compliance with established POC, although continues to admit to lack of HEP compliance. Patient overall presents with improved right shoulder ROM, improved cervical ROM, and decreased pain. Patient continues to present with significant loss of cervical ROM and segmental mobility and RC/scapular weakness. Due to these impairments, Patient has difficulty performing a/iadls. Patient would benefit from skilled physical therapy to address their aforementioned impairments, improve their level of function and to improve their overall quality  of life.  Understanding of Dx/Px/POC: excellent     Prognosis: good    Goals  Short Term Goals: to be achieved by 4 weeks  1) Patient to be independent with basic HEP. MET  2) Decrease pain to 1/10 at it's worst. PROGRESSED, BUT NOT MET  3) Increase UE strength by 1/2 MMT grade in all deficient planes. PARTIALLY MET  4) Increase cervical ROM by > 5 deg in all deficient planes. PARTIALLY MET  5) Patient to report decreased sleep interruption secondary to pain. MET  6) Increase shoulder ROM by > 5 deg in all deficient planes. MET    Long Term Goals: to be achieved by discharge ALL GOALS PROGRESSING  1) FOTO equal to or greater than 73.  2) Patient to be independent with comprehensive HEP.  3) Abolish pain for improved quality of life.  4) Increase UE strength to at least 4-/5 MMT grade in all deficient planes to improve a/iadls.  5) Increase cervical ROM to WFL all planes to improve a/iadls.  6) Patient to report no sleep interruption secondary to pain.  7) Increase shoulder ROM by > 10 deg in all deficient planes to improve a/iadls.    Plan  Patient would benefit from: skilled PT  Planned modality interventions: biofeedback, cryotherapy, hydrotherapy, unattended electrical stimulation, thermotherapy: hydrocollator packs and low level laser therapy    Planned therapy interventions: activity modification, ADL retraining, behavior modification, body mechanics training, functional ROM exercises, home exercise program, IADL retraining, joint mobilization, manual therapy, massage, neuromuscular re-education, patient education, postural training, strengthening, stretching, therapeutic activities and therapeutic exercise    Frequency: 2-3x week.  Duration in weeks: 8  Plan of Care beginning date: 3/13/2025  Plan of Care expiration date: 5/8/2025  Treatment plan discussed with: patient        Subjective Evaluation    History of Present Illness  Mechanism of injury: Patient reports that since returning to physical therapy his  right shoulder mobility has been better. Patient's right shoulder pain has resolved. Patient is able to reach away from his body with greater ease. Patient estimates his right shoulder overall level of function to be approximately 75-80% of his premorbid. Patient states that he would still like to regain some more strength and functional mobility. Patient's weakness remains his primary limitation.    Patient reports that his cervical spine is less sore and has some improved ROM bilaterally. Patient does have intermittent crepitus.   Patient Goals  Patient goals for therapy: decreased pain, increased motion and increased strength    Pain  Current pain ratin  At best pain ratin  At worst pain ratin  Location: left > right cervical spine, occiput  Quality: tight, sore, ache.    Treatments  Current treatment: physical therapy        Objective     Postural Observations  Seated posture: fair  Standing posture: fair      Tenderness   Cervical Spine   No tenderness in the spinous process, left transverse process and right transverse process.     Active Range of Motion   Cervical/Thoracic Spine       Cervical    Flexion: 49 degrees   Extension: 28 degrees      Left lateral flexion: 29 degrees      Right lateral flexion: 28 degrees      Left rotation: 50 degrees  Right rotation: 50 degrees     Left Shoulder   Flexion: 105 degrees   Abduction: 90 degrees   External rotation BTH: T3   Internal rotation BTB: T9     Right Shoulder   Flexion: 85 degrees   Abduction: 82 degrees   External rotation BTH: T3   Internal rotation BTB: T8     Passive Range of Motion   Cervical/Thoracic Spine   Cervical     Flexion (degrees):  Restriction level: moderate  Left lateral flexion (degrees):  Restriction level: maximal  Right lateral flexion (degrees):  Restriction level: maximal  Left rotation (degrees):  Restriction level: moderate  Right rotation (degrees):  Restriction level: moderate  Left Shoulder   Flexion: 150 degrees    Abduction: 133 degrees   External rotation 90°: 45 degrees   Internal rotation 90°: 40 degrees     Right Shoulder   Flexion: 152 degrees   Abduction: 150 degrees   External rotation 90°: 58 degrees   Internal rotation 90°: 67 degrees     Joint Play     Hypomobile: C1, C2, C3, C4, C5, C6 and C7     Strength/Myotome Testing     Left Shoulder     Planes of Motion   Flexion: 3-   Abduction: 3-   External rotation at 0°: 5   Internal rotation at 0°: 5     Right Shoulder     Planes of Motion   Flexion: 2+   Abduction: 2+   External rotation at 0°: 3+   Internal rotation at 0°: 5     Left Elbow   Flexion: 5  Extension: 5    Right Elbow   Flexion: 5  Extension: 5    Left Wrist/Hand   Wrist extension: 5  Wrist flexion: 5    Right Wrist/Hand   Wrist extension: 5  Wrist flexion: 5    Ambulation     Ambulation: Level Surfaces   Ambulation without assistive device: independent    Observational Gait   Gait: within functional limits                POC expires Unit limit Auth  expiration date PT/OT + Visit Limit?   5/8/25 4 N/a BOMN                 Visit/Unit Tracking  AUTH Status:  Date 2/27 3/3 3/6 3/10 3/13  1/30 2/3 2/10 2/13 2/17 2/24   N/A Used 13 14 15 16 17  7 8 9 10 11 12    Remaining                  Precautions: HTN, h/o C5 fx      Manuals 2/17 2/24 2/27 3/3 3/6 3/10 3/13  2/10 2/13   Gr. II-III cervical side glides     GR        UT, levator str.     GR        Gr. II-III cervical rotation, side bend in sitting     Supine  GR        Gr. II-III C2, C3 CPA mobs             Reassessment       GR   GR                                          Neuro Re-Ed             Supine DNF   NV          Supine upper cervical retraction with towel beneath occiput   NV          Bent over row         2# 3x10    [place]: M, L row 3x10 BTB 3x 10  BTB   [place]: M: 15# 3x15    L: 15# 3x10  [place]: M, L 15# 3x15 ea.        Prone scaption   Bent over 2x 10        Bent over 3x10    Prone shoulder ext.  Bent over 2x 10        Bent over 2# 3x 10    "  Prone h. Abd.         Bent over 2x 10 3\"  minimal range    MB vertical rolls against wall         GMB 2x10     Bent over rows  2x 10            Supine h abd with TB    RTB 3x10         Multidirectional MB on wall (V, H, CW/CCW circles)     RMB to fatigue         Ther Ex             Patient education: pathophysiology, HEP review       GR   GR   UBE 4'/4'  5'/5' 5' / 5' L3 5' / 5' L3 5' / 5' L5 5' / 5' L5 5' / 5' L3  5'/5' 5' / 5'   Pulleys             Finger ladder with eccentric lowering             Side lying ER 3x10 1# 3x 10 1#        2x10     UT, levator str.             Cervical rotation SNAGS   15x5\" b/l  15x5\" b/l        Cervical isometrics - all planes             Scaption in side lying with UE ranger with TB             TB ER RTB 3x10  RTB 3x 10   RTB 2x10     RTB 3x10    TB IR GTB 3x10  GTB 3x 10   GTB until fatigue     RTB 3x10    Wall slides with DB             Shoulder flexion AAROM with UE ranger in standing      5# 3x15       1st rib mobilization with strap   X20 b/l  X20 b/l        Levator str. With ipsilateral shoulder in flexion against wall   X20 b/l  15x5\" b/l        Biceps curls      10# 2x10       Scaption wall slides      RTB 2x10       Ther Activity                                       Gait Training                                       Modalities                                            "

## 2025-03-19 ENCOUNTER — APPOINTMENT (OUTPATIENT)
Age: 75
End: 2025-03-19
Payer: MEDICARE

## 2025-03-24 ENCOUNTER — OFFICE VISIT (OUTPATIENT)
Dept: PHYSICAL THERAPY | Facility: CLINIC | Age: 75
End: 2025-03-24
Payer: MEDICARE

## 2025-03-24 DIAGNOSIS — S12.401D CLOSED NONDISPLACED FRACTURE OF FIFTH CERVICAL VERTEBRA WITH ROUTINE HEALING, UNSPECIFIED FRACTURE MORPHOLOGY, SUBSEQUENT ENCOUNTER: Primary | ICD-10-CM

## 2025-03-24 PROCEDURE — 97110 THERAPEUTIC EXERCISES: CPT | Performed by: PHYSICAL THERAPIST

## 2025-03-24 PROCEDURE — 97140 MANUAL THERAPY 1/> REGIONS: CPT | Performed by: PHYSICAL THERAPIST

## 2025-03-24 NOTE — PROGRESS NOTES
"Daily Note     Today's date: 3/24/2025  Patient name: Aron Oswald Jr.  : 1950  MRN: [id number]  Referring provider: Madiha Hallman CRNP  Dx:   Encounter Diagnosis     ICD-10-CM    1. Closed nondisplaced fracture of fifth cervical vertebra with routine healing, unspecified fracture morphology, subsequent encounter  S12.401D           Start Time: 1400  Stop Time: 1455  Total time in clinic (min): 55 minutes    Subjective: Patient reports that he has not been performing his HEP.      Objective: See treatment diary below      Assessment: Tolerated treatment well. Patient demonstrated fatigue post treatment and would benefit from continued PT. Reviewed and updated HEP. Patient continues to present with limited cervical mobility and ROM.      Plan: Continue per plan of care.  Progress treatment as tolerated.         POC expires Unit limit Auth  expiration date PT/OT + Visit Limit?   25 4 N/a BOMN                 Visit/Unit Tracking  AUTH Status:  Date 2/27 3/3 3/6 3/10 3/13 3/24  2/3 2/10 2/13 2/17 2/24   N/A Used 13 14 15 16 17 18  8 9 10 11 12    Remaining                  Precautions: HTN, h/o C5 fx      Manuals 2/17 2/24 2/27 3/3 3/6 3/10 3/13 3/24  2/13   Gr. II-III cervical side glides     GR   GR     UT, levator str.     GR   GR     Gr. II-III cervical rotation, side bend in sitting     Supine  GR   GR     Gr. II-III C2, C3 CPA mobs             Reassessment       GR   GR                                          Neuro Re-Ed             Supine DNF   NV          Supine upper cervical retraction with towel beneath occiput   NV     20x3\"     Bent over row             [place]: M, L row 3x10 BTB 3x 10  BTB   [place]: M: 15# 3x15    L: 15# 3x10  [place]: M, L 15# 3x15 ea.        Prone scaption   Bent over 2x 10            Prone shoulder ext.  Bent over 2x 10            Prone h. Abd.             MB vertical rolls against wall              Bent over rows  2x 10            Supine h abd with TB    RTB 3x10    " "     Multidirectional MB on wall (V, H, CW/CCW circles)     RMB to fatigue         Ther Ex             Patient education: pathophysiology, HEP review       GR GR  GR   UBE 4'/4'  5'/5' 5' / 5' L3 5' / 5' L3 5' / 5' L5 5' / 5' L5 5' / 5' L3 5' / 5' L3  5' / 5'   Pulleys             Finger ladder with eccentric lowering             Side lying ER 3x10 1# 3x 10 1#            UT, levator str.             Cervical rotation SNAGS   15x5\" b/l  15x5\" b/l   15x5\" b/l     Cervical isometrics - all planes             Scaption in side lying with UE ranger with TB             TB ER RTB 3x10  RTB 3x 10   RTB 2x10         TB IR GTB 3x10  GTB 3x 10   GTB until fatigue         Wall slides with DB             Shoulder flexion AAROM with UE ranger in standing      5# 3x15       1st rib mobilization with strap   X20 b/l  X20 b/l        Levator str. With ipsilateral shoulder in flexion against wall   X20 b/l  15x5\" b/l   15x5\" b/l     Biceps curls      10# 2x10       Scaption wall slides      RTB 2x10       Ther Activity                                       Gait Training                                       Modalities                                       Access Code: [email or web address]  URL: [email or web address]  Date: 03/24/2025  Prepared by: Diomedes Gordon    Exercises  - Supine Deep Neck Flexor Training  - 1 x daily - 3 x weekly - 2 sets - 10 reps - 5 seconds hold  - Seated Cervical Sidebending Stretch  - 1 x daily - 3 x weekly - 3 sets - 1 reps - 30 seconds hold  - Seated Levator Scapulae Stretch  - 1 x daily - 3 x weekly - 3 sets - 1 reps - 30 seconds hold  - Seated Assisted Cervical Rotation with Towel  - 1 x daily - 3 x weekly - 2 sets - 10 reps - 3 seconds hold  - Seated Passive Cervical Retraction  - 1 x daily - 3 x weekly - 2 sets - 10 reps - 3 seconds hold  - Standing Shoulder Row with Anchored Resistance  - 1 x daily - 3 x weekly - 3 sets - 10 reps - 3 seconds hold  - Shoulder extension with resistance - Neutral  - 1 x " daily - 3 x weekly - 3 sets - 10 reps - 3 seconds hold  - Prone Shoulder Extension - Single Arm  - 1 x daily - 3 x weekly - 2 sets - 10 reps - 3 seconds hold  - Prone Single Arm Shoulder Horizontal Abduction with Scapular Retraction and Palm Down  - 1 x daily - 3 x weekly - 2 sets - 10 reps - 3 seconds hold  - Standing Bent Over Single Arm Scapular Row with Table Support  - 1 x daily - 3 x weekly - 2 sets - 10 reps - 3 seconds hold  - Seated Shoulder Horizontal Abduction with Resistance  - 1 x daily - 3 x weekly - 2 sets - 10 reps  - Shoulder Internal Rotation with Resistance  - 1 x daily - 3 x weekly - 3 sets - 10 reps  - Shoulder External Rotation with Anchored Resistance  - 1 x daily - 3 x weekly - 3 sets - 10 reps  - Sidelying Shoulder ER with Towel and Dumbbell  - 1 x daily - 3 x weekly - 3 sets - 10 reps  - Seated Single Arm Bicep Curls with Rotation and Dumbbell  - 1 x daily - 3 x weekly - 3 sets - 10 reps  - Scaption Wall Slide with Towel  - 1 x daily - 3 x weekly - 3 sets - 10 reps  - Sidelying Shoulder Abduction with Resistance to 60 Degrees  - 1 x daily - 3 x weekly - 3 sets - 10 reps

## 2025-03-26 ENCOUNTER — EVALUATION (OUTPATIENT)
Age: 75
End: 2025-03-26
Payer: MEDICARE

## 2025-03-26 DIAGNOSIS — R41.841 COGNITIVE COMMUNICATION DEFICIT: Primary | ICD-10-CM

## 2025-03-26 DIAGNOSIS — R41.3 MEMORY CHANGES: ICD-10-CM

## 2025-03-26 PROCEDURE — 92507 TX SP LANG VOICE COMM INDIV: CPT

## 2025-03-26 NOTE — PROGRESS NOTES
Daily Speech Treatment Note    Today's date: 3/26/2025   Patient’s name: Aron Oswald Jr.  : 1950  MRN: [id number]  Safety measures: memory difficulties, HTN, hx of etoh abuse   Referring provider: Ludy Person,*    Encounter Diagnosis     ICD-10-CM    1. Cognitive communication deficit  R41.841       2. Memory changes  R41.3             Visit Tracking:  POC expires Unit limit Auth Expiration date PT/OT + Visit Limit?   25 N/A 25 BOMN PCY                                           Visit/Unit Tracking  AUTH Status:  Date 25  IE    PU  2/26  3/5  3/12  3/26    RE       No Used  1  2  3  4  5  1  2  3  4           Remaining   9  8  7  6  5  9  8  7  6             Subjective/Behavioral:  -Patient pleasant, engaged, and cooperative. Patient attended today's session unaccompanied.  No new concerns reported.     Objective/Assessment: Patient required mod verbal cues for spelling.    Short-term goals:  Patient will be educated on and demonstrate understanding of word finding strategies (i.e., circumlocution) for improved generative naming and verbal expression skills, to be achieved in 2-4 weeks.   Patient will utilize word finding strategies during semantic feature analysis treatment activity, with 80% accuracy, IND, in 3 out of 4 consecutive sessions, to facilitate improved naming and verbal expression skills, to be achieved in 8-10 weeks.  Patient will complete auditory immediate and short term memory tasks to facilitate increased ability to retell narratives and recall information within functional living environment, with 80% accuracy, IND using preferred memory strategy, in 3 out of 4 consecutive sessions, to be achieved in 10-12 weeks.  3/5/25: Patient utilized repetition/rehearsal, association (attribute/object), and visualization strategies to immediately recall a list of ten words with 70% accuracy, IND. Accuracy increased to 100% given mod verbal semantic  cues. Patient then recalled the same ten words after a ten minute delay with 90% accuracy, IND.     Patient will complete complex auditory attention processing tasks (e.g., sentence unscramble, ranking numbers/words, etc.) to improve working memory, with 80% accuracy, IND, in 3 out of 4 consecutive sessions, to be achieved in 10-12 weeks.    Patient will complete thought organization tasks (e.g., sequencing, deduction puzzles, etc.) with 80% accuracy, IND, in 3 out of 4 consecutive sessions, to facilitate increased executive functioning, working memory, problem solving, and processing skills, to be achieved in 10-12 weeks.  2/26/25: Patient was given a math decoding handout where patient was given a chain of different line segments that contained different symbols. According to the key each symbol represented a math equation such as @= subtract 2. Patient to complete the chain. Task completed in 33/40 opp (82% acc) independently, increasing to 40/40 opp (100% acc) from min cueing. Patient benefited from visual cues that lead to increased accuracy  3/5/25: Patient completed a project planning/deduction puzzle containing 10 elements with 50% accuracy, IND. Accuracy increased to 100% given mod verbal cues including direct verbal instruction, semantic cues, and immediate direct and indirect verbal feedback. Given as HEP.    To target mental manipulation and working memory, patient will participate in word finding activity (i.e., anagrams) with 80% accuracy, IND, in 3 out of 4 consecutive sessions, to be achieved in 10-12 weeks.  2/26/25:Completing second half of analogies task: Task completed in 15/20 opp (75% acc) independently, increasing to 20/20 opp (100% acc) from min cueing. Patient benefited from verbal models and phonemic cues that lead to increased accuracy.   3/26/25: Patient completed an acrostics puzzle with 50% accuracy, IND. Accuracy increased to 100% given mod verbal semantic cues, rhyming cues, initial  letter cues, verbal shaping of answer, and immediate, direct verbal feedback. Given as HEP.    Patient will answer questions regarding story read aloud with 80% accuracy, IND, in 3 out of 4 consecutive sessions, to facilitate improved auditory comprehension and recall, to be achieved in 8-10 weeks.   3/12/25: Patient answered questions regarding a story 7 sentences in length with 67% accuracy, IND. Accuracy increased to 100% given mod -max verbal cues including rereading of passage, verbal semantic cues, gestures, verbal shaping of answer, and clinician model.   Patient will complete attention tasks (divided, auditory, alternating, sustained) by responding to multiple tasks or details within tasks at the same time in a distracting environment, with 80% accuracy, given min cues, in 3 out of 4 consecutive sessions, to improve attention and working memory, to be achieved in 10-12 weeks.   3/5/25: Patient completed sustained attention tasks by selecting cards (from a deck of playing cards) based upon a predetermined rule with the following accuracy: Even spades: completed in 1:37 with 1 error (>90% accuracy); odd diamonds: completed in 1:19 with 1 error (>90% accuracy). Given as HEP.    Plan:  -Patient was provided with home exercises/activities to target goals in plan of care at the end of today's session.  -Continue with current plan of care.

## 2025-03-27 ENCOUNTER — OFFICE VISIT (OUTPATIENT)
Dept: PHYSICAL THERAPY | Facility: CLINIC | Age: 75
End: 2025-03-27
Payer: MEDICARE

## 2025-03-27 DIAGNOSIS — S12.401D CLOSED NONDISPLACED FRACTURE OF FIFTH CERVICAL VERTEBRA WITH ROUTINE HEALING, UNSPECIFIED FRACTURE MORPHOLOGY, SUBSEQUENT ENCOUNTER: Primary | ICD-10-CM

## 2025-03-27 PROCEDURE — 97110 THERAPEUTIC EXERCISES: CPT | Performed by: PHYSICAL THERAPIST

## 2025-03-27 PROCEDURE — 97112 NEUROMUSCULAR REEDUCATION: CPT | Performed by: PHYSICAL THERAPIST

## 2025-03-27 NOTE — PROGRESS NOTES
"Daily Note     Today's date: 3/27/2025  Patient name: Aron Oswald Jr.  : 1950  MRN: [id number]  Referring provider: Madiha Hallman CRNP  Dx:   Encounter Diagnosis     ICD-10-CM    1. Closed nondisplaced fracture of fifth cervical vertebra with routine healing, unspecified fracture morphology, subsequent encounter  S12.401D           Start Time: 1445  Stop Time: 1535  Total time in clinic (min): 50 minutes    Subjective: Patient reports that his neck has been feeling a little better. Patient has not been performing his HEP.      Objective: See treatment diary below      Assessment: Tolerated treatment well. Patient demonstrated fatigue post treatment and would benefit from continued PT. Reencouraged patient to perform Hep. Patient continues to present with shoulder girdle weakness.      Plan: Continue per plan of care.  Progress treatment as tolerated.         POC expires Unit limit Auth  expiration date PT/OT + Visit Limit?   25 4 N/a BOMN                 Visit/Unit Tracking  AUTH Status:  Date 2/27 3/3 3/6 3/10 3/13 3/24 3/27  2/10 2/13 2/17 2/24   N/A Used 13 14 15 16 17 18 19  9 10 11 12    Remaining                  Precautions: HTN, h/o C5 fx      Manuals 2/17 2/24 2/27 3/3 3/6 3/10 3/13 3/24 3/27    Gr. II-III cervical side glides     GR   GR     UT, levator str.     GR   GR     Gr. II-III cervical rotation, side bend in sitting     Supine  GR   GR     Gr. II-III C2, C3 CPA mobs             Reassessment       GR                                             Neuro Re-Ed             Supine DNF   NV          Supine upper cervical retraction with towel beneath occiput   NV     20x3\"     Bent over row             [place]: M, L row 3x10 BTB 3x 10  BTB   [place]: M: 15# 3x15    L: 15# 3x10  [place]: M, L 15# 3x15 ea.   [place]: M, L 15# 3x15 ea.     Prone scaption   Bent over 2x 10            Prone shoulder ext.  Bent over 2x 10            Prone h. Abd.             MB vertical rolls against wall        " "      Bent over rows  2x 10            Supine h abd with TB    RTB 3x10         Multidirectional MB on wall (V, H, CW/CCW circles)     RMB to fatigue     RMB until fatigue    Ther Ex             Patient education: pathophysiology, HEP review       GR GR     UBE 4'/4'  5'/5' 5' / 5' L3 5' / 5' L3 5' / 5' L5 5' / 5' L5 5' / 5' L3 5' / 5' L3 5' / 5' L3    Pulleys             Finger ladder with eccentric lowering             Side lying ER 3x10 1# 3x 10 1#            UT, levator str.             Cervical rotation SNAGS   15x5\" b/l  15x5\" b/l   15x5\" b/l     Cervical isometrics - all planes             Scaption in side lying with UE ranger with TB             TB ER RTB 3x10  RTB 3x 10   RTB 2x10         TB IR GTB 3x10  GTB 3x 10   GTB until fatigue         Wall slides with DB             Shoulder flexion AAROM with UE ranger in standing      5# 3x15       1st rib mobilization with strap   X20 b/l  X20 b/l        Levator str. With ipsilateral shoulder in flexion against wall   X20 b/l  15x5\" b/l   15x5\" b/l     Biceps curls      10# 2x10   10# 3x10 b/l    Scaption wall slides      RTB 2x10   RTB 2x10     Ther Activity                                       Gait Training                                       Modalities                                       Access Code: [email or web address]  URL: [email or web address]  Date: 03/24/2025  Prepared by: Diomedes Gordon    Exercises  - Supine Deep Neck Flexor Training  - 1 x daily - 3 x weekly - 2 sets - 10 reps - 5 seconds hold  - Seated Cervical Sidebending Stretch  - 1 x daily - 3 x weekly - 3 sets - 1 reps - 30 seconds hold  - Seated Levator Scapulae Stretch  - 1 x daily - 3 x weekly - 3 sets - 1 reps - 30 seconds hold  - Seated Assisted Cervical Rotation with Towel  - 1 x daily - 3 x weekly - 2 sets - 10 reps - 3 seconds hold  - Seated Passive Cervical Retraction  - 1 x daily - 3 x weekly - 2 sets - 10 reps - 3 seconds hold  - Standing Shoulder Row with Anchored Resistance  - 1 " x daily - 3 x weekly - 3 sets - 10 reps - 3 seconds hold  - Shoulder extension with resistance - Neutral  - 1 x daily - 3 x weekly - 3 sets - 10 reps - 3 seconds hold  - Prone Shoulder Extension - Single Arm  - 1 x daily - 3 x weekly - 2 sets - 10 reps - 3 seconds hold  - Prone Single Arm Shoulder Horizontal Abduction with Scapular Retraction and Palm Down  - 1 x daily - 3 x weekly - 2 sets - 10 reps - 3 seconds hold  - Standing Bent Over Single Arm Scapular Row with Table Support  - 1 x daily - 3 x weekly - 2 sets - 10 reps - 3 seconds hold  - Seated Shoulder Horizontal Abduction with Resistance  - 1 x daily - 3 x weekly - 2 sets - 10 reps  - Shoulder Internal Rotation with Resistance  - 1 x daily - 3 x weekly - 3 sets - 10 reps  - Shoulder External Rotation with Anchored Resistance  - 1 x daily - 3 x weekly - 3 sets - 10 reps  - Sidelying Shoulder ER with Towel and Dumbbell  - 1 x daily - 3 x weekly - 3 sets - 10 reps  - Seated Single Arm Bicep Curls with Rotation and Dumbbell  - 1 x daily - 3 x weekly - 3 sets - 10 reps  - Scaption Wall Slide with Towel  - 1 x daily - 3 x weekly - 3 sets - 10 reps  - Sidelying Shoulder Abduction with Resistance to 60 Degrees  - 1 x daily - 3 x weekly - 3 sets - 10 reps

## 2025-03-31 ENCOUNTER — OFFICE VISIT (OUTPATIENT)
Dept: PHYSICAL THERAPY | Facility: CLINIC | Age: 75
End: 2025-03-31
Payer: MEDICARE

## 2025-03-31 DIAGNOSIS — S12.401D CLOSED NONDISPLACED FRACTURE OF FIFTH CERVICAL VERTEBRA WITH ROUTINE HEALING, UNSPECIFIED FRACTURE MORPHOLOGY, SUBSEQUENT ENCOUNTER: Primary | ICD-10-CM

## 2025-03-31 PROCEDURE — 97110 THERAPEUTIC EXERCISES: CPT

## 2025-03-31 PROCEDURE — 97112 NEUROMUSCULAR REEDUCATION: CPT

## 2025-03-31 NOTE — PROGRESS NOTES
"Daily Note     Today's date: 3/31/2025  Patient name: Aron Oswald Jr.  : 1950  MRN: [id number]  Referring provider: Madiha Hallman CRNP  Dx:   Encounter Diagnosis     ICD-10-CM    1. Closed nondisplaced fracture of fifth cervical vertebra with routine healing, unspecified fracture morphology, subsequent encounter  S12.401D           Start Time: 1220  Stop Time: 1303  Total time in clinic (min): 43 minutes    Subjective: Patient reports that his neck is sore.        Objective: See treatment diary below      Assessment: Tolerated treatment well.   Patient participated in skilled PT session focused on strengthening, stretching, and ROM.  Patient able to complete exercise program with no increase in soreness, only fatigue.  Patient continues with shoulder weakness, needing assistance from LUE to finish ROM.   Patient would continue to benefit from skilled PT interventions to address strengthening, stretching, and ROM. Patient demonstrated fatigue post treatment      Plan: Continue per plan of care.        POC expires Unit limit Auth  expiration date PT/OT + Visit Limit?   25 4 N/a BOMN                 Visit/Unit Tracking  AUTH Status:  Date 2/27 3/3 3/6 3/10 3/13 3/24 3/27 3/31 2/10 2/13 2/17 2/24   N/A Used 13 14 15 16 17 18 19 20 9 10 11 12    Remaining                  Precautions: HTN, h/o C5 fx      Manuals 2/17 2/24 2/27 3/3 3/6 3/10 3/13 3/24 3/27 3/31   Gr. II-III cervical side glides     GR   GR     UT, levator str.     GR   GR     Gr. II-III cervical rotation, side bend in sitting     Supine  GR   GR     Gr. II-III C2, C3 CPA mobs             Reassessment       GR                                             Neuro Re-Ed             Supine DNF   NV          Supine upper cervical retraction with towel beneath occiput   NV     20x3\"     Bent over row             [place]: M, L row 3x10 BTB 3x 10  BTB   [place]: M: 15# 3x15    L: 15# 3x10  [place]: M, L 15# 3x15 ea.   [place]: M, L 15# 3x15 ea. " "[place] M, L 1# 3x15 ea    Prone scaption   Bent over 2x 10            Prone shoulder ext.  Bent over 2x 10            Prone h. Abd.             MB vertical rolls against wall              Bent over rows  2x 10            Supine h abd with TB    RTB 3x10         Multidirectional MB on wall (V, H, CW/CCW circles)     RMB to fatigue     RMB until fatigue RMB until fatigue   Ther Ex             Patient education: pathophysiology, HEP review       GR GR     UBE 4'/4'  5'/5' 5' / 5' L3 5' / 5' L3 5' / 5' L5 5' / 5' L5 5' / 5' L3 5' / 5' L3 5' / 5' L3 5\"/5'  L3   Pulleys             Finger ladder with eccentric lowering             Side lying ER 3x10 1# 3x 10 1#            UT, levator str.             Cervical rotation SNAGS   15x5\" b/l  15x5\" b/l   15x5\" b/l  5\" 15x B/L   Cervical isometrics - all planes             Scaption in side lying with UE ranger with TB             TB ER RTB 3x10  RTB 3x 10   RTB 2x10         TB IR GTB 3x10  GTB 3x 10   GTB until fatigue         Wall slides with DB             Shoulder flexion AAROM with UE ranger in standing      5# 3x15       1st rib mobilization with strap   X20 b/l  X20 b/l        Levator str. With ipsilateral shoulder in flexion against wall   X20 b/l  15x5\" b/l   15x5\" b/l     Biceps curls      10# 2x10   10# 3x10 b/l 10# 3x10 B/L   Scaption wall slides      RTB 2x10   RTB 2x10     Ther Activity                                       Gait Training                                       Modalities                                       Access Code: [email or web address]  URL: [email or web address]  Date: 03/24/2025  Prepared by: Diomedes Gordon    Exercises  - Supine Deep Neck Flexor Training  - 1 x daily - 3 x weekly - 2 sets - 10 reps - 5 seconds hold  - Seated Cervical Sidebending Stretch  - 1 x daily - 3 x weekly - 3 sets - 1 reps - 30 seconds hold  - Seated Levator Scapulae Stretch  - 1 x daily - 3 x weekly - 3 sets - 1 reps - 30 seconds hold  - Seated Assisted Cervical " Rotation with Towel  - 1 x daily - 3 x weekly - 2 sets - 10 reps - 3 seconds hold  - Seated Passive Cervical Retraction  - 1 x daily - 3 x weekly - 2 sets - 10 reps - 3 seconds hold  - Standing Shoulder Row with Anchored Resistance  - 1 x daily - 3 x weekly - 3 sets - 10 reps - 3 seconds hold  - Shoulder extension with resistance - Neutral  - 1 x daily - 3 x weekly - 3 sets - 10 reps - 3 seconds hold  - Prone Shoulder Extension - Single Arm  - 1 x daily - 3 x weekly - 2 sets - 10 reps - 3 seconds hold  - Prone Single Arm Shoulder Horizontal Abduction with Scapular Retraction and Palm Down  - 1 x daily - 3 x weekly - 2 sets - 10 reps - 3 seconds hold  - Standing Bent Over Single Arm Scapular Row with Table Support  - 1 x daily - 3 x weekly - 2 sets - 10 reps - 3 seconds hold  - Seated Shoulder Horizontal Abduction with Resistance  - 1 x daily - 3 x weekly - 2 sets - 10 reps  - Shoulder Internal Rotation with Resistance  - 1 x daily - 3 x weekly - 3 sets - 10 reps  - Shoulder External Rotation with Anchored Resistance  - 1 x daily - 3 x weekly - 3 sets - 10 reps  - Sidelying Shoulder ER with Towel and Dumbbell  - 1 x daily - 3 x weekly - 3 sets - 10 reps  - Seated Single Arm Bicep Curls with Rotation and Dumbbell  - 1 x daily - 3 x weekly - 3 sets - 10 reps  - Scaption Wall Slide with Towel  - 1 x daily - 3 x weekly - 3 sets - 10 reps  - Sidelying Shoulder Abduction with Resistance to 60 Degrees  - 1 x daily - 3 x weekly - 3 sets - 10 reps

## 2025-04-02 ENCOUNTER — OFFICE VISIT (OUTPATIENT)
Age: 75
End: 2025-04-02
Payer: MEDICARE

## 2025-04-02 ENCOUNTER — TELEPHONE (OUTPATIENT)
Age: 75
End: 2025-04-02

## 2025-04-02 DIAGNOSIS — R26.2 AMBULATORY DYSFUNCTION: Primary | ICD-10-CM

## 2025-04-02 DIAGNOSIS — R41.841 COGNITIVE COMMUNICATION DEFICIT: Primary | ICD-10-CM

## 2025-04-02 DIAGNOSIS — R41.3 MEMORY CHANGES: ICD-10-CM

## 2025-04-02 PROCEDURE — 97129 THER IVNTJ 1ST 15 MIN: CPT

## 2025-04-02 PROCEDURE — 97130 THER IVNTJ EA ADDL 15 MIN: CPT

## 2025-04-02 NOTE — PROGRESS NOTES
Daily Speech Treatment Note    Today's date: 2025   Patient’s name: Aron Oswald Jr.  : 1950  MRN: [id number]  Safety measures: memory difficulties, HTN, hx of etoh abuse   Referring provider: Ludy Person,*    Encounter Diagnosis     ICD-10-CM    1. Cognitive communication deficit  R41.841       2. Memory changes  R41.3               Visit Tracking:  POC expires Unit limit Auth Expiration date PT/OT + Visit Limit?   25 N/A 25 BOMN PCY                                           Visit/Unit Tracking  AUTH Status:  Date 25  IE    PU  2/26  3/5  3/12  3/26  4/2      RE     No Used  1  2  3  4  5  1  2  3  4  5         Remaining   9  8  7  6  5  9  8  7  6  5           Subjective/Behavioral:  -Patient pleasant, engaged, and cooperative. Patient attended today's session unaccompanied.  Patient 5 minutes late for tx; agreeable to shortened session; no new concerns reported.    Objective/Assessment: Patient forgot to complete HEP given in previous sessions. Clinician and patient discussed implementing a variety of external aids to increase recall of completing HEP.    Short-term goals:  Patient will be educated on and demonstrate understanding of word finding strategies (i.e., circumlocution) for improved generative naming and verbal expression skills, to be achieved in 2-4 weeks.   Patient will utilize word finding strategies during semantic feature analysis treatment activity, with 80% accuracy, IND, in 3 out of 4 consecutive sessions, to facilitate improved naming and verbal expression skills, to be achieved in 8-10 weeks.  Patient will complete auditory immediate and short term memory tasks to facilitate increased ability to retell narratives and recall information within functional living environment, with 80% accuracy, IND using preferred memory strategy, in 3 out of 4 consecutive sessions, to be achieved in 10-12 weeks.  3/5/25: Patient utilized  repetition/rehearsal, association (attribute/object), and visualization strategies to immediately recall a list of ten words with 70% accuracy, IND. Accuracy increased to 100% given mod verbal semantic cues. Patient then recalled the same ten words after a ten minute delay with 90% accuracy, IND.     Patient will complete complex auditory attention processing tasks (e.g., sentence unscramble, ranking numbers/words, etc.) to improve working memory, with 80% accuracy, IND, in 3 out of 4 consecutive sessions, to be achieved in 10-12 weeks.    Patient will complete thought organization tasks (e.g., sequencing, deduction puzzles, etc.) with 80% accuracy, IND, in 3 out of 4 consecutive sessions, to facilitate increased executive functioning, working memory, problem solving, and processing skills, to be achieved in 10-12 weeks.  2/26/25: Patient was given a math decoding handout where patient was given a chain of different line segments that contained different symbols. According to the key each symbol represented a math equation such as @= subtract 2. Patient to complete the chain. Task completed in 33/40 opp (82% acc) independently, increasing to 40/40 opp (100% acc) from min cueing. Patient benefited from visual cues that lead to increased accuracy  3/5/25: Patient completed a project planning/deduction puzzle containing 10 elements with 50% accuracy, IND. Accuracy increased to 100% given mod verbal cues including direct verbal instruction, semantic cues, and immediate direct and indirect verbal feedback. Given as HEP.  4/2/25: To target attention and following complex directions, the patient completed a math coding activity in which they must follow along a sequence of symbols, using a symbol key as reference to the mathematical operation needing to be completed in each step. Patient completed this complex direction following task with 0% accuracy, IND. Accuracy increased to 100% given mod-max cues including  visual/highlighting, direct verbal instruction, semantic cues, immediate, direct and indirect verbal feedback, and clinician models. Given as HEP.      To target mental manipulation and working memory, patient will participate in word finding activity (i.e., anagrams) with 80% accuracy, IND, in 3 out of 4 consecutive sessions, to be achieved in 10-12 weeks.  2/26/25:Completing second half of analogies task: Task completed in 15/20 opp (75% acc) independently, increasing to 20/20 opp (100% acc) from min cueing. Patient benefited from verbal models and phonemic cues that lead to increased accuracy.   3/26/25: Patient completed an acrostics puzzle with 50% accuracy, IND. Accuracy increased to 100% given mod verbal semantic cues, rhyming cues, initial letter cues, verbal shaping of answer, and immediate, direct verbal feedback. Given as HEP.    Patient will answer questions regarding story read aloud with 80% accuracy, IND, in 3 out of 4 consecutive sessions, to facilitate improved auditory comprehension and recall, to be achieved in 8-10 weeks.   3/12/25: Patient answered questions regarding a story 7 sentences in length with 67% accuracy, IND. Accuracy increased to 100% given mod -max verbal cues including rereading of passage, verbal semantic cues, gestures, verbal shaping of answer, and clinician model.   Patient will complete attention tasks (divided, auditory, alternating, sustained) by responding to multiple tasks or details within tasks at the same time in a distracting environment, with 80% accuracy, given min cues, in 3 out of 4 consecutive sessions, to improve attention and working memory, to be achieved in 10-12 weeks.   3/5/25: Patient completed sustained attention tasks by selecting cards (from a deck of playing cards) based upon a predetermined rule with the following accuracy: Even spades: completed in 1:37 with 1 error (>90% accuracy); odd diamonds: completed in 1:19 with 1 error (>90% accuracy). Given  as HEP.    Plan:  -Patient was provided with home exercises/activities to target goals in plan of care at the end of today's session.  -Continue with current plan of care.

## 2025-04-02 NOTE — TELEPHONE ENCOUNTER
"Pt currently being seen with pt for his shoulder.    Wife has noticed some changes in his walking especially going around items he takes \"double steps\" and leans more to one side.     No falls however he may tip slightly but then corrects him self before fully losing balance.    His watch has also reported change in his gait.    Spouse would like a new referral put in for pt to help with his gait and balance    "

## 2025-04-02 NOTE — TELEPHONE ENCOUNTER
Patient wife is calling.  She would like Zoraida to add a referral for PT.  Patient is having balance issues; she thinks it's related to his cognitive issues.  He is already going to PT but she would like to address this specifically.  Please contact Kimberly when referral has been placed.  Thank you.

## 2025-04-02 NOTE — TELEPHONE ENCOUNTER
Jeff Mackey!  I see from epic notes that you are working with Mr. Oswald right now.  I received this request today.  The last time I saw Mr. Oswald was on video (in February), so I didn't really assess gait.  Is this something that you can address during his current sessions, or do I need a separate referral? - is there anything concerning from  your perspective that  he would  need visit from us for?  Please let me know what your thoughts are from your observations.  TY

## 2025-04-03 ENCOUNTER — OFFICE VISIT (OUTPATIENT)
Dept: PHYSICAL THERAPY | Facility: CLINIC | Age: 75
End: 2025-04-03
Payer: MEDICARE

## 2025-04-03 DIAGNOSIS — S12.401D CLOSED NONDISPLACED FRACTURE OF FIFTH CERVICAL VERTEBRA WITH ROUTINE HEALING, UNSPECIFIED FRACTURE MORPHOLOGY, SUBSEQUENT ENCOUNTER: Primary | ICD-10-CM

## 2025-04-03 PROCEDURE — 97110 THERAPEUTIC EXERCISES: CPT | Performed by: PHYSICAL THERAPIST

## 2025-04-03 PROCEDURE — 97140 MANUAL THERAPY 1/> REGIONS: CPT | Performed by: PHYSICAL THERAPIST

## 2025-04-03 NOTE — PROGRESS NOTES
"Daily Note     Today's date: 4/3/2025  Patient name: Aron Oswald Jr.  : 1950  MRN: [id number]  Referring provider: Madiha Hallman CRNP  Dx:   Encounter Diagnosis     ICD-10-CM    1. Closed nondisplaced fracture of fifth cervical vertebra with routine healing, unspecified fracture morphology, subsequent encounter  S12.401D           Start Time: 1225  Stop Time: 1300  Total time in clinic (min): 35 minutes    Subjective: Patient states that sometimes his neck catches when rotating.      Objective: See treatment diary below      Assessment: Tolerated treatment well. Patient demonstrated fatigue post treatment and would benefit from continued PT. Discussed HEP and importance of compliance to maximize cervical ROM. Patient has a script for balance, but would like to hold until he completes therapy for the spine and shoulder.      Plan: Continue per plan of care.  Progress treatment as tolerated.         POC expires Unit limit Auth  expiration date PT/OT + Visit Limit?   25 4 N/a BOMN                 Visit/Unit Tracking  AUTH Status:  Date 2/27 3/3 3/6 3/10 3/13 3/24 3/27 3/31 4/3  2/17 2/24   N/A Used 13 14 15 16 17 18 19 20 21  11 12    Remaining                  Precautions: HTN, h/o C5 fx      Manuals 4/3  2/27 3/3 3/6 3/10 3/13 3/24 3/27 3/31   Gr. II-III cervical side glides     GR   GR     UT, levator str.     GR   GR     Gr. II-III cervical rotation, side bend in sitting GR    Supine  GR   GR     Gr. II-III C2, C3 CPA mobs             Reassessment       GR                                             Neuro Re-Ed             Supine DNF   NV          Supine upper cervical retraction with towel beneath occiput   NV     20x3\"     Bent over row             [place]: M, L row    [place]: M: 15# 3x15    L: 15# 3x10  [place]: M, L 15# 3x15 ea.   [place]: M, L 15# 3x15 ea. [place] M, L 1# 3x15 ea    Prone scaption              Prone shoulder ext.             Prone h. Abd.             MB vertical rolls against " "wall              Bent over rows             Supine h abd with TB    RTB 3x10         Multidirectional MB on wall (V, H, CW/CCW circles)     RMB to fatigue     RMB until fatigue RMB until fatigue   Ther Ex             Patient education: pathophysiology, HEP review GR      GR GR     UBE 5 min  5' / 5' L3 5' / 5' L3 5' / 5' L5 5' / 5' L5 5' / 5' L3 5' / 5' L3 5' / 5' L3 5\"/5'  L3   Pulleys             Finger ladder with eccentric lowering             Side lying ER             UT, levator str.             Cervical rotation SNAGS   15x5\" b/l  15x5\" b/l   15x5\" b/l  5\" 15x B/L   Cervical isometrics - all planes             Scaption in side lying with UE ranger with TB             TB ER    RTB 2x10         TB IR    GTB until fatigue         Wall slides with DB             Shoulder flexion AAROM with UE ranger in standing      5# 3x15       1st rib mobilization with strap   X20 b/l  X20 b/l        Levator str. With ipsilateral shoulder in flexion against wall   X20 b/l  15x5\" b/l   15x5\" b/l     Biceps curls      10# 2x10   10# 3x10 b/l 10# 3x10 B/L   Scaption wall slides      RTB 2x10   RTB 2x10     Ther Activity                                       Gait Training                                       Modalities                                       Access Code: [email or web address]  URL: [email or web address]  Date: 03/24/2025  Prepared by: Diomedes Gordon    Exercises  - Supine Deep Neck Flexor Training  - 1 x daily - 3 x weekly - 2 sets - 10 reps - 5 seconds hold  - Seated Cervical Sidebending Stretch  - 1 x daily - 3 x weekly - 3 sets - 1 reps - 30 seconds hold  - Seated Levator Scapulae Stretch  - 1 x daily - 3 x weekly - 3 sets - 1 reps - 30 seconds hold  - Seated Assisted Cervical Rotation with Towel  - 1 x daily - 3 x weekly - 2 sets - 10 reps - 3 seconds hold  - Seated Passive Cervical Retraction  - 1 x daily - 3 x weekly - 2 sets - 10 reps - 3 seconds hold  - Standing Shoulder Row with Anchored Resistance  - 1 x " daily - 3 x weekly - 3 sets - 10 reps - 3 seconds hold  - Shoulder extension with resistance - Neutral  - 1 x daily - 3 x weekly - 3 sets - 10 reps - 3 seconds hold  - Prone Shoulder Extension - Single Arm  - 1 x daily - 3 x weekly - 2 sets - 10 reps - 3 seconds hold  - Prone Single Arm Shoulder Horizontal Abduction with Scapular Retraction and Palm Down  - 1 x daily - 3 x weekly - 2 sets - 10 reps - 3 seconds hold  - Standing Bent Over Single Arm Scapular Row with Table Support  - 1 x daily - 3 x weekly - 2 sets - 10 reps - 3 seconds hold  - Seated Shoulder Horizontal Abduction with Resistance  - 1 x daily - 3 x weekly - 2 sets - 10 reps  - Shoulder Internal Rotation with Resistance  - 1 x daily - 3 x weekly - 3 sets - 10 reps  - Shoulder External Rotation with Anchored Resistance  - 1 x daily - 3 x weekly - 3 sets - 10 reps  - Sidelying Shoulder ER with Towel and Dumbbell  - 1 x daily - 3 x weekly - 3 sets - 10 reps  - Seated Single Arm Bicep Curls with Rotation and Dumbbell  - 1 x daily - 3 x weekly - 3 sets - 10 reps  - Scaption Wall Slide with Towel  - 1 x daily - 3 x weekly - 3 sets - 10 reps  - Sidelying Shoulder Abduction with Resistance to 60 Degrees  - 1 x daily - 3 x weekly - 3 sets - 10 reps

## 2025-04-07 ENCOUNTER — OFFICE VISIT (OUTPATIENT)
Dept: PHYSICAL THERAPY | Facility: CLINIC | Age: 75
End: 2025-04-07
Payer: MEDICARE

## 2025-04-07 DIAGNOSIS — S12.401D CLOSED NONDISPLACED FRACTURE OF FIFTH CERVICAL VERTEBRA WITH ROUTINE HEALING, UNSPECIFIED FRACTURE MORPHOLOGY, SUBSEQUENT ENCOUNTER: Primary | ICD-10-CM

## 2025-04-07 PROCEDURE — 97112 NEUROMUSCULAR REEDUCATION: CPT | Performed by: PHYSICAL THERAPIST

## 2025-04-07 PROCEDURE — 97110 THERAPEUTIC EXERCISES: CPT | Performed by: PHYSICAL THERAPIST

## 2025-04-07 NOTE — PROGRESS NOTES
"Daily Note     Today's date: 2025  Patient name: Aron Oswald Jr.  : 1950  MRN: [id number]  Referring provider: Madiha Hallman CRNP  Dx:   Encounter Diagnosis     ICD-10-CM    1. Closed nondisplaced fracture of fifth cervical vertebra with routine healing, unspecified fracture morphology, subsequent encounter  S12.401D           Start Time: 1215  Stop Time: 1300  Total time in clinic (min): 45 minutes    Subjective: Patient offers no new complaints.      Objective: See treatment diary below      Assessment: Tolerated treatment well. Patient demonstrated fatigue post treatment and would benefit from continued PT. Patient able to perform shoulder flexion in supine position, but unable in upright position. Gradually improving UE strength.      Plan: Continue per plan of care.  Progress treatment as tolerated.         POC expires Unit limit Auth  expiration date PT/OT + Visit Limit?   25 4 N/a BOMN                 Visit/Unit Tracking  AUTH Status:  Date 2/27 3/3 3/6 3/10 3/13 3/24 3/27 3/31 4/3 4/7  2/24   N/A Used 13 14 15 16 17 18 19 20 21 22  12    Remaining                  Precautions: HTN, h/o C5 fx      Manuals 4/3 4/7  3/3 3/6 3/10 3/13 3/24 3/27 3/31   Gr. II-III cervical side glides     GR   GR     UT, levator str.     GR   GR     Gr. II-III cervical rotation, side bend in sitting GR    Supine  GR   GR     Gr. II-III C2, C3 CPA mobs             Reassessment       GR                                             Neuro Re-Ed             Supine DNF             Supine upper cervical retraction with towel beneath occiput        20x3\"     Bent over row             [place]: M, L row  M: 20# 3x10  [place]: M: 15# 3x15    L: 15# 3x10  [place]: M, L 15# 3x15 ea.   [place]: M, L 15# 3x15 ea. [place] M, L 1# 3x15 ea    Prone scaption   Unable           Prone shoulder ext.  2# 2x10           Prone h. Abd.  AROM x10  AAROM x10           MB vertical rolls against wall              Bent over rows           " "  Supine h abd with TB    RTB 3x10         Multidirectional MB on wall (V, H, CW/CCW circles)     RMB to fatigue     RMB until fatigue RMB until fatigue   Supine SA punch  5# 20x5\"           Ther Ex             Patient education: pathophysiology, HEP review GR      GR GR     UBE 5 min 5 min  5' / 5' L3 5' / 5' L5 5' / 5' L5 5' / 5' L3 5' / 5' L3 5' / 5' L3 5\"/5'  L3   Pulleys             Finger ladder with eccentric lowering             Side lying ER             UT, levator str.             Cervical rotation SNAGS     15x5\" b/l   15x5\" b/l  5\" 15x B/L   Cervical isometrics - all planes             Scaption in side lying with UE ranger with TB             TB ER    RTB 2x10         TB IR    GTB until fatigue         Wall slides with DB             Shoulder flexion AAROM with UE ranger in standing      5# 3x15       1st rib mobilization with strap     X20 b/l        Levator str. With ipsilateral shoulder in flexion against wall     15x5\" b/l   15x5\" b/l     Biceps curls      10# 2x10   10# 3x10 b/l 10# 3x10 B/L   [place]aption wall slides      RTB 2x10   RTB 2x10     Supine shoulder flexion with DB  5#   AROM x10    AAROM x10           Ther Activity                                       Gait Training                                       Modalities                                       Access Code: [email or web address]  URL: [email or web address]  Date: 03/24/2025  Prepared by: Diomedes Gordon    Exercises  - Supine Deep Neck Flexor Training  - 1 x daily - 3 x weekly - 2 sets - 10 reps - 5 seconds hold  - Seated Cervical Sidebending Stretch  - 1 x daily - 3 x weekly - 3 sets - 1 reps - 30 seconds hold  - Seated Levator Scapulae Stretch  - 1 x daily - 3 x weekly - 3 sets - 1 reps - 30 seconds hold  - Seated Assisted Cervical Rotation with Towel  - 1 x daily - 3 x weekly - 2 sets - 10 reps - 3 seconds hold  - Seated Passive Cervical Retraction  - 1 x daily - 3 x weekly - 2 sets - 10 reps - 3 seconds hold  - Standing Shoulder " Row with Anchored Resistance  - 1 x daily - 3 x weekly - 3 sets - 10 reps - 3 seconds hold  - Shoulder extension with resistance - Neutral  - 1 x daily - 3 x weekly - 3 sets - 10 reps - 3 seconds hold  - Prone Shoulder Extension - Single Arm  - 1 x daily - 3 x weekly - 2 sets - 10 reps - 3 seconds hold  - Prone Single Arm Shoulder Horizontal Abduction with Scapular Retraction and Palm Down  - 1 x daily - 3 x weekly - 2 sets - 10 reps - 3 seconds hold  - Standing Bent Over Single Arm Scapular Row with Table Support  - 1 x daily - 3 x weekly - 2 sets - 10 reps - 3 seconds hold  - Seated Shoulder Horizontal Abduction with Resistance  - 1 x daily - 3 x weekly - 2 sets - 10 reps  - Shoulder Internal Rotation with Resistance  - 1 x daily - 3 x weekly - 3 sets - 10 reps  - Shoulder External Rotation with Anchored Resistance  - 1 x daily - 3 x weekly - 3 sets - 10 reps  - Sidelying Shoulder ER with Towel and Dumbbell  - 1 x daily - 3 x weekly - 3 sets - 10 reps  - Seated Single Arm Bicep Curls with Rotation and Dumbbell  - 1 x daily - 3 x weekly - 3 sets - 10 reps  - Scaption Wall Slide with Towel  - 1 x daily - 3 x weekly - 3 sets - 10 reps  - Sidelying Shoulder Abduction with Resistance to 60 Degrees  - 1 x daily - 3 x weekly - 3 sets - 10 reps

## 2025-04-08 ENCOUNTER — EVALUATION (OUTPATIENT)
Age: 75
End: 2025-04-08
Payer: MEDICARE

## 2025-04-08 DIAGNOSIS — R41.3 MEMORY CHANGES: ICD-10-CM

## 2025-04-08 DIAGNOSIS — R41.841 COGNITIVE COMMUNICATION DEFICIT: Primary | ICD-10-CM

## 2025-04-08 PROCEDURE — 96125 COGNITIVE TEST BY HC PRO: CPT

## 2025-04-08 NOTE — PROGRESS NOTES
Speech-Language Pathology Re-Evaluation    Today's date: 2025   Patient’s name: Aron Oswald Jr.  : 1950  MRN: [id number]  Safety measures: memory difficulties, HTN, hx of etoh abuse   Referring provider: Ludy Person,*    Encounter Diagnosis     ICD-10-CM    1. Cognitive communication deficit  R41.841       2. Memory changes  R41.3           Assessment:   Patient continues to present with mild cognitive-linguistic impairment c/b deficits with attention, immediate and delayed memory, executive functioning, and word finding difficulties with evident lexical retrieval deficits observed during structured tasks as well as spontaneous discourse. Patient continues to tolerate tx well and remains motivated to attend. Patient inconsistently completes HEP, suspected to lack of carryover of strategy/external aid use to facilitate improved recall of tasks. Pt was administered RBANs examination form B this date. Pt scored a rating of low average in the area of immediate memory recall (previously scored low average on IE), superior in the area of visuospatial/constructional (previously scored high average on IE), low average for language function (previously scored average on IE), borderline for attention to task (previously scored low average on IE), and rating high averagein the area of delayed memory recall (previously scored low average on IE). Pt received a score of average in the overall summation of index scores (previously scored average on IE). Patient would benefit from continued  OP skilled ST services to target increased functional recall, improve executive functioning skills (e.g. processing, problem-solving, thought organization, etc.), increase verbal fluency, and receive education on word-finding, in order to experience successful completion of daily tasks, follow directions within functional activities and unstructured tasks, support positive communication interactions with both familiar  and unfamiliar listeners, promote safety, and facilitate overall improved quality of life.     Short-term goals:  Patient will be educated on and demonstrate understanding of word finding strategies (i.e., circumlocution) for improved generative naming and verbal expression skills, to be achieved in 2-4 weeks. PARTIALLY MET/ONGOING  Patient will utilize word finding strategies during semantic feature analysis treatment activity, with 80% accuracy, IND, in 3 out of 4 consecutive sessions, to facilitate improved naming and verbal expression skills, to be achieved in 8-10 weeks.PARTIALLY MET/ONGOING  Patient will complete auditory immediate and short term memory tasks to facilitate increased ability to retell narratives and recall information within functional living environment, with 80% accuracy, IND using preferred memory strategy, in 3 out of 4 consecutive sessions, to be achieved in 10-12 weeks.PARTIALLY MET/ONGOING  Patient will complete complex auditory attention processing tasks (e.g., sentence unscramble, ranking numbers/words, etc.) to improve working memory, with 80% accuracy, IND, in 3 out of 4 consecutive sessions, to be achieved in 10-12 weeks.PARTIALLY MET/ONGOING  Patient will complete thought organization tasks (e.g., sequencing, deduction puzzles, etc.) with 80% accuracy, IND, in 3 out of 4 consecutive sessions, to facilitate increased executive functioning, working memory, problem solving, and processing skills, to be achieved in 10-12 weeks..PARTIALLY MET/ONGOING  To target mental manipulation and working memory, patient will participate in word finding activity (i.e., anagrams) with 80% accuracy, IND, in 3 out of 4 consecutive sessions, to be achieved in 10-12 weeks. PARTIALLY MET/ONGOING  Patient will answer questions regarding story read aloud with 80% accuracy, IND, in 3 out of 4 consecutive sessions, to facilitate improved auditory comprehension and recall, to be achieved in 8-10 weeks.  "PARTIALLY MET/ONGOING  Patient will complete attention tasks (divided, auditory, alternating, sustained) by responding to multiple tasks or details within tasks at the same time in a distracting environment, with 80% accuracy, given min cues, in 3 out of 4 consecutive sessions, to improve attention and working memory, to be achieved in 10-12 weeks.  PARTIALLY MET/ONGOING     Long-term goals:  Patient will complete cognitive-linguistic therapy that addresses patient's specific deficits in processing speed, short-term working memory, attention to detail, monitoring, sequencing, and organization skills, with instruction, to alleviate effects of executive functioning disorder deficits by discharge.PARTIALLY MET/ONGOING  Patient will improve ability to facilitate cognitive function and communication skills, including use of compensatory strategies in a variety of functional living tasks, to improve quality of life and to maximize level of independence.   PARTIALLY MET/ONGOING         Plan:  Patient would benefit from outpatient skilled Speech Therapy services: Cognitive-linguistic therapy    Frequency: 1-2x weekly  Duration: 3 months    Intervention certification from: 4/8/2025  Intervention certification to: 7/8/2025      Subjective:  -Patient pleasant, engaged, and cooperative. Patient attended today's session unaccompanied. No new concerns reported.       Patient's goal(s): \"I don't know; I guess to do better. I guess they told me I needed some help in some way to be more cognitive.\"    Pain: Absent (scale:  N/A )      Objective (testing):  The Repeatable Battery for the Assessment of Neuropsychological Status (RBANS) is a brief, individually-administered assessment which measures attention, language, visuospatial/constructional abilities, and immediate & delayed memory. The RBANS is intended for use with adolescents to adults, ages 12 to 89 years. The following results were obtained during the administration of the " assessment.    Form: B    Cognitive Domain/Subtest: Index Score: Percentile Rank: Classification: IE Index Score: Status:   IMMEDIATE MEMORY 85 16%ile Low Average 87 DECLINE - though no significant change in severity rating        1. List Learning (20/40)          2. Story Memory (15/24)           VISUOSPATIAL/  CONSTRUCTIONAL 121 92%ile Superior 112 IMPROVEMENT        3. Figure Copy (19/20)          4. Line Orientation (20/20)           LANGUAGE 88 21%ile Low Average 96 DECLINE        5. Picture Naming (10/10)          6. Semantic Fluency (13/40)           ATTENTION 79 8%ile Borderline 82 DECLINE        7. Digit Span (8/16)          8. Coding (27/89)           DELAYED MEMORY 113 81%ile High Average 88 IMPROVEMENT        9. List Recall (4/10)          10. List Recognition (20/20)          11. Story Recall (10/12)          12. Figure Recall (18/20)           Sum of Index Scores:  486  465 IMPROVEMENT   Total Score:  95      Percentile: 37%ile      Classification: Average          “IE” indicates the scores from the initial evaluation (1/8/2025). Form: D      *Patient named 9 concrete category members (zoo) in 60 sec (norm=15+). -- BELOW AVERAGE    *Patient named 5 abstract category members (words beginning with letter 'm') in 60 sec (norm=10+). -- BELOW AVERAGE              Visit Tracking:  POC expires Unit limit Auth Expiration date PT/OT + Visit Limit?   4/8/25 N/A 12/13/25 BOMN PCY                                           Visit/Unit Tracking  AUTH Status:  Date 1/8/25  IE  1/14 1/20  2/4 2/18  PU  2/26  3/5  3/12  3/26  4/2 4/8  RE     No Used  1  2  3  4  5  1  2  3  4  5  6       Remaining   9  8  7  6  5  9  8  7  6  5  4           Intervention comments:  - 39 minutes standard cognitive-linguistic evaluation   - 60 minutes scoring, report write-up, and modification of POC

## 2025-04-10 ENCOUNTER — OFFICE VISIT (OUTPATIENT)
Dept: PHYSICAL THERAPY | Facility: CLINIC | Age: 75
End: 2025-04-10
Payer: MEDICARE

## 2025-04-10 DIAGNOSIS — S12.401D CLOSED NONDISPLACED FRACTURE OF FIFTH CERVICAL VERTEBRA WITH ROUTINE HEALING, UNSPECIFIED FRACTURE MORPHOLOGY, SUBSEQUENT ENCOUNTER: Primary | ICD-10-CM

## 2025-04-10 PROCEDURE — 97110 THERAPEUTIC EXERCISES: CPT | Performed by: PHYSICAL THERAPIST

## 2025-04-10 PROCEDURE — 97112 NEUROMUSCULAR REEDUCATION: CPT | Performed by: PHYSICAL THERAPIST

## 2025-04-10 NOTE — PROGRESS NOTES
"Daily Note     Today's date: 4/10/2025  Patient name: Aron Oswald Jr.  : 1950  MRN: [id number]  Referring provider: Madiha Hallman CRNP  Dx:   Encounter Diagnosis     ICD-10-CM    1. Closed nondisplaced fracture of fifth cervical vertebra with routine healing, unspecified fracture morphology, subsequent encounter  S12.401D           Start Time: 1218  Stop Time: 1305  Total time in clinic (min): 47 minutes    Subjective: Patient offers no new complaints.      Objective: See treatment diary below      Assessment: Tolerated treatment well. Patient demonstrated fatigue post treatment and would benefit from continued PT. No significant changes to overall status.      Plan: Continue per plan of care.  Progress treatment as tolerated.         POC expires Unit limit Auth  expiration date PT/OT + Visit Limit?   25 4 N/a BOMN                 Visit/Unit Tracking  AUTH Status:  Date 2/27 3/3 3/6 3/10 3/13 3/24 3/27 3/31 4/3 4/7 4/10    N/A Used 13 14 15 16 17 18 19 20 21 22 23     Remaining                  Precautions: HTN, h/o C5 fx      Manuals 4/3 4/7 4/10  3/6 3/10 3/13 3/24 3/27 3/31   Gr. II-III cervical side glides     GR   GR     UT, levator str.     GR   GR     Gr. II-III cervical rotation, side bend in sitting GR    Supine  GR   GR     Gr. II-III C2, C3 CPA mobs             Reassessment       GR                                             Neuro Re-Ed             Supine DNF             Supine upper cervical retraction with towel beneath occiput        20x3\"     Bent over row             [place]: M, L row  M: 20# 3x10 L: 15# 3x10   [place]: M, L 15# 3x15 ea.   [place]: M, L 15# 3x15 ea. [place] M, L 1# 3x15 ea    Prone scaption   Unable           Prone shoulder ext.  2# 2x10           Prone h. Abd.  AROM x10  AAROM x10           MB vertical rolls against wall              Bent over rows             Supine h abd with TB             Multidirectional MB on wall (V, H, CW/CCW circles)    RMB until fatigue  " "    RMB until fatigue RMB until fatigue   Supine SA punch  5# 20x5\"           Lawnmower pulls   8# 2x10          Ther Ex             Patient education: pathophysiology, HEP review GR      GR GR     UBE 5 min 5 min Nustep 10 min  5' / 5' L5 5' / 5' L5 5' / 5' L3 5' / 5' L3 5' / 5' L3 5\"/5'  L3   Pulleys             Finger ladder with eccentric lowering             Side lying ER             UT, levator str.             Cervical rotation SNAGS     15x5\" b/l   15x5\" b/l  5\" 15x B/L   Cervical isometrics - all planes             Scaption in side lying with UE ranger with TB             TB ER             TB IR             Wall slides with DB             Shoulder flexion AAROM with UE ranger in standing      5# 3x15       1st rib mobilization with strap     X20 b/l        Levator str. With ipsilateral shoulder in flexion against wall     15x5\" b/l   15x5\" b/l     Biceps curls      10# 2x10   10# 3x10 b/l 10# 3x10 B/L   Scaption wall slides      RTB 2x10   RTB 2x10     Supine shoulder flexion with DB  5#   AROM x10    AAROM x10 5# AROM x 10    AAROM x10          Ther Activity                                       Gait Training                                       Modalities                                       Access Code: [email or web address]  URL: [email or web address]  Date: 03/24/2025  Prepared by: Diomedes Gordon    Exercises  - Supine Deep Neck Flexor Training  - 1 x daily - 3 x weekly - 2 sets - 10 reps - 5 seconds hold  - Seated Cervical Sidebending Stretch  - 1 x daily - 3 x weekly - 3 sets - 1 reps - 30 seconds hold  - Seated Levator Scapulae Stretch  - 1 x daily - 3 x weekly - 3 sets - 1 reps - 30 seconds hold  - Seated Assisted Cervical Rotation with Towel  - 1 x daily - 3 x weekly - 2 sets - 10 reps - 3 seconds hold  - Seated Passive Cervical Retraction  - 1 x daily - 3 x weekly - 2 sets - 10 reps - 3 seconds hold  - Standing Shoulder Row with Anchored Resistance  - 1 x daily - 3 x weekly - 3 sets - 10 reps - 3 " seconds hold  - Shoulder extension with resistance - Neutral  - 1 x daily - 3 x weekly - 3 sets - 10 reps - 3 seconds hold  - Prone Shoulder Extension - Single Arm  - 1 x daily - 3 x weekly - 2 sets - 10 reps - 3 seconds hold  - Prone Single Arm Shoulder Horizontal Abduction with Scapular Retraction and Palm Down  - 1 x daily - 3 x weekly - 2 sets - 10 reps - 3 seconds hold  - Standing Bent Over Single Arm Scapular Row with Table Support  - 1 x daily - 3 x weekly - 2 sets - 10 reps - 3 seconds hold  - Seated Shoulder Horizontal Abduction with Resistance  - 1 x daily - 3 x weekly - 2 sets - 10 reps  - Shoulder Internal Rotation with Resistance  - 1 x daily - 3 x weekly - 3 sets - 10 reps  - Shoulder External Rotation with Anchored Resistance  - 1 x daily - 3 x weekly - 3 sets - 10 reps  - Sidelying Shoulder ER with Towel and Dumbbell  - 1 x daily - 3 x weekly - 3 sets - 10 reps  - Seated Single Arm Bicep Curls with Rotation and Dumbbell  - 1 x daily - 3 x weekly - 3 sets - 10 reps  - Scaption Wall Slide with Towel  - 1 x daily - 3 x weekly - 3 sets - 10 reps  - Sidelying Shoulder Abduction with Resistance to 60 Degrees  - 1 x daily - 3 x weekly - 3 sets - 10 reps

## 2025-04-14 ENCOUNTER — TELEPHONE (OUTPATIENT)
Dept: SLEEP CENTER | Facility: CLINIC | Age: 75
End: 2025-04-14

## 2025-04-14 ENCOUNTER — APPOINTMENT (OUTPATIENT)
Dept: PHYSICAL THERAPY | Facility: CLINIC | Age: 75
End: 2025-04-14
Payer: MEDICARE

## 2025-04-14 DIAGNOSIS — S12.401D CLOSED NONDISPLACED FRACTURE OF FIFTH CERVICAL VERTEBRA WITH ROUTINE HEALING, UNSPECIFIED FRACTURE MORPHOLOGY, SUBSEQUENT ENCOUNTER: Primary | ICD-10-CM

## 2025-04-14 NOTE — PROGRESS NOTES
"Daily Note     Today's date: 2025  Patient name: Aron Oswald Jr.  : 1950  MRN: [id number]  Referring provider: Madiha Hallman CRNP  Dx:   Encounter Diagnosis     ICD-10-CM    1. Closed nondisplaced fracture of fifth cervical vertebra with routine healing, unspecified fracture morphology, subsequent encounter  S12.401D                      Subjective: ***      Objective: See treatment diary below      Assessment: Tolerated treatment well. Patient demonstrated fatigue post treatment and would benefit from continued PT.       Plan: Continue per plan of care.  Progress treatment as tolerated.         POC expires Unit limit Auth  expiration date PT/OT + Visit Limit?   25 4 N/a BOMN                 Visit/Unit Tracking  AUTH Status:  Date 2/27 3/3 3/6 3/10 3/13 3/24 3/27 3/31 4/3 4/7 4/10 4/14   N/A Used 13 14 15 16 17 18 19 20 21 22  24    Remaining                  Precautions: HTN, h/o C5 fx      Manuals 4/3 4/7 4/10 4/14  3/10 3/13 3/24 3/27 3/31   Gr. II-III cervical side glides        GR     UT, levator str.        GR     Gr. II-III cervical rotation, side bend in sitting GR       GR     Gr. II-III C2, C3 CPA mobs             Reassessment       GR                                             Neuro Re-Ed             Supine DNF             Supine upper cervical retraction with towel beneath occiput        20x3\"     Bent over row             [place]: M, L row  M: 20# 3x10 L: 15# 3x10   [place]: M, L 15# 3x15 ea.   [place]: M, L 15# 3x15 ea. [place] M, L 1# 3x15 ea    Prone scaption   Unable           Prone shoulder ext.  2# 2x10           Prone h. Abd.  AROM x10  AAROM x10           MB vertical rolls against wall              Bent over rows             Supine h abd with TB             Multidirectional MB on wall (V, H, CW/CCW circles)    RMB until fatigue      RMB until fatigue RMB until fatigue   Supine SA punch  5# 20x5\"           Lawnmower pulls   8# 2x10          Ther Ex             Patient " "education: pathophysiology, HEP review GR      GR GR     UBE 5 min 5 min Nustep 10 min   5' / 5' L5 5' / 5' L3 5' / 5' L3 5' / 5' L3 5\"/5'  L3   Pulleys             Finger ladder with eccentric lowering             Side lying ER             UT, levator str.             Cervical rotation SNAGS        15x5\" b/l  5\" 15x B/L   Cervical isometrics - all planes             Scaption in side lying with UE ranger with TB             TB ER             TB IR             Wall slides with DB             Shoulder flexion AAROM with UE ranger in standing      5# 3x15       1st rib mobilization with strap             Levator str. With ipsilateral shoulder in flexion against wall        15x5\" b/l     Biceps curls      10# 2x10   10# 3x10 b/l 10# 3x10 B/L   Scaption wall slides      RTB 2x10   RTB 2x10     Supine shoulder flexion with DB  5#   AROM x10    AAROM x10 5# AROM x 10    AAROM x10          Ther Activity                                       Gait Training                                       Modalities                                       Access Code: [email or web address]  URL: [email or web address]  Date: 03/24/2025  Prepared by: Diomedes Gordon    Exercises  - Supine Deep Neck Flexor Training  - 1 x daily - 3 x weekly - 2 sets - 10 reps - 5 seconds hold  - Seated Cervical Sidebending Stretch  - 1 x daily - 3 x weekly - 3 sets - 1 reps - 30 seconds hold  - Seated Levator Scapulae Stretch  - 1 x daily - 3 x weekly - 3 sets - 1 reps - 30 seconds hold  - Seated Assisted Cervical Rotation with Towel  - 1 x daily - 3 x weekly - 2 sets - 10 reps - 3 seconds hold  - Seated Passive Cervical Retraction  - 1 x daily - 3 x weekly - 2 sets - 10 reps - 3 seconds hold  - Standing Shoulder Row with Anchored Resistance  - 1 x daily - 3 x weekly - 3 sets - 10 reps - 3 seconds hold  - Shoulder extension with resistance - Neutral  - 1 x daily - 3 x weekly - 3 sets - 10 reps - 3 seconds hold  - Prone Shoulder Extension - Single Arm  - 1 x " daily - 3 x weekly - 2 sets - 10 reps - 3 seconds hold  - Prone Single Arm Shoulder Horizontal Abduction with Scapular Retraction and Palm Down  - 1 x daily - 3 x weekly - 2 sets - 10 reps - 3 seconds hold  - Standing Bent Over Single Arm Scapular Row with Table Support  - 1 x daily - 3 x weekly - 2 sets - 10 reps - 3 seconds hold  - Seated Shoulder Horizontal Abduction with Resistance  - 1 x daily - 3 x weekly - 2 sets - 10 reps  - Shoulder Internal Rotation with Resistance  - 1 x daily - 3 x weekly - 3 sets - 10 reps  - Shoulder External Rotation with Anchored Resistance  - 1 x daily - 3 x weekly - 3 sets - 10 reps  - Sidelying Shoulder ER with Towel and Dumbbell  - 1 x daily - 3 x weekly - 3 sets - 10 reps  - Seated Single Arm Bicep Curls with Rotation and Dumbbell  - 1 x daily - 3 x weekly - 3 sets - 10 reps  - Scaption Wall Slide with Towel  - 1 x daily - 3 x weekly - 3 sets - 10 reps  - Sidelying Shoulder Abduction with Resistance to 60 Degrees  - 1 x daily - 3 x weekly - 3 sets - 10 reps

## 2025-04-14 NOTE — TELEPHONE ENCOUNTER
Medical certificate form faxed from [facility] for Dr. Rubio to complete and fax back to [facility] @ [phone].      Compliance report requested via email from [facility] liaisons. Will forward to Dr. Rubio once received.     Call to patient who did not have 31-90 day compliance appointment.  Scheduled for compliance visit with Dr. Rubio on 4/23/2025 in [place] office.  Wife states they have not had any calls from insurance or DME provider.

## 2025-04-16 ENCOUNTER — TELEPHONE (OUTPATIENT)
Age: 75
End: 2025-04-16

## 2025-04-17 ENCOUNTER — OFFICE VISIT (OUTPATIENT)
Dept: PHYSICAL THERAPY | Facility: CLINIC | Age: 75
End: 2025-04-17
Payer: MEDICARE

## 2025-04-17 DIAGNOSIS — S12.401D CLOSED NONDISPLACED FRACTURE OF FIFTH CERVICAL VERTEBRA WITH ROUTINE HEALING, UNSPECIFIED FRACTURE MORPHOLOGY, SUBSEQUENT ENCOUNTER: Primary | ICD-10-CM

## 2025-04-17 PROCEDURE — 97140 MANUAL THERAPY 1/> REGIONS: CPT | Performed by: PHYSICAL THERAPIST

## 2025-04-17 PROCEDURE — 97110 THERAPEUTIC EXERCISES: CPT | Performed by: PHYSICAL THERAPIST

## 2025-04-17 NOTE — PROGRESS NOTES
"Daily Note     Today's date: 2025  Patient name: Aron Oswald Jr.  : 1950  MRN: [id number]  Referring provider: Madiha Hallman CRNP  Dx:   Encounter Diagnosis     ICD-10-CM    1. Closed nondisplaced fracture of fifth cervical vertebra with routine healing, unspecified fracture morphology, subsequent encounter  S12.401D           Start Time: 1220  Stop Time: 1258  Total time in clinic (min): 38 minutes    Subjective: Patient reports that both shoulders are sometimes sore after working out.      Objective: See treatment diary below      Assessment: Tolerated treatment well. Patient would benefit from continued PT. Patient reported decreased tension in neck following treatment session.      Plan: Continue per plan of care.  Progress treatment as tolerated.         POC expires Unit limit Auth  expiration date PT/OT + Visit Limit?   25 4 N/a BOMN                 Visit/Unit Tracking  AUTH Status:  Date   3/6 3/10 3/13 3/24 3/27 3/31 4/3 4/7 4/10 4/17   N/A Used   15 16 17 18 19 20 21 22 23 24    Remaining                  Precautions: HTN, h/o C5 fx      Manuals 4/3 4/7 4/10 4/17   3/13 3/24 3/27 3/31   Gr. II-III cervical side glides    GR    GR GR    UT, levator str.    GR    GR     Gr. II-III cervical rotation, side bend in sitting GR   GR    GR     Gr. II-III C2, C3 CPA mobs             Reassessment       GR                                             Neuro Re-Ed             Supine DNF             Supine upper cervical retraction with towel beneath occiput        20x3\"     Bent over row             [place]: M, L row  M: 20# 3x10 L: 15# 3x10      [place]: M, L 15# 3x15 ea. [place] M, L 1# 3x15 ea    Prone scaption   Unable           Prone shoulder ext.  2# 2x10           Prone h. Abd.  AROM x10  AAROM x10           MB vertical rolls against wall              Bent over rows             Supine h abd with TB             Multidirectional MB on wall (V, H, CW/CCW circles)    RMB until fatigue      RMB " "until fatigue RMB until fatigue   Supine SA punch  5# 20x5\"           Lawnmower pulls   8# 2x10          Ther Ex             Patient education: pathophysiology, HEP review GR      GR GR     UBE 5 min 5 min Nustep 10 min Nustep 10 min   5' / 5' L3 5' / 5' L3 5' / 5' L3 5\"/5'  L3   Pulleys             Finger ladder with eccentric lowering             Side lying ER             UT, levator str.             Cervical rotation SNAGS        15x5\" b/l  5\" 15x B/L   Cervical isometrics - all planes             Scaption in side lying with UE ranger with TB             TB ER             TB IR             Wall slides with DB             Shoulder flexion AAROM with UE ranger in standing             1st rib mobilization with strap             Levator str. With ipsilateral shoulder in flexion against wall        15x5\" b/l     Biceps curls         10# 3x10 b/l 10# 3x10 B/L   Scaption wall slides         RTB 2x10     Supine shoulder flexion with DB  5#   AROM x10    AAROM x10 5# AROM x 10    AAROM x10          Ther Activity                                       Gait Training                                       Modalities                                       Access Code: [email or web address]  URL: [email or web address]  Date: 03/24/2025  Prepared by: Diomedes Gordon    Exercises  - Supine Deep Neck Flexor Training  - 1 x daily - 3 x weekly - 2 sets - 10 reps - 5 seconds hold  - Seated Cervical Sidebending Stretch  - 1 x daily - 3 x weekly - 3 sets - 1 reps - 30 seconds hold  - Seated Levator Scapulae Stretch  - 1 x daily - 3 x weekly - 3 sets - 1 reps - 30 seconds hold  - Seated Assisted Cervical Rotation with Towel  - 1 x daily - 3 x weekly - 2 sets - 10 reps - 3 seconds hold  - Seated Passive Cervical Retraction  - 1 x daily - 3 x weekly - 2 sets - 10 reps - 3 seconds hold  - Standing Shoulder Row with Anchored Resistance  - 1 x daily - 3 x weekly - 3 sets - 10 reps - 3 seconds hold  - Shoulder extension with resistance - Neutral  " - 1 x daily - 3 x weekly - 3 sets - 10 reps - 3 seconds hold  - Prone Shoulder Extension - Single Arm  - 1 x daily - 3 x weekly - 2 sets - 10 reps - 3 seconds hold  - Prone Single Arm Shoulder Horizontal Abduction with Scapular Retraction and Palm Down  - 1 x daily - 3 x weekly - 2 sets - 10 reps - 3 seconds hold  - Standing Bent Over Single Arm Scapular Row with Table Support  - 1 x daily - 3 x weekly - 2 sets - 10 reps - 3 seconds hold  - Seated Shoulder Horizontal Abduction with Resistance  - 1 x daily - 3 x weekly - 2 sets - 10 reps  - Shoulder Internal Rotation with Resistance  - 1 x daily - 3 x weekly - 3 sets - 10 reps  - Shoulder External Rotation with Anchored Resistance  - 1 x daily - 3 x weekly - 3 sets - 10 reps  - Sidelying Shoulder ER with Towel and Dumbbell  - 1 x daily - 3 x weekly - 3 sets - 10 reps  - Seated Single Arm Bicep Curls with Rotation and Dumbbell  - 1 x daily - 3 x weekly - 3 sets - 10 reps  - Scaption Wall Slide with Towel  - 1 x daily - 3 x weekly - 3 sets - 10 reps  - Sidelying Shoulder Abduction with Resistance to 60 Degrees  - 1 x daily - 3 x weekly - 3 sets - 10 reps

## 2025-04-21 ENCOUNTER — OFFICE VISIT (OUTPATIENT)
Dept: PHYSICAL THERAPY | Facility: CLINIC | Age: 75
End: 2025-04-21
Attending: NURSE PRACTITIONER
Payer: MEDICARE

## 2025-04-21 DIAGNOSIS — S12.401D CLOSED NONDISPLACED FRACTURE OF FIFTH CERVICAL VERTEBRA WITH ROUTINE HEALING, UNSPECIFIED FRACTURE MORPHOLOGY, SUBSEQUENT ENCOUNTER: Primary | ICD-10-CM

## 2025-04-21 PROCEDURE — 97110 THERAPEUTIC EXERCISES: CPT

## 2025-04-21 NOTE — PROGRESS NOTES
"Daily Note     Today's date: 2025  Patient name: Aron Oswald Jr.  : 1950  MRN: [id number]  Referring provider: Madiha Hallman CRNP  Dx:   Encounter Diagnosis     ICD-10-CM    1. Closed nondisplaced fracture of fifth cervical vertebra with routine healing, unspecified fracture morphology, subsequent encounter  S12.401D                      Subjective: Pt reports no new complaints at this time.       Objective: See treatment diary below      Assessment: Pt did well with all TE as listed, reports fatigue at tx.       Plan: Continue per plan of care.        POC expires Unit limit Auth  expiration date PT/OT + Visit Limit?   25 4 N/a BOMN                 Visit/Unit Tracking  AUTH Status:  Date 4/21  3/6 3/10 3/13 3/24 3/27 3/31 4/3 4/7 4/10 4/17   N/A Used 25  15 16 17 18 19 20 21 22 23 24    Remaining                  Precautions: HTN, h/o C5 fx      Manuals 4/3 4/7 4/10 4/17 4/21  3/13 3/24 3/27 3/31   Gr. II-III cervical side glides    GR    GR GR    UT, levator str.    GR    GR     Gr. II-III cervical rotation, side bend in sitting GR   GR    GR     Gr. II-III C2, C3 CPA mobs             Reassessment       GR                                             Neuro Re-Ed             Supine DNF             Supine upper cervical retraction with towel beneath occiput        20x3\"     Bent over row             [place]: M, L row  M: 20# 3x10 L: 15# 3x10  M20#  L15#3x10 ea    [place]: M, L 15# 3x15 ea. [place] M, L 1# 3x15 ea    Prone scaption   Unable           Prone shoulder ext.  2# 2x10   2# 3x10        Prone h. Abd.  AROM x10  AAROM x10   1# 3x10        MB vertical rolls against wall     Red x20         Bent over rows             Supine h abd with TB             Multidirectional MB on wall (V, H, CW/CCW circles)    RMB until fatigue  Red x20 ea    RMB until fatigue RMB until fatigue   Supine SA punch  5# 20x5\"           Lawnmower pulls   8# 2x10  8# 3x10        Ther Ex             Patient education: " "pathophysiology, HEP review GR      GR GR     UBE 5 min 5 min Nustep 10 min Nustep 10 min   5' / 5' L3 5' / 5' L3 5' / 5' L3 5\"/5'  L3   Pulleys     3'/3'        Finger ladder with eccentric lowering             Side lying ER             UT, levator str.             Cervical rotation SNAGS        15x5\" b/l  5\" 15x B/L   Cervical isometrics - all planes             Scaption in side lying with UE ranger with TB             TB ER             TB IR             Wall slides with DB             Shoulder flexion AAROM with UE ranger in standing             1st rib mobilization with strap             Levator str. With ipsilateral shoulder in flexion against wall        15x5\" b/l     Biceps curls         10# 3x10 b/l 10# 3x10 B/L   Scaption wall slides         RTB 2x10     Supine shoulder flexion with DB  5#   AROM x10    AAROM x10 5# AROM x 10    AAROM x10 4# 3x10         Ther Activity                                       Gait Training                                       Modalities                                       Access Code: [email or web address]  URL: [email or web address]  Date: 03/24/2025  Prepared by: Diomedes Gordon    Exercises  - Supine Deep Neck Flexor Training  - 1 x daily - 3 x weekly - 2 sets - 10 reps - 5 seconds hold  - Seated Cervical Sidebending Stretch  - 1 x daily - 3 x weekly - 3 sets - 1 reps - 30 seconds hold  - Seated Levator Scapulae Stretch  - 1 x daily - 3 x weekly - 3 sets - 1 reps - 30 seconds hold  - Seated Assisted Cervical Rotation with Towel  - 1 x daily - 3 x weekly - 2 sets - 10 reps - 3 seconds hold  - Seated Passive Cervical Retraction  - 1 x daily - 3 x weekly - 2 sets - 10 reps - 3 seconds hold  - Standing Shoulder Row with Anchored Resistance  - 1 x daily - 3 x weekly - 3 sets - 10 reps - 3 seconds hold  - Shoulder extension with resistance - Neutral  - 1 x daily - 3 x weekly - 3 sets - 10 reps - 3 seconds hold  - Prone Shoulder Extension - Single Arm  - 1 x daily - 3 x weekly - 2 " sets - 10 reps - 3 seconds hold  - Prone Single Arm Shoulder Horizontal Abduction with Scapular Retraction and Palm Down  - 1 x daily - 3 x weekly - 2 sets - 10 reps - 3 seconds hold  - Standing Bent Over Single Arm Scapular Row with Table Support  - 1 x daily - 3 x weekly - 2 sets - 10 reps - 3 seconds hold  - Seated Shoulder Horizontal Abduction with Resistance  - 1 x daily - 3 x weekly - 2 sets - 10 reps  - Shoulder Internal Rotation with Resistance  - 1 x daily - 3 x weekly - 3 sets - 10 reps  - Shoulder External Rotation with Anchored Resistance  - 1 x daily - 3 x weekly - 3 sets - 10 reps  - Sidelying Shoulder ER with Towel and Dumbbell  - 1 x daily - 3 x weekly - 3 sets - 10 reps  - Seated Single Arm Bicep Curls with Rotation and Dumbbell  - 1 x daily - 3 x weekly - 3 sets - 10 reps  - Scaption Wall Slide with Towel  - 1 x daily - 3 x weekly - 3 sets - 10 reps  - Sidelying Shoulder Abduction with Resistance to 60 Degrees  - 1 x daily - 3 x weekly - 3 sets - 10 reps

## 2025-04-23 ENCOUNTER — OFFICE VISIT (OUTPATIENT)
Age: 75
End: 2025-04-23
Payer: MEDICARE

## 2025-04-23 VITALS
DIASTOLIC BLOOD PRESSURE: 82 MMHG | HEIGHT: 71 IN | WEIGHT: 191 LBS | SYSTOLIC BLOOD PRESSURE: 120 MMHG | BODY MASS INDEX: 26.74 KG/M2 | HEART RATE: 43 BPM | OXYGEN SATURATION: 99 %

## 2025-04-23 DIAGNOSIS — G47.52 DREAM ENACTMENT BEHAVIOR: ICD-10-CM

## 2025-04-23 DIAGNOSIS — I49.3 PVC (PREMATURE VENTRICULAR CONTRACTION): ICD-10-CM

## 2025-04-23 DIAGNOSIS — G47.33 OSA (OBSTRUCTIVE SLEEP APNEA): Primary | ICD-10-CM

## 2025-04-23 PROCEDURE — 99214 OFFICE O/P EST MOD 30 MIN: CPT | Performed by: INTERNAL MEDICINE

## 2025-04-23 PROCEDURE — G2211 COMPLEX E/M VISIT ADD ON: HCPCS | Performed by: INTERNAL MEDICINE

## 2025-04-23 NOTE — ASSESSMENT & PLAN NOTE
Mild obstructive sleep apnea  RDI 7.6  Initial S/s: Snoring, choking, gasping, witnessed apneas, excessive daytime sleepiness, [place] score: 13    Compliance from 3/19 - 4/17  More than 4 hours 90%, 6 hours and 40 minutes  On APAP 5-15  95 percentile pressure 9.4  Median leaks 0.4  Residual AHI 0.3    Although machine is not detecting air leak patient's wife states that he has frequent dry mouth and sleeps with his mouth open while using nasal mask  He is still snoring    Plan  Ordered mask fitting  Patient would benefit from using F30 or F40 mask    Orders:    Mask fitting only; Future    PAP DME Resupply/Reorder

## 2025-04-23 NOTE — ASSESSMENT & PLAN NOTE
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

## 2025-04-23 NOTE — PROGRESS NOTES
Name: Aron Oswald Jr.      : 1950      MRN: [id number]  Encounter Provider: Isael Rubio MD  Encounter Date: 2025   Encounter department: [facility]    :  Assessment & Plan  DASIA (obstructive sleep apnea)  Mild obstructive sleep apnea  RDI 7.6  Initial S/s: Snoring, choking, gasping, witnessed apneas, excessive daytime sleepiness, Medway score: 13    Compliance from 3/19 -   More than 4 hours 90%, 6 hours and 40 minutes  On APAP 5-15  95 percentile pressure 9.4  Median leaks 0.4  Residual AHI 0.3    Although machine is not detecting air leak patient's wife states that he has frequent dry mouth and sleeps with his mouth open while using nasal mask  He is still snoring    Plan  Ordered mask fitting  Patient would benefit from using F30 or F40 mask    Orders:    Mask fitting only; Future    PAP DME Resupply/Reorder    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  Recently had falls from the bed  Counseled patient on safety precautions  Discussed possibility of neurological disorder in the future as patient reports anosmia and constipation    Since starting CPAP this treatment neck pain behavior has minimized  Patient and his wife report no concerning incidents recently         PVC (premature ventricular contraction)  He has frequent PVCs with a 28% burden  He is following with cardiology and was started on metoprolol succinate  I explained that optimal treatment of obstructive sleep apnea will decrease risk of arrhythmias         History of Present Illness     Aron Oswald is a 74 y.o. male with PMHx Hypertension, PVCs, thyroid nodule who presents for follow-up of mild obstructive sleep apnea.    Initial office visit  He reports excessive daytime sleepiness with an Medway score of 13.  He reports choking and gasping during sleep.  He has been told he stops breathing at night.  His memory and concentration is decreased.  He goes to bed at 11:30 PM  and falls asleep within 30 minutes.  He wakes up 1-2 times at night to urinate.  He wakes up at 5 AM.  He gets 8 hours of sleep and sometimes feels refreshed afterwards.  He drinks coffee in the morning.  He occasionally takes a nap but feels worse after a nap.  He reports snoring but is not loud and intermittent.     In August 2024 he fell on the floor from the bed. He noticed right arm weakness afterwards despite falling on left side. He has had dream enactment episodes 1-2 times a year where he fell on the floor. His wife states that this has been going on for years (8-10 years).     His wife states that he has a decreased sense of smell.  He has had constipation.    Current office visit  He has started CPAP on an APAP of 5-15.  He has excellent compliance.  However he uses a nasal mask and frequently has dry mouth.  He is getting 7 hours of sleep on average.  His wife states that he is still snoring with the CPAP mask on.  Dream enactment behavior has been minimal since starting CPAP.  They state it is improved greatly.  He is currently using an N20 mask.                  Sitting and reading: (Proxy-Rptd) (P) Slight chance of dozing  Watching TV: (Proxy-Rptd) (P) Moderate chance of dozing  Sitting, inactive in a public place (e.g. a theatre or a meeting): (Proxy-Rptd) (P) Slight chance of dozing  As a passenger in a car for an hour without a break: (Proxy-Rptd) (P) Would never doze  Lying down to rest in the afternoon when circumstances permit: (Proxy-Rptd) (P) Moderate chance of dozing  Sitting and talking to someone: (Proxy-Rptd) (P) Slight chance of dozing  Sitting quietly after a lunch without alcohol: (Proxy-Rptd) (P) Moderate chance of dozing  In a car, while stopped for a few minutes in traffic: (Proxy-Rptd) (P) Would never doze  Total score: (Proxy-Rptd) (P) 9       Review of Systems   Constitutional: Negative.    HENT: Negative.     Eyes: Negative.    Respiratory: Negative.     Cardiovascular: Negative.     Gastrointestinal: Negative.    Endocrine: Negative.    Genitourinary: Negative.    Musculoskeletal: Negative.    Skin: Negative.    Allergic/Immunologic: Negative.    Neurological: Negative.    Hematological: Negative.    Psychiatric/Behavioral: Negative.       Pertinent positives/negatives included in HPI and also as noted:       Pertinent Medical History           Medical History Reviewed by provider this encounter:     .  Past Medical History   Past Medical History:   Diagnosis Date    Allergic     Anxiety     COVID-19 virus infection 12/05/2022    Fractures 8/27/2024    C5 - fall from bed, right arm weakness    Hypertension     Refusal of blood transfusions as patient is [employer]      Past Surgical History:   Procedure Laterality Date    APPENDECTOMY      US GUIDED THYROID BIOPSY  1/16/2025     Family History   Problem Relation Age of Onset    No Known Problems Mother     Cancer Father         Stomach, urethral cancers    Diabetes Father     Hearing loss Father     Glaucoma Father     Diabetes Brother     Prostate cancer Brother       reports that he has never smoked. He has never used smokeless tobacco. He reports that he does not currently use alcohol. He reports that he does not use drugs.  Current Outpatient Medications   Medication Instructions    alfuzosin (UROXATRAL) 10 mg, Oral, Daily    Melatonin 5 mg, Daily at bedtime    metoprolol succinate (TOPROL-XL) 25 mg, Oral, Daily    Multiple Vitamins-Minerals (Multivitamin Adults) TABS     ofloxacin (OCUFLOX) 0.3 % ophthalmic solution 4 drops to the left ear twice a day for 7 days    Saw [place] 160 MG CAPS Daily    sildenafil (VIAGRA) 50 mg, Oral, Daily PRN    Vitamin D 2,000 Units, Daily     Allergies   Allergen Reactions    Penicillins Other (See Comments)      Current Outpatient Medications on File Prior to Visit   Medication Sig Dispense Refill    alfuzosin (UROXATRAL) 10 mg 24 hr tablet Take 1 tablet (10 mg total) by mouth daily 90 tablet 3  "   Cholecalciferol (Vitamin D) 50 MCG (2000 UT) tablet Take 2,000 Units by mouth daily      Melatonin 5 MG TABS Take 5 mg by mouth daily at bedtime      metoprolol succinate (TOPROL-XL) 25 mg 24 hr tablet Take 1 tablet (25 mg total) by mouth daily 90 tablet 3    Multiple Vitamins-Minerals (Multivitamin Adults) TABS       ofloxacin (OCUFLOX) 0.3 % ophthalmic solution 4 drops to the left ear twice a day for 7 days 10 mL 1    Saw [place] 160 MG CAPS Take by mouth daily      sildenafil (VIAGRA) 50 MG tablet Take 1 tablet (50 mg total) by mouth daily as needed for erectile dysfunction 10 tablet 5     No current facility-administered medications on file prior to visit.      Social History     Tobacco Use    Smoking status: Never    Smokeless tobacco: Never   Vaping Use    Vaping status: Never Used   Substance and Sexual Activity    Alcohol use: Not Currently    Drug use: Never    Sexual activity: Yes     Partners: Female     Objective   /82 (BP Location: Left arm, Patient Position: Sitting, Cuff Size: Standard)   Pulse (!) 43   Ht 5' 11\" (1.803 m)   Wt 86.6 kg (191 lb)   SpO2 99%   BMI 26.64 kg/m²        Physical Exam  Vitals reviewed.   HENT:      Head: Normocephalic and atraumatic.      Nose: Nose normal.      Mouth/Throat:      Mouth: Mucous membranes are moist.   Eyes:      Extraocular Movements: Extraocular movements intact.      Pupils: Pupils are equal, round, and reactive to light.   Cardiovascular:      Rate and Rhythm: Normal rate and regular rhythm.      Pulses: Normal pulses.   Pulmonary:      Effort: Pulmonary effort is normal.      Breath sounds: Normal breath sounds.   Abdominal:      General: Abdomen is flat. Bowel sounds are normal.      Palpations: Abdomen is soft.   Musculoskeletal:         General: Normal range of motion.      Cervical back: Normal range of motion.   Skin:     General: Skin is warm.   Neurological:      Mental Status: He is alert. Mental status is at baseline. " "  Psychiatric:         Mood and Affect: Mood normal.       Visit Vitals  /82 (BP Location: Left arm, Patient Position: Sitting, Cuff Size: Standard)   Pulse (!) 43   Ht 5' 11\" (1.803 m)   Wt 86.6 kg (191 lb)   SpO2 99%   BMI 26.64 kg/m²   Smoking Status Never   BSA 2.07 m²           Data  Lab Results   Component Value Date    HGB 14.2 09/17/2024    HCT 43.4 09/17/2024     (H) 09/17/2024      Lab Results   Component Value Date    CALCIUM 8.6 08/30/2024    K 3.8 08/30/2024    CO2 26 08/30/2024     08/30/2024    BUN 18 08/30/2024    CREATININE 1.15 08/30/2024     Lab Results   Component Value Date    IRON 57 08/30/2024    TIBC 233 (L) 08/30/2024    FERRITIN 80 08/30/2024     Lab Results   Component Value Date    AST 32 06/14/2023    ALT 34 06/14/2023             "

## 2025-04-24 ENCOUNTER — APPOINTMENT (OUTPATIENT)
Dept: PHYSICAL THERAPY | Facility: CLINIC | Age: 75
End: 2025-04-24
Attending: NURSE PRACTITIONER
Payer: MEDICARE

## 2025-04-24 ENCOUNTER — TELEPHONE (OUTPATIENT)
Dept: SLEEP CENTER | Facility: CLINIC | Age: 75
End: 2025-04-24

## 2025-04-24 ENCOUNTER — OFFICE VISIT (OUTPATIENT)
Age: 75
End: 2025-04-24
Payer: MEDICARE

## 2025-04-24 ENCOUNTER — TELEPHONE (OUTPATIENT)
Age: 75
End: 2025-04-24

## 2025-04-24 ENCOUNTER — TELEPHONE (OUTPATIENT)
Dept: OTHER | Facility: OTHER | Age: 75
End: 2025-04-24

## 2025-04-24 DIAGNOSIS — R41.3 MEMORY CHANGES: ICD-10-CM

## 2025-04-24 DIAGNOSIS — R41.841 COGNITIVE COMMUNICATION DEFICIT: Primary | ICD-10-CM

## 2025-04-24 LAB

## 2025-04-24 PROCEDURE — 97130 THER IVNTJ EA ADDL 15 MIN: CPT

## 2025-04-24 PROCEDURE — 97129 THER IVNTJ 1ST 15 MIN: CPT

## 2025-04-24 NOTE — TELEPHONE ENCOUNTER
Pt's wife called regarding below    Pt was last seen by neurology in feb.    She does not want to schedule follow up with our office this time.    She will call office back to schedule when she is ready.

## 2025-04-24 NOTE — PROGRESS NOTES
Daily Speech Treatment Note    Today's date: 2025   Patient’s name: Aron Oswald Jr.  : 1950  MRN: [id number]  Safety measures:  memory difficulties, HTN, hx of etoh abuse   Referring provider: Ludy Person,*    Encounter Diagnosis     ICD-10-CM    1. Cognitive communication deficit  R41.841       2. Memory changes  R41.3         Visit Tracking:  POC expires Unit limit Auth Expiration date PT/OT + Visit Limit?   25 N/A 25 BOMN PCY                                           Visit/Unit Tracking  AUTH Status:  Date 25  IE    PU  2/26  3/5  3/12  3/26  4/2     4/8  RE     No Used  1  2  3  4  5  1  2  3  4  5  6  1     Remaining   9  8  7  6  5  9  8  7  6  5  4         Subjective/Behavioral:  -Patient reports improvement of hearing since placement of L ear.     Objective/Assessment:  -Reviewed testing results and goals in plan care with patient. Patient is in agreement at this time.  Short-term goals:  Patient will be educated on and demonstrate understanding of word finding strategies (i.e., circumlocution) for improved generative naming and verbal expression skills, to be achieved in 2-4 weeks.   Patient will utilize word finding strategies during semantic feature analysis treatment activity, with 80% accuracy, IND, in 3 out of 4 consecutive sessions, to facilitate improved naming and verbal expression skills, to be achieved in 8-10 weeks.  Patient will complete auditory immediate and short term memory tasks to facilitate increased ability to retell narratives and recall information within functional living environment, with 80% accuracy, IND using preferred memory strategy, in 3 out of 4 consecutive sessions, to be achieved in 10-12 weeks  Patient will complete complex auditory attention processing tasks (e.g., sentence unscramble, ranking numbers/words, etc.) to improve working memory, with 80% accuracy, IND, in 3 out of 4 consecutive sessions, to be  achieved in 10-12 weeks.  Patient will complete thought organization tasks (e.g., sequencing, deduction puzzles, etc.) with 80% accuracy, IND, in 3 out of 4 consecutive sessions, to facilitate increased executive functioning, working memory, problem solving, and processing skills, to be achieved in 10-12 weeks..  To target mental manipulation and working memory, patient will participate in word finding activity (i.e., anagrams) with 80% accuracy, IND, in 3 out of 4 consecutive sessions, to be achieved in 10-12 weeks.   4/24/25: Initiated crossword puzzle without any clues. Task completed in 3/4 opp (75% acc) independently, increasing to 4/4 opp (100% acc) from min cueing. Patient benefited from semantic cues that lead to increased accuracy. Completed rest for HEP.  Patient will answer questions regarding story read aloud with 80% accuracy, IND, in 3 out of 4 consecutive sessions, to facilitate improved auditory comprehension and recall, to be achieved in 8-10 weeks.   Patient will complete attention tasks (divided, auditory, alternating, sustained) by responding to multiple tasks or details within tasks at the same time in a distracting environment, with 80% accuracy, given min cues, in 3 out of 4 consecutive sessions, to improve attention and working memory, to be achieved in 10-12 weeks.    4/24/25: Patient completed compass following directions task where he was given a grid and different coordinates. Patient to plot the coordinates into the grid and continue to following the coordinates until word was created. No coordinates should overlap. Task completed in 34/49 opp (69% acc) independently, increasing to 49/49 opp (100% acc) from min-mod cueing. Patient benefited from visual cues. Often patient went east instead of west.            Plan:  -Patient was provided with home exercises/activities to target goals in plan of care at the end of today's session.  -Continue with current plan of care.

## 2025-04-24 NOTE — TELEPHONE ENCOUNTER
Patient accidentally accepted a message to reschedule appointment for 1:30 but they are unable to get there at 1:30. They were just seen in February 2025. They do not need an appointment until February 2026.

## 2025-04-25 ENCOUNTER — OFFICE VISIT (OUTPATIENT)
Dept: PHYSICAL THERAPY | Facility: CLINIC | Age: 75
End: 2025-04-25
Attending: NURSE PRACTITIONER
Payer: MEDICARE

## 2025-04-25 DIAGNOSIS — S12.401D CLOSED NONDISPLACED FRACTURE OF FIFTH CERVICAL VERTEBRA WITH ROUTINE HEALING, UNSPECIFIED FRACTURE MORPHOLOGY, SUBSEQUENT ENCOUNTER: Primary | ICD-10-CM

## 2025-04-25 PROCEDURE — 97110 THERAPEUTIC EXERCISES: CPT

## 2025-04-25 NOTE — PROGRESS NOTES
"Daily Note     Today's date: 2025  Patient name: Aron Oswald Jr.  : 1950  MRN: [id number]  Referring provider: Madiha Hallman CRNP  Dx:   Encounter Diagnosis     ICD-10-CM    1. Closed nondisplaced fracture of fifth cervical vertebra with routine healing, unspecified fracture morphology, subsequent encounter  S12.401D           Start Time: 1117  Stop Time: 1155  Total time in clinic (min): 38 minutes    Subjective: Pt reports no new complaints at this time.       Objective: See treatment diary below      Assessment: Exercises performed as per POC noted below. No change in symptoms throughout nor post tx.       Plan: Continue per plan of care.        POC expires Unit limit Auth  expiration date PT/OT + Visit Limit?   25 4 N/a BOMN                 Visit/Unit Tracking  AUTH Status:  Date 4/21 4/25 3/6 3/10 3/13 3/24 3/27 3/31 4/3 4/7 4/10 4/17   N/A Used 25 26 15 16 17 18 19 20 21 22 23 24    Remaining                  Precautions: HTN, h/o C5 fx      Manuals 4/3 4/7 4/10 4/17 4/21 4/25 3/13 3/24 3/27 3/31   Gr. II-III cervical side glides    GR    GR GR    UT, levator str.    GR    GR     Gr. II-III cervical rotation, side bend in sitting GR   GR    GR     Gr. II-III C2, C3 CPA mobs             Reassessment       GR                                             Neuro Re-Ed             Supine DNF             Supine upper cervical retraction with towel beneath occiput        20x3\"     Bent over row             Webslide: M, L row  M: 20# 3x10 L: 15# 3x10  M20#  L15#3x10 ea M20#  L15#3x10 ea   Gu[place]o: M, L 15# 3x15 ea. [place] M, L 1# 3x15 ea    Prone scaption   Unable           Prone shoulder ext.  2# 2x10   2# 3x10 2# 3x10       Prone h. Abd.  AROM x10  AAROM x10   1# 3x10 1# 3x10       MB vertical rolls against wall     Red x20 Red x20        Bent over rows             Supine h abd with TB             Multidirectional MB on wall (V, H, CW/CCW circles)    RMB until fatigue  Red x20 ea Red x20 ea   " "RMB until fatigue RMB until fatigue   Supine SA punch  5# 20x5\"           Lawnmower pulls   8# 2x10  8# 3x10 8# 3x10       Ther Ex             Patient education: pathophysiology, HEP review GR      GR GR     UBE 5 min 5 min Nustep 10 min Nustep 10 min  5' / 5' L3 5' / 5' L3 5' / 5' L3 5' / 5' L3 5\"/5'  L3   Pulleys     3'/3'        Finger ladder with eccentric lowering             Side lying ER             UT, levator str.             Cervical rotation SNAGS        15x5\" b/l  5\" 15x B/L   Cervical isometrics - all planes             Scaption in side lying with UE ranger with TB             TB ER             TB IR             Wall slides with DB             Shoulder flexion AAROM with UE ranger in standing             1st rib mobilization with strap             Levator str. With ipsilateral shoulder in flexion against wall        15x5\" b/l     Biceps curls         10# 3x10 b/l 10# 3x10 B/L   Scaption wall slides         RTB 2x10     Supine shoulder flexion with DB  5#   AROM x10    AAROM x10 5# AROM x 10    AAROM x10 4# 3x10         Ther Activity                                       Gait Training                                       Modalities                                       Access Code: [email or web address]  URL: [email or web address]  Date: 03/24/2025  Prepared by: Diomedes Gordon    Exercises  - Supine Deep Neck Flexor Training  - 1 x daily - 3 x weekly - 2 sets - 10 reps - 5 seconds hold  - Seated Cervical Sidebending Stretch  - 1 x daily - 3 x weekly - 3 sets - 1 reps - 30 seconds hold  - Seated Levator Scapulae Stretch  - 1 x daily - 3 x weekly - 3 sets - 1 reps - 30 seconds hold  - Seated Assisted Cervical Rotation with Towel  - 1 x daily - 3 x weekly - 2 sets - 10 reps - 3 seconds hold  - Seated Passive Cervical Retraction  - 1 x daily - 3 x weekly - 2 sets - 10 reps - 3 seconds hold  - Standing Shoulder Row with Anchored Resistance  - 1 x daily - 3 x weekly - 3 sets - 10 reps - 3 seconds hold  - " Shoulder extension with resistance - Neutral  - 1 x daily - 3 x weekly - 3 sets - 10 reps - 3 seconds hold  - Prone Shoulder Extension - Single Arm  - 1 x daily - 3 x weekly - 2 sets - 10 reps - 3 seconds hold  - Prone Single Arm Shoulder Horizontal Abduction with Scapular Retraction and Palm Down  - 1 x daily - 3 x weekly - 2 sets - 10 reps - 3 seconds hold  - Standing Bent Over Single Arm Scapular Row with Table Support  - 1 x daily - 3 x weekly - 2 sets - 10 reps - 3 seconds hold  - Seated Shoulder Horizontal Abduction with Resistance  - 1 x daily - 3 x weekly - 2 sets - 10 reps  - Shoulder Internal Rotation with Resistance  - 1 x daily - 3 x weekly - 3 sets - 10 reps  - Shoulder External Rotation with Anchored Resistance  - 1 x daily - 3 x weekly - 3 sets - 10 reps  - Sidelying Shoulder ER with Towel and Dumbbell  - 1 x daily - 3 x weekly - 3 sets - 10 reps  - Seated Single Arm Bicep Curls with Rotation and Dumbbell  - 1 x daily - 3 x weekly - 3 sets - 10 reps  - Scaption Wall Slide with Towel  - 1 x daily - 3 x weekly - 3 sets - 10 reps  - Sidelying Shoulder Abduction with Resistance to 60 Degrees  - 1 x daily - 3 x weekly - 3 sets - 10 reps

## 2025-04-28 ENCOUNTER — OFFICE VISIT (OUTPATIENT)
Dept: PHYSICAL THERAPY | Facility: CLINIC | Age: 75
End: 2025-04-28
Attending: NURSE PRACTITIONER
Payer: MEDICARE

## 2025-04-28 DIAGNOSIS — S12.401D CLOSED NONDISPLACED FRACTURE OF FIFTH CERVICAL VERTEBRA WITH ROUTINE HEALING, UNSPECIFIED FRACTURE MORPHOLOGY, SUBSEQUENT ENCOUNTER: Primary | ICD-10-CM

## 2025-04-28 PROCEDURE — 97140 MANUAL THERAPY 1/> REGIONS: CPT | Performed by: PHYSICAL THERAPIST

## 2025-04-28 PROCEDURE — 97110 THERAPEUTIC EXERCISES: CPT | Performed by: PHYSICAL THERAPIST

## 2025-04-28 NOTE — PROGRESS NOTES
Daily Note     Today's date: 2025  Patient name: Aron Oswald Jr.  : 1950  MRN: [id number]  Referring provider: Madiha Hallman CRNP  Dx:   Encounter Diagnosis     ICD-10-CM    1. Closed nondisplaced fracture of fifth cervical vertebra with routine healing, unspecified fracture morphology, subsequent encounter  S12.401D           Start Time: 1745  Stop Time: 1850  Total time in clinic (min): 65 minutes    Subjective: Patient states that his cervical ROM remains about the same, but has not performed his HEP.       Objective: See treatment diary below      Assessment: Tolerated treatment well. Patient demonstrated fatigue post treatment and would benefit from continued PT. No significant changes to overall status this visit.      Plan: Continue per plan of care.  Progress treatment as tolerated.         POC expires Unit limit Auth  expiration date PT/OT + Visit Limit?   25 4 N/a BOMN                 Visit/Unit Tracking  AUTH Status:  Date 4/21 4/25 4/28  3/13 3/24 3/27 3/31 4/3 4/7 4/10 4/17   N/A Used  27  17 18 19 20 21 22 23 24    Remaining                  Precautions: HTN, h/o C5 fx      Manuals 4/3 4/7 4/10 4/17 4/21 4/25 4/28  3/27 3/31   Gr. II-III cervical side glides    GR   GR  GR    UT, levator str.    GR   GR      Gr. II-III cervical rotation, side bend in sitting GR   GR   GR      Gr. II-III C2, C3 CPA mobs             Reassessment                                                    Neuro Re-Ed             Supine DNF             Supine upper cervical retraction with towel beneath occiput             Bent over row             Webslide: M, L row  M: 20# 3x10 L: 15# 3x10  M20#  L15#3x10 ea M20#  L15#3x10 ea   [place]: M, L 15# 3x15 ea. [place] M, L 1# 3x15 ea    Prone scaption   Unable           Prone shoulder ext.  2# 2x10   2# 3x10 2# 3x10       Prone h. Abd.  AROM x10  AAROM x10   1# 3x10 1# 3x10       MB vertical rolls against wall     Red x20 Red x20        Bent over rows         "     Supine h abd with TB             Multidirectional MB on wall (V, H, CW/CCW circles)    RMB until fatigue  Red x20 ea Red x20 ea   RMB until fatigue RMB until fatigue   Supine SA punch  5# 20x5\"           Lawnmower pulls   8# 2x10  8# 3x10 8# 3x10       Ther Ex             Patient education: pathophysiology, HEP review GR            UBE 5 min 5 min Nustep 10 min Nustep 10 min  5' / 5' L3 5' / 5' L3  5' / 5' L3 5\"/5'  L3   Pulleys     3'/3'        Finger ladder with eccentric lowering             Side lying ER             UT, levator str.             Cervical rotation SNAGS       20x3\"   5\" 15x B/L   Cervical isometrics - all planes             Scaption in side lying with UE ranger with TB             TB ER             TB IR             Wall slides with DB             Shoulder flexion AAROM with UE ranger in standing             1st rib mobilization with strap             Levator str. With ipsilateral shoulder in flexion against wall       15x5\"      Biceps curls         10# 3x10 b/l 10# 3x10 B/L   Scaption wall slides         RTB 2x10     Supine shoulder flexion with DB  5#   AROM x10    AAROM x10 5# AROM x 10    AAROM x10 4# 3x10         Ther Activity                                       Gait Training                                       Modalities                                       Access Code: [email or web address]  URL: [email or web address]  Date: 03/24/2025  Prepared by: Diomedes Gordon    Exercises  - Supine Deep Neck Flexor Training  - 1 x daily - 3 x weekly - 2 sets - 10 reps - 5 seconds hold  - Seated Cervical Sidebending Stretch  - 1 x daily - 3 x weekly - 3 sets - 1 reps - 30 seconds hold  - Seated Levator Scapulae Stretch  - 1 x daily - 3 x weekly - 3 sets - 1 reps - 30 seconds hold  - Seated Assisted Cervical Rotation with Towel  - 1 x daily - 3 x weekly - 2 sets - 10 reps - 3 seconds hold  - Seated Passive Cervical Retraction  - 1 x daily - 3 x weekly - 2 sets - 10 reps - 3 seconds hold  - " Standing Shoulder Row with Anchored Resistance  - 1 x daily - 3 x weekly - 3 sets - 10 reps - 3 seconds hold  - Shoulder extension with resistance - Neutral  - 1 x daily - 3 x weekly - 3 sets - 10 reps - 3 seconds hold  - Prone Shoulder Extension - Single Arm  - 1 x daily - 3 x weekly - 2 sets - 10 reps - 3 seconds hold  - Prone Single Arm Shoulder Horizontal Abduction with Scapular Retraction and Palm Down  - 1 x daily - 3 x weekly - 2 sets - 10 reps - 3 seconds hold  - Standing Bent Over Single Arm Scapular Row with Table Support  - 1 x daily - 3 x weekly - 2 sets - 10 reps - 3 seconds hold  - Seated Shoulder Horizontal Abduction with Resistance  - 1 x daily - 3 x weekly - 2 sets - 10 reps  - Shoulder Internal Rotation with Resistance  - 1 x daily - 3 x weekly - 3 sets - 10 reps  - Shoulder External Rotation with Anchored Resistance  - 1 x daily - 3 x weekly - 3 sets - 10 reps  - Sidelying Shoulder ER with Towel and Dumbbell  - 1 x daily - 3 x weekly - 3 sets - 10 reps  - Seated Single Arm Bicep Curls with Rotation and Dumbbell  - 1 x daily - 3 x weekly - 3 sets - 10 reps  - Scaption Wall Slide with Towel  - 1 x daily - 3 x weekly - 3 sets - 10 reps  - Sidelying Shoulder Abduction with Resistance to 60 Degrees  - 1 x daily - 3 x weekly - 3 sets - 10 reps

## 2025-05-01 ENCOUNTER — OFFICE VISIT (OUTPATIENT)
Dept: PHYSICAL THERAPY | Facility: CLINIC | Age: 75
End: 2025-05-01
Attending: NURSE PRACTITIONER
Payer: MEDICARE

## 2025-05-01 ENCOUNTER — APPOINTMENT (OUTPATIENT)
Age: 75
End: 2025-05-01
Payer: MEDICARE

## 2025-05-01 ENCOUNTER — APPOINTMENT (OUTPATIENT)
Dept: PHYSICAL THERAPY | Facility: CLINIC | Age: 75
End: 2025-05-01
Payer: MEDICARE

## 2025-05-01 DIAGNOSIS — S12.401D CLOSED NONDISPLACED FRACTURE OF FIFTH CERVICAL VERTEBRA WITH ROUTINE HEALING, UNSPECIFIED FRACTURE MORPHOLOGY, SUBSEQUENT ENCOUNTER: Primary | ICD-10-CM

## 2025-05-01 PROCEDURE — 97110 THERAPEUTIC EXERCISES: CPT

## 2025-05-01 PROCEDURE — 97112 NEUROMUSCULAR REEDUCATION: CPT

## 2025-05-01 NOTE — PROGRESS NOTES
Daily Note     Today's date: 2025  Patient name: Aron Oswald Jr.  : 1950  MRN: [id number]  Referring provider: Madiha Hallman CRNP  Dx:   Encounter Diagnosis     ICD-10-CM    1. Closed nondisplaced fracture of fifth cervical vertebra with routine healing, unspecified fracture morphology, subsequent encounter  S12.401D                      Subjective: Pt reports no changes in status since LV.      Objective: See treatment diary below      Assessment: Tolerated treatment well. Continued TE's today with focus on shoulder strengthening.  Pt able to complete all exercises today with good technique and no increase in pain.  Patient would benefit from continued PT      Plan: Continue per plan of care.        POC expires Unit limit Auth  expiration date PT/OT + Visit Limit?   25 4 N/a BOMN                 Visit/Unit Tracking  AUTH Status:  Date 4/21 4/25 4/28 5/1 3/13 3/24 3/27 3/31 4/3 4/7 4/10 4/17   N/A Used  28 17 18 19 20 21 22 23 24    Remaining                  Precautions: HTN, h/o C5 fx      Manuals 4/3 4/7 4/10 4/17 4/21 4/25 4/28 5/1 3/27 3/31   Gr. II-III cervical side glides    GR   GR  GR    UT, levator str.    GR   GR      Gr. II-III cervical rotation, side bend in sitting GR   GR   GR      Gr. II-III C2, C3 CPA mobs             Reassessment                                                    Neuro Re-Ed             Supine DNF             Supine upper cervical retraction with towel beneath occiput             Bent over row             Webslide: M, L row  M: 20# 3x10 L: 15# 3x10  M20#  L15#3x10 ea M20#  L15#3x10 ea  M20#  L15#3x10 ea Weston: M, L 15# 3x15 ea. Weston M, L 1# 3x15 ea    Prone scaption   Unable           Prone shoulder ext.  2# 2x10   2# 3x10 2# 3x10  2# 3x10     Prone h. Abd.  AROM x10  AAROM x10   1# 3x10 1# 3x10  1# 3x10     MB vertical rolls against wall     Red x20 Red x20        Bent over rows             Supine h abd with TB             Multidirectional MB on  "wall (V, H, CW/CCW circles)    RMB until fatigue  Red x20 ea Red x20 ea  Red x20 ea RMB until fatigue RMB until fatigue   Supine SA punch  5# 20x5\"           Lawnmower pulls   8# 2x10  8# 3x10 8# 3x10  8# 3x10     Ther Ex             Patient education: pathophysiology, HEP review GR            UBE 5 min 5 min Nustep 10 min Nustep 10 min  5' / 5' L3 5' / 5' L3 5' / 5' L3 5' / 5' L3 5\"/5'  L3   Pulleys     3'/3'        Finger ladder with eccentric lowering             Side lying ER             UT, levator str.             Cervical rotation SNAGS       20x3\"   5\" 15x B/L   Cervical isometrics - all planes             Scaption in side lying with UE ranger with TB             TB ER             TB IR             Wall slides with DB             Shoulder flexion AAROM with UE ranger in standing             1st rib mobilization with strap             Levator str. With ipsilateral shoulder in flexion against wall       15x5\"      Biceps curls         10# 3x10 b/l 10# 3x10 B/L   Scaption wall slides         RTB 2x10     Supine shoulder flexion with DB  5#   AROM x10    AAROM x10 5# AROM x 10    AAROM x10 4# 3x10         Ther Activity                                       Gait Training                                       Modalities                                       Access Code: [email or web address]  URL: [email or web address]  Date: 03/24/2025  Prepared by: Diomedes Gordon    Exercises  - Supine Deep Neck Flexor Training  - 1 x daily - 3 x weekly - 2 sets - 10 reps - 5 seconds hold  - Seated Cervical Sidebending Stretch  - 1 x daily - 3 x weekly - 3 sets - 1 reps - 30 seconds hold  - Seated Levator Scapulae Stretch  - 1 x daily - 3 x weekly - 3 sets - 1 reps - 30 seconds hold  - Seated Assisted Cervical Rotation with Towel  - 1 x daily - 3 x weekly - 2 sets - 10 reps - 3 seconds hold  - Seated Passive Cervical Retraction  - 1 x daily - 3 x weekly - 2 sets - 10 reps - 3 seconds hold  - Standing Shoulder Row with Anchored " Resistance  - 1 x daily - 3 x weekly - 3 sets - 10 reps - 3 seconds hold  - Shoulder extension with resistance - Neutral  - 1 x daily - 3 x weekly - 3 sets - 10 reps - 3 seconds hold  - Prone Shoulder Extension - Single Arm  - 1 x daily - 3 x weekly - 2 sets - 10 reps - 3 seconds hold  - Prone Single Arm Shoulder Horizontal Abduction with Scapular Retraction and Palm Down  - 1 x daily - 3 x weekly - 2 sets - 10 reps - 3 seconds hold  - Standing Bent Over Single Arm Scapular Row with Table Support  - 1 x daily - 3 x weekly - 2 sets - 10 reps - 3 seconds hold  - Seated Shoulder Horizontal Abduction with Resistance  - 1 x daily - 3 x weekly - 2 sets - 10 reps  - Shoulder Internal Rotation with Resistance  - 1 x daily - 3 x weekly - 3 sets - 10 reps  - Shoulder External Rotation with Anchored Resistance  - 1 x daily - 3 x weekly - 3 sets - 10 reps  - Sidelying Shoulder ER with Towel and Dumbbell  - 1 x daily - 3 x weekly - 3 sets - 10 reps  - Seated Single Arm Bicep Curls with Rotation and Dumbbell  - 1 x daily - 3 x weekly - 3 sets - 10 reps  - Scaption Wall Slide with Towel  - 1 x daily - 3 x weekly - 3 sets - 10 reps  - Sidelying Shoulder Abduction with Resistance to 60 Degrees  - 1 x daily - 3 x weekly - 3 sets - 10 reps

## 2025-05-01 NOTE — TELEPHONE ENCOUNTER
Rec'd call from pt stating the mask is leaking and making noise and would like to get re-fitted. Please assist [phone]

## 2025-05-02 NOTE — TELEPHONE ENCOUNTER
Return call to patient who states he spoke with [employer] and they are shipping smaller size of current mask to him and he is to return previous unopened masks.  Madhuri agrees to call back if he still has questions/concerns.

## 2025-05-06 ENCOUNTER — OFFICE VISIT (OUTPATIENT)
Dept: PHYSICAL THERAPY | Facility: CLINIC | Age: 75
End: 2025-05-06
Payer: MEDICARE

## 2025-05-06 ENCOUNTER — OFFICE VISIT (OUTPATIENT)
Age: 75
End: 2025-05-06
Payer: MEDICARE

## 2025-05-06 DIAGNOSIS — S12.401D CLOSED NONDISPLACED FRACTURE OF FIFTH CERVICAL VERTEBRA WITH ROUTINE HEALING, UNSPECIFIED FRACTURE MORPHOLOGY, SUBSEQUENT ENCOUNTER: Primary | ICD-10-CM

## 2025-05-06 DIAGNOSIS — R41.841 COGNITIVE COMMUNICATION DEFICIT: Primary | ICD-10-CM

## 2025-05-06 DIAGNOSIS — R41.3 MEMORY CHANGES: ICD-10-CM

## 2025-05-06 PROCEDURE — 97130 THER IVNTJ EA ADDL 15 MIN: CPT

## 2025-05-06 PROCEDURE — 97110 THERAPEUTIC EXERCISES: CPT | Performed by: PHYSICAL THERAPIST

## 2025-05-06 PROCEDURE — 97129 THER IVNTJ 1ST 15 MIN: CPT

## 2025-05-06 PROCEDURE — 97112 NEUROMUSCULAR REEDUCATION: CPT | Performed by: PHYSICAL THERAPIST

## 2025-05-06 NOTE — PROGRESS NOTES
Daily Speech Treatment Note    Today's date: 2025   Patient’s name: Aron Oswald Jr.  : 1950  MRN: [id number]  Safety measures:  memory difficulties, HTN, hx of etoh abuse   Referring provider: Ludy Person,*    Encounter Diagnosis     ICD-10-CM    1. Cognitive communication deficit  R41.841       2. Memory changes  R41.3           Visit Tracking:  POC expires Unit limit Auth Expiration date PT/OT + Visit Limit?   25 N/A 25 BOMN PCY                                           Visit/Unit Tracking  AUTH Status:  Date    RE            No Used  6  1 2            Remaining   4  9 8              Subjective/Behavioral:  -Patient pleasant, engaged, and cooperative. Patient attended today's session unaccompanied. Patient 6 minutes late for tx; agreeable to shortened session. Discussed audiogram results from  compared to 2024. No new concerns reported.     Objective/Assessment: Patient required consistent mod verbal cueing to effectively/efficiently utilize rehearsal strategy for recall tasks.     Short-term goals:  Patient will be educated on and demonstrate understanding of word finding strategies (i.e., circumlocution) for improved generative naming and verbal expression skills, to be achieved in 2-4 weeks.   Patient will utilize word finding strategies during semantic feature analysis treatment activity, with 80% accuracy, IND, in 3 out of 4 consecutive sessions, to facilitate improved naming and verbal expression skills, to be achieved in 8-10 weeks.  Patient will complete auditory immediate and short term memory tasks to facilitate increased ability to retell narratives and recall information within functional living environment, with 80% accuracy, IND using preferred memory strategy, in 3 out of 4 consecutive sessions, to be achieved in 10-12 weeks  25: Patient immediately recalled 4 orally presented words and answered questions regarding category inclusion with 75%  accuracy, IND. Accuracy increased to 100% given mod verbal prompts. Patient then immediately recalled 5 orally presented words and answered questions based upon category exclusion with 0% accuracy, IND. Accuracy increased to 100% given mod verbal cues including direct verbal instruction, immediate, direct verbal feedback, gestures, initial phoneme cues, and semantic cues. Given as HEP.    Patient will complete complex auditory attention processing tasks (e.g., sentence unscramble, ranking numbers/words, etc.) to improve working memory, with 80% accuracy, IND, in 3 out of 4 consecutive sessions, to be achieved in 10-12 weeks.      Patient will complete thought organization tasks (e.g., sequencing, deduction puzzles, etc.) with 80% accuracy, IND, in 3 out of 4 consecutive sessions, to facilitate increased executive functioning, working memory, problem solving, and processing skills, to be achieved in 10-12 weeks..  To target mental manipulation and working memory, patient will participate in word finding activity (i.e., anagrams) with 80% accuracy, IND, in 3 out of 4 consecutive sessions, to be achieved in 10-12 weeks.   4/24/25: Initiated crossword puzzle without any clues. Task completed in 3/4 opp (75% acc) independently, increasing to 4/4 opp (100% acc) from min cueing. Patient benefited from semantic cues that lead to increased accuracy. Completed rest for HEP.    Patient will answer questions regarding story read aloud with 80% accuracy, IND, in 3 out of 4 consecutive sessions, to facilitate improved auditory comprehension and recall, to be achieved in 8-10 weeks.   Patient will complete attention tasks (divided, auditory, alternating, sustained) by responding to multiple tasks or details within tasks at the same time in a distracting environment, with 80% accuracy, given min cues, in 3 out of 4 consecutive sessions, to improve attention and working memory, to be achieved in 10-12 weeks.    4/24/25: Patient  completed compass following directions task where he was given a grid and different coordinates. Patient to plot the coordinates into the grid and continue to following the coordinates until word was created. No coordinates should overlap. Task completed in 34/49 opp (69% acc) independently, increasing to 49/49 opp (100% acc) from min-mod cueing. Patient benefited from visual cues. Often patient went east instead of west.            Plan:  -Patient was provided with home exercises/activities to target goals in plan of care at the end of today's session.  -Continue with current plan of care.

## 2025-05-06 NOTE — PROGRESS NOTES
"Daily Note / Discharge    Today's date: 2025  Patient name: Aron Oswald Jr.  : 1950  MRN: [id number]  Referring provider: Madiha Hallman CRNP  Dx:   Encounter Diagnosis     ICD-10-CM    1. Closed nondisplaced fracture of fifth cervical vertebra with routine healing, unspecified fracture morphology, subsequent encounter  S12.401D           Start Time: 737  Stop Time: 815  Total time in clinic (min): 38 minutes    Subjective: Patient reports no significant changes to overall status.       Objective: See treatment diary below      Assessment: Tolerated treatment well. Patient demonstrated fatigue post treatment and would benefit from continued PT. Patient reeducated on importance of HEP compliance. Reviewed HEP and answered all questions to satisfaction. Patient instructed to contact PT if he notices a decline in function or if he has any questions/concerns. Patient will be discharged at this time.      Plan: Discharge.       POC expires Unit limit Auth  expiration date PT/OT + Visit Limit?   25 4 N/a BOMN                 Visit/Unit Tracking  AUTH Status:  Date 4/21 4/25 4/28 5/1 5/6  3/27 3/31 4/3 4/7 4/10 4/17   N/A Used 25 26 27 28 29  19 20 21 22 23 24    Remaining                  Precautions: HTN, h/o C5 fx      Manuals 4/3 4/7 4/10 4/17 4/21 4/25 4/28 5/1 5/6    Gr. II-III cervical side glides    GR   GR      UT, levator str.    GR   GR      Gr. II-III cervical rotation, side bend in sitting GR   GR   GR      Gr. II-III C2, C3 CPA mobs             Reassessment                                                    Neuro Re-Ed             Supine DNF         20x5\"    Supine upper cervical retraction with towel beneath occiput             Bent over row             Webslide: M, L row  M: 20# 3x10 L: 15# 3x10  M20#  L15#3x10 ea M20#  L15#3x10 ea  M20#  L15#3x10 ea      Prone scaption   Unable           Prone shoulder ext.  2# 2x10   2# 3x10 2# 3x10  2# 3x10     Prone h. Abd.  AROM x10  AAROM x10  " " 1# 3x10 1# 3x10  1# 3x10     MB vertical rolls against wall     Red x20 Red x20        Bent over rows             Supine h abd with TB             Multidirectional MB on wall (V, H, CW/CCW circles)    RMB until fatigue  Red x20 ea Red x20 ea  Red x20 ea     Supine SA punch  5# 20x5\"       5# 20x5\"    Lawnmower pulls   8# 2x10  8# 3x10 8# 3x10  8# 3x10     Ther Ex             Patient education: pathophysiology, HEP review GR        HEP review    UBE 5 min 5 min Nustep 10 min Nustep 10 min  5' / 5' L3 5' / 5' L3 5' / 5' L3 5' / 5' L3    Pulleys     3'/3'        Finger ladder with eccentric lowering             Side lying ER             UT, levator str.             Cervical rotation SNAGS       20x3\"      Cervical isometrics - all planes             Scaption in side lying with UE ranger with TB             TB ER             TB IR             Wall slides with DB             Shoulder flexion AAROM with UE ranger in standing             1st rib mobilization with strap             Levator str. With ipsilateral shoulder in flexion against wall       15x5\"      Biceps curls             Scaption wall slides             Supine shoulder flexion with DB  5#   AROM x10    AAROM x10 5# AROM x 10    AAROM x10 4# 3x10         Ther Activity                                       Gait Training                                       Modalities                                       Access Code: [email or web address]  URL: [email or web address]  Date: 05/06/2025  Prepared by: Diomedes Gordon    Exercises  - Supine Deep Neck Flexor Training  - 1 x daily - 3 x weekly - 2 sets - 10 reps - 5 seconds hold  - Seated Cervical Sidebending Stretch  - 1 x daily - 3 x weekly - 3 sets - 1 reps - 30 seconds hold  - Seated Levator Scapulae Stretch  - 1 x daily - 3 x weekly - 3 sets - 1 reps - 30 seconds hold  - Seated Assisted Cervical Rotation with Towel  - 1 x daily - 3 x weekly - 2 sets - 10 reps - 3 seconds hold  - Seated Passive Cervical Retraction  " - 1 x daily - 3 x weekly - 2 sets - 10 reps - 3 seconds hold  - Standing Shoulder Row with Anchored Resistance  - 1 x daily - 3 x weekly - 3 sets - 10 reps - 3 seconds hold  - Shoulder extension with resistance - Neutral  - 1 x daily - 3 x weekly - 3 sets - 10 reps - 3 seconds hold  - Prone Shoulder Extension - Single Arm  - 1 x daily - 3 x weekly - 2 sets - 10 reps - 3 seconds hold  - Prone Single Arm Shoulder Horizontal Abduction with Scapular Retraction and Palm Down  - 1 x daily - 3 x weekly - 2 sets - 10 reps - 3 seconds hold  - Standing Bent Over Single Arm Scapular Row with Table Support  - 1 x daily - 3 x weekly - 2 sets - 10 reps - 3 seconds hold  - Seated Shoulder Horizontal Abduction with Resistance  - 1 x daily - 3 x weekly - 2 sets - 10 reps  - Shoulder Internal Rotation with Resistance  - 1 x daily - 3 x weekly - 3 sets - 10 reps  - Shoulder External Rotation with Anchored Resistance  - 1 x daily - 3 x weekly - 3 sets - 10 reps  - Sidelying Shoulder ER with Towel and Dumbbell  - 1 x daily - 3 x weekly - 3 sets - 10 reps  - Seated Single Arm Bicep Curls with Rotation and Dumbbell  - 1 x daily - 3 x weekly - 3 sets - 10 reps  - Scaption Wall Slide with Towel  - 1 x daily - 3 x weekly - 3 sets - 10 reps  - Sidelying Shoulder Abduction with Resistance to 60 Degrees  - 1 x daily - 3 x weekly - 3 sets - 10 reps  - Single Arm Serratus Punches in Supine with Dumbbell  - 1 x daily - 3 x weekly - 2 sets - 10 reps - 5 seconds hold  - Supine Single Arm Shoulder Flexion with Resistance  - 1 x daily - 3 x weekly - 2 sets - 10 reps  - Supine Shoulder Horizontal Abduction with Resistance  - 1 x daily - 3 x weekly - 2 sets - 10 reps - 3 seconds hold  - Standing Wall Ball Circles with Mini Swiss Ball  - 1 x daily - 4 x weekly - 1 sets - 20 reps

## 2025-05-08 ENCOUNTER — APPOINTMENT (OUTPATIENT)
Age: 75
End: 2025-05-08
Payer: MEDICARE

## 2025-05-08 ENCOUNTER — APPOINTMENT (OUTPATIENT)
Dept: PHYSICAL THERAPY | Facility: CLINIC | Age: 75
End: 2025-05-08
Payer: MEDICARE

## 2025-05-12 ENCOUNTER — OFFICE VISIT (OUTPATIENT)
Dept: UROLOGY | Facility: CLINIC | Age: 75
End: 2025-05-12
Payer: MEDICARE

## 2025-05-12 VITALS
BODY MASS INDEX: 26.88 KG/M2 | HEIGHT: 71 IN | OXYGEN SATURATION: 98 % | SYSTOLIC BLOOD PRESSURE: 138 MMHG | HEART RATE: 69 BPM | WEIGHT: 192 LBS | DIASTOLIC BLOOD PRESSURE: 80 MMHG

## 2025-05-12 DIAGNOSIS — N40.1 BPH WITH LOWER URINARY TRACT SYMPTOMS WITHOUT URINARY OBSTRUCTION: Primary | ICD-10-CM

## 2025-05-12 PROCEDURE — 99213 OFFICE O/P EST LOW 20 MIN: CPT

## 2025-05-12 NOTE — PROGRESS NOTES
Name: Aron Oswald Jr.      : 1950      MRN: [id number]  Encounter Provider: BARBARA Underwood  Encounter Date: 2025   Encounter department: [facility]  :  Assessment & Plan  BPH with lower urinary tract symptoms without urinary obstruction  -cystoscopy on 25 showing significant BPH with elevated median lobe, moderate intravesical prostatic protrusion. 3 bladder stones present; one about 1 cm in size.   -symptoms well controlled on 10 mg daily alfuzosin. Tolerating medication well.  -We discussed TURP with cystolitholapaxy in event symptoms are poorly controlled. Patient defers surgical intervention at this time.  -He will return in 4 months for follow-up, or sooner if needed. All questions addressed. Please do not hesitate to reach out with further questions or concerns.              History of Present Illness   Aron Oswald Jr. is a 74 y.o. male who presents here for follow-up of lower urinary tract symptoms, microscopic hematuria. Patient seen in consultation by Dr. Bragg in 2025. He was minimally symptomatic from a urinary standpoint. His urine dip was positive for microscopic hematuria. Patient did undergo cystoscopy on 25 showing significant BPH with elevated median lobe, moderate intravesical prostatic protrusion. 3 bladder stones present; one about 1 cm in size.     Patient is currently taking alfuzosin. He no longer experiences urinary urgency as often. He does have a little leakage with urgency, states this does not occur often. Overall, patient is content on alfuzosin and tolerating medication well. He is not interested in surgical intervention at this time. Denies dysuria, flank pain, gross hematuria. Denies suprapubic pain/pressure or difficulty with voiding.     Review of Systems   Constitutional:  Negative for activity change, chills, fatigue and fever.   HENT:  Negative for congestion, rhinorrhea and sore throat.    Eyes:  Negative  "for photophobia, redness and visual disturbance.   Respiratory:  Negative for cough, shortness of breath and wheezing.    Cardiovascular:  Negative for chest pain, palpitations and leg swelling.   Gastrointestinal:  Negative for abdominal pain, diarrhea, nausea and vomiting.   Genitourinary:  Positive for urgency. Negative for difficulty urinating, dysuria, flank pain, frequency and hematuria.   Neurological:  Negative for weakness, light-headedness and headaches.          Objective   /80 (BP Location: Left arm, Patient Position: Sitting, Cuff Size: Standard)   Pulse 69   Ht 5' 11\" (1.803 m)   Wt 87.1 kg (192 lb)   SpO2 98%   BMI 26.78 kg/m²     Physical Exam  Vitals reviewed.   Constitutional:       General: He is not in acute distress.     Appearance: He is well-developed.   HENT:      Head: Normocephalic and atraumatic.   Eyes:      Conjunctiva/sclera: Conjunctivae normal.   Pulmonary:      Effort: Pulmonary effort is normal. No respiratory distress.   Neurological:      Mental Status: He is alert and oriented to person, place, and time.   Psychiatric:         Mood and Affect: Mood normal.        Results   Lab Results   Component Value Date    PSA 3.680 09/17/2024    PSA 2.98 06/14/2023    PSA 2.3 08/11/2021     Lab Results   Component Value Date    CALCIUM 8.6 08/30/2024    K 3.8 08/30/2024    CO2 26 08/30/2024     08/30/2024    BUN 18 08/30/2024    CREATININE 1.15 08/30/2024     Lab Results   Component Value Date    WBC 8.25 09/17/2024    HGB 14.2 09/17/2024    HCT 43.4 09/17/2024     (H) 09/17/2024     09/17/2024       Office Urine Dip  No results found for this or any previous visit (from the past hour).      "

## 2025-05-15 ENCOUNTER — EVALUATION (OUTPATIENT)
Age: 75
End: 2025-05-15
Payer: MEDICARE

## 2025-05-15 DIAGNOSIS — R41.841 COGNITIVE COMMUNICATION DEFICIT: Primary | ICD-10-CM

## 2025-05-15 DIAGNOSIS — R41.3 MEMORY CHANGES: ICD-10-CM

## 2025-05-15 PROCEDURE — 97130 THER IVNTJ EA ADDL 15 MIN: CPT

## 2025-05-15 PROCEDURE — 97129 THER IVNTJ 1ST 15 MIN: CPT

## 2025-05-15 NOTE — PROGRESS NOTES
"Speech-Language Pathology Progress Update    Today's date: 5/15/2025   Patient’s name: Aorn Oswald Jr.  : 1950  MRN: [id number]  Safety measures: memory difficulties, HTN, hx of etoh abuse   Referring provider: Ludy Person,*    Encounter Diagnosis     ICD-10-CM    1. Cognitive communication deficit  R41.841       2. Memory changes  R41.3           Assessment:   Patient continues to present with mild cognitive-linguistic impairment c/b deficits with attention, immediate and delayed memory, executive functioning, and word finding difficulties with evident lexical retrieval deficits observed during structured tasks as well as spontaneous discourse. Patient reported \"not seeing anything that is significantly different other than some improvements with some things\". Patient has made limited progress since last re-evaluation  only being seen for 2 sessions since testing. Clinician and patient have discussed importance of consistent attendance and completion of HEP in order to make observable improvements during POC. Patient benefits from meta-cognitive cues to increase insight and awareness into deficits, perceived challenges, and potential compensatory strategies to implement to improve overall QOL and increase success with daily tasks. Patient would benefit from continued OP skilled ST services to target increased functional recall, improve executive functioning skills (e.g. processing, problem-solving, thought organization, etc.), increase verbal fluency, and receive education on word-finding, in order to experience successful completion of daily tasks, follow directions within functional activities and unstructured tasks, support positive communication interactions with both familiar and unfamiliar listeners, promote safety, and facilitate overall improved quality of life.     Short-term goals:  Patient will be educated on and demonstrate understanding of word finding strategies (i.e., " circumlocution) for improved generative naming and verbal expression skills, to be achieved in 2-4 weeks. PARTIALLY MET/ONGOING  Patient will utilize word finding strategies during semantic feature analysis treatment activity, with 80% accuracy, IND, in 3 out of 4 consecutive sessions, to facilitate improved naming and verbal expression skills, to be achieved in 8-10 weeks.PARTIALLY MET/ONGOING  Patient will complete auditory immediate and short term memory tasks to facilitate increased ability to retell narratives and recall information within functional living environment, with 80% accuracy, IND using preferred memory strategy, in 3 out of 4 consecutive sessions, to be achieved in 10-12 weeks.PARTIALLY MET/ONGOING  Patient will complete complex auditory attention processing tasks (e.g., sentence unscramble, ranking numbers/words, etc.) to improve working memory, with 80% accuracy, IND, in 3 out of 4 consecutive sessions, to be achieved in 10-12 weeks.PARTIALLY MET/ONGOING  Patient will complete thought organization tasks (e.g., sequencing, deduction puzzles, etc.) with 80% accuracy, IND, in 3 out of 4 consecutive sessions, to facilitate increased executive functioning, working memory, problem solving, and processing skills, to be achieved in 10-12 weeks..PARTIALLY MET/ONGOING  To target mental manipulation and working memory, patient will participate in word finding activity (i.e., anagrams) with 80% accuracy, IND, in 3 out of 4 consecutive sessions, to be achieved in 10-12 weeks. PARTIALLY MET/ONGOING  Patient will answer questions regarding story read aloud with 80% accuracy, IND, in 3 out of 4 consecutive sessions, to facilitate improved auditory comprehension and recall, to be achieved in 8-10 weeks. PARTIALLY MET/ONGOING  Patient will complete attention tasks (divided, auditory, alternating, sustained) by responding to multiple tasks or details within tasks at the same time in a distracting environment, with  "80% accuracy, given min cues, in 3 out of 4 consecutive sessions, to improve attention and working memory, to be achieved in 10-12 weeks.  PARTIALLY MET/ONGOING      Plan:  Patient would benefit from outpatient skilled Speech Therapy services: Cognitive-linguistic therapy    Frequency: 1-2x weekly  Duration: 2 months    Intervention certification from: 5/15/2025  Intervention certification to: 7/8/2025      Subjective:  -Patient pleasant, engaged, and cooperative. Patient attended today's session unaccompanied. No new concerns reported.       Patient's goal(s): \"I would like to see a general improvement in memory and that type of thing.\"    Pain: Absent (scale: N/A)      Objective (testing):  -Patient pleasant, engaged, and cooperative. Patient attended today's session unaccompanied. No new concerns reported.         Treatment:  Patient will complete auditory immediate and short term memory tasks to facilitate increased ability to retell narratives and recall information within functional living environment, with 80% accuracy, IND using preferred memory strategy, in 3 out of 4 consecutive sessions, to be achieved in 10-12 weeks.  5/15/25: Patient IND utilized rehearsals, visualization, and association strategies to encode and immediately recall a list of ten words with 100% accuracy. Patient then recalled the same ten words after a 5 minute delay with 100% accuracy, IND. Given as HEP.    To target mental manipulation and working memory, patient will participate in word finding activity (i.e., anagrams) with 80% accuracy, IND, in 3 out of 4 consecutive sessions, to be achieved in 10-12 weeks  5/15/25: Patient completed a word finding task in which he needed to provide a 5-letter word to a given definition clue, transfer to a grid written vertically, so that when all clues were answered and grid was filled, a new word would be formed in the center row. Patient completed this activity with 75% accuracy, IND. Accuracy " increased to 100% given mod verbal semantic cues. Given as HEP.         Visit Tracking:  POC expires Unit limit Auth Expiration date PT/OT + Visit Limit?   7/8/25 N/A 12/13/25 BOMN PCY                                           Visit/Unit Tracking  AUTH Status:  Date  4/8  RE  4/24 5/6  5/15  PU               No Used  6  1 2  3                 Remaining   4  9 8  7                     Intervention comments:  - 45 minutes cognitive-linguistic tx targeting encoding, recall, and word finding

## 2025-05-19 ENCOUNTER — TELEPHONE (OUTPATIENT)
Age: 75
End: 2025-05-19

## 2025-05-19 NOTE — TELEPHONE ENCOUNTER
Patient called in requesting for a form to be submitted to [facility] stating patient uses a cpap machine to avoid getting a cut of notice of his power or having his power cut off.     [employer] advised not to send in the form before the 17th because the system will just kick it out since he currently has one in the system from last month. The form is good for 30 days.      Please call the patient when this has been submitted. Thank you.

## 2025-05-20 NOTE — TELEPHONE ENCOUNTER
Form signed and faxed to [facility] [phone].    Call placed to patient, advised of above.    Scheduled mask fitting appointment with [facility] 5/28/2025 in the [place] office.

## 2025-05-22 ENCOUNTER — APPOINTMENT (OUTPATIENT)
Age: 75
End: 2025-05-22
Payer: MEDICARE

## 2025-05-27 ENCOUNTER — TELEPHONE (OUTPATIENT)
Dept: NEUROLOGY | Facility: CLINIC | Age: 75
End: 2025-05-27

## 2025-05-28 ENCOUNTER — EVALUATION (OUTPATIENT)
Dept: PHYSICAL THERAPY | Facility: CLINIC | Age: 75
End: 2025-05-28
Attending: NURSE PRACTITIONER
Payer: MEDICARE

## 2025-05-28 ENCOUNTER — TELEPHONE (OUTPATIENT)
Age: 75
End: 2025-05-28

## 2025-05-28 DIAGNOSIS — R26.2 AMBULATORY DYSFUNCTION: Primary | ICD-10-CM

## 2025-05-28 DIAGNOSIS — G47.33 OSA (OBSTRUCTIVE SLEEP APNEA): Primary | ICD-10-CM

## 2025-05-28 PROCEDURE — 97161 PT EVAL LOW COMPLEX 20 MIN: CPT | Performed by: PHYSICAL THERAPIST

## 2025-05-28 PROCEDURE — 97112 NEUROMUSCULAR REEDUCATION: CPT | Performed by: PHYSICAL THERAPIST

## 2025-05-28 NOTE — PROGRESS NOTES
PT Evaluation     Today's date: 2025  Patient name: Aron Oswald Jr.  : 1950  MRN: [id number]  Referring provider: Zoraida Crabtree CRNP  Dx:   Encounter Diagnosis     ICD-10-CM    1. Ambulatory dysfunction  R26.2             Start Time: 1225  Stop Time: 1310  Total time in clinic (min): 45 minutes    Assessment  Impairments: activity intolerance, impaired balance, lacks appropriate home exercise program and safety issue    Assessment details: Aron Oswald Jr. is a 74 y.o. male who presents with c/o loss of balance. Patient has noticed at times dragging his feet and stumbling when avoiding objects. Patient has fallen out of the his within the past year. Examination findings demonstrate LOB with narrow WILLIAM and with eyes closed. Intact LE strength and FGA WFL. Patient's clinical presentation is consistent with their referring diagnosis of balance dysfunction. Patient would benefit from skilled physical therapy to address their aforementioned impairments, improve their level of function and to improve their overall quality of life.  Understanding of Dx/Px/POC: excellent     Prognosis: excellent    Goals  Short Term Goals: to be achieved in 4 weeks  1) Patient to be independent with basic HEP.  2) Patient to improve FGA by 3 points.    Long Term Goals: to be achieved by discharge  1) Patient to be independent with comprehensive HEP.  2) Patient to report increased confidence with a/iadls with less fear avoidance.    Plan  Patient would benefit from: skilled PT  Planned modality interventions: biofeedback, cryotherapy, hydrotherapy, TENS, unattended electrical stimulation and low level laser therapy    Planned therapy interventions: activity modification, ADL retraining, balance, balance/weight bearing training, behavior modification, body mechanics training, functional ROM exercises, gait training, home exercise program, IADL retraining, joint mobilization, manual therapy, massage, neuromuscular  re-education, patient education, strengthening, stretching, therapeutic activities, therapeutic exercise and transfer training    Frequency: 2-3x week.  Duration in weeks: 12  Plan of Care beginning date: 5/28/2025  Plan of Care expiration date: 8/20/2025  Treatment plan discussed with: patient        Subjective Evaluation    History of Present Illness  Mechanism of injury: Patient presents with concerns of balance. Patient has occasional loss of balance when standing from a seated position or turning quickly. Patient's wife believes that he drags his feet at times, however, he himself does not believe that he does this. Patient had fallen out of bed once, but has not tripped since. Patient denies progressive weakness or tingling/numbness.   Patient Goals  Patient goals for therapy: improved balance    Pain  No pain reported    Treatments  No previous or current treatments      Objective     Strength/Myotome Testing     Lumbar   Left   Normal strength    Right   Normal strength    Ambulation   Weight-Bearing Status   Weight-Bearing Status (Left): full weight bearing   Weight-Bearing Status (Right): full weight-bearing    Assistive device used: none    Ambulation: Level Surfaces   Ambulation without assistive device: independent    Ambulation: Stairs   Ascend stairs: independent  Descend stairs: independent    Observational Gait   Gait: within functional limits     Functional Assessment        Comments  Balance (sec):     FT, EO: 30    FT, EC: 30, minor increase in postural sway    Tandem stance EO: 30    Tandem stance, EC: 5    SLS: R: 0 ; L: 4    Functional Gait Assessment: 27/30                 POC expires Unit limit Auth  expiration date PT/OT + Visit Limit?   8/20/25 N/A N/A BOMN                 Visit/Unit Tracking  AUTH Status:  Date 5/28              N/A Used 1               Remaining                  Precautions: HTN, h/o C5 fx      Manuals 5/28                                                                 Neuro Re-Ed             [employer]             SLS             Tandem stance             Patient education: gym membership/walking program, cardiovascular activity, HEP compliance, balance systems  GR                                                   Ther Ex             Nustep: UE/LE                                                                                                        Ther Activity             Forward, lateral hurdles             Oliverio Lindo: resisted walking all directions                                       Gait Training                                       Modalities

## 2025-05-28 NOTE — TELEPHONE ENCOUNTER
Patient acct is on hold with [facility] for medicare review. I need a new script for resupply for this patient.  Will need to fax over new resupply script and most recent office notes to [phone].     When patient is due for a mask patient would like to get the Res med Air touch F-20 Lg mask.

## 2025-05-29 ENCOUNTER — OFFICE VISIT (OUTPATIENT)
Age: 75
End: 2025-05-29
Payer: MEDICARE

## 2025-05-29 DIAGNOSIS — R41.3 MEMORY CHANGES: ICD-10-CM

## 2025-05-29 DIAGNOSIS — R41.841 COGNITIVE COMMUNICATION DEFICIT: Primary | ICD-10-CM

## 2025-05-29 PROCEDURE — 97130 THER IVNTJ EA ADDL 15 MIN: CPT

## 2025-05-29 PROCEDURE — 97129 THER IVNTJ 1ST 15 MIN: CPT

## 2025-05-29 NOTE — TELEPHONE ENCOUNTER
Script for CPAP resupply and recent office visit note sent to [facility] via [employer].

## 2025-05-29 NOTE — PROGRESS NOTES
"Daily Speech Treatment Note    Today's date: 2025   Patient’s name: Aron Oswald Jr.  : 1950  MRN: [id number]  Safety measures: memory difficulties, HTN, hx of etoh abuse   Referring provider: Ludy Person,*    Encounter Diagnosis     ICD-10-CM    1. Cognitive communication deficit  R41.841       2. Memory changes  R41.3           Visit Tracking:  POC expires Unit limit Auth Expiration date PT/OT + Visit Limit?   25 N/A 25 BOMN PCY                                           Visit/Unit Tracking  AUTH Status:  Date    RE   5/6  5/15  PU               No Used  6  1 2  3  4               Remaining   4  9 8  7  6                 Subjective/Behavioral:  -Patient pleasant, engaged, and cooperative. Patient's wife, Kimberly, present for today's session. Patient 15 minutes late for tx; agreeable to shortened session. Patient's wife reported concerns that patient is not conveying nor relaying information back to her accurately when she is not present for medical appointments.     Objective/Assessment:  -Patient's family member/caregiver was present during today's session.  -Reviewed testing results and goals in plan care with patient. Patient is in agreement at this time. Patient's wife reported, \"I'm seeing some changes\". Reported going on vacation [place]state; seeing regression in ability to follow directions, spouse had to immediately repeat directions 3x. Discussed use of humor as avoidance/escape behaviors 2/2 difficulties experienced at this time and deflection of/evading questions. Patient forgot to complete HEP; lack of carryover of strategy approach discussed last session, two weeks ago. Patient w/reduced recall of conversation with clinician and topics/information discussed.    Short-term goals:  Patient will be educated on and demonstrate understanding of word finding strategies (i.e., circumlocution) for improved generative naming and verbal expression skills, to be " achieved in 2-4 weeks.   Patient will utilize word finding strategies during semantic feature analysis treatment activity, with 80% accuracy, IND, in 3 out of 4 consecutive sessions, to facilitate improved naming and verbal expression skills, to be achieved in 8-10 weeks.  Patient will complete auditory immediate and short term memory tasks to facilitate increased ability to retell narratives and recall information within functional living environment, with 80% accuracy, IND using preferred memory strategy, in 3 out of 4 consecutive sessions, to be achieved in 10-12 weeks.  5/29/25: Reviewed internal memory strategies and external aids. Brainstormed ideas of strategy use to employ in daily tasks, including use of combination of different strategies and aids as needed to experience increased successful initiation/execution/completion of daily tasks. Discussed importance of reestablishing routine, increasing socialization and engagement with various communication partners; patient and wife verbally expressed understanding of information and agreement with recommendations.     Patient will complete complex auditory attention processing tasks (e.g., sentence unscramble, ranking numbers/words, etc.) to improve working memory, with 80% accuracy, IND, in 3 out of 4 consecutive sessions, to be achieved in 10-12 weeks.  Patient will complete thought organization tasks (e.g., sequencing, deduction puzzles, etc.) with 80% accuracy, IND, in 3 out of 4 consecutive sessions, to facilitate increased executive functioning, working memory, problem solving, and processing skills, to be achieved in 10-12 weeks.  To target mental manipulation and working memory, patient will participate in word finding activity (i.e., anagrams) with 80% accuracy, IND, in 3 out of 4 consecutive sessions, to be achieved in 10-12 weeks.   Patient will answer questions regarding story read aloud with 80% accuracy, IND, in 3 out of 4 consecutive sessions,  to facilitate improved auditory comprehension and recall, to be achieved in 8-10 weeks.   Patient will complete attention tasks (divided, auditory, alternating, sustained) by responding to multiple tasks or details within tasks at the same time in a distracting environment, with 80% accuracy, given min cues, in 3 out of 4 consecutive sessions, to improve attention and working memory, to be achieved in 10-12 weeks.        Plan:  -Patient was provided with home exercises/activities to target goals in plan of care at the end of today's session.  -Continue with current plan of care.

## 2025-05-30 ENCOUNTER — NURSE TRIAGE (OUTPATIENT)
Age: 75
End: 2025-05-30

## 2025-05-30 LAB

## 2025-05-30 NOTE — TELEPHONE ENCOUNTER
"REASON FOR CONVERSATION: Hypotension    SYMPTOMS: Pt and wife Kimberly called reporting low blood pressures since Tuesday. Pt took amlodipine 5mg in the am on Monday because he felt his bp was high because of recent travel, pt takes his metoprolol at night. Pt reports feeling lightheaded and visual disturbance when turning his head earlier today- symptoms have subsided.     monday - 139/62- took amlodipine 5mg which he does not take regularly- old RX  tues- 115/77  wed- 127/70  thus-105/70 p71    Today BP  99/54 p-low did not register- this mornig   100/48 p55- 140ish    118/53 p53 around 2pm after drinking water    OTHER HEALTH INFORMATION: Metoprolol 25mg daily, took amlodipine on Monday because he felt his bp was high because of recent travel    PROTOCOL DISPOSITION: Discuss with Provider and Call Back Patient    CARE ADVICE PROVIDED: Advised that a message will be sent to cardiologist, advised to continue to stay hydrated     PRACTICE FOLLOW-UP: Please advise on low blood pressure, symptoms and if pt should take metoprolol this evening.     Answer Assessment - Initial Assessment Questions  1. BLOOD PRESSURE: \"What is your blood pressure?\" \"Did you take at least two measurements 5 minutes apart?\"      Bp118/53 p53  2. ONSET: \"When did you take your blood pressure?\"      At 2pm  3. HOW: \"How did you take your blood pressure?\" (e.g., visiting nurse, automatic home BP monitor)      Automatic upper arm cuff  4. HISTORY: \"Do you have a history of low blood pressure?\" \"What is your blood pressure normally?\"      High bp-on medication   5. MEDICINES: \"Are you taking any medicines for blood pressure?\" If Yes, ask: \"Have they been changed recently?\"      Metoprolol 25mg daily, pt took dose of amlodipine on Monday because he felt his bp was high from recent travel   6. PULSE RATE: \"Do you know what your pulse rate is?\"       53  7. OTHER SYMPTOMS: \"Have you been sick recently?\" \"Have you had a recent injury?\"     " unsure    Protocols used: Blood Pressure - Low-Adult-[place]

## 2025-06-04 ENCOUNTER — NURSE TRIAGE (OUTPATIENT)
Age: 75
End: 2025-06-04

## 2025-06-04 NOTE — TELEPHONE ENCOUNTER
"REASON FOR CONVERSATION: Dizziness  Received a call from spouse currently in [facility] on vacation, calling up with date on BP and symptoms.   Pt also had his neurologist appt today at the [facility], HR 35 /60  SYMPTOMS: blurry, fuzzy vision at times, lightheadedness and dizziness    OTHER HEALTH INFORMATION:   Currently at the [facility] today 12 n /60 HR 35    PROTOCOL DISPOSITION: Discuss with Provider and Call Back Patient (overriding See Within 3 Days in Office)    CARE ADVICE PROVIDED:   Discuss with provider, and advised if symptoms worsen ED    PRACTICE FOLLOW-UP:   Recommendations from provider      Reason for Disposition   Fall in systolic BP > 20 mmHg after standing up    Answer Assessment - Initial Assessment Questions  1. BLOOD PRESSURE: \"What is your blood pressure?\" \"Did you take at least two measurements 5 minutes apart?\"      5/21 /59 HR 72, 5/24 /62 low HR noted,5/26 /62 HR low,5/27  /77 HR 78 5/28 127/70 HR 54, 5/29 105/70 hr 71, 5/30 BP 99/54 HR low no number given, repeated /48 HR 48- had called into office  5/31 /63 low HR, repeated /63 low HR 6/1 /59 HR 54. 6/2 BP 94/47 HR low reading, 6/3 /64 HR low reading repeated /64 HR 69  2. ONSET: \"When did you take your blood pressure?\"      1130 michelle   3. HOW: \"How did you take your blood pressure? (e.g., visiting nurse, automatic home BP monitor)      Automatic BP cuff  4. HISTORY: \"Do you have a history of low blood pressure?\" \"What is your blood pressure normally?\"      New recently since August  5. MEDICINES: \"Are you taking any medicines for blood pressure?\" If Yes, ask: \"Have they been changed recently?\"        6. PULSE RATE: \"Do you know what your pulse rate is?\"         7. OTHER SYMPTOMS: \"Have you been sick recently?\" \"Have you had a recent injury?\"      Dizziness/lightheadedness    Protocols used: Blood Pressure - Low-Adult-[place]    "

## 2025-06-05 ENCOUNTER — NURSE TRIAGE (OUTPATIENT)
Age: 75
End: 2025-06-05

## 2025-06-05 ENCOUNTER — APPOINTMENT (OUTPATIENT)
Age: 75
End: 2025-06-05
Payer: MEDICARE

## 2025-06-05 NOTE — TELEPHONE ENCOUNTER
"REASON FOR CONVERSATION: Hypotension  Received a call from spouse, pt is currently in [facility], and BP very low at 81/45 HR 64  SYMPTOMS: denies, but vision is not as sharp    OTHER HEALTH INFORMATION:   Pt is out of state, in [facility]    PROTOCOL DISPOSITION: Go to ED Now    CARE ADVICE PROVIDED:   Recommended ED    PRACTICE FOLLOW-UP:   Follow up on BP's- ED visit       Reason for Disposition   Systolic BP < 80 and NOT feeling weak or lightheaded    Answer Assessment - Initial Assessment Questions  1. BLOOD PRESSURE: \"What is your blood pressure?\" \"Did you take at least two measurements 5 minutes apart?\"     BP 81/45 hr 64  2. ONSET: \"When did you take your blood pressure?\"      now  3. HOW: \"How did you take your blood pressure?\" (e.g., visiting nurse, automatic home BP monitor)      automatic  4. HISTORY: \"Do you have a history of low blood pressure?\" \"What is your blood pressure normally?\"      Not normally  5. MEDICINES: \"Are you taking any medicines for blood pressure?\" If Yes, ask: \"Have they been changed recently?\"      Beta blocker  6. PULSE RATE: \"Do you know what your pulse rate is?\"       Hr 64  7. OTHER SYMPTOMS: \"Have you been sick recently?\" \"Have you had a recent injury?\"      denies    Protocols used: Blood Pressure - Low-Adult-[place]    "

## 2025-06-08 ENCOUNTER — TELEPHONE (OUTPATIENT)
Dept: INTERNAL MEDICINE CLINIC | Facility: OTHER | Age: 75
End: 2025-06-08

## 2025-06-09 ENCOUNTER — PATIENT MESSAGE (OUTPATIENT)
Dept: CARDIOLOGY CLINIC | Facility: CLINIC | Age: 75
End: 2025-06-09

## 2025-06-09 ENCOUNTER — NURSE TRIAGE (OUTPATIENT)
Age: 75
End: 2025-06-09

## 2025-06-09 NOTE — TELEPHONE ENCOUNTER
"REASON FOR CONVERSATION: Advice Only (Follow up )    SYMPTOMS: fluctuating BP/HR of 80//70's & 40-60's    OTHER HEALTH INFORMATION: Sunday 129/65, 53.  Aron scheduled for LP & MRI in August at [facility]    PROTOCOL DISPOSITION: Callback by PCP Today    CARE ADVICE PROVIDED: will forward information to dr Herman; call with concerns; ED with any symptoms    PRACTICE FOLLOW-UP: requesting a sooner than the July 21st appt to discuss results of Holter & ECHO (to be done 6/13/25)      Reason for Disposition   Nursing judgment     Kimberly would like to speak with Dr Herman if possible to discuss the events of last week, low BP/HR (she believes it is autonomic), the UC & ED visits/results.    Answer Assessment - Initial Assessment Questions  1. REASON FOR CALL: \"What is the main reason for your call?\" or \"How can I best help you?\"      Kimberly, patient's wife, would like Dr Herman to review UC & ED records from 6/5/25 in [place].    2. SYMPTOMS : \"Do you have any symptoms?\"       Fluctuating BP 80/50's to 170-190/70's, HR 40-60's    3. OTHER QUESTIONS: \"Do you have any other questions?\"      Aron is to have an LP & MRI in August at [facility]; Kimberly is requesting for Dr Herman to be involved in any decision making regarding care.     Spoke with patient's wife Kimberly, following up on previous calls regarding low BP while he was at [facility].    Seen at  6/5/25 who then referred him to the ED same day.  BP initially low at  114/58 but prior to was 80/50's. ED BP was 170-190/70's, was not treated.    Patient has been home in [place] for a few days now, BP yesterday 129/65, HR 53, asymptomatic.    Patient will go back to [facility] in August for an LP & MRI.    Scheduled 6/13/25 for Holter placement & ECHO; Kimberly requesting a sooner appointment than scheduled in July to discuss results.    Protocols used: Information Only Call - No Triage-Adult-[place]    "

## 2025-06-10 ENCOUNTER — TELEPHONE (OUTPATIENT)
Age: 75
End: 2025-06-10

## 2025-06-10 NOTE — TELEPHONE ENCOUNTER
Caller: Kimberly (patient's spouse)    Doctor: Dr. Herman ([facility])    Reason for call: Kimberly stated Dr. Herman requested additional medical records from patient ([facility]). Kimberly called requesting fax # for the [facility] location which I provided [phone]. Kimberly is filling out form which requires this fax # for [facility] location. Sending this encounter [facility] as courtesy KYLER.    Call back#: [phone]

## 2025-06-11 ENCOUNTER — TELEPHONE (OUTPATIENT)
Age: 75
End: 2025-06-11

## 2025-06-11 ENCOUNTER — TELEPHONE (OUTPATIENT)
Dept: SLEEP CENTER | Facility: CLINIC | Age: 75
End: 2025-06-11

## 2025-06-11 DIAGNOSIS — I10 ESSENTIAL HYPERTENSION: Primary | ICD-10-CM

## 2025-06-11 LAB

## 2025-06-11 RX ORDER — AMLODIPINE BESYLATE 5 MG/1
5 TABLET ORAL DAILY
Qty: 90 TABLET | Refills: 3 | Status: SHIPPED | OUTPATIENT
Start: 2025-06-11

## 2025-06-11 NOTE — TELEPHONE ENCOUNTER
Spoke with patient's wife Kimberly, inquiring about a new BP medication Dr Herman commented on ordering for Aron, there was nothing sent to [facility].    Stated his BP today is 152/86, HR 54, still with a fuzzy feeling in his head.    Please advise on a new medication.    Can send [employer] message if/when a script is sent, per Kimberly.

## 2025-06-11 NOTE — PROGRESS NOTES
Initially the beta-blockers were started because of the high PVC burden however due to the bradycardia and hypotension related issues that beta-blockers have been stopped.  -The amlodipine has been restarted.  As per the notes the patient will be getting some evaluation done at the [facility] including the MRI as well as the LP.

## 2025-06-12 ENCOUNTER — TELEPHONE (OUTPATIENT)
Age: 75
End: 2025-06-12

## 2025-06-12 ENCOUNTER — OFFICE VISIT (OUTPATIENT)
Age: 75
End: 2025-06-12
Payer: MEDICARE

## 2025-06-12 DIAGNOSIS — R41.841 COGNITIVE COMMUNICATION DEFICIT: Primary | ICD-10-CM

## 2025-06-12 DIAGNOSIS — R41.3 MEMORY CHANGES: ICD-10-CM

## 2025-06-12 PROCEDURE — 97130 THER IVNTJ EA ADDL 15 MIN: CPT

## 2025-06-12 PROCEDURE — 97129 THER IVNTJ 1ST 15 MIN: CPT

## 2025-06-12 NOTE — TELEPHONE ENCOUNTER
Annabelle from [facility] called in received notes from patients last office visit on 4/23/25 with Dr. Rubio   They are requesting notes sent again with doctors signature as well  Fax: [phone]

## 2025-06-12 NOTE — PROGRESS NOTES
"Daily Speech Treatment Note    Today's date: 2025   Patient’s name: Aron Oswald Jr.  : 1950  MRN: [id number]  Safety measures: memory difficulties, HTN, hx of etoh abuse   Referring provider: Ludy Person,*    Encounter Diagnosis     ICD-10-CM    1. Cognitive communication deficit  R41.841       2. Memory changes  R41.3             Visit Tracking:  POC expires Unit limit Auth Expiration date PT/OT + Visit Limit?   25 N/A 25 BOMN PCY                                           Visit/Unit Tracking  AUTH Status:  Date    RE  4/24 5/6  5/15  PU             No Used  6  1 2  3  1  2             Remaining   4  9 8  7  9  8               Subjective/Behavioral:  -Patient pleasant, engaged, and cooperative. Patient's wife, Kimberly, present for today's session. Patient reported going to [facility] for geriatrician services in order to determine/rule out presence/absence of progressive neurological disorder. Patient reported presenting w/ c/o of atypical vision changes (per patient, \"fuzzy\"); reported changes in BP readings, with lower than usual reading (81/45).    Objective/Assessment:  Patient reported cardiac changes since fall, including intermittent PVCs, low pressure readings, low resting HR. Patient attended ER while in [place], unrelated to appointment conducted for neurological evaluation. Patient and his spouse described the details surrounding the neuro appt, as well as ED visit. Discussed POC moving forward, future testing, related impairments to impact on daily functioning. Patient reported concerns for Lewy Body dementia or PD, 2/2 presence of RBD and MCI. STGs not formally addressed this session 2/2 after discussion of trip to [place].       Short-term goals:  Patient will be educated on and demonstrate understanding of word finding strategies (i.e., circumlocution) for improved generative naming and verbal expression skills, to be achieved in 2-4 weeks. "   Patient will utilize word finding strategies during semantic feature analysis treatment activity, with 80% accuracy, IND, in 3 out of 4 consecutive sessions, to facilitate improved naming and verbal expression skills, to be achieved in 8-10 weeks.  Patient will complete auditory immediate and short term memory tasks to facilitate increased ability to retell narratives and recall information within functional living environment, with 80% accuracy, IND using preferred memory strategy, in 3 out of 4 consecutive sessions, to be achieved in 10-12 weeks.  5/29/25: Reviewed internal memory strategies and external aids. Brainstormed ideas of strategy use to employ in daily tasks, including use of combination of different strategies and aids as needed to experience increased successful initiation/execution/completion of daily tasks. Discussed importance of reestablishing routine, increasing socialization and engagement with various communication partners; patient and wife verbally expressed understanding of information and agreement with recommendations.     Patient will complete complex auditory attention processing tasks (e.g., sentence unscramble, ranking numbers/words, etc.) to improve working memory, with 80% accuracy, IND, in 3 out of 4 consecutive sessions, to be achieved in 10-12 weeks.  Patient will complete thought organization tasks (e.g., sequencing, deduction puzzles, etc.) with 80% accuracy, IND, in 3 out of 4 consecutive sessions, to facilitate increased executive functioning, working memory, problem solving, and processing skills, to be achieved in 10-12 weeks.  To target mental manipulation and working memory, patient will participate in word finding activity (i.e., anagrams) with 80% accuracy, IND, in 3 out of 4 consecutive sessions, to be achieved in 10-12 weeks.   Patient will answer questions regarding story read aloud with 80% accuracy, IND, in 3 out of 4 consecutive sessions, to facilitate improved  auditory comprehension and recall, to be achieved in 8-10 weeks.   Patient will complete attention tasks (divided, auditory, alternating, sustained) by responding to multiple tasks or details within tasks at the same time in a distracting environment, with 80% accuracy, given min cues, in 3 out of 4 consecutive sessions, to improve attention and working memory, to be achieved in 10-12 weeks.        Plan:  -Patient was provided with home exercises/activities to target goals in plan of care at the end of today's session.  -Continue with current plan of care.

## 2025-06-13 ENCOUNTER — RA CDI HCC (OUTPATIENT)
Dept: OTHER | Facility: HOSPITAL | Age: 75
End: 2025-06-13

## 2025-06-13 ENCOUNTER — HOSPITAL ENCOUNTER (OUTPATIENT)
Dept: NON INVASIVE DIAGNOSTICS | Facility: CLINIC | Age: 75
Discharge: HOME | End: 2025-06-13
Attending: INTERNAL MEDICINE
Payer: MEDICARE

## 2025-06-13 VITALS
DIASTOLIC BLOOD PRESSURE: 80 MMHG | SYSTOLIC BLOOD PRESSURE: 138 MMHG | HEIGHT: 71 IN | WEIGHT: 192 LBS | BODY MASS INDEX: 26.88 KG/M2 | HEART RATE: 69 BPM

## 2025-06-13 DIAGNOSIS — I49.3 PVC (PREMATURE VENTRICULAR CONTRACTION): ICD-10-CM

## 2025-06-13 DIAGNOSIS — I10 ESSENTIAL HYPERTENSION: ICD-10-CM

## 2025-06-13 DIAGNOSIS — G47.33 OSA (OBSTRUCTIVE SLEEP APNEA): ICD-10-CM

## 2025-06-13 DIAGNOSIS — I10 PRIMARY HYPERTENSION: ICD-10-CM

## 2025-06-13 LAB
AORTIC ROOT: 3.2 CM
ASCENDING AORTA: 3.7 CM
BSA FOR ECHO PROCEDURE: 2.07 M2
E WAVE DECELERATION TIME: 156 MS
E/A RATIO: 0.81
FRACTIONAL SHORTENING: 34 (ref 28–44)
INTERVENTRICULAR SEPTUM IN DIASTOLE (PARASTERNAL SHORT AXIS VIEW): 1 CM
INTERVENTRICULAR SEPTUM: 1 CM (ref 0.6–1.1)
LAAS-AP2: 28.8 CM2
LAAS-AP4: 30.1 CM2
LEFT ATRIUM SIZE: 4.8 CM
LEFT ATRIUM VOLUME (MOD BIPLANE): 99 ML
LEFT ATRIUM VOLUME INDEX (MOD BIPLANE): 47.8 ML/M2
LEFT INTERNAL DIMENSION IN SYSTOLE: 2.9 CM (ref 2.1–4)
LEFT VENTRICULAR INTERNAL DIMENSION IN DIASTOLE: 4.4 CM (ref 3.5–6)
LEFT VENTRICULAR POSTERIOR WALL IN END DIASTOLE: 1 CM
LEFT VENTRICULAR STROKE VOLUME: 57 ML
LV EF US.2D.A4C+ESTIMATED: 70 %
LVSV (TEICH): 57 ML
MV E'TISSUE VEL-SEP: 6 CM/S
MV PEAK A VEL: 0.9 M/S
MV PEAK E VEL: 73 CM/S
MV STENOSIS PRESSURE HALF TIME: 45 MS
MV VALVE AREA P 1/2 METHOD: 4.89
RIGHT ATRIUM AREA SYSTOLE A4C: 22.1 CM2
RIGHT VENTRICLE ID DIMENSION: 4.1 CM
SL CV LEFT ATRIUM LENGTH A2C: 6.8 CM
SL CV LV EF: 60
SL CV PED ECHO LEFT VENTRICLE DIASTOLIC VOLUME (MOD BIPLANE) 2D: 89 ML
SL CV PED ECHO LEFT VENTRICLE SYSTOLIC VOLUME (MOD BIPLANE) 2D: 31 ML
TR MAX PG: 35 MMHG
TR PEAK VELOCITY: 2.9 M/S
TRICUSPID ANNULAR PLANE SYSTOLIC EXCURSION: 1.9 CM
TRICUSPID VALVE PEAK REGURGITATION VELOCITY: 2.94 M/S

## 2025-06-13 PROCEDURE — 93306 TTE W/DOPPLER COMPLETE: CPT

## 2025-06-13 PROCEDURE — 93226 XTRNL ECG REC<48 HR SCAN A/R: CPT

## 2025-06-13 PROCEDURE — 93225 XTRNL ECG REC<48 HRS REC: CPT

## 2025-06-13 PROCEDURE — 93306 TTE W/DOPPLER COMPLETE: CPT | Performed by: STUDENT IN AN ORGANIZED HEALTH CARE EDUCATION/TRAINING PROGRAM

## 2025-06-16 ENCOUNTER — HOSPITAL ENCOUNTER (EMERGENCY)
Facility: HOSPITAL | Age: 75
Discharge: HOME | End: 2025-06-16
Payer: MEDICARE

## 2025-06-16 ENCOUNTER — NURSE TRIAGE (OUTPATIENT)
Dept: OTHER | Facility: OTHER | Age: 75
End: 2025-06-16

## 2025-06-16 VITALS
DIASTOLIC BLOOD PRESSURE: 57 MMHG | SYSTOLIC BLOOD PRESSURE: 108 MMHG | HEART RATE: 84 BPM | TEMPERATURE: 97.6 F | OXYGEN SATURATION: 100 % | RESPIRATION RATE: 16 BRPM

## 2025-06-16 DIAGNOSIS — I95.9 HYPOTENSION: Primary | ICD-10-CM

## 2025-06-16 LAB
ALBUMIN SERPL BCG-MCNC: 4.2 G/DL (ref 3.5–5)
ALP SERPL-CCNC: 40 U/L (ref 34–104)
ALT SERPL W P-5'-P-CCNC: 18 U/L (ref 7–52)
ANION GAP SERPL CALCULATED.3IONS-SCNC: 5 MMOL/L (ref 4–13)
AST SERPL W P-5'-P-CCNC: 25 U/L (ref 13–39)
BASOPHILS # BLD AUTO: 0.03 THOUSANDS/ÂΜL (ref 0–0.1)
BASOPHILS NFR BLD AUTO: 1 % (ref 0–1)
BILIRUB SERPL-MCNC: 0.79 MG/DL (ref 0.2–1)
BUN SERPL-MCNC: 24 MG/DL (ref 5–25)
CALCIUM SERPL-MCNC: 9.3 MG/DL (ref 8.4–10.2)
CHLORIDE SERPL-SCNC: 108 MMOL/L (ref 96–108)
CO2 SERPL-SCNC: 27 MMOL/L (ref 21–32)
CREAT SERPL-MCNC: 1.33 MG/DL (ref 0.6–1.3)
EOSINOPHIL # BLD AUTO: 0.11 THOUSAND/ÂΜL (ref 0–0.61)
EOSINOPHIL NFR BLD AUTO: 2 % (ref 0–6)
ERYTHROCYTE [DISTWIDTH] IN BLOOD BY AUTOMATED COUNT: 14 % (ref 11.6–15.1)
GFR SERPL CREATININE-BSD FRML MDRD: 51 ML/MIN/1.73SQ M
GLUCOSE SERPL-MCNC: 167 MG/DL (ref 65–140)
HCT VFR BLD AUTO: 40.9 % (ref 36.5–49.3)
HGB BLD-MCNC: 13.4 G/DL (ref 12–17)
IMM GRANULOCYTES # BLD AUTO: 0.01 THOUSAND/UL (ref 0–0.2)
IMM GRANULOCYTES NFR BLD AUTO: 0 % (ref 0–2)
LYMPHOCYTES # BLD AUTO: 1.38 THOUSANDS/ÂΜL (ref 0.6–4.47)
LYMPHOCYTES NFR BLD AUTO: 28 % (ref 14–44)
MCH RBC QN AUTO: 31.2 PG (ref 26.8–34.3)
MCHC RBC AUTO-ENTMCNC: 32.8 G/DL (ref 31.4–37.4)
MCV RBC AUTO: 95 FL (ref 82–98)
MONOCYTES # BLD AUTO: 0.4 THOUSAND/ÂΜL (ref 0.17–1.22)
MONOCYTES NFR BLD AUTO: 8 % (ref 4–12)
NEUTROPHILS # BLD AUTO: 2.97 THOUSANDS/ÂΜL (ref 1.85–7.62)
NEUTS SEG NFR BLD AUTO: 61 % (ref 43–75)
NRBC BLD AUTO-RTO: 0 /100 WBCS
PLATELET # BLD AUTO: 130 THOUSANDS/UL (ref 149–390)
PMV BLD AUTO: 11.5 FL (ref 8.9–12.7)
POTASSIUM SERPL-SCNC: 5 MMOL/L (ref 3.5–5.3)
PROT SERPL-MCNC: 6.9 G/DL (ref 6.4–8.4)
RBC # BLD AUTO: 4.29 MILLION/UL (ref 3.88–5.62)
SODIUM SERPL-SCNC: 140 MMOL/L (ref 135–147)
WBC # BLD AUTO: 4.9 THOUSAND/UL (ref 4.31–10.16)

## 2025-06-16 PROCEDURE — 36415 COLL VENOUS BLD VENIPUNCTURE: CPT

## 2025-06-16 PROCEDURE — 85025 COMPLETE CBC W/AUTO DIFF WBC: CPT

## 2025-06-16 PROCEDURE — 93005 ELECTROCARDIOGRAM TRACING: CPT

## 2025-06-16 PROCEDURE — 99285 EMERGENCY DEPT VISIT HI MDM: CPT

## 2025-06-16 PROCEDURE — 99284 EMERGENCY DEPT VISIT MOD MDM: CPT

## 2025-06-16 PROCEDURE — 80053 COMPREHEN METABOLIC PANEL: CPT

## 2025-06-16 NOTE — TELEPHONE ENCOUNTER
"REASON FOR CONVERSATION: Hypotension    SYMPTOMS: hypotension, BP 67/43, similar reading a few minutes apart. Both taken in seated position. Wife states cuff is reliable. Pt had some \"blank staring\" and reported \"feeling fuzzy\"    OTHER HEALTH INFORMATION: prior episode earlier in the month while out of town, was evaluated in the ED at that time.     PROTOCOL DISPOSITION: Go to ED Now    CARE ADVICE PROVIDED: Go to ED. Declined EMS. Advised to call 911 with any worsened symptoms or unresponsiveness.     PRACTICE FOLLOW-UP: ED follow up      Reason for Disposition   [1] Systolic BP < 80 AND [2] NOT feeling weak or lightheaded    Answer Assessment - Initial Assessment Questions  1. BLOOD PRESSURE: \"What is your blood pressure?\" \"Did you take at least two measurements 5 minutes apart?\"        Digital on the upper arm    2. ONSET: \"When did you take your blood pressure?\"        Just now    3. HOW: \"How did you take your blood pressure?\" (e.g., visiting nurse, automatic home BP monitor)        digital    4. HISTORY: \"Do you have a history of low blood pressure?\" \"What is your blood pressure normally?\"        Yes, episode of hypotension earlier in the month while out of town, went to the ED at that time for eval.     5. MEDICINES: \"Are you taking any medicines for blood pressure?\" If Yes, ask: \"Have they been changed recently?\"        Amlodipine 5mg    6. PULSE RATE: \"Do you know what your pulse rate is?\"         81     7. OTHER SYMPTOMS: \"Have you been sick recently?\" \"Have you had a recent injury?\"        \"Blank stare\" \"feeling fuzzy\"    Protocols used: Blood Pressure - Low-Adult-AH    "

## 2025-06-16 NOTE — TELEPHONE ENCOUNTER
Regarding:  blood pressure 67/43 & pulse 81  ----- Message from Bladimir FLORES sent at 6/16/2025  7:01 PM EDT -----  '' My  blood pressure is 67/43 pulse 81 concern.''

## 2025-06-17 ENCOUNTER — NURSE TRIAGE (OUTPATIENT)
Age: 75
End: 2025-06-17

## 2025-06-17 LAB
ATRIAL RATE: 69 BPM
P AXIS: 60 DEGREES
PR INTERVAL: 164 MS
QRS AXIS: 42 DEGREES
QRSD INTERVAL: 88 MS
QT INTERVAL: 428 MS
QTC INTERVAL: 458 MS
T WAVE AXIS: 59 DEGREES
VENTRICULAR RATE: 69 BPM

## 2025-06-17 PROCEDURE — 93010 ELECTROCARDIOGRAM REPORT: CPT | Performed by: INTERNAL MEDICINE

## 2025-06-17 NOTE — ED PROVIDER NOTES
Time reflects when diagnosis was documented in both MDM as applicable and the Disposition within this note       Time User Action Codes Description Comment    6/16/2025  9:02 PM Yariel Gotti Add [I95.9] Hypotension           ED Disposition       ED Disposition   Discharge    Condition   Stable    Date/Time   Mon Jun 16, 2025  9:02 PM    Comment   Aron Oswald Jr. discharge to home/self care.                   Assessment & Plan       Medical Decision Making  Patient is currently normotensive here and asymptomatic will get basic blood work to look for any signs of anemia or electrolyte disturbance also get an EKG and additional orthostatic vital signs.        Amount and/or Complexity of Data Reviewed  Labs: ordered. Decision-making details documented in ED Course.  ECG/medicine tests:  Decision-making details documented in ED Course.        ED Course as of 06/18/25 2252   Mon Jun 16, 2025 2017 ECG 12 lead  Compared to prior August 29, 2024, sinus rhythm, normal axis, normal intervals, occasional PVC, no ST elevation or depression.  Otherwise normal EKG   2035 WBC: 4.90   2035 Hemoglobin: 13.4   2035 Platelet Count(!): 130  History of similar   2103 May be possible dehydration.  Recommend he continue to drink water having follow-up with his primary care and outpatient follow-up.       Medications - No data to display    ED Risk Strat Scores                    No data recorded        SBIRT 22yo+      Flowsheet Row Most Recent Value   Initial Alcohol Screen: US AUDIT-C     1. How often do you have a drink containing alcohol? 0 Filed at: 06/16/2025 1944   2. How many drinks containing alcohol do you have on a typical day you are drinking?  0 Filed at: 06/16/2025 1944   3a. Male UNDER 65: How often do you have five or more drinks on one occasion? 0 Filed at: 06/16/2025 1937   3b. FEMALE Any Age, or MALE 65+: How often do you have 4 or more drinks on one occassion? 0 Filed at: 06/16/2025 1944   Audit-C Score 0 Filed at:  "06/16/2025 1944   MIRTHA: How many times in the past year have you...    Used an illegal drug or used a prescription medication for non-medical reasons? Never Filed at: 06/16/2025 1944                            History of Present Illness       Chief Complaint   Patient presents with    Hypotension     C/o \"feeling fuzzy\" when he checked his BP it was 67/43 & was advised to come to ED.        Past Medical History[1]   Past Surgical History[2]   Family History[3]   Social History[4]   E-Cigarette/Vaping    E-Cigarette Use Never User       E-Cigarette/Vaping Substances    Nicotine No     THC No     CBD No     Flavoring No     Other No     Unknown No       I have reviewed and agree with the history as documented.     74-year-old male past medical history of PVCs, neuromuscular dysplasia, bradycardia, hypertension presenting to the emergency department for hypotension.  He states that earlier this evening at dinner he was sitting there eating dinner after he advised he felt like his vision was slightly off.  He states that he then took his blood pressure and his systolic was 67.  He says that he has been having episodes like this for the last several weeks he was recently taken off metoprolol and placed on Norvasc.  He also was recently on a Holter monitor until earlier today/yesterday for 48 hours.  He says that during this episode he did not have the Holter monitor on as he had already had it removed.  He states that this did not happen after standing up or any events of any kind.  States that he was simply sitting and had no change in position.  He says that he started feeling better but they come to the ED to be evaluated.  He has been at [facility] to be evaluated for other neurologic processes but no results of this test are available.  He recently had echocardiogram as well.          Review of Systems        Objective       ED Triage Vitals   Temperature Pulse Blood Pressure Respirations SpO2 Patient Position " - Orthostatic VS   06/16/25 1936 06/16/25 1936 06/16/25 1936 06/16/25 1936 06/16/25 1936 06/16/25 1945   97.6 °F (36.4 °C) 83 117/56 16 98 % Lying      Temp src Heart Rate Source BP Location FiO2 (%) Pain Score    -- 06/16/25 1945 06/16/25 1945 -- --     Monitor Right arm        Vitals      Date and Time Temp Pulse SpO2 Resp BP Pain Score FACES Pain Rating User   06/16/25 2046 -- 84 -- 16 108/57 -- -- TS   06/16/25 2043 -- 74 -- 16 111/59 -- -- TS   06/16/25 2040 -- 65 -- 16 125/60 -- -- TS   06/16/25 1945 -- 75 100 % 18 137/64 -- -- TS   06/16/25 1936 97.6 °F (36.4 °C) 83 98 % 16 117/56 -- -- AM            Physical Exam  Vitals and nursing note reviewed.   Constitutional:       Appearance: Normal appearance. He is well-developed.   HENT:      Head: Normocephalic and atraumatic.      Nose: Nose normal.      Mouth/Throat:      Mouth: Mucous membranes are moist.      Pharynx: No oropharyngeal exudate or posterior oropharyngeal erythema.     Eyes:      Extraocular Movements: Extraocular movements intact.      Conjunctiva/sclera: Conjunctivae normal.      Pupils: Pupils are equal, round, and reactive to light.       Cardiovascular:      Rate and Rhythm: Normal rate and regular rhythm.      Pulses: Normal pulses.      Heart sounds: Normal heart sounds.   Pulmonary:      Effort: Pulmonary effort is normal.      Breath sounds: Normal breath sounds.   Abdominal:      General: Bowel sounds are normal. There is no distension.      Palpations: Abdomen is soft.      Tenderness: There is no abdominal tenderness. There is no guarding or rebound.     Musculoskeletal:         General: Normal range of motion.      Cervical back: Normal range of motion and neck supple.      Right lower leg: No edema.      Left lower leg: No edema.     Skin:     General: Skin is warm and dry.      Capillary Refill: Capillary refill takes less than 2 seconds.     Neurological:      General: No focal deficit present.      Mental Status: He is alert and  oriented to person, place, and time.      Cranial Nerves: No cranial nerve deficit.     Psychiatric:         Mood and Affect: Mood normal.         Behavior: Behavior normal.         Results Reviewed       Procedure Component Value Units Date/Time    Comprehensive metabolic panel [[id number]]  (Abnormal) Collected: 06/16/25 2005    Lab Status: Final result Specimen: Blood from Arm, Right Updated: 06/16/25 2049     Sodium 140 mmol/L      Potassium 5.0 mmol/L      Chloride 108 mmol/L      CO2 27 mmol/L      ANION GAP 5 mmol/L      BUN 24 mg/dL      Creatinine 1.33 mg/dL      Glucose 167 mg/dL      Calcium 9.3 mg/dL      AST 25 U/L      ALT 18 U/L      Alkaline Phosphatase 40 U/L      Total Protein 6.9 g/dL      Albumin 4.2 g/dL      Total Bilirubin 0.79 mg/dL      eGFR 51 ml/min/1.73sq m     Narrative:      National Kidney Disease Foundation guidelines for Chronic Kidney Disease (CKD):     Stage 1 with normal or high GFR (GFR > 90 mL/min/1.73 square meters)    Stage 2 Mild CKD (GFR = 60-89 mL/min/1.73 square meters)    Stage 3A Moderate CKD (GFR = 45-59 mL/min/1.73 square meters)    Stage 3B Moderate CKD (GFR = 30-44 mL/min/1.73 square meters)    Stage 4 Severe CKD (GFR = 15-29 mL/min/1.73 square meters)    Stage 5 End Stage CKD (GFR <15 mL/min/1.73 square meters)  Note: GFR calculation is accurate only with a steady state creatinine    CBC and differential [[id number]]  (Abnormal) Collected: 06/16/25 2005    Lab Status: Final result Specimen: Blood from Arm, Right Updated: 06/16/25 2035     WBC 4.90 Thousand/uL      RBC 4.29 Million/uL      Hemoglobin 13.4 g/dL      Hematocrit 40.9 %      MCV 95 fL      MCH 31.2 pg      MCHC 32.8 g/dL      RDW 14.0 %      MPV 11.5 fL      Platelets 130 Thousands/uL      nRBC 0 /100 WBCs      Segmented % 61 %      Immature Grans % 0 %      Lymphocytes % 28 %      Monocytes % 8 %      Eosinophils Relative 2 %      Basophils Relative 1 %      Absolute Neutrophils 2.97 Thousands/µL       Absolute Immature Grans 0.01 Thousand/uL      Absolute Lymphocytes 1.38 Thousands/µL      Absolute Monocytes 0.40 Thousand/µL      Eosinophils Absolute 0.11 Thousand/µL      Basophils Absolute 0.03 Thousands/µL             No orders to display       Procedures    ED Medication and Procedure Management   Prior to Admission Medications   Prescriptions Last Dose Informant Patient Reported? Taking?   Cholecalciferol (Vitamin D) 50 MCG ( UT) tablet Not Taking Self Yes No   Sig: Take 2,000 Units by mouth daily   Patient not taking: Reported on 2025   Melatonin 5 MG TABS 6/15/2025 Evening Self Yes Yes   Sig: Take 5 mg by mouth daily at bedtime   Multiple Vitamins-Minerals (Multivitamin Adults) TABS Not Taking Self Yes No   Patient not taking: Reported on 2025   Saw [place] 160 MG CAPS 6/15/2025 Self Yes Yes   Sig: Take by mouth in the morning.   alfuzosin (UROXATRAL) 10 mg 24 hr tablet 6/15/2025 Self No Yes   Sig: Take 1 tablet (10 mg total) by mouth daily   amLODIPine (NORVASC) 5 mg tablet 6/15/2025  No Yes   Sig: Take 1 tablet (5 mg total) by mouth daily   ofloxacin (OCUFLOX) 0.3 % ophthalmic solution  Self No No   Si drops to the left ear twice a day for 7 days   sildenafil (VIAGRA) 50 MG tablet Past Month Self No Yes   Sig: Take 1 tablet (50 mg total) by mouth daily as needed for erectile dysfunction      Facility-Administered Medications: None     Discharge Medication List as of 2025  9:03 PM        CONTINUE these medications which have NOT CHANGED    Details   alfuzosin (UROXATRAL) 10 mg 24 hr tablet Take 1 tablet (10 mg total) by mouth daily, Starting 2025, Normal      amLODIPine (NORVASC) 5 mg tablet Take 1 tablet (5 mg total) by mouth daily, Starting 2025, Normal      Melatonin 5 MG TABS Take 5 mg by mouth daily at bedtime, Starting Thu 2024, Historical Med      Saw [place] 160 MG CAPS Take by mouth in the morning., Historical Med      sildenafil (VIAGRA) 50 MG tablet  Take 1 tablet (50 mg total) by mouth daily as needed for erectile dysfunction, Starting Mon 12/2/2024, Normal      Cholecalciferol (Vitamin D) 50 MCG (2000 UT) tablet Take 2,000 Units by mouth daily, Starting Wed 11/20/2024, Historical Med      Multiple Vitamins-Minerals (Multivitamin Adults) TABS Historical Med      ofloxacin (OCUFLOX) 0.3 % ophthalmic solution 4 drops to the left ear twice a day for 7 days, Normal           No discharge procedures on file.  ED SEPSIS DOCUMENTATION   Time reflects when diagnosis was documented in both MDM as applicable and the Disposition within this note       Time User Action Codes Description Comment    6/16/2025  9:02 PM Yariel Gotti [I95.9] Hypotension                      [1]   Past Medical History:  Diagnosis Date    Allergic     Anxiety     COVID-19 virus infection 12/05/2022    Fractures 8/27/2024    C5 - fall from bed, right arm weakness    Hypertension     Refusal of blood transfusions as patient is Mandaen    [2]   Past Surgical History:  Procedure Laterality Date    APPENDECTOMY      US GUIDED THYROID BIOPSY  1/16/2025   [3]   Family History  Problem Relation Name Age of Onset    No Known Problems Mother      Cancer Father Aron Sr.         Stomach, urethral cancers    Diabetes Father Aron Sr.     Hearing loss Father Aron Sr.     Glaucoma Father Aron Sr.     Diabetes Brother Meet     Prostate cancer Brother Meet    [4]   Social History  Tobacco Use    Smoking status: Never    Smokeless tobacco: Never   Vaping Use    Vaping status: Never Used   Substance Use Topics    Alcohol use: Not Currently    Drug use: Never        Yariel Gotti MD  06/18/25 0025

## 2025-06-17 NOTE — DISCHARGE INSTRUCTIONS
Your emergency department for low blood pressure.  When you arrived to the emergency department your blood pressure was within normal limits.  We did do an EKG and lab testing which did not reveal any abnormalities there is a possibility that you have some slight dehydration we recommend that you continue to drink water.  We are unsure what caused her hypotension at home.  We do recommend that you continue to follow-up with your outpatient follow-up and workup for the same.  She develop new or worsening symptoms please return to the emergency department.

## 2025-06-17 NOTE — TELEPHONE ENCOUNTER
"REASON FOR CONVERSATION: Medication Problem (amlodipine)    SYMPTOMS: asking when to hold Amlodipine    OTHER HEALTH INFORMATION: seen in ED lastnight due to symptomatic hypotension episode 60/40's.  Did not take Amlodipine lastnight; BP lastnight 1265, HR 72; today 121/92, 76    PROTOCOL DISPOSITION: Callback From Office Within 24 Hours (overriding Callback by PCP Today)    CARE ADVICE PROVIDED: will send update to dr Herman regarding ED visit for hypotension.    PRACTICE FOLLOW-UP: Kimberly requesting specific parameters for Aron to take Amlodipine, BP to be above what; please contact with recommendations    Reason for Disposition   Caller has NON-URGENT medicine question about med that PCP or specialist prescribed and triager unable to answer question     Asking for specific parameters on giving or holding Amlodipine dose.    Answer Assessment - Initial Assessment Questions  1. NAME of MEDICINE: \"What medicine(s) are you calling about?\"      amlodipine  2. QUESTION: \"What is your question?\" (e.g., double dose of medicine, side effect)      Can I have specific parameters for Aron to take the Amlodipine?  3. PRESCRIBER: \"Who prescribed the medicine?\" Reason: if prescribed by specialist, call should be referred to that group.      Dr Herman  4. SYMPTOMS: \"Do you have any symptoms?\" If Yes, ask: \"What symptoms are you having?\"  \"How bad are the symptoms (e.g., mild, moderate, severe)      Hypotension episode lastnight requiring ED evaluation     Did not take Amlodipine last night due to hypotension episode, symptomatic, requiring ED evaluation.    Protocols used: Medication Question Call-Adult-[place]    "

## 2025-06-18 ENCOUNTER — OFFICE VISIT (OUTPATIENT)
Dept: PHYSICAL THERAPY | Facility: CLINIC | Age: 75
End: 2025-06-18
Attending: NURSE PRACTITIONER
Payer: MEDICARE

## 2025-06-18 DIAGNOSIS — R26.2 AMBULATORY DYSFUNCTION: Primary | ICD-10-CM

## 2025-06-18 PROCEDURE — 97110 THERAPEUTIC EXERCISES: CPT

## 2025-06-18 PROCEDURE — 97112 NEUROMUSCULAR REEDUCATION: CPT

## 2025-06-18 NOTE — PROGRESS NOTES
Daily Note     Today's date: 2025  Patient name: Aron Oswald Jr.  : 1950  MRN: [id number]  Referring provider: Zoraida Crabtree CRNP  Dx:   Encounter Diagnosis     ICD-10-CM    1. Ambulatory dysfunction  R26.2                      Subjective: Pt reports no changes since LV.      Objective: See treatment diary below      Assessment: Tolerated treatment well. Initiated balance training today, pt most challenged during bilateral BOSU step ups with intermittent 1 UE assist required to complete.  Pt able to complete all other exercises today with good technique.  Patient would benefit from continued PT      Plan: Continue per plan of care.      POC expires Unit limit Auth  expiration date PT/OT + Visit Limit?   25 N/A N/A BOMN                 Visit/Unit Tracking  AUTH Status:  Date              N/A Used 1 2              Remaining                  Precautions: HTN, h/o C5 fx      Manuals            Vitals  BP: 164/82    HR: 50                                                  Neuro Re-Ed             Bosu FSU  X20 B/L intermittent UE assit           SLS             Tandem stance  4x30''           Patient education: gym membership/walking program, cardiovascular activity, HEP compliance, balance systems  GR                                                   Ther Ex             Nustep: UE/LE  L1 10'                                                                                                      Ther Activity             Forward, lateral hurdles  4 laps susanna Lindo: resisted walking all directions  20# fwd/bad x10                                     Gait Training                                       Modalities

## 2025-06-19 ENCOUNTER — OFFICE VISIT (OUTPATIENT)
Age: 75
End: 2025-06-19
Payer: MEDICARE

## 2025-06-19 DIAGNOSIS — R41.841 COGNITIVE COMMUNICATION DEFICIT: Primary | ICD-10-CM

## 2025-06-19 DIAGNOSIS — R41.3 MEMORY CHANGES: ICD-10-CM

## 2025-06-19 PROCEDURE — 97130 THER IVNTJ EA ADDL 15 MIN: CPT

## 2025-06-19 PROCEDURE — 97129 THER IVNTJ 1ST 15 MIN: CPT

## 2025-06-20 ENCOUNTER — TELEPHONE (OUTPATIENT)
Age: 75
End: 2025-06-20

## 2025-06-20 ENCOUNTER — OFFICE VISIT (OUTPATIENT)
Dept: INTERNAL MEDICINE CLINIC | Age: 75
End: 2025-06-20
Payer: MEDICARE

## 2025-06-20 VITALS
TEMPERATURE: 97.5 F | HEIGHT: 71 IN | WEIGHT: 190 LBS | BODY MASS INDEX: 26.6 KG/M2 | DIASTOLIC BLOOD PRESSURE: 74 MMHG | OXYGEN SATURATION: 97 % | SYSTOLIC BLOOD PRESSURE: 132 MMHG | HEART RATE: 60 BPM

## 2025-06-20 DIAGNOSIS — Z09 HOSPITAL DISCHARGE FOLLOW-UP: Primary | ICD-10-CM

## 2025-06-20 DIAGNOSIS — I10 ESSENTIAL HYPERTENSION: ICD-10-CM

## 2025-06-20 DIAGNOSIS — N52.9 ERECTILE DYSFUNCTION, UNSPECIFIED ERECTILE DYSFUNCTION TYPE: ICD-10-CM

## 2025-06-20 DIAGNOSIS — I95.89 OTHER SPECIFIED HYPOTENSION: ICD-10-CM

## 2025-06-20 PROCEDURE — 99214 OFFICE O/P EST MOD 30 MIN: CPT | Performed by: INTERNAL MEDICINE

## 2025-06-20 PROCEDURE — G2211 COMPLEX E/M VISIT ADD ON: HCPCS | Performed by: INTERNAL MEDICINE

## 2025-06-20 RX ORDER — SILDENAFIL 50 MG/1
50 TABLET, FILM COATED ORAL DAILY PRN
Qty: 10 TABLET | Refills: 5 | Status: CANCELLED | OUTPATIENT
Start: 2025-06-20

## 2025-06-20 NOTE — TELEPHONE ENCOUNTER
Pt wife called to inform provider that after appt today, 6/20/25, pt and wife went to eat lunch then came home. When they got home, pt had another low BP episode where his BP was 94/48, HR 56. About 5-10 min later, BP increased to 104/55, pt stated he started to feel better.    Wife is very concerned as these low BP episodes are becoming more frequent. Wife also stated that her cuff BP readings are consistent with pt symptoms, cardio advised wife to bring cuff to next appt as the cuff may be faulty. Wife does not feel this is the issue. Wife also concerned as his appt with cardio is not until 7/21/25, and she doesn't think he should wait that long.

## 2025-06-20 NOTE — PROGRESS NOTES
Name: Aron Oswald Jr.      : 1950      MRN: [id number]  Encounter Provider: Whitney Bey DO  Encounter Date: 2025   Encounter department: [facility]  :  Assessment & Plan  Hospital discharge follow-up  -Done 5 days postdischarge  - Blood pressure has been fairly well-controlled since then       Other specified hypotension  -Currently resolved and possibly 2/2 medication side effect plus dehydration unlikely autonomic dysfunction especially with widely varying blood pressures.  - Patient was counseled to keep well-hydrated and to cut down on caffeine intake  - We will hold off on prescribing sildenafil for him today because of the side effect of hypotension especially since he is also on alfuzosin which also has similar side effect.  - He was counseled to follow-up with cardiology ASAP  - He was counseled to move very slowly to avoid falling       Erectile dysfunction, unspecified erectile dysfunction type  -Patient would like a refill of his sildenafil  - I will hold sildenafil for now because of the side effect of hypotension       Essential hypertension  -Well-controlled  - Continue with amlodipine.  Of note, I will not discontinue his amlodipine because patient sometimes has very elevated blood pressures.  - I will defer to cardiology on discontinuing antihypertensive  - Continue with a low-salt diet             Depression Screening and Follow-up Plan: Patient was screened for depression during today's encounter. They screened negative with a PHQ-2 score of 0.      Falls Plan of Care: Medications that increase falls were reviewed. Vitamin D supplementation was recommended.       History of Present Illness   HPI    Patient presents for a follow-up visit regarding his recent ED visit for hypotension.  He has been seen in the ED two times.  He states  he was eating and drinking coffee and started getting fuzzy vision and checked his bp and it was 81/45 in [place]  and they went to urgent care and it had gone up to 108 systolic and then they went to the ED and 190 systolic within 4 hours of low BP and then it finally came down to 170   The next day BP was in the 130s   They did a work up and could not find out what caused it  Wife states that he has a lot of pvcs that he does not feel  Sees the cardilogist   The second time that his blood pressure was low was 4 days ago and the systolic bp at that time was  67 and he had just eaten again.  His wife states that sometimes when the blood pressure falls, he is not postprandial.  She admits that he take sildenafil and does have symptoms after taking the medication.  They have been keeping a record of his blood pressure and BP is mostly around 110 or 120.  Of note, did state that he was started on  BB 2 months ago and later changed to amlodipine   Will see his cardiologist in a few weeks.  He admits that he does not drink enough water and does drink a cup of coffee daily.  He would like a refill of sildenafil today.      Review of Systems   Constitutional:  Negative for activity change, chills, fatigue, fever and unexpected weight change.   HENT:  Negative for ear pain, postnasal drip, rhinorrhea, sinus pressure and sore throat.    Eyes:  Negative for pain.   Respiratory:  Negative for cough, choking, chest tightness, shortness of breath and wheezing.    Cardiovascular:  Negative for chest pain, palpitations and leg swelling.   Gastrointestinal:  Negative for abdominal pain, constipation, diarrhea, nausea and vomiting.   Genitourinary:  Negative for dysuria and hematuria.   Musculoskeletal:  Negative for arthralgias, back pain, gait problem, joint swelling, myalgias and neck stiffness.   Skin:  Negative for pallor and rash.   Neurological:  Positive for dizziness (Occasional fuzzy sensation in his head). Negative for tremors, seizures, syncope, light-headedness and headaches.   Hematological:  Negative for adenopathy.  "  Psychiatric/Behavioral:  Negative for behavioral problems.        Objective   /74 (BP Location: Left arm, Patient Position: Sitting, Cuff Size: Standard)   Pulse 60   Temp 97.5 °F (36.4 °C) (Temporal)   Ht 5' 11\" (1.803 m)   Wt 86.2 kg (190 lb)   SpO2 97%   BMI 26.50 kg/m²      Physical Exam  Constitutional:       General: He is not in acute distress.     Appearance: He is well-developed. He is not diaphoretic.   HENT:      Head: Normocephalic and atraumatic.      Right Ear: External ear normal.      Left Ear: External ear normal.      Nose: Nose normal.      Mouth/Throat:      Pharynx: No oropharyngeal exudate.     Eyes:      General: No scleral icterus.        Right eye: No discharge.         Left eye: No discharge.      Conjunctiva/sclera: Conjunctivae normal.      Pupils: Pupils are equal, round, and reactive to light.     Neck:      Thyroid: No thyromegaly.      Vascular: No JVD.      Trachea: No tracheal deviation.     Cardiovascular:      Rate and Rhythm: Normal rate. Rhythm irregularly irregular. Extrasystoles are present.     Heart sounds: Normal heart sounds. No murmur heard.     No friction rub. No gallop.   Pulmonary:      Effort: Pulmonary effort is normal. No respiratory distress.      Breath sounds: Normal breath sounds. No wheezing or rales.   Chest:      Chest wall: No tenderness.   Abdominal:      General: Bowel sounds are normal. There is no distension.      Palpations: Abdomen is soft. There is no mass.      Tenderness: There is no abdominal tenderness. There is no guarding or rebound.     Musculoskeletal:         General: No tenderness or deformity. Normal range of motion.      Cervical back: Normal range of motion and neck supple.   Lymphadenopathy:      Cervical: No cervical adenopathy.     Skin:     General: Skin is warm and dry.      Coloration: Skin is not pale.      Findings: No erythema or rash.     Neurological:      Mental Status: He is alert and oriented to person, place, " and time.      Cranial Nerves: No cranial nerve deficit.      Motor: No abnormal muscle tone.      Coordination: Coordination normal.      Deep Tendon Reflexes: Reflexes are normal and symmetric.     Psychiatric:         Behavior: Behavior normal.

## 2025-06-20 NOTE — ASSESSMENT & PLAN NOTE
-Patient would like a refill of his sildenafil  - I will hold sildenafil for now because of the side effect of hypotension

## 2025-06-20 NOTE — TELEPHONE ENCOUNTER
Mrs Oswald called the clinical line. Patient's BP now is 100/56.  He is feeling better.  He will stop the Amlodipine.  He did see the PCP today who is recommending ambulatory BP cuff monitoring.  Spouse is requesting a sooner visit with Dr Herman than the visit scheduled for 7/21.

## 2025-06-21 NOTE — ASSESSMENT & PLAN NOTE
-Well-controlled  - Continue with amlodipine.  Of note, I will not discontinue his amlodipine because patient sometimes has very elevated blood pressures.  - I will defer to cardiology on discontinuing antihypertensive  - Continue with a low-salt diet

## 2025-06-24 ENCOUNTER — OFFICE VISIT (OUTPATIENT)
Dept: PHYSICAL THERAPY | Facility: CLINIC | Age: 75
End: 2025-06-24
Attending: NURSE PRACTITIONER
Payer: MEDICARE

## 2025-06-24 DIAGNOSIS — R26.2 AMBULATORY DYSFUNCTION: Primary | ICD-10-CM

## 2025-06-24 PROCEDURE — 97112 NEUROMUSCULAR REEDUCATION: CPT

## 2025-06-24 PROCEDURE — 97110 THERAPEUTIC EXERCISES: CPT

## 2025-06-24 PROCEDURE — 97530 THERAPEUTIC ACTIVITIES: CPT

## 2025-06-24 NOTE — PROGRESS NOTES
"Daily Note     Today's date: 2025  Patient name: Aron Oswald Jr.  : 1950  MRN: [id number]  Referring provider: Zoraida Crabtree CRNP  Dx:   Encounter Diagnosis     ICD-10-CM    1. Ambulatory dysfunction  R26.2                      Subjective: Patient requests to end session.       Objective: See treatment diary below      Assessment: Tolerated treatment well. Did very well with hurdles and bosu. Intermittent UE support. Added wobble board. Patient demonstrated fatigue post treatment and would benefit from continued PT      Plan: Progress treatment as tolerated.         POC expires Unit limit Auth  expiration date PT/OT + Visit Limit?   25 N/A N/A BOMN                 Visit/Unit Tracking  AUTH Status:  Date             N/A Used 1 2 3             Remaining                  Precautions: HTN, h/o C5 fx      Manuals           Vitals  BP: 164/82    HR: 50 BP:  120/60                                                 Neuro Re-Ed             Bosu FSU  X20 B/L intermittent UE assit X20 B/L intermittent UE           SLS             Tandem stance  4x30'' 2x30\" ea          Patient education: gym membership/walking program, cardiovascular activity, HEP compliance, balance systems  GR            Wobble board: balance   1 min ea balance          Wobble board: taps    20x ea                        Ther Ex             Nustep: UE/LE  L1 10' L5 10'                                                                                                      Ther Activity             Forward, lateral hurdles  4 laps ea 4 laps ea 5# KB fwd?         Lava alan             [place]: resisted walking all directions  20# fwd/bad x10 NV                                     Gait Training                                       Modalities                                                     "

## 2025-06-26 ENCOUNTER — OFFICE VISIT (OUTPATIENT)
Dept: PHYSICAL THERAPY | Facility: CLINIC | Age: 75
End: 2025-06-26
Attending: NURSE PRACTITIONER
Payer: MEDICARE

## 2025-06-26 ENCOUNTER — OFFICE VISIT (OUTPATIENT)
Age: 75
End: 2025-06-26
Payer: MEDICARE

## 2025-06-26 DIAGNOSIS — R41.841 COGNITIVE COMMUNICATION DEFICIT: Primary | ICD-10-CM

## 2025-06-26 DIAGNOSIS — R41.3 MEMORY CHANGES: ICD-10-CM

## 2025-06-26 DIAGNOSIS — R26.2 AMBULATORY DYSFUNCTION: Primary | ICD-10-CM

## 2025-06-26 PROCEDURE — 97129 THER IVNTJ 1ST 15 MIN: CPT

## 2025-06-26 PROCEDURE — 97530 THERAPEUTIC ACTIVITIES: CPT

## 2025-06-26 PROCEDURE — 97112 NEUROMUSCULAR REEDUCATION: CPT

## 2025-06-26 PROCEDURE — 97130 THER IVNTJ EA ADDL 15 MIN: CPT

## 2025-06-26 PROCEDURE — 97110 THERAPEUTIC EXERCISES: CPT

## 2025-06-26 NOTE — PROGRESS NOTES
Daily Speech Treatment Note    Today's date: 2025   Patient’s name: Aron Oswald Jr.  : 1950  MRN: [id number]  Safety measures: memory difficulties, HTN, hx of etoh abuse, color blind  Referring provider: Ludy Person,*    Encounter Diagnosis     ICD-10-CM    1. Cognitive communication deficit  R41.841       2. Memory changes  R41.3             Visit Tracking:  POC expires Unit limit Auth Expiration date PT/OT + Visit Limit?   25 N/A 25 BOMN PCY                                           Visit/Unit Tracking  AUTH Status:  Date    RE  4/24 5/6  5/15  PU         No Used  6  1 2  3  1  2  3  4         Remaining   4  9 8  7  9  8  7  6           Subjective/Behavioral:  -Patient pleasant, engaged, and cooperative. Patient's wife, Kimberly, present for today's session. Patient had questions regarding Holter monitor results; patient recommended to continue to f/u with cardiology.     Objective/Assessment: Patient made error early on in activity, thereby negatively impacting all values that followed, activity could not be corrected 2/2 time constraints.     Short-term goals:  Patient will be educated on and demonstrate understanding of word finding strategies (i.e., circumlocution) for improved generative naming and verbal expression skills, to be achieved in 2-4 weeks.   Patient will utilize word finding strategies during semantic feature analysis treatment activity, with 80% accuracy, IND, in 3 out of 4 consecutive sessions, to facilitate improved naming and verbal expression skills, to be achieved in 8-10 weeks.  Patient will complete auditory immediate and short term memory tasks to facilitate increased ability to retell narratives and recall information within functional living environment, with 80% accuracy, IND using preferred memory strategy, in 3 out of 4 consecutive sessions, to be achieved in 10-12 weeks.  25: Reviewed internal memory strategies and  external aids. Brainstormed ideas of strategy use to employ in daily tasks, including use of combination of different strategies and aids as needed to experience increased successful initiation/execution/completion of daily tasks. Discussed importance of reestablishing routine, increasing socialization and engagement with various communication partners; patient and wife verbally expressed understanding of information and agreement with recommendations.     Patient will complete complex auditory attention processing tasks (e.g., sentence unscramble, ranking numbers/words, etc.) to improve working memory, with 80% accuracy, IND, in 3 out of 4 consecutive sessions, to be achieved in 10-12 weeks.  Patient will complete thought organization tasks (e.g., sequencing, deduction puzzles, etc.) with 80% accuracy, IND, in 3 out of 4 consecutive sessions, to facilitate increased executive functioning, working memory, problem solving, and processing skills, to be achieved in 10-12 weeks.  6/19/25: Patient completed a medication management task which instructed him to fill AM/PM pillbox with mock pills following directions given for medication schedule, with emphasis on simulation of functional tasks (sequencing, following multi step directions). Patient completed with  50% and 75% accuracy, IND, across two trials increasing to 100% accuracy given mod-max cues including delayed indirect verbal feedback, direct verbal instruction, semantic cues, and immediate, direct verbal feedback, with color sorting completed by clinician for second trial, completed in an adequate amount of time.     6/26/25: To target attention and following complex directions, the patient completed a math coding activity in which they must follow along a sequence of symbols, using a symbol key as reference to the mathematical operation needing to be completed in each step. Patient completed this complex direction following task with 15% accuracy, IND. Given  as HEP.       To target mental manipulation and working memory, patient will participate in word finding activity (i.e., anagrams) with 80% accuracy, IND, in 3 out of 4 consecutive sessions, to be achieved in 10-12 weeks.   Patient will answer questions regarding story read aloud with 80% accuracy, IND, in 3 out of 4 consecutive sessions, to facilitate improved auditory comprehension and recall, to be achieved in 8-10 weeks.   Patient will complete attention tasks (divided, auditory, alternating, sustained) by responding to multiple tasks or details within tasks at the same time in a distracting environment, with 80% accuracy, given min cues, in 3 out of 4 consecutive sessions, to improve attention and working memory, to be achieved in 10-12 weeks.        Plan:  -Patient was provided with home exercises/activities to target goals in plan of care at the end of today's session.  -Continue with current plan of care.

## 2025-06-26 NOTE — PROGRESS NOTES
"Daily Note     Today's date: 2025  Patient name: Aron Oswlad Jr.  : 1950  MRN: [id number]  Referring provider: Zoraida Crabtree CRNP  Dx:   Encounter Diagnosis     ICD-10-CM    1. Ambulatory dysfunction  R26.2                      Subjective: Pt reports no changes since LV.      Objective: See treatment diary below      Assessment: Tolerated treatment well. Implemented additional TE's to further challenge balance, proprioception, and postural stability.  Pt able to complete with mild anterior LOB noted during lava rocks, able to self correct with hip strategy and requires CGA to complete.  Patient would benefit from continued PT      Plan: Continue per plan of care.        POC expires Unit limit Auth  expiration date PT/OT + Visit Limit?   25 N/A N/A BOMN                 Visit/Unit Tracking  AUTH Status:  Date            N/A Used 1 2 3 4            Remaining                  Precautions: HTN, h/o C5 fx      Manuals          Vitals  BP: 164/82    HR: 50 BP:  120/60 BP: 138/58                                                Neuro Re-Ed             Bosu FSU  X20 B/L intermittent UE assit X20 B/L intermittent UE  X20 B/L intermittent UE          SLS             Tandem stance  4x30'' 2x30\" ea          Patient education: gym membership/walking program, cardiovascular activity, HEP compliance, balance systems  GR            Wobble board: balance   1 min ea balance 1 min ea balance CGA         Wobble board: taps    20x ea  20x ea CGA                      Ther Ex             Nustep: UE/LE  L1 10' L5 10'  L5 10'                                                                                                    Ther Activity             Forward, lateral hurdles  4 laps ea 4 laps ea 5# KB fwd?         Lava rocks    5 laps at mirror SL pause CGA         [place]: resisted walking all directions  20# fwd/bad x10 NV  20# fwd/bad x10                                   Gait " Training                                       Modalities

## 2025-06-30 NOTE — PROGRESS NOTES
"Daily Note     Today's date: 2025  Patient name: Aron Oswald Jr.  : 1950  MRN: [id number]  Referring provider: Zroaida Crabtree CRNP  Dx:   Encounter Diagnosis     ICD-10-CM    1. Ambulatory dysfunction  R26.2                      Subjective: Patient denies LOB or falls and states he has been feeling well.      Objective: See treatment diary below      Assessment: Tolerated treatment well. Patient demonstrated fatigue post treatment and would benefit from continued PT.  LOB with balance on Rocker Board.  Minimal assistance to recover.      Plan: Continue per plan of care.  Progress treatment as tolerated.         POC expires Unit limit Auth  expiration date PT/OT + Visit Limit?   25 N/A N/A BOMN                 Visit/Unit Tracking  AUTH Status:  Date           N/A Used 1 2 3 4 5           Remaining                  Precautions: HTN, h/o C5 fx      Manuals  7/        Vitals  BP: 164/82    HR: 50 BP:  120/60 BP: 138/58 /58 HR 58                                               Neuro Re-Ed             Bosu FSU  X20 B/L intermittent UE assit X20 B/L intermittent UE  X20 B/L intermittent UE  X20 B/L intermittent UE         SLS             Tandem stance  4x30'' 2x30\" ea  X1' ea        Patient education: gym membership/walking program, cardiovascular activity, HEP compliance, balance systems  GR            Wobble board: balance   1 min ea balance 1 min ea balance CGA 1 min ea balance CGA        Wobble board: taps    20x ea  20x ea CGA 20x ea CGA                     Ther Ex             Nustep: UE/LE  L1 10' L5 10'  L5 10' L5 10'                                                                                                   Ther Activity             Forward, lateral hurdles  4 laps ea 4 laps ea 5# KB fwd?         Lava rocks    5 laps at mirror SL pause CGA 5 laps at mirror SL pause CGA        [place]: resisted walking all directions  20# fwd/bad x10 NV  20# " vijaya/bad x10 20# vijaya/bad x10                                  Gait Training                                       Modalities

## 2025-07-01 ENCOUNTER — OFFICE VISIT (OUTPATIENT)
Dept: PHYSICAL THERAPY | Facility: CLINIC | Age: 75
End: 2025-07-01
Attending: NURSE PRACTITIONER
Payer: MEDICARE

## 2025-07-01 DIAGNOSIS — R26.2 AMBULATORY DYSFUNCTION: Primary | ICD-10-CM

## 2025-07-01 PROCEDURE — 97110 THERAPEUTIC EXERCISES: CPT | Performed by: PHYSICAL THERAPIST

## 2025-07-01 PROCEDURE — 97112 NEUROMUSCULAR REEDUCATION: CPT | Performed by: PHYSICAL THERAPIST

## 2025-07-01 PROCEDURE — 97530 THERAPEUTIC ACTIVITIES: CPT | Performed by: PHYSICAL THERAPIST

## 2025-07-03 ENCOUNTER — EVALUATION (OUTPATIENT)
Age: 75
End: 2025-07-03
Payer: MEDICARE

## 2025-07-03 ENCOUNTER — APPOINTMENT (OUTPATIENT)
Dept: PHYSICAL THERAPY | Facility: CLINIC | Age: 75
End: 2025-07-03
Attending: NURSE PRACTITIONER
Payer: MEDICARE

## 2025-07-03 DIAGNOSIS — R41.841 COGNITIVE COMMUNICATION DEFICIT: Primary | ICD-10-CM

## 2025-07-03 DIAGNOSIS — R41.3 MEMORY CHANGES: ICD-10-CM

## 2025-07-03 PROCEDURE — 96125 COGNITIVE TEST BY HC PRO: CPT

## 2025-07-03 NOTE — PROGRESS NOTES
"Speech-Language Pathology Re-Evaluation    Today's date: 7/3/2025   Patient’s name: Aron Oswald Jr.  : 1950  MRN: [id number]  Safety measures:  memory difficulties, HTN, hx of etoh abuse   Referring provider: Ludy Person,*    Encounter Diagnosis     ICD-10-CM    1. Cognitive communication deficit  R41.841       2. Memory changes  R41.3             Assessment:   Patient continues to present with mild cognitive-linguistic impairment c/b deficits with attention, immediate and delayed memory, executive functioning, and word finding difficulties with evident lexical retrieval deficits observed during structured tasks as well as spontaneous discourse. Patient reported \"not seeing anything that is significantly different other than some improvements with some things\". Patient has made limited progress since last re-evaluation  only being seen for 2 sessions since testing. Clinician and patient have discussed importance of consistent attendance and completion of HEP in order to make observable improvements during POC. Patient benefits from meta-cognitive cues to increase insight and awareness into deficits, perceived challenges, and potential compensatory strategies to implement to improve overall QOL and increase success with daily tasks. Patient would benefit from continued OP skilled ST services to target increased functional recall, improve executive functioning skills (e.g. processing, problem-solving, thought organization, etc.), increase verbal fluency, and receive education on word-finding, in order to experience successful completion of daily tasks, follow directions within functional activities and unstructured tasks, support positive communication interactions with both familiar and unfamiliar listeners, promote safety, and facilitate overall improved quality of life.     Short-term goals:  Patient will be educated on and demonstrate understanding of word finding strategies (i.e., " circumlocution) for improved generative naming and verbal expression skills, to be achieved in 2-4 weeks. PARTIALLY MET/ONGOING  Patient will utilize word finding strategies during semantic feature analysis treatment activity, with 80% accuracy, IND, in 3 out of 4 consecutive sessions, to facilitate improved naming and verbal expression skills, to be achieved in 8-10 weeks.PARTIALLY MET/ONGOING  Patient will complete auditory immediate and short term memory tasks to facilitate increased ability to retell narratives and recall information within functional living environment, with 80% accuracy, IND using preferred memory strategy, in 3 out of 4 consecutive sessions, to be achieved in 10-12 weeks.PARTIALLY MET/ONGOING  Patient will complete complex auditory attention processing tasks (e.g., sentence unscramble, ranking numbers/words, etc.) to improve working memory, with 80% accuracy, IND, in 3 out of 4 consecutive sessions, to be achieved in 10-12 weeks.PARTIALLY MET/ONGOING  Patient will complete thought organization tasks (e.g., sequencing, deduction puzzles, etc.) with 80% accuracy, IND, in 3 out of 4 consecutive sessions, to facilitate increased executive functioning, working memory, problem solving, and processing skills, to be achieved in 10-12 weeks..PARTIALLY MET/ONGOING  To target mental manipulation and working memory, patient will participate in word finding activity (i.e., anagrams) with 80% accuracy, IND, in 3 out of 4 consecutive sessions, to be achieved in 10-12 weeks. PARTIALLY MET/ONGOING  Patient will answer questions regarding story read aloud with 80% accuracy, IND, in 3 out of 4 consecutive sessions, to facilitate improved auditory comprehension and recall, to be achieved in 8-10 weeks. PARTIALLY MET/ONGOING  Patient will complete attention tasks (divided, auditory, alternating, sustained) by responding to multiple tasks or details within tasks at the same time in a distracting environment, with  "80% accuracy, given min cues, in 3 out of 4 consecutive sessions, to improve attention and working memory, to be achieved in 10-12 weeks.  PARTIALLY MET/ONGOING     Long-term goals:  Patient will complete cognitive-linguistic therapy that addresses patient's specific deficits in processing speed, short-term working memory, attention to detail, monitoring, sequencing, and organization skills, with instruction, to alleviate effects of executive functioning disorder deficits by discharge.PARTIALLY MET/ONGOING  Patient will improve ability to facilitate cognitive function and communication skills, including use of compensatory strategies in a variety of functional living tasks, to improve quality of life and to maximize level of independence.   PARTIALLY MET/ONGOING      Plan:  Patient would benefit from outpatient skilled Speech Therapy services: Cognitive-linguistic therapy    Frequency: 1-2x weekly  Duration: 3 months    Intervention certification from: 7/3/2025  Intervention certification to: 10/3/2025      Subjective:  -Patient pleasant, engaged, and cooperative. Patient attended today's session unaccompanied. Patient ten minutes late for txl agreeable to shortened session. No new concerns reported.       Patient's goal(s): \"I guess to be a little more alert, maybe. You know just basic paying attention type of thing.\"    Pain: Absent (scale: N/A)      Objective (testing):  rbans        Visit Tracking:  POC expires Unit limit Auth Expiration date PT/OT + Visit Limit?   7/8/25 N/A 12/13/25 BOMN PCY                                           Visit/Unit Tracking  AUTH Status:  Date  4/8  RE  4/24 5/6  5/15  PU  5/29 6/12 6/19 6/26 7/3  RE     No Used  6  1 2  3  1  2  3  4  5       Remaining   4  9 8  7  9  8  7  6  5          Intervention comments:  - 35 minutes standard cognitive-linguistic evaluation  - 60 minutes scoring, report write-up, and modification of POC    " "pleasant, engaged, and cooperative. Patient attended today's session unaccompanied. Patient ten minutes late for txl agreeable to shortened session. No new concerns reported.       Patient's goal(s): \"I guess to be a little more alert, maybe. You know just basic paying attention type of thing.\"    Pain: Absent (scale: N/A)      Objective (testing):  The Repeatable Battery for the Assessment of Neuropsychological Status (RBANS) is a brief, individually-administered assessment which measures attention, language, visuospatial/constructional abilities, and immediate & delayed memory. The RBANS is intended for use with adolescents to adults, ages 12 to 89 years. The following results were obtained during the administration of the assessment.    Form: C    Cognitive Domain/Subtest: Index Score: Percentile Rank: Classification: RE Index Score: Status:   IMMEDIATE MEMORY 69 2%ile Extremely Low 85 DECLINE        1. List Learning (18/40)          2. Story Memory (8/24)           VISUOSPATIAL/  CONSTRUCTIONAL 72 3%ile Borderline 121 DECLINE        3. Figure Copy (17/20)          4. Line Orientation (16/20)           LANGUAGE 75 5%ile Borderline 88 DECLINE        5. Picture Naming (8/10)          6. Semantic Fluency (14/40)           ATTENTION 91 27%ile Average 79 IMPROVEMENT        7. Digit Span (10/16)          8. Coding (29/89)           DELAYED MEMORY 75 5%ile Borderline 113 DECLINE        9. List Recall (0/10)          10. List Recognition (17/20)          11. Story Recall (6/12)          12. Figure Recall (11/20)           Sum of Index Scores:  382  486 DECLINE   Total Score:  70      Percentile: 2%ile      Classification: Borderline          “RE” indicates the scores from the re-evaluation (4/8/25). Form: B      *Patient named 10 concrete category members (musical instruments) in 60 sec (norm=15+). -- BELOW AVERAGE    *Patient named 8 abstract category members (words beginning with letter 't') in 60 sec (norm=10+). -- " BELOW AVERAGE         Visit Tracking:  POC expires Unit limit Auth Expiration date PT/OT + Visit Limit?   7/8/25 N/A 12/13/25 BOMN PCY                                           Visit/Unit Tracking  AUTH Status:  Date  4/8  RE  4/24 5/6  5/15  PU  5/29  6/12  6/19  6/26  7/3  RE     No Used  6  1 2  3  1  2  3  4  5       Remaining   4  9 8  7  9  8  7  6  5          Intervention comments:  - 35 minutes standard cognitive-linguistic evaluation  - 60 minutes scoring, report write-up, and modification of POC

## 2025-07-08 ENCOUNTER — OFFICE VISIT (OUTPATIENT)
Dept: PHYSICAL THERAPY | Facility: CLINIC | Age: 75
End: 2025-07-08
Attending: NURSE PRACTITIONER
Payer: MEDICARE

## 2025-07-08 DIAGNOSIS — R26.2 AMBULATORY DYSFUNCTION: Primary | ICD-10-CM

## 2025-07-08 PROCEDURE — 97110 THERAPEUTIC EXERCISES: CPT

## 2025-07-08 PROCEDURE — 97530 THERAPEUTIC ACTIVITIES: CPT

## 2025-07-08 NOTE — PROGRESS NOTES
"Daily Note     Today's date: 2025  Patient name: Aron Oswald Jr.  : 1950  MRN: [id number]  Referring provider: Zoraida Crabtree CRNP  Dx:   Encounter Diagnosis     ICD-10-CM    1. Ambulatory dysfunction  R26.2           Start Time: 1057          Subjective: pt noted no changes since last visit.       Objective: See treatment diary below      Assessment:  Progressions made at this tie with focus on LE strengthening to improve ambulation and balance. Noticed an increase challenge today. Tolerated treatment well. Patient exhibited good technique with therapeutic exercises and would benefit from continued PT  S/p treatment session,     Plan: Continue per plan of care.        POC expires Unit limit Auth  expiration date PT/OT + Visit Limit?   25 N/A N/A BOMN                 Visit/Unit Tracking  AUTH Status:  Date           N/A Used 1 2 3 4 5 6           Remaining                  Precautions: HTN, h/o C5 fx      Manuals        Vitals  BP: 164/82    HR: 50 BP:  120/60 BP: 138/58 /58 HR 58 68HR   98SPO2     /80mmHg                                              Neuro Re-Ed             Bosu FSU  X20 B/L intermittent UE assit X20 B/L intermittent UE  X20 B/L intermittent UE  X20 B/L intermittent UE         SLS             Tandem stance  4x30'' 2x30\" ea  X1' ea        Patient education: gym membership/walking program, cardiovascular activity, HEP compliance, balance systems  GR     DOMS/ HEP        Wobble board: balance   1 min ea balance 1 min ea balance CGA 1 min ea balance CGA        Wobble board: taps    20x ea  20x ea CGA 20x ea CGA                     Ther Ex             Nustep: UE/LE  L1 10' L5 10'  L5 10' L5 10' L5 10 min        Hip  3 ways       2x 10                                                                                      Ther Activity             Forward, lateral hurdles  4 laps ea 4 laps ea 5# KB fwd?         Lava rocks  " "  5 laps at mirror SL pause CGA 5 laps at mirror SL pause CGA        [place]: resisted walking all directions  20# fwd/bad x10 NV  20# fwd/bad x10 20# fwd/bad x10 20# fwd and bwd 10x  cl. Sup        Fwd step ups       6\"  2x 10        Lateral step ups       6\"   2x 10 b/l        Sit to stand       2x 10                     Gait Training                                       Modalities                                                         "

## 2025-07-14 ENCOUNTER — APPOINTMENT (OUTPATIENT)
Age: 75
End: 2025-07-14
Payer: MEDICARE

## 2025-07-16 ENCOUNTER — APPOINTMENT (OUTPATIENT)
Dept: PHYSICAL THERAPY | Facility: CLINIC | Age: 75
End: 2025-07-16
Attending: NURSE PRACTITIONER
Payer: MEDICARE

## 2025-07-16 DIAGNOSIS — R26.2 AMBULATORY DYSFUNCTION: Primary | ICD-10-CM

## 2025-07-17 ENCOUNTER — APPOINTMENT (OUTPATIENT)
Age: 75
End: 2025-07-17
Payer: MEDICARE

## 2025-07-17 ENCOUNTER — EVALUATION (OUTPATIENT)
Dept: PHYSICAL THERAPY | Facility: CLINIC | Age: 75
End: 2025-07-17
Attending: NURSE PRACTITIONER
Payer: MEDICARE

## 2025-07-17 DIAGNOSIS — R26.2 AMBULATORY DYSFUNCTION: Primary | ICD-10-CM

## 2025-07-17 PROCEDURE — 97112 NEUROMUSCULAR REEDUCATION: CPT | Performed by: PHYSICAL THERAPIST

## 2025-07-17 NOTE — PROGRESS NOTES
PT Re-Evaluation  and PT Discharge    Today's date: 2025  Patient name: Aron Oswald Jr.  : 1950  MRN: [id number]  Referring provider: Zoraida Crabtree CRNP  Dx:   Encounter Diagnosis     ICD-10-CM    1. Ambulatory dysfunction  R26.2             Start Time: 1403  Stop Time: 1445  Total time in clinic (min): 42 minutes    Assessment    Assessment details: Since beginning physical therapy, Aron has attended a total number of 7 visits and has maintained fair compliance with established POC. Patient has made gradual improvements, including improved dynamic and static balance with NBOS. Patient's FGA is within age appropriate norms. Patient describes no balance dysfunction or unsteadiness wth a/iadls. Patient does c/o inconsistent BP that is potentially contributing to some of his unsteadiness when first standing. Patient will be following up with cardiology. Patient has been instructed to contact PT if he begins to notice a decline in function or has any questions or concerns. Patient will be discharged at this time. Patient is in agreement with plan.  Understanding of Dx/Px/POC: excellent     Prognosis: excellent    Goals  Short Term Goals: to be achieved in 4 weeks  1) Patient to be independent with basic HEP. MET  2) Patient to improve FGA by 3 points. NOT TESTED    Long Term Goals: to be achieved by discharge  1) Patient to be independent with comprehensive HEP. NOT MET  2) Patient to report increased confidence with a/iadls with less fear avoidance. MET    Plan    Planned therapy interventions: home exercise program    Plan of Care beginning date: 2025  Plan of Care expiration date: 2025  Treatment plan discussed with: patient        Subjective Evaluation    History of Present Illness  Mechanism of injury: Patient reports not noticing any significant improvement in his balance over the past several weeks. Patient denies experiencing loss of balance recently. Patient denies experiencing any  "falls or unsteadiness in general. Patient has had lower blood pressure over the past month and notices if he stands too quickly he'll feel a little tired and have slightly fuzzy vision. Patient has an appointment with cardiology next week.   Patient Goals  Patient goals for therapy: improved balance    Pain  No pain reported    Treatments  No previous or current treatments      Objective     Strength/Myotome Testing     Lumbar   Left   Normal strength    Right   Normal strength    Ambulation   Weight-Bearing Status   Weight-Bearing Status (Left): full weight bearing   Weight-Bearing Status (Right): full weight-bearing    Assistive device used: none    Ambulation: Level Surfaces   Ambulation without assistive device: independent    Ambulation: Stairs   Ascend stairs: independent  Descend stairs: independent    Observational Gait   Gait: within functional limits     Functional Assessment        Comments  INITIAL EVALUATION (5/28/25):  Balance (sec):     FT, EO: 30    FT, EC: 30, minor increase in postural sway    Tandem stance EO: 30    Tandem stance, EC: 5    SLS: R: 0 ; L: 4    Functional Gait Assessment: 27/30    REEVALUATION (7/16/25):    Balance (sec):     FT, EO: 30    FT, EC: 30    Tandem stance, EO: 30    SLS: R: 4 ; L: 10                 POC expires Unit limit Auth  expiration date PT/OT + Visit Limit?   8/20/25 N/A N/A BOMN                 Visit/Unit Tracking  AUTH Status:  Date 5/28 6/18 6/24 6/26 7/1 7/8 7/17        N/A Used 1 2 3 4 5 6  7         Remaining                  Precautions: HTN, h/o C5 fx      Manuals 5/28 6/18 6/24 6/26 7/1 7/8 7/17      Vitals  BP: 164/82    HR: 50 BP:  120/60 BP: 138/58 /58 HR 58 68HR   98SPO2     /80mmHg       Reassessment       GR                                Neuro Re-Ed             Bosu FSU  X20 B/L intermittent UE assit X20 B/L intermittent UE  X20 B/L intermittent UE  X20 B/L intermittent UE         SLS             Tandem stance  4x30'' 2x30\" ea  X1' ea  " "      Patient education: gym membership/walking program, cardiovascular activity, HEP compliance, balance systems  GR     DOMS/ HEP  GR      Wobble board: balance   1 min ea balance 1 min ea balance CGA 1 min ea balance CGA        Wobble board: taps    20x ea  20x ea CGA 20x ea CGA                     Ther Ex             Nustep: UE/LE  L1 10' L5 10'  L5 10' L5 10' L5 10 min        Hip  3 ways       2x 10                                                                                      Ther Activity             Forward, lateral hurdles  4 laps ea 4 laps ea 5# KB fwd?         Lava rocks    5 laps at mirror SL pause CGA 5 laps at mirror SL pause CGA        [place]: resisted walking all directions  20# fwd/bad x10 NV  20# fwd/bad x10 20# fwd/bad x10 20# fwd and bwd 10x  cl. Sup        Fwd step ups       6\"  2x 10        Lateral step ups       6\"   2x 10 b/l        Sit to stand       2x 10                     Gait Training                                       Modalities                                              "

## 2025-07-24 ENCOUNTER — APPOINTMENT (OUTPATIENT)
Age: 75
End: 2025-07-24
Payer: MEDICARE

## 2025-07-24 ENCOUNTER — TELEPHONE (OUTPATIENT)
Dept: NEUROLOGY | Facility: CLINIC | Age: 75
End: 2025-07-24

## 2025-07-24 NOTE — TELEPHONE ENCOUNTER
LVM confirming virtual appointment with  Dr. Rubio for 7/25/25 at 2:30 PM Gave [phone] to reschedule if needed.

## 2025-07-24 NOTE — TELEPHONE ENCOUNTER
Pt called neurology office returning call.  Made him aware of below.      Appt confirmed on appt desk

## 2025-07-25 ENCOUNTER — TELEPHONE (OUTPATIENT)
Age: 75
End: 2025-07-25

## 2025-07-25 ENCOUNTER — TELEPHONE (OUTPATIENT)
Dept: SLEEP CENTER | Facility: CLINIC | Age: 75
End: 2025-07-25

## 2025-07-25 ENCOUNTER — TELEMEDICINE (OUTPATIENT)
Age: 75
End: 2025-07-25
Payer: MEDICARE

## 2025-07-25 DIAGNOSIS — G47.33 OSA (OBSTRUCTIVE SLEEP APNEA): Primary | ICD-10-CM

## 2025-07-25 DIAGNOSIS — G47.52 DREAM ENACTMENT BEHAVIOR: ICD-10-CM

## 2025-07-25 LAB
DME PARACHUTE DELIVERY DATE ACTUAL: NORMAL
DME PARACHUTE DELIVERY DATE EXPECTED: NORMAL
DME PARACHUTE DELIVERY DATE REQUESTED: NORMAL
DME PARACHUTE DELIVERY DATE REQUESTED: NORMAL
DME PARACHUTE ITEM DESCRIPTION: NORMAL
DME PARACHUTE ORDER STATUS: NORMAL
DME PARACHUTE ORDER STATUS: NORMAL
DME PARACHUTE SUPPLIER NAME: NORMAL
DME PARACHUTE SUPPLIER NAME: NORMAL
DME PARACHUTE SUPPLIER PHONE: NORMAL
DME PARACHUTE SUPPLIER PHONE: NORMAL

## 2025-07-25 PROCEDURE — G2211 COMPLEX E/M VISIT ADD ON: HCPCS | Performed by: INTERNAL MEDICINE

## 2025-07-25 PROCEDURE — 99213 OFFICE O/P EST LOW 20 MIN: CPT | Performed by: INTERNAL MEDICINE

## 2025-07-25 NOTE — PROGRESS NOTES
Virtual Regular Visit  Name: Aron Oswald Jr.      : 1950      MRN: [id number]  Encounter Provider: Isael Rubio MD  Encounter Date: 2025   Encounter department: [facility] SLEEP [facility]IC[facility]  :  Assessment & Plan  DASIA (obstructive sleep apnea)  Mild obstructive sleep apnea  RDI 7.6  Initial S/s: Snoring, choking, gasping, witnessed apneas, excessive daytime sleepiness, [place] score: 13    Compliance from-  More than 4 hours 30%, 3 hours and 56 minutes  On APAP 5-15  95 percentile pressure 13.2  Median leak 3.4  Residual AHI 4.5      Switched to fullface masks    Patient states he is sleeping better without CPAP and no significant air leak  He has difficulty tolerating the pressure    Plan  Ordered CPAP study as patient may tolerate pressure better with BiPAP  Ordered pressure change to 5-10 cm H2O  Orders:    CPAP Titration Study; Future    PAP DME Pressure Change    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 male with PMHx Hypertension, PVCs, thyroid nodule who presents for follow-up of mild obstructive sleep apnea.    Initial office visit  He reports excessive daytime sleepiness with an [place] score of 13.  He reports choking and gasping during sleep.  He has been told he stops breathing at night.  His memory and concentration is decreased.  He goes to bed at 11:30 PM and falls asleep within 30 minutes.  He wakes up 1-2 times at night to urinate.  He wakes up at 5 AM.  He gets 8 hours of sleep and sometimes feels  refreshed afterwards.  He drinks coffee in the morning.  He occasionally takes a nap but feels worse after a nap.  He reports snoring but is not loud and intermittent.     In August 2024 he fell on the floor from the bed. He noticed right arm weakness afterwards despite falling on left side. He has had dream enactment episodes 1-2 times a year where he fell on the floor. His wife states that this has been going on for years (8-10 years).     His wife states that he has a decreased sense of smell.  He has had constipation.    Current office visit  He has been having difficulty with compliance and switching to fullface mask.  He was previously on nasal mask and had good compliance.  His compliance is decreased recently.  He has not had any dream enactment episodes since starting CPAP and melatonin at 5 mg.  He notices significant air leak.  His current [place] score is 8.  He goes to bed at 11 PM and falls asleep within an hour.  He gets out of bed at 8:30 AM.  He gets 7 hours of sleep on average.  He does not always feel refreshed.        Review of Systems   Constitutional: Negative.    HENT: Negative.     Eyes: Negative.    Respiratory: Negative.     Cardiovascular: Negative.    Gastrointestinal: Negative.    Endocrine: Negative.    Genitourinary: Negative.    Musculoskeletal: Negative.    Skin: Negative.    Allergic/Immunologic: Negative.    Neurological: Negative.    Hematological: Negative.    Psychiatric/Behavioral: Negative.         Objective   There were no vitals taken for this visit.    Physical Exam  Constitutional:       Appearance: Normal appearance. He is obese. He is not ill-appearing.   HENT:      Head: Normocephalic and atraumatic.      Nose: Nose normal.      Mouth/Throat:      Mouth: Mucous membranes are moist.     Eyes:      Extraocular Movements: Extraocular movements intact.      Pupils: Pupils are equal, round, and reactive to light.       Musculoskeletal:         General: Normal range of  motion.      Cervical back: Normal range of motion.     Neurological:      Mental Status: He is alert and oriented to person, place, and time. Mental status is at baseline.     Psychiatric:         Mood and Affect: Mood normal.         Behavior: Behavior normal.         Administrative Statements   Encounter provider Isael Rubio MD    The Patient is located at Home and in the following state in which I hold an active license [place].    The patient was identified by name and date of birth. Aron Oswald JrPetra was informed that this is a telemedicine visit and that the visit is being conducted through the [employer] platform. He agrees to proceed..  My office door was closed. The patient was notified the following individuals were present in the room Dr. Marietta Cueto ( resident).  He acknowledged consent and understanding of privacy and security of the video platform. The patient has agreed to participate and understands they can discontinue the visit at any time.    I have spent a total time of 8.5 minutes in caring for this patient on the day of the visit/encounter including Diagnostic results, Prognosis, Risks and benefits of tx options, Instructions for management, Patient and family education, Importance of tx compliance, Risk factor reductions, Impressions, Counseling / Coordination of care, Documenting in the medical record, Reviewing/placing orders in the medical record (including tests, medications, and/or procedures), and Obtaining or reviewing history  , not including the time spent for establishing the audio/video connection.

## 2025-07-25 NOTE — TELEPHONE ENCOUNTER
I reviewed the patient liability acknowledgement form with the patient on the telephone.      Patients who cancel their morning sleep study after 3:00 pm the day prior to the study, or cancel their evening sleep study after 12:00 pm on the day of the study, or fail to arrive for their scheduled appointment will be charged a $100 No Show Fee.  Sleep studies scheduled on Sundays must be cancelled by 12:00 noon on the preceding Friday.    The patient acknowledges verbally that they are financially responsible for a $100 No Show charge if they do not cancel their sleep study by the cancellation time requirement listed above. This charge will not be covered by their insurance company.  This charge must be paid prior to the test being re-scheduled.

## 2025-07-25 NOTE — ASSESSMENT & PLAN NOTE
Mild obstructive sleep apnea  RDI 7.6  Initial S/s: Snoring, choking, gasping, witnessed apneas, excessive daytime sleepiness, [place] score: 13    Compliance from/18-7/17  More than 4 hours 30%, 3 hours and 56 minutes  On APAP 5-15  95 percentile pressure 13.2  Median leak 3.4  Residual AHI 4.5      Switched to fullface masks    Patient states he is sleeping better without CPAP and no significant air leak  He has difficulty tolerating the pressure    Plan  Ordered CPAP study as patient may tolerate pressure better with BiPAP  Ordered pressure change to 5-10 cm H2O  Orders:    CPAP Titration Study; Future    PAP DME Pressure Change

## 2025-07-25 NOTE — TELEPHONE ENCOUNTER
7/25 tried to call patient; no answer; left voicemail. Called pt's spouse; no answer; left voicemail. Dr. Rubio would like to have this pt scheduled for a sleep diagnostic study. Open bed for 7/26

## 2025-07-28 ENCOUNTER — OFFICE VISIT (OUTPATIENT)
Age: 75
End: 2025-07-28
Payer: MEDICARE

## 2025-07-28 DIAGNOSIS — R41.841 COGNITIVE COMMUNICATION DEFICIT: Primary | ICD-10-CM

## 2025-07-28 DIAGNOSIS — R41.3 MEMORY CHANGES: ICD-10-CM

## 2025-07-28 PROCEDURE — 97129 THER IVNTJ 1ST 15 MIN: CPT

## 2025-07-28 PROCEDURE — 97130 THER IVNTJ EA ADDL 15 MIN: CPT

## 2025-07-29 LAB
DME PARACHUTE DELIVERY DATE ACTUAL: NORMAL
DME PARACHUTE DELIVERY DATE ACTUAL: NORMAL
DME PARACHUTE DELIVERY DATE EXPECTED: NORMAL
DME PARACHUTE DELIVERY DATE REQUESTED: NORMAL
DME PARACHUTE DELIVERY DATE REQUESTED: NORMAL
DME PARACHUTE ITEM DESCRIPTION: NORMAL
DME PARACHUTE ORDER STATUS: NORMAL
DME PARACHUTE ORDER STATUS: NORMAL
DME PARACHUTE SUPPLIER NAME: NORMAL
DME PARACHUTE SUPPLIER NAME: NORMAL
DME PARACHUTE SUPPLIER PHONE: NORMAL
DME PARACHUTE SUPPLIER PHONE: NORMAL

## 2025-07-31 ENCOUNTER — OFFICE VISIT (OUTPATIENT)
Age: 75
End: 2025-07-31
Payer: MEDICARE

## 2025-07-31 DIAGNOSIS — R41.3 MEMORY CHANGES: ICD-10-CM

## 2025-07-31 DIAGNOSIS — R41.841 COGNITIVE COMMUNICATION DEFICIT: Primary | ICD-10-CM

## 2025-07-31 PROCEDURE — 97130 THER IVNTJ EA ADDL 15 MIN: CPT

## 2025-07-31 PROCEDURE — 97129 THER IVNTJ 1ST 15 MIN: CPT

## 2025-08-11 ENCOUNTER — OFFICE VISIT (OUTPATIENT)
Age: 75
End: 2025-08-11
Payer: MEDICARE

## 2025-08-12 ENCOUNTER — TELEPHONE (OUTPATIENT)
Age: 75
End: 2025-08-12

## 2025-08-14 ENCOUNTER — HOSPITAL ENCOUNTER (EMERGENCY)
Facility: HOSPITAL | Age: 75
Discharge: HOME | End: 2025-08-14
Attending: EMERGENCY MEDICINE | Admitting: EMERGENCY MEDICINE
Payer: MEDICARE

## 2025-08-14 ENCOUNTER — TELEPHONE (OUTPATIENT)
Age: 75
End: 2025-08-14

## 2025-08-14 VITALS
SYSTOLIC BLOOD PRESSURE: 124 MMHG | TEMPERATURE: 98 F | HEART RATE: 82 BPM | RESPIRATION RATE: 18 BRPM | HEIGHT: 71 IN | BODY MASS INDEX: 26.04 KG/M2 | WEIGHT: 186 LBS | OXYGEN SATURATION: 98 % | DIASTOLIC BLOOD PRESSURE: 66 MMHG

## 2025-08-14 DIAGNOSIS — I95.9 HYPOTENSION: Primary | ICD-10-CM

## 2025-08-14 PROCEDURE — 99283 EMERGENCY DEPT VISIT LOW MDM: CPT | Performed by: EMERGENCY MEDICINE

## 2025-08-14 PROCEDURE — 99284 EMERGENCY DEPT VISIT MOD MDM: CPT

## 2025-08-21 ENCOUNTER — OFFICE VISIT (OUTPATIENT)
Age: 75
End: 2025-08-21
Payer: MEDICARE

## 2025-08-21 DIAGNOSIS — R41.3 MEMORY CHANGES: ICD-10-CM

## 2025-08-21 DIAGNOSIS — R41.841 COGNITIVE COMMUNICATION DEFICIT: Primary | ICD-10-CM

## 2025-08-21 PROCEDURE — 97129 THER IVNTJ 1ST 15 MIN: CPT

## 2025-08-21 PROCEDURE — 97130 THER IVNTJ EA ADDL 15 MIN: CPT
